# Patient Record
Sex: FEMALE | Race: WHITE | NOT HISPANIC OR LATINO | Employment: OTHER | URBAN - METROPOLITAN AREA
[De-identification: names, ages, dates, MRNs, and addresses within clinical notes are randomized per-mention and may not be internally consistent; named-entity substitution may affect disease eponyms.]

---

## 2017-01-11 ENCOUNTER — TRANSCRIBE ORDERS (OUTPATIENT)
Dept: LAB | Facility: CLINIC | Age: 82
End: 2017-01-11

## 2017-01-11 ENCOUNTER — APPOINTMENT (OUTPATIENT)
Dept: LAB | Facility: CLINIC | Age: 82
End: 2017-01-11
Payer: MEDICARE

## 2017-01-11 DIAGNOSIS — M06.09 RHEUMATOID ARTHRITIS OF MULTIPLE SITES WITHOUT RHEUMATOID FACTOR (HCC): ICD-10-CM

## 2017-01-11 DIAGNOSIS — Z79.899 ENCOUNTER FOR LONG-TERM (CURRENT) USE OF OTHER MEDICATIONS: Primary | ICD-10-CM

## 2017-01-11 DIAGNOSIS — M25.511 CHRONIC RIGHT SHOULDER PAIN: ICD-10-CM

## 2017-01-11 DIAGNOSIS — M75.101 ROTATOR CUFF SYNDROME OF RIGHT SHOULDER: ICD-10-CM

## 2017-01-11 DIAGNOSIS — R76.0 RAISED ANTIBODY TITER: ICD-10-CM

## 2017-01-11 DIAGNOSIS — M81.0 OSTEOPOROSIS, UNSPECIFIED: ICD-10-CM

## 2017-01-11 DIAGNOSIS — M51.36 DEGENERATION OF LUMBAR INTERVERTEBRAL DISC: ICD-10-CM

## 2017-01-11 DIAGNOSIS — M15.0 PRIMARY GENERALIZED HYPERTROPHIC OSTEOARTHROSIS: ICD-10-CM

## 2017-01-11 DIAGNOSIS — G89.29 CHRONIC RIGHT SHOULDER PAIN: ICD-10-CM

## 2017-01-11 DIAGNOSIS — G56.02 CARPAL TUNNEL SYNDROME ON LEFT: ICD-10-CM

## 2017-01-11 DIAGNOSIS — M19.232 SECONDARY OSTEOARTHRITIS OF LEFT WRIST: ICD-10-CM

## 2017-01-11 LAB
ALBUMIN SERPL BCP-MCNC: 3.5 G/DL (ref 3.5–5)
ALT SERPL W P-5'-P-CCNC: 16 U/L (ref 12–78)
ANION GAP SERPL CALCULATED.3IONS-SCNC: 6 MMOL/L (ref 4–13)
AST SERPL W P-5'-P-CCNC: 12 U/L (ref 5–45)
BASOPHILS # BLD AUTO: 0.05 THOUSANDS/ΜL (ref 0–0.1)
BASOPHILS NFR BLD AUTO: 1 % (ref 0–1)
BUN SERPL-MCNC: 16 MG/DL (ref 5–25)
CALCIUM SERPL-MCNC: 9.7 MG/DL (ref 8.3–10.1)
CHLORIDE SERPL-SCNC: 105 MMOL/L (ref 100–108)
CO2 SERPL-SCNC: 29 MMOL/L (ref 21–32)
CREAT SERPL-MCNC: 0.72 MG/DL (ref 0.6–1.3)
EOSINOPHIL # BLD AUTO: 0.12 THOUSAND/ΜL (ref 0–0.61)
EOSINOPHIL NFR BLD AUTO: 2 % (ref 0–6)
ERYTHROCYTE [DISTWIDTH] IN BLOOD BY AUTOMATED COUNT: 14.9 % (ref 11.6–15.1)
ERYTHROCYTE [SEDIMENTATION RATE] IN BLOOD: 16 MM/HOUR (ref 0–20)
GFR SERPL CREATININE-BSD FRML MDRD: >60 ML/MIN/1.73SQ M
GLUCOSE SERPL-MCNC: 101 MG/DL (ref 65–140)
HCT VFR BLD AUTO: 40.4 % (ref 34.8–46.1)
HGB BLD-MCNC: 13.3 G/DL (ref 11.5–15.4)
LYMPHOCYTES # BLD AUTO: 2.05 THOUSANDS/ΜL (ref 0.6–4.47)
LYMPHOCYTES NFR BLD AUTO: 33 % (ref 14–44)
MCH RBC QN AUTO: 32.8 PG (ref 26.8–34.3)
MCHC RBC AUTO-ENTMCNC: 32.9 G/DL (ref 31.4–37.4)
MCV RBC AUTO: 100 FL (ref 82–98)
MONOCYTES # BLD AUTO: 1.76 THOUSAND/ΜL (ref 0.17–1.22)
MONOCYTES NFR BLD AUTO: 28 % (ref 4–12)
NEUTROPHILS # BLD AUTO: 2.25 THOUSANDS/ΜL (ref 1.85–7.62)
NEUTS SEG NFR BLD AUTO: 36 % (ref 43–75)
NRBC BLD AUTO-RTO: 0 /100 WBCS
PLATELET # BLD AUTO: 124 THOUSANDS/UL (ref 149–390)
PMV BLD AUTO: 12.4 FL (ref 8.9–12.7)
POTASSIUM SERPL-SCNC: 3.9 MMOL/L (ref 3.5–5.3)
RBC # BLD AUTO: 4.05 MILLION/UL (ref 3.81–5.12)
SODIUM SERPL-SCNC: 140 MMOL/L (ref 136–145)
WBC # BLD AUTO: 6.25 THOUSAND/UL (ref 4.31–10.16)

## 2017-01-11 PROCEDURE — 85652 RBC SED RATE AUTOMATED: CPT | Performed by: INTERNAL MEDICINE

## 2017-01-11 PROCEDURE — 84450 TRANSFERASE (AST) (SGOT): CPT | Performed by: INTERNAL MEDICINE

## 2017-01-11 PROCEDURE — 85025 COMPLETE CBC W/AUTO DIFF WBC: CPT | Performed by: INTERNAL MEDICINE

## 2017-01-11 PROCEDURE — 36415 COLL VENOUS BLD VENIPUNCTURE: CPT | Performed by: INTERNAL MEDICINE

## 2017-01-11 PROCEDURE — 82040 ASSAY OF SERUM ALBUMIN: CPT | Performed by: INTERNAL MEDICINE

## 2017-01-11 PROCEDURE — 84460 ALANINE AMINO (ALT) (SGPT): CPT | Performed by: INTERNAL MEDICINE

## 2017-01-11 PROCEDURE — 80048 BASIC METABOLIC PNL TOTAL CA: CPT | Performed by: INTERNAL MEDICINE

## 2017-03-29 ENCOUNTER — APPOINTMENT (OUTPATIENT)
Dept: LAB | Facility: CLINIC | Age: 82
End: 2017-03-29
Payer: MEDICARE

## 2017-03-29 ENCOUNTER — TRANSCRIBE ORDERS (OUTPATIENT)
Dept: LAB | Facility: CLINIC | Age: 82
End: 2017-03-29

## 2017-03-29 DIAGNOSIS — M81.0 OSTEOPOROSIS, UNSPECIFIED: ICD-10-CM

## 2017-03-29 DIAGNOSIS — M15.0 PRIMARY GENERALIZED HYPERTROPHIC OSTEOARTHROSIS: ICD-10-CM

## 2017-03-29 DIAGNOSIS — G56.02 CARPAL TUNNEL SYNDROME ON LEFT: ICD-10-CM

## 2017-03-29 DIAGNOSIS — M75.101 ROTATOR CUFF SYNDROME OF RIGHT SHOULDER: ICD-10-CM

## 2017-03-29 DIAGNOSIS — R76.0 RAISED ANTIBODY TITER: ICD-10-CM

## 2017-03-29 DIAGNOSIS — M19.232 SECONDARY OSTEOARTHRITIS OF LEFT WRIST: ICD-10-CM

## 2017-03-29 DIAGNOSIS — M51.36 DEGENERATION OF LUMBAR INTERVERTEBRAL DISC: ICD-10-CM

## 2017-03-29 DIAGNOSIS — M06.09 RHEUMATOID ARTHRITIS OF MULTIPLE SITES WITHOUT RHEUMATOID FACTOR (HCC): ICD-10-CM

## 2017-03-29 DIAGNOSIS — Z79.899 ENCOUNTER FOR LONG-TERM (CURRENT) USE OF OTHER MEDICATIONS: ICD-10-CM

## 2017-03-29 DIAGNOSIS — M25.511 ACUTE PAIN OF RIGHT SHOULDER: ICD-10-CM

## 2017-03-29 DIAGNOSIS — Z79.899 ENCOUNTER FOR LONG-TERM (CURRENT) USE OF OTHER MEDICATIONS: Primary | ICD-10-CM

## 2017-03-29 LAB
ALBUMIN SERPL BCP-MCNC: 3.5 G/DL (ref 3.5–5)
ALT SERPL W P-5'-P-CCNC: 17 U/L (ref 12–78)
ANION GAP SERPL CALCULATED.3IONS-SCNC: 8 MMOL/L (ref 4–13)
AST SERPL W P-5'-P-CCNC: 15 U/L (ref 5–45)
BASOPHILS # BLD AUTO: 0.01 THOUSANDS/ΜL (ref 0–0.1)
BASOPHILS NFR BLD AUTO: 0 % (ref 0–1)
BUN SERPL-MCNC: 21 MG/DL (ref 5–25)
CALCIUM SERPL-MCNC: 10.1 MG/DL (ref 8.3–10.1)
CHLORIDE SERPL-SCNC: 105 MMOL/L (ref 100–108)
CO2 SERPL-SCNC: 29 MMOL/L (ref 21–32)
CREAT SERPL-MCNC: 0.81 MG/DL (ref 0.6–1.3)
EOSINOPHIL # BLD AUTO: 0.09 THOUSAND/ΜL (ref 0–0.61)
EOSINOPHIL NFR BLD AUTO: 1 % (ref 0–6)
ERYTHROCYTE [DISTWIDTH] IN BLOOD BY AUTOMATED COUNT: 15.2 % (ref 11.6–15.1)
ERYTHROCYTE [SEDIMENTATION RATE] IN BLOOD: 11 MM/HOUR (ref 0–20)
GFR SERPL CREATININE-BSD FRML MDRD: >60 ML/MIN/1.73SQ M
GLUCOSE P FAST SERPL-MCNC: 82 MG/DL (ref 65–99)
HCT VFR BLD AUTO: 38.9 % (ref 34.8–46.1)
HGB BLD-MCNC: 12.7 G/DL (ref 11.5–15.4)
LYMPHOCYTES # BLD AUTO: 2.55 THOUSANDS/ΜL (ref 0.6–4.47)
LYMPHOCYTES NFR BLD AUTO: 36 % (ref 14–44)
MCH RBC QN AUTO: 32.7 PG (ref 26.8–34.3)
MCHC RBC AUTO-ENTMCNC: 32.6 G/DL (ref 31.4–37.4)
MCV RBC AUTO: 100 FL (ref 82–98)
MONOCYTES # BLD AUTO: 1.99 THOUSAND/ΜL (ref 0.17–1.22)
MONOCYTES NFR BLD AUTO: 28 % (ref 4–12)
NEUTROPHILS # BLD AUTO: 2.51 THOUSANDS/ΜL (ref 1.85–7.62)
NEUTS SEG NFR BLD AUTO: 35 % (ref 43–75)
NRBC BLD AUTO-RTO: 0 /100 WBCS
PLATELET # BLD AUTO: 138 THOUSANDS/UL (ref 149–390)
PMV BLD AUTO: 12.8 FL (ref 8.9–12.7)
POTASSIUM SERPL-SCNC: 4.4 MMOL/L (ref 3.5–5.3)
RBC # BLD AUTO: 3.88 MILLION/UL (ref 3.81–5.12)
SODIUM SERPL-SCNC: 142 MMOL/L (ref 136–145)
WBC # BLD AUTO: 7.18 THOUSAND/UL (ref 4.31–10.16)

## 2017-03-29 PROCEDURE — 85025 COMPLETE CBC W/AUTO DIFF WBC: CPT | Performed by: INTERNAL MEDICINE

## 2017-03-29 PROCEDURE — 84450 TRANSFERASE (AST) (SGOT): CPT

## 2017-03-29 PROCEDURE — 84460 ALANINE AMINO (ALT) (SGPT): CPT | Performed by: INTERNAL MEDICINE

## 2017-03-29 PROCEDURE — 80048 BASIC METABOLIC PNL TOTAL CA: CPT | Performed by: INTERNAL MEDICINE

## 2017-03-29 PROCEDURE — 85652 RBC SED RATE AUTOMATED: CPT | Performed by: INTERNAL MEDICINE

## 2017-03-29 PROCEDURE — 36415 COLL VENOUS BLD VENIPUNCTURE: CPT | Performed by: INTERNAL MEDICINE

## 2017-03-29 PROCEDURE — 82040 ASSAY OF SERUM ALBUMIN: CPT

## 2017-06-27 ENCOUNTER — APPOINTMENT (OUTPATIENT)
Dept: LAB | Facility: CLINIC | Age: 82
End: 2017-06-27
Payer: MEDICARE

## 2017-06-27 ENCOUNTER — TRANSCRIBE ORDERS (OUTPATIENT)
Dept: LAB | Facility: CLINIC | Age: 82
End: 2017-06-27

## 2017-06-27 DIAGNOSIS — Z79.899 ENCOUNTER FOR LONG-TERM (CURRENT) USE OF OTHER MEDICATIONS: ICD-10-CM

## 2017-06-27 DIAGNOSIS — M51.36 DEGENERATION OF LUMBAR INTERVERTEBRAL DISC: ICD-10-CM

## 2017-06-27 DIAGNOSIS — M19.232 SECONDARY OSTEOARTHRITIS OF LEFT WRIST: ICD-10-CM

## 2017-06-27 DIAGNOSIS — M15.0 PRIMARY GENERALIZED HYPERTROPHIC OSTEOARTHROSIS: ICD-10-CM

## 2017-06-27 DIAGNOSIS — M81.0 SENILE OSTEOPOROSIS: ICD-10-CM

## 2017-06-27 DIAGNOSIS — M25.511 RIGHT SHOULDER PAIN, UNSPECIFIED CHRONICITY: ICD-10-CM

## 2017-06-27 DIAGNOSIS — M06.09 RHEUMATOID ARTHRITIS OF MULTIPLE SITES WITHOUT RHEUMATOID FACTOR (HCC): Primary | ICD-10-CM

## 2017-06-27 DIAGNOSIS — M06.09 RHEUMATOID ARTHRITIS OF MULTIPLE SITES WITHOUT RHEUMATOID FACTOR (HCC): ICD-10-CM

## 2017-06-27 DIAGNOSIS — M75.101 ROTATOR CUFF SYNDROME OF RIGHT SHOULDER: ICD-10-CM

## 2017-06-27 DIAGNOSIS — R76.0 RAISED ANTIBODY TITER: ICD-10-CM

## 2017-06-27 DIAGNOSIS — G56.02 CARPAL TUNNEL SYNDROME ON LEFT: ICD-10-CM

## 2017-06-27 LAB
ALBUMIN SERPL BCP-MCNC: 3.6 G/DL (ref 3.5–5)
ALBUMIN SERPL BCP-MCNC: 3.7 G/DL (ref 3.5–5)
ALT SERPL W P-5'-P-CCNC: 15 U/L (ref 12–78)
ANION GAP SERPL CALCULATED.3IONS-SCNC: 6 MMOL/L (ref 4–13)
AST SERPL W P-5'-P-CCNC: 21 U/L (ref 5–45)
BASOPHILS # BLD AUTO: 0.02 THOUSANDS/ΜL (ref 0–0.1)
BASOPHILS NFR BLD AUTO: 0 % (ref 0–1)
BUN SERPL-MCNC: 19 MG/DL (ref 5–25)
CALCIUM ALBUM COR SERPL-MCNC: 10.4 MG/DL (ref 8.3–10.1)
CALCIUM SERPL-MCNC: 10.2 MD/DL (ref 8.3–10.1)
CALCIUM SERPL-MCNC: 10.2 MG/DL (ref 8.3–10.1)
CHLORIDE SERPL-SCNC: 105 MMOL/L (ref 100–108)
CO2 SERPL-SCNC: 29 MMOL/L (ref 21–32)
CREAT SERPL-MCNC: 0.72 MG/DL (ref 0.6–1.3)
EOSINOPHIL # BLD AUTO: 0.07 THOUSAND/ΜL (ref 0–0.61)
EOSINOPHIL NFR BLD AUTO: 1 % (ref 0–6)
ERYTHROCYTE [DISTWIDTH] IN BLOOD BY AUTOMATED COUNT: 14.7 % (ref 11.6–15.1)
ERYTHROCYTE [SEDIMENTATION RATE] IN BLOOD: 11 MM/HOUR (ref 0–20)
GFR SERPL CREATININE-BSD FRML MDRD: >60 ML/MIN/1.73SQ M
GLUCOSE P FAST SERPL-MCNC: 101 MG/DL (ref 65–99)
HCT VFR BLD AUTO: 39.7 % (ref 34.8–46.1)
HGB BLD-MCNC: 13.2 G/DL (ref 11.5–15.4)
LYMPHOCYTES # BLD AUTO: 2.68 THOUSANDS/ΜL (ref 0.6–4.47)
LYMPHOCYTES NFR BLD AUTO: 37 % (ref 14–44)
MCH RBC QN AUTO: 32.8 PG (ref 26.8–34.3)
MCHC RBC AUTO-ENTMCNC: 33.2 G/DL (ref 31.4–37.4)
MCV RBC AUTO: 99 FL (ref 82–98)
MONOCYTES # BLD AUTO: 1.5 THOUSAND/ΜL (ref 0.17–1.22)
MONOCYTES NFR BLD AUTO: 21 % (ref 4–12)
NEUTROPHILS # BLD AUTO: 2.92 THOUSANDS/ΜL (ref 1.85–7.62)
NEUTS SEG NFR BLD AUTO: 41 % (ref 43–75)
NRBC BLD AUTO-RTO: 0 /100 WBCS
PLATELET # BLD AUTO: 139 THOUSANDS/UL (ref 149–390)
PMV BLD AUTO: 11.9 FL (ref 8.9–12.7)
POTASSIUM SERPL-SCNC: 4.2 MMOL/L (ref 3.5–5.3)
RBC # BLD AUTO: 4.02 MILLION/UL (ref 3.81–5.12)
SODIUM SERPL-SCNC: 140 MMOL/L (ref 136–145)
WBC # BLD AUTO: 7.21 THOUSAND/UL (ref 4.31–10.16)

## 2017-06-27 PROCEDURE — 80048 BASIC METABOLIC PNL TOTAL CA: CPT | Performed by: INTERNAL MEDICINE

## 2017-06-27 PROCEDURE — 85025 COMPLETE CBC W/AUTO DIFF WBC: CPT | Performed by: INTERNAL MEDICINE

## 2017-06-27 PROCEDURE — 85652 RBC SED RATE AUTOMATED: CPT | Performed by: INTERNAL MEDICINE

## 2017-06-27 PROCEDURE — 82040 ASSAY OF SERUM ALBUMIN: CPT | Performed by: INTERNAL MEDICINE

## 2017-06-27 PROCEDURE — 84460 ALANINE AMINO (ALT) (SGPT): CPT | Performed by: INTERNAL MEDICINE

## 2017-06-27 PROCEDURE — 84450 TRANSFERASE (AST) (SGOT): CPT

## 2017-06-27 PROCEDURE — 82040 ASSAY OF SERUM ALBUMIN: CPT

## 2017-06-27 PROCEDURE — 82310 ASSAY OF CALCIUM: CPT | Performed by: INTERNAL MEDICINE

## 2017-06-27 PROCEDURE — 36415 COLL VENOUS BLD VENIPUNCTURE: CPT | Performed by: INTERNAL MEDICINE

## 2017-07-24 ENCOUNTER — TRANSCRIBE ORDERS (OUTPATIENT)
Dept: LAB | Facility: CLINIC | Age: 82
End: 2017-07-24

## 2017-07-24 ENCOUNTER — APPOINTMENT (OUTPATIENT)
Dept: LAB | Facility: CLINIC | Age: 82
End: 2017-07-24
Payer: MEDICARE

## 2017-07-24 DIAGNOSIS — M15.0 PRIMARY GENERALIZED HYPERTROPHIC OSTEOARTHROSIS: ICD-10-CM

## 2017-07-24 DIAGNOSIS — G56.02 CARPAL TUNNEL SYNDROME ON LEFT: ICD-10-CM

## 2017-07-24 DIAGNOSIS — Z79.899 ENCOUNTER FOR LONG-TERM (CURRENT) USE OF OTHER MEDICATIONS: ICD-10-CM

## 2017-07-24 DIAGNOSIS — M81.0 SENILE OSTEOPOROSIS: ICD-10-CM

## 2017-07-24 DIAGNOSIS — M75.101 ROTATOR CUFF SYNDROME OF RIGHT SHOULDER: ICD-10-CM

## 2017-07-24 DIAGNOSIS — M54.40 ACUTE LOW BACK PAIN WITH SCIATICA, SCIATICA LATERALITY UNSPECIFIED, UNSPECIFIED BACK PAIN LATERALITY: ICD-10-CM

## 2017-07-24 DIAGNOSIS — M19.232 SECONDARY OSTEOARTHRITIS OF LEFT WRIST: ICD-10-CM

## 2017-07-24 DIAGNOSIS — M25.511 RIGHT SHOULDER PAIN, UNSPECIFIED CHRONICITY: ICD-10-CM

## 2017-07-24 DIAGNOSIS — M06.09 RHEUMATOID ARTHRITIS OF MULTIPLE SITES WITHOUT RHEUMATOID FACTOR (HCC): Primary | ICD-10-CM

## 2017-07-24 DIAGNOSIS — M51.36 DEGENERATION OF LUMBAR INTERVERTEBRAL DISC: ICD-10-CM

## 2017-07-24 DIAGNOSIS — M06.09 RHEUMATOID ARTHRITIS OF MULTIPLE SITES WITHOUT RHEUMATOID FACTOR (HCC): ICD-10-CM

## 2017-07-24 LAB
ALBUMIN SERPL BCP-MCNC: 3.6 G/DL (ref 3.5–5)
ALT SERPL W P-5'-P-CCNC: 16 U/L (ref 12–78)
ANION GAP SERPL CALCULATED.3IONS-SCNC: 6 MMOL/L (ref 4–13)
AST SERPL W P-5'-P-CCNC: 14 U/L (ref 5–45)
BASOPHILS # BLD AUTO: 0.03 THOUSANDS/ΜL (ref 0–0.1)
BASOPHILS NFR BLD AUTO: 0 % (ref 0–1)
BUN SERPL-MCNC: 20 MG/DL (ref 5–25)
CALCIUM SERPL-MCNC: 9.8 MG/DL (ref 8.3–10.1)
CHLORIDE SERPL-SCNC: 105 MMOL/L (ref 100–108)
CO2 SERPL-SCNC: 28 MMOL/L (ref 21–32)
CREAT SERPL-MCNC: 0.76 MG/DL (ref 0.6–1.3)
EOSINOPHIL # BLD AUTO: 0.1 THOUSAND/ΜL (ref 0–0.61)
EOSINOPHIL NFR BLD AUTO: 1 % (ref 0–6)
ERYTHROCYTE [DISTWIDTH] IN BLOOD BY AUTOMATED COUNT: 14.3 % (ref 11.6–15.1)
ERYTHROCYTE [SEDIMENTATION RATE] IN BLOOD: 14 MM/HOUR (ref 0–20)
GFR SERPL CREATININE-BSD FRML MDRD: 71 ML/MIN/1.73SQ M
GLUCOSE P FAST SERPL-MCNC: 95 MG/DL (ref 65–99)
HCT VFR BLD AUTO: 39.7 % (ref 34.8–46.1)
HGB BLD-MCNC: 13.2 G/DL (ref 11.5–15.4)
LYMPHOCYTES # BLD AUTO: 2.87 THOUSANDS/ΜL (ref 0.6–4.47)
LYMPHOCYTES NFR BLD AUTO: 34 % (ref 14–44)
MCH RBC QN AUTO: 32.4 PG (ref 26.8–34.3)
MCHC RBC AUTO-ENTMCNC: 33.2 G/DL (ref 31.4–37.4)
MCV RBC AUTO: 98 FL (ref 82–98)
MONOCYTES # BLD AUTO: 1.95 THOUSAND/ΜL (ref 0.17–1.22)
MONOCYTES NFR BLD AUTO: 23 % (ref 4–12)
NEUTROPHILS # BLD AUTO: 3.55 THOUSANDS/ΜL (ref 1.85–7.62)
NEUTS SEG NFR BLD AUTO: 42 % (ref 43–75)
NRBC BLD AUTO-RTO: 0 /100 WBCS
PLATELET # BLD AUTO: 136 THOUSANDS/UL (ref 149–390)
PMV BLD AUTO: 12.3 FL (ref 8.9–12.7)
POTASSIUM SERPL-SCNC: 4.1 MMOL/L (ref 3.5–5.3)
RBC # BLD AUTO: 4.07 MILLION/UL (ref 3.81–5.12)
SODIUM SERPL-SCNC: 139 MMOL/L (ref 136–145)
WBC # BLD AUTO: 8.53 THOUSAND/UL (ref 4.31–10.16)

## 2017-07-24 PROCEDURE — 82040 ASSAY OF SERUM ALBUMIN: CPT

## 2017-07-24 PROCEDURE — 36415 COLL VENOUS BLD VENIPUNCTURE: CPT | Performed by: INTERNAL MEDICINE

## 2017-07-24 PROCEDURE — 85652 RBC SED RATE AUTOMATED: CPT | Performed by: INTERNAL MEDICINE

## 2017-07-24 PROCEDURE — 84450 TRANSFERASE (AST) (SGOT): CPT

## 2017-07-24 PROCEDURE — 85025 COMPLETE CBC W/AUTO DIFF WBC: CPT | Performed by: INTERNAL MEDICINE

## 2017-07-24 PROCEDURE — 80048 BASIC METABOLIC PNL TOTAL CA: CPT | Performed by: INTERNAL MEDICINE

## 2017-07-24 PROCEDURE — 84460 ALANINE AMINO (ALT) (SGPT): CPT | Performed by: INTERNAL MEDICINE

## 2017-07-27 ENCOUNTER — TRANSCRIBE ORDERS (OUTPATIENT)
Dept: RADIOLOGY | Facility: CLINIC | Age: 82
End: 2017-07-27

## 2017-07-27 ENCOUNTER — ALLSCRIPTS OFFICE VISIT (OUTPATIENT)
Dept: OTHER | Facility: OTHER | Age: 82
End: 2017-07-27

## 2017-07-27 ENCOUNTER — APPOINTMENT (OUTPATIENT)
Dept: RADIOLOGY | Facility: CLINIC | Age: 82
End: 2017-07-27
Payer: MEDICARE

## 2017-07-27 DIAGNOSIS — N83.202 CYST OF LEFT OVARY: ICD-10-CM

## 2017-07-27 DIAGNOSIS — Z91.81 HISTORY OF FALL: ICD-10-CM

## 2017-07-27 DIAGNOSIS — R93.89 ABNORMAL FINDINGS ON DIAGNOSTIC IMAGING OF OTHER SPECIFIED BODY STRUCTURES: ICD-10-CM

## 2017-07-27 DIAGNOSIS — E04.1 NONTOXIC SINGLE THYROID NODULE: ICD-10-CM

## 2017-07-27 DIAGNOSIS — Z91.81 HISTORY OF FALL: Primary | ICD-10-CM

## 2017-07-27 PROCEDURE — 72100 X-RAY EXAM L-S SPINE 2/3 VWS: CPT

## 2017-07-27 PROCEDURE — 72170 X-RAY EXAM OF PELVIS: CPT

## 2017-08-01 ENCOUNTER — HOSPITAL ENCOUNTER (OUTPATIENT)
Dept: RADIOLOGY | Facility: CLINIC | Age: 82
Discharge: HOME | End: 2017-08-01
Payer: MEDICARE

## 2017-08-01 DIAGNOSIS — E04.1 NONTOXIC SINGLE THYROID NODULE: ICD-10-CM

## 2017-08-01 DIAGNOSIS — N83.202 CYST OF LEFT OVARY: ICD-10-CM

## 2017-08-01 PROCEDURE — 76830 TRANSVAGINAL US NON-OB: CPT

## 2017-08-01 PROCEDURE — 76856 US EXAM PELVIC COMPLETE: CPT

## 2017-08-01 PROCEDURE — 76536 US EXAM OF HEAD AND NECK: CPT

## 2017-08-02 ENCOUNTER — GENERIC CONVERSION - ENCOUNTER (OUTPATIENT)
Dept: OTHER | Facility: OTHER | Age: 82
End: 2017-08-02

## 2017-08-07 ENCOUNTER — HOSPITAL ENCOUNTER (OUTPATIENT)
Dept: RADIOLOGY | Facility: HOSPITAL | Age: 82
Discharge: HOME | End: 2017-08-07
Payer: MEDICARE

## 2017-08-07 DIAGNOSIS — R93.89 ABNORMAL FINDINGS ON DIAGNOSTIC IMAGING OF OTHER SPECIFIED BODY STRUCTURES: ICD-10-CM

## 2017-08-07 DIAGNOSIS — E04.1 NONTOXIC SINGLE THYROID NODULE: ICD-10-CM

## 2017-08-07 PROCEDURE — 76942 ECHO GUIDE FOR BIOPSY: CPT

## 2017-08-07 PROCEDURE — 10022 HB FNA W/IMAGE: CPT

## 2017-08-07 PROCEDURE — 88173 CYTOPATH EVAL FNA REPORT: CPT | Performed by: FAMILY MEDICINE

## 2017-08-11 ENCOUNTER — GENERIC CONVERSION - ENCOUNTER (OUTPATIENT)
Dept: OTHER | Facility: OTHER | Age: 82
End: 2017-08-11

## 2017-09-01 ENCOUNTER — APPOINTMENT (OUTPATIENT)
Dept: LAB | Facility: CLINIC | Age: 82
End: 2017-09-01
Payer: MEDICARE

## 2017-09-01 ENCOUNTER — TRANSCRIBE ORDERS (OUTPATIENT)
Dept: LAB | Facility: CLINIC | Age: 82
End: 2017-09-01

## 2017-09-01 DIAGNOSIS — M51.36 DEGENERATION OF LUMBAR INTERVERTEBRAL DISC: ICD-10-CM

## 2017-09-01 DIAGNOSIS — G56.02 CARPAL TUNNEL SYNDROME ON LEFT: ICD-10-CM

## 2017-09-01 DIAGNOSIS — M06.09 RHEUMATOID ARTHRITIS OF MULTIPLE SITES WITHOUT RHEUMATOID FACTOR (HCC): ICD-10-CM

## 2017-09-01 DIAGNOSIS — M75.101 ROTATOR CUFF SYNDROME OF RIGHT SHOULDER: ICD-10-CM

## 2017-09-01 DIAGNOSIS — M81.0 SENILE OSTEOPOROSIS: ICD-10-CM

## 2017-09-01 DIAGNOSIS — M06.09 RHEUMATOID ARTHRITIS OF MULTIPLE SITES WITHOUT RHEUMATOID FACTOR (HCC): Primary | ICD-10-CM

## 2017-09-01 DIAGNOSIS — M15.0 PRIMARY GENERALIZED HYPERTROPHIC OSTEOARTHROSIS: ICD-10-CM

## 2017-09-01 LAB
ALBUMIN SERPL BCP-MCNC: 3.3 G/DL (ref 3.5–5)
ALT SERPL W P-5'-P-CCNC: 13 U/L (ref 12–78)
ANION GAP SERPL CALCULATED.3IONS-SCNC: 6 MMOL/L (ref 4–13)
AST SERPL W P-5'-P-CCNC: 17 U/L (ref 5–45)
BASOPHILS # BLD AUTO: 0.03 THOUSANDS/ΜL (ref 0–0.1)
BASOPHILS NFR BLD AUTO: 0 % (ref 0–1)
BUN SERPL-MCNC: 26 MG/DL (ref 5–25)
CALCIUM SERPL-MCNC: 10 MG/DL (ref 8.3–10.1)
CHLORIDE SERPL-SCNC: 105 MMOL/L (ref 100–108)
CO2 SERPL-SCNC: 27 MMOL/L (ref 21–32)
CREAT SERPL-MCNC: 0.9 MG/DL (ref 0.6–1.3)
EOSINOPHIL # BLD AUTO: 0.04 THOUSAND/ΜL (ref 0–0.61)
EOSINOPHIL NFR BLD AUTO: 1 % (ref 0–6)
ERYTHROCYTE [DISTWIDTH] IN BLOOD BY AUTOMATED COUNT: 14.8 % (ref 11.6–15.1)
ERYTHROCYTE [SEDIMENTATION RATE] IN BLOOD: 16 MM/HOUR (ref 0–20)
GFR SERPL CREATININE-BSD FRML MDRD: 58 ML/MIN/1.73SQ M
GLUCOSE P FAST SERPL-MCNC: 104 MG/DL (ref 65–99)
HCT VFR BLD AUTO: 39.2 % (ref 34.8–46.1)
HGB BLD-MCNC: 13.2 G/DL (ref 11.5–15.4)
LYMPHOCYTES # BLD AUTO: 2.91 THOUSANDS/ΜL (ref 0.6–4.47)
LYMPHOCYTES NFR BLD AUTO: 36 % (ref 14–44)
MCH RBC QN AUTO: 32.9 PG (ref 26.8–34.3)
MCHC RBC AUTO-ENTMCNC: 33.7 G/DL (ref 31.4–37.4)
MCV RBC AUTO: 98 FL (ref 82–98)
MONOCYTES # BLD AUTO: 1.98 THOUSAND/ΜL (ref 0.17–1.22)
MONOCYTES NFR BLD AUTO: 25 % (ref 4–12)
NEUTROPHILS # BLD AUTO: 3.07 THOUSANDS/ΜL (ref 1.85–7.62)
NEUTS SEG NFR BLD AUTO: 38 % (ref 43–75)
NRBC BLD AUTO-RTO: 0 /100 WBCS
PLATELET # BLD AUTO: 162 THOUSANDS/UL (ref 149–390)
PMV BLD AUTO: 12.4 FL (ref 8.9–12.7)
POTASSIUM SERPL-SCNC: 4.2 MMOL/L (ref 3.5–5.3)
RBC # BLD AUTO: 4.01 MILLION/UL (ref 3.81–5.12)
SODIUM SERPL-SCNC: 138 MMOL/L (ref 136–145)
WBC # BLD AUTO: 8.07 THOUSAND/UL (ref 4.31–10.16)

## 2017-09-01 PROCEDURE — 85652 RBC SED RATE AUTOMATED: CPT

## 2017-09-01 PROCEDURE — 36415 COLL VENOUS BLD VENIPUNCTURE: CPT

## 2017-09-01 PROCEDURE — 84450 TRANSFERASE (AST) (SGOT): CPT

## 2017-09-01 PROCEDURE — 80048 BASIC METABOLIC PNL TOTAL CA: CPT

## 2017-09-01 PROCEDURE — 82040 ASSAY OF SERUM ALBUMIN: CPT

## 2017-09-01 PROCEDURE — 84460 ALANINE AMINO (ALT) (SGPT): CPT

## 2017-09-01 PROCEDURE — 85025 COMPLETE CBC W/AUTO DIFF WBC: CPT

## 2018-01-12 NOTE — RESULT NOTES
Discussion/Summary   u/s of abdomen shows no mass  we can perform a ct scan to take a closer look  Also recommendation made to biopsy thyroid nodule  I will place an order for needle guided biopsy which can be done at [address] [place] [address] interventional radiology dept  Call cent scheduling to schedule  schedule f/u ov to discuss biopsy results  Verified Results  * US PELVIS COMPLETE (TRANSABDOMINAL AND TRANSVAGINAL) [id number] 08:54AM Rad Tian Order Number: [id number]    - Patient Instructions: To schedule this appointment, please contact Central Scheduling at [phone]  Test Name Result Flag Reference   US PELVIS COMPLETE (TRANSABDOMINAL AND TRANSVAGINAL) (Report)     PELVIC ULTRASOUND, COMPLETE     INDICATION: 51-year-old woman  Outside CT from 6/20/2017 demonstrated 2 9 cm left adnexal mass  COMPARISON: None  TECHNIQUE:  Transabdominal pelvic ultrasound was performed in sagittal and transverse planes with a curvilinear transducer  Additional transvaginal imaging was performed to better evaluate the endometrium and ovaries  Imaging included volumetric    sweeps as well as traditional still imaging technique  FINDINGS:     UTERUS:   Position: Retroflexed  Size: 5 0 x 2 5 x 2 6 cm  Myometrium: Normal contour and echogenicity  No masses  Cervix: Normal in appearance  ENDOMETRIUM:    Thickness: Normal in thickness, measuring 3 mm in maximal thickness  Echotexture: Normal and homogenous in echogenicity with no evidence of endometrial mass or fluid collection  OVARIES/ADNEXA:   Right ovary: Not identified  Left ovary: 2 5 x 1 2 x 1 4 cm  No evidence of left ovarian cyst or solid mass  Doppler flow within normal limits  No suspicious adnexal mass  Free fluid: None  IMPRESSION:      1  Right ovary not identified  2  Normal appearing atrophic uterus  3  Normal-appearing left ovary with no evidence of left adnexal mass            Workstation performed: [id number]     Signed by:   Jhony Bahena MD   8/2/17     US THYROID [id number] 08:54AM Anca Crest Order Number: [id number]    - Patient Instructions: To schedule this appointment, please contact Central Scheduling at [phone]  Test Name Result Flag Reference   US THYROID (Report)     THYROID ULTRASOUND     INDICATION: Thyroid nodule seen on prior CT scan  COMPARISON: None  TECHNIQUE:  Ultrasound of the thyroid was performed with a high frequency linear transducer in transverse and sagittal planes including volumetric imaging sweeps as well as traditional still imaging technique  FINDINGS:   Heterogeneous bilaterally  Right gland: 4 0 x 1 6 x 2 3 cm  In the upper pole a solid nodule measures 2 1 x 1 4 x 2 3 cm  This has a central hypoechoic area  This is vascular with no microcalcifications  In the lower pole a calcified nodule measures 1 1 x 1 0 x 1 2 cm  This has peripheral vascularity and peripheral calcification with no microcalcifications  Left gland: 2 4 x 1 2 x 1 4 cm  No dominant nodules  Left lobe is heterogeneous with scattered calcifications  Isthmus: The isthmus is 0 22 cm in AP dimension  IMPRESSION:      There are 2 nodules on the right  Recommend biopsy of the largest nodule  Heterogeneous left lobe  Recommend close follow-up  ##sigslh##sigslh       Workstation performed: [id number]     Signed by:   Tamia Anderson MD   8/2/17       Signatures   Electronically signed by : DEBRA Abbott;  Aug  2 2017 10:19AM EST                       (Author)

## 2018-01-15 VITALS
RESPIRATION RATE: 16 BRPM | TEMPERATURE: 98.5 F | SYSTOLIC BLOOD PRESSURE: 120 MMHG | HEIGHT: 63 IN | DIASTOLIC BLOOD PRESSURE: 70 MMHG | WEIGHT: 141 LBS | BODY MASS INDEX: 24.98 KG/M2 | HEART RATE: 78 BPM

## 2018-01-18 ENCOUNTER — APPOINTMENT (OUTPATIENT)
Dept: LAB | Facility: CLINIC | Age: 83
End: 2018-01-18
Payer: MEDICARE

## 2018-01-18 ENCOUNTER — TRANSCRIBE ORDERS (OUTPATIENT)
Dept: LAB | Facility: CLINIC | Age: 83
End: 2018-01-18

## 2018-01-18 DIAGNOSIS — M51.36 DEGENERATION OF LUMBAR INTERVERTEBRAL DISC: ICD-10-CM

## 2018-01-18 DIAGNOSIS — M81.0 SENILE OSTEOPOROSIS: ICD-10-CM

## 2018-01-18 DIAGNOSIS — E04.1 THYROID NODULE: ICD-10-CM

## 2018-01-18 DIAGNOSIS — M19.232 SECONDARY OSTEOARTHRITIS OF LEFT WRIST: ICD-10-CM

## 2018-01-18 DIAGNOSIS — R76.0 RAISED ANTIBODY TITER: ICD-10-CM

## 2018-01-18 DIAGNOSIS — M75.101 ROTATOR CUFF SYNDROME OF RIGHT SHOULDER: ICD-10-CM

## 2018-01-18 DIAGNOSIS — M25.511 RIGHT SHOULDER PAIN, UNSPECIFIED CHRONICITY: ICD-10-CM

## 2018-01-18 DIAGNOSIS — M06.09 RHEUMATOID ARTHRITIS OF MULTIPLE SITES WITHOUT RHEUMATOID FACTOR (HCC): ICD-10-CM

## 2018-01-18 DIAGNOSIS — G56.02 CARPAL TUNNEL SYNDROME ON LEFT: ICD-10-CM

## 2018-01-18 DIAGNOSIS — M15.0 PRIMARY GENERALIZED HYPERTROPHIC OSTEOARTHROSIS: ICD-10-CM

## 2018-01-18 DIAGNOSIS — M06.09 RHEUMATOID ARTHRITIS OF MULTIPLE SITES WITHOUT RHEUMATOID FACTOR (HCC): Primary | ICD-10-CM

## 2018-01-18 LAB
ALBUMIN SERPL BCP-MCNC: 3.6 G/DL (ref 3.5–5)
ALT SERPL W P-5'-P-CCNC: 14 U/L (ref 12–78)
ANION GAP SERPL CALCULATED.3IONS-SCNC: 7 MMOL/L (ref 4–13)
AST SERPL W P-5'-P-CCNC: 14 U/L (ref 5–45)
BASOPHILS # BLD AUTO: 0.03 THOUSANDS/ΜL (ref 0–0.1)
BASOPHILS NFR BLD AUTO: 0 % (ref 0–1)
BUN SERPL-MCNC: 20 MG/DL (ref 5–25)
CALCIUM SERPL-MCNC: 9.7 MG/DL (ref 8.3–10.1)
CHLORIDE SERPL-SCNC: 106 MMOL/L (ref 100–108)
CO2 SERPL-SCNC: 28 MMOL/L (ref 21–32)
CREAT SERPL-MCNC: 0.85 MG/DL (ref 0.6–1.3)
EOSINOPHIL # BLD AUTO: 0.07 THOUSAND/ΜL (ref 0–0.61)
EOSINOPHIL NFR BLD AUTO: 1 % (ref 0–6)
ERYTHROCYTE [DISTWIDTH] IN BLOOD BY AUTOMATED COUNT: 14.5 % (ref 11.6–15.1)
ERYTHROCYTE [SEDIMENTATION RATE] IN BLOOD: 12 MM/HOUR (ref 0–20)
GFR SERPL CREATININE-BSD FRML MDRD: 61 ML/MIN/1.73SQ M
GLUCOSE SERPL-MCNC: 115 MG/DL (ref 65–140)
HCT VFR BLD AUTO: 39.9 % (ref 34.8–46.1)
HGB BLD-MCNC: 13.4 G/DL (ref 11.5–15.4)
LYMPHOCYTES # BLD AUTO: 2.3 THOUSANDS/ΜL (ref 0.6–4.47)
LYMPHOCYTES NFR BLD AUTO: 31 % (ref 14–44)
MCH RBC QN AUTO: 33.1 PG (ref 26.8–34.3)
MCHC RBC AUTO-ENTMCNC: 33.6 G/DL (ref 31.4–37.4)
MCV RBC AUTO: 99 FL (ref 82–98)
MONOCYTES # BLD AUTO: 1.9 THOUSAND/ΜL (ref 0.17–1.22)
MONOCYTES NFR BLD AUTO: 26 % (ref 4–12)
NEUTROPHILS # BLD AUTO: 3 THOUSANDS/ΜL (ref 1.85–7.62)
NEUTS SEG NFR BLD AUTO: 42 % (ref 43–75)
NRBC BLD AUTO-RTO: 0 /100 WBCS
PLATELET # BLD AUTO: 118 THOUSANDS/UL (ref 149–390)
PMV BLD AUTO: 12.5 FL (ref 8.9–12.7)
POTASSIUM SERPL-SCNC: 3.7 MMOL/L (ref 3.5–5.3)
RBC # BLD AUTO: 4.05 MILLION/UL (ref 3.81–5.12)
SODIUM SERPL-SCNC: 141 MMOL/L (ref 136–145)
WBC # BLD AUTO: 7.32 THOUSAND/UL (ref 4.31–10.16)

## 2018-01-18 PROCEDURE — 80048 BASIC METABOLIC PNL TOTAL CA: CPT | Performed by: INTERNAL MEDICINE

## 2018-01-18 PROCEDURE — 36415 COLL VENOUS BLD VENIPUNCTURE: CPT | Performed by: INTERNAL MEDICINE

## 2018-01-18 PROCEDURE — 82040 ASSAY OF SERUM ALBUMIN: CPT

## 2018-01-18 PROCEDURE — 84450 TRANSFERASE (AST) (SGOT): CPT

## 2018-01-18 PROCEDURE — 84460 ALANINE AMINO (ALT) (SGPT): CPT | Performed by: INTERNAL MEDICINE

## 2018-01-18 PROCEDURE — 85652 RBC SED RATE AUTOMATED: CPT | Performed by: INTERNAL MEDICINE

## 2018-01-18 PROCEDURE — 85025 COMPLETE CBC W/AUTO DIFF WBC: CPT | Performed by: INTERNAL MEDICINE

## 2018-02-20 ENCOUNTER — TRANSCRIBE ORDERS (OUTPATIENT)
Dept: LAB | Facility: CLINIC | Age: 83
End: 2018-02-20

## 2018-02-20 DIAGNOSIS — M54.40 ACUTE LOW BACK PAIN WITH SCIATICA, SCIATICA LATERALITY UNSPECIFIED, UNSPECIFIED BACK PAIN LATERALITY: ICD-10-CM

## 2018-02-20 DIAGNOSIS — M81.0 SENILE OSTEOPOROSIS: ICD-10-CM

## 2018-02-20 DIAGNOSIS — M75.101 ROTATOR CUFF SYNDROME OF RIGHT SHOULDER: ICD-10-CM

## 2018-02-20 DIAGNOSIS — M15.0 PRIMARY GENERALIZED HYPERTROPHIC OSTEOARTHROSIS: ICD-10-CM

## 2018-02-20 DIAGNOSIS — M25.511 RIGHT SHOULDER PAIN, UNSPECIFIED CHRONICITY: ICD-10-CM

## 2018-02-20 DIAGNOSIS — M51.36 DEGENERATION OF LUMBAR INTERVERTEBRAL DISC: ICD-10-CM

## 2018-02-20 DIAGNOSIS — G56.03 CARPAL TUNNEL SYNDROME, BILATERAL: ICD-10-CM

## 2018-02-20 DIAGNOSIS — M19.232 SECONDARY OSTEOARTHRITIS OF LEFT WRIST: ICD-10-CM

## 2018-02-20 DIAGNOSIS — R76.0 RAISED ANTIBODY TITER: ICD-10-CM

## 2018-02-20 DIAGNOSIS — M06.09 RHEUMATOID ARTHRITIS OF MULTIPLE SITES WITHOUT RHEUMATOID FACTOR (HCC): Primary | ICD-10-CM

## 2018-04-16 ENCOUNTER — TRANSCRIBE ORDERS (OUTPATIENT)
Dept: LAB | Facility: CLINIC | Age: 83
End: 2018-04-16

## 2018-04-16 ENCOUNTER — APPOINTMENT (OUTPATIENT)
Dept: LAB | Facility: CLINIC | Age: 83
End: 2018-04-16
Payer: MEDICARE

## 2018-04-16 DIAGNOSIS — G89.29 CHRONIC RIGHT SHOULDER PAIN: ICD-10-CM

## 2018-04-16 DIAGNOSIS — M75.101 ROTATOR CUFF SYNDROME OF RIGHT SHOULDER: ICD-10-CM

## 2018-04-16 DIAGNOSIS — M51.36 DEGENERATION OF LUMBAR INTERVERTEBRAL DISC: ICD-10-CM

## 2018-04-16 DIAGNOSIS — M81.0 SENILE OSTEOPOROSIS: ICD-10-CM

## 2018-04-16 DIAGNOSIS — M15.0 PRIMARY GENERALIZED HYPERTROPHIC OSTEOARTHROSIS: ICD-10-CM

## 2018-04-16 DIAGNOSIS — G56.02 CARPAL TUNNEL SYNDROME ON LEFT: ICD-10-CM

## 2018-04-16 DIAGNOSIS — M19.232 SECONDARY OSTEOARTHRITIS OF LEFT WRIST: ICD-10-CM

## 2018-04-16 DIAGNOSIS — M54.50 PAIN, LUMBAR REGION: ICD-10-CM

## 2018-04-16 DIAGNOSIS — M06.09 RHEUMATOID ARTHRITIS OF MULTIPLE SITES WITHOUT RHEUMATOID FACTOR (HCC): ICD-10-CM

## 2018-04-16 DIAGNOSIS — M06.09 RHEUMATOID ARTHRITIS OF MULTIPLE SITES WITHOUT RHEUMATOID FACTOR (HCC): Primary | ICD-10-CM

## 2018-04-16 DIAGNOSIS — M25.511 CHRONIC RIGHT SHOULDER PAIN: ICD-10-CM

## 2018-04-16 DIAGNOSIS — R76.0 RAISED ANTIBODY TITER: ICD-10-CM

## 2018-04-16 LAB
ALBUMIN SERPL BCP-MCNC: 3.7 G/DL (ref 3.5–5)
ALBUMIN SERPL BCP-MCNC: 3.7 G/DL (ref 3.5–5)
ALT SERPL W P-5'-P-CCNC: 19 U/L (ref 12–78)
ANION GAP SERPL CALCULATED.3IONS-SCNC: 6 MMOL/L (ref 4–13)
AST SERPL W P-5'-P-CCNC: 21 U/L (ref 5–45)
BASOPHILS # BLD AUTO: 0.02 THOUSANDS/ΜL (ref 0–0.1)
BASOPHILS NFR BLD AUTO: 0 % (ref 0–1)
BUN SERPL-MCNC: 25 MG/DL (ref 5–25)
CALCIUM ALBUM COR SERPL-MCNC: 10.7 MG/DL (ref 8.3–10.1)
CALCIUM SERPL-MCNC: 10.5 MG/DL
CALCIUM SERPL-MCNC: 10.5 MG/DL (ref 8.3–10.1)
CHLORIDE SERPL-SCNC: 105 MMOL/L (ref 100–108)
CO2 SERPL-SCNC: 28 MMOL/L (ref 21–32)
CREAT SERPL-MCNC: 0.85 MG/DL (ref 0.6–1.3)
EOSINOPHIL # BLD AUTO: 0.08 THOUSAND/ΜL (ref 0–0.61)
EOSINOPHIL NFR BLD AUTO: 1 % (ref 0–6)
ERYTHROCYTE [DISTWIDTH] IN BLOOD BY AUTOMATED COUNT: 14.8 % (ref 11.6–15.1)
ERYTHROCYTE [SEDIMENTATION RATE] IN BLOOD: 10 MM/HOUR (ref 0–20)
GFR SERPL CREATININE-BSD FRML MDRD: 61 ML/MIN/1.73SQ M
GLUCOSE P FAST SERPL-MCNC: 124 MG/DL (ref 65–99)
HCT VFR BLD AUTO: 41.7 % (ref 34.8–46.1)
HGB BLD-MCNC: 13.4 G/DL (ref 11.5–15.4)
LYMPHOCYTES # BLD AUTO: 2.97 THOUSANDS/ΜL (ref 0.6–4.47)
LYMPHOCYTES NFR BLD AUTO: 40 % (ref 14–44)
MCH RBC QN AUTO: 33 PG (ref 26.8–34.3)
MCHC RBC AUTO-ENTMCNC: 32.1 G/DL (ref 31.4–37.4)
MCV RBC AUTO: 103 FL (ref 82–98)
MONOCYTES # BLD AUTO: 1.27 THOUSAND/ΜL (ref 0.17–1.22)
MONOCYTES NFR BLD AUTO: 17 % (ref 4–12)
NEUTROPHILS # BLD AUTO: 3.11 THOUSANDS/ΜL (ref 1.85–7.62)
NEUTS SEG NFR BLD AUTO: 42 % (ref 43–75)
NRBC BLD AUTO-RTO: 0 /100 WBCS
PLATELET # BLD AUTO: 159 THOUSANDS/UL (ref 149–390)
PMV BLD AUTO: 12.3 FL (ref 8.9–12.7)
POTASSIUM SERPL-SCNC: 4.1 MMOL/L (ref 3.5–5.3)
RBC # BLD AUTO: 4.06 MILLION/UL (ref 3.81–5.12)
SODIUM SERPL-SCNC: 139 MMOL/L (ref 136–145)
WBC # BLD AUTO: 7.47 THOUSAND/UL (ref 4.31–10.16)

## 2018-04-16 PROCEDURE — 82040 ASSAY OF SERUM ALBUMIN: CPT | Performed by: INTERNAL MEDICINE

## 2018-04-16 PROCEDURE — 84450 TRANSFERASE (AST) (SGOT): CPT

## 2018-04-16 PROCEDURE — 85025 COMPLETE CBC W/AUTO DIFF WBC: CPT | Performed by: INTERNAL MEDICINE

## 2018-04-16 PROCEDURE — 36415 COLL VENOUS BLD VENIPUNCTURE: CPT | Performed by: INTERNAL MEDICINE

## 2018-04-16 PROCEDURE — 85652 RBC SED RATE AUTOMATED: CPT | Performed by: INTERNAL MEDICINE

## 2018-04-16 PROCEDURE — 80048 BASIC METABOLIC PNL TOTAL CA: CPT | Performed by: INTERNAL MEDICINE

## 2018-04-16 PROCEDURE — 84460 ALANINE AMINO (ALT) (SGPT): CPT | Performed by: INTERNAL MEDICINE

## 2018-04-17 ENCOUNTER — TRANSCRIBE ORDERS (OUTPATIENT)
Dept: ADMINISTRATIVE | Facility: HOSPITAL | Age: 83
End: 2018-04-17

## 2018-04-17 DIAGNOSIS — M81.8 OTHER OSTEOPOROSIS, UNSPECIFIED PATHOLOGICAL FRACTURE PRESENCE: Primary | ICD-10-CM

## 2018-04-19 ENCOUNTER — HOSPITAL ENCOUNTER (OUTPATIENT)
Dept: RADIOLOGY | Facility: HOSPITAL | Age: 83
Discharge: HOME | End: 2018-04-19
Attending: INTERNAL MEDICINE
Payer: MEDICARE

## 2018-04-19 DIAGNOSIS — M81.8 OTHER OSTEOPOROSIS, UNSPECIFIED PATHOLOGICAL FRACTURE PRESENCE: ICD-10-CM

## 2018-04-19 PROCEDURE — 77080 DXA BONE DENSITY AXIAL: CPT

## 2018-06-18 ENCOUNTER — TRANSCRIBE ORDERS (OUTPATIENT)
Dept: LAB | Facility: CLINIC | Age: 83
End: 2018-06-18

## 2018-06-18 ENCOUNTER — APPOINTMENT (OUTPATIENT)
Dept: LAB | Facility: CLINIC | Age: 83
End: 2018-06-18
Payer: MEDICARE

## 2018-06-18 DIAGNOSIS — G56.02 CARPAL TUNNEL SYNDROME ON LEFT: ICD-10-CM

## 2018-06-18 DIAGNOSIS — M25.511 RIGHT SHOULDER PAIN, UNSPECIFIED CHRONICITY: ICD-10-CM

## 2018-06-18 DIAGNOSIS — M19.232 SECONDARY OSTEOARTHRITIS OF LEFT WRIST: ICD-10-CM

## 2018-06-18 DIAGNOSIS — M81.0 SENILE OSTEOPOROSIS: ICD-10-CM

## 2018-06-18 DIAGNOSIS — R76.0 RAISED ANTIBODY TITER: ICD-10-CM

## 2018-06-18 DIAGNOSIS — M15.0 PRIMARY GENERALIZED HYPERTROPHIC OSTEOARTHROSIS: ICD-10-CM

## 2018-06-18 DIAGNOSIS — M06.09 RHEUMATOID ARTHRITIS OF MULTIPLE SITES WITHOUT RHEUMATOID FACTOR (HCC): Primary | ICD-10-CM

## 2018-06-18 DIAGNOSIS — M75.101 ROTATOR CUFF SYNDROME OF RIGHT SHOULDER: ICD-10-CM

## 2018-06-18 DIAGNOSIS — M51.36 DEGENERATION OF LUMBAR INTERVERTEBRAL DISC: ICD-10-CM

## 2018-06-18 DIAGNOSIS — M06.09 RHEUMATOID ARTHRITIS OF MULTIPLE SITES WITHOUT RHEUMATOID FACTOR (HCC): ICD-10-CM

## 2018-06-18 LAB
ALBUMIN SERPL BCP-MCNC: 3.6 G/DL (ref 3.5–5)
ALT SERPL W P-5'-P-CCNC: 14 U/L (ref 12–78)
ANION GAP SERPL CALCULATED.3IONS-SCNC: 6 MMOL/L (ref 4–13)
AST SERPL W P-5'-P-CCNC: 14 U/L (ref 5–45)
BASOPHILS # BLD AUTO: 0.04 THOUSANDS/ΜL (ref 0–0.1)
BASOPHILS NFR BLD AUTO: 1 % (ref 0–1)
BUN SERPL-MCNC: 17 MG/DL (ref 5–25)
CALCIUM SERPL-MCNC: 9.6 MG/DL (ref 8.3–10.1)
CHLORIDE SERPL-SCNC: 106 MMOL/L (ref 100–108)
CO2 SERPL-SCNC: 29 MMOL/L (ref 21–32)
CREAT SERPL-MCNC: 0.84 MG/DL (ref 0.6–1.3)
EOSINOPHIL # BLD AUTO: 0.04 THOUSAND/ΜL (ref 0–0.61)
EOSINOPHIL NFR BLD AUTO: 1 % (ref 0–6)
ERYTHROCYTE [DISTWIDTH] IN BLOOD BY AUTOMATED COUNT: 14.3 % (ref 11.6–15.1)
ERYTHROCYTE [SEDIMENTATION RATE] IN BLOOD: 16 MM/HOUR (ref 0–20)
GFR SERPL CREATININE-BSD FRML MDRD: 62 ML/MIN/1.73SQ M
GLUCOSE P FAST SERPL-MCNC: 114 MG/DL (ref 65–99)
HCT VFR BLD AUTO: 40.1 % (ref 34.8–46.1)
HGB BLD-MCNC: 13 G/DL (ref 11.5–15.4)
IMM GRANULOCYTES # BLD AUTO: 0.03 THOUSAND/UL (ref 0–0.2)
IMM GRANULOCYTES NFR BLD AUTO: 0 % (ref 0–2)
LYMPHOCYTES # BLD AUTO: 2.52 THOUSANDS/ΜL (ref 0.6–4.47)
LYMPHOCYTES NFR BLD AUTO: 33 % (ref 14–44)
MCH RBC QN AUTO: 33.2 PG (ref 26.8–34.3)
MCHC RBC AUTO-ENTMCNC: 32.4 G/DL (ref 31.4–37.4)
MCV RBC AUTO: 103 FL (ref 82–98)
MONOCYTES # BLD AUTO: 1.8 THOUSAND/ΜL (ref 0.17–1.22)
MONOCYTES NFR BLD AUTO: 24 % (ref 4–12)
NEUTROPHILS # BLD AUTO: 3.18 THOUSANDS/ΜL (ref 1.85–7.62)
NEUTS SEG NFR BLD AUTO: 41 % (ref 43–75)
NRBC BLD AUTO-RTO: 0 /100 WBCS
PLATELET # BLD AUTO: 113 THOUSANDS/UL (ref 149–390)
PMV BLD AUTO: 12.4 FL (ref 8.9–12.7)
POTASSIUM SERPL-SCNC: 4 MMOL/L (ref 3.5–5.3)
RBC # BLD AUTO: 3.91 MILLION/UL (ref 3.81–5.12)
SODIUM SERPL-SCNC: 141 MMOL/L (ref 136–145)
WBC # BLD AUTO: 7.61 THOUSAND/UL (ref 4.31–10.16)

## 2018-06-18 PROCEDURE — 80048 BASIC METABOLIC PNL TOTAL CA: CPT | Performed by: INTERNAL MEDICINE

## 2018-06-18 PROCEDURE — 85025 COMPLETE CBC W/AUTO DIFF WBC: CPT | Performed by: INTERNAL MEDICINE

## 2018-06-18 PROCEDURE — 85652 RBC SED RATE AUTOMATED: CPT | Performed by: INTERNAL MEDICINE

## 2018-06-18 PROCEDURE — 84450 TRANSFERASE (AST) (SGOT): CPT

## 2018-06-18 PROCEDURE — 36415 COLL VENOUS BLD VENIPUNCTURE: CPT | Performed by: INTERNAL MEDICINE

## 2018-06-18 PROCEDURE — 82040 ASSAY OF SERUM ALBUMIN: CPT

## 2018-06-18 PROCEDURE — 84460 ALANINE AMINO (ALT) (SGPT): CPT | Performed by: INTERNAL MEDICINE

## 2018-08-15 NOTE — RESULT NOTES
Discussion/Summary   biopsy is negative for cancer  it is consistent with benign thyroid nodule  yearly thyroid u/s indicated     Verified Results  (1) FINE NEEDLE ASPIRATION [id number] 09:33AM Janice Hassan     Test Name Result Flag Reference   LAB AP CASE REPORT (Report)     Non-gynecologic Cytology             Case: [id number]                  Authorizing Provider: Tressa Pollard MD     Collected:      08/07/2017 0933        Ordering Location:   [address] [place] [address] Received:      08/07/2017 8507                    Ultrasound                                   Pathologist:      Homer Easley MD                                 Specimens:  A) - Thyroid, Right, upper mid                                     B) - Thyroid, Right, upper mid   LAB AP CYTO FINAL DIAGNOSIS (Report)     A,B  Thyroid, right upper mid (fine-needle aspiration):    - Negative for malignancy ([place] Category II) - See note  - Findings consistent with a benign follicular nodule  Satisfactory for evaluation  Note: As reported in the [address] [place] [address] for Reporting Thyroid   Cytopathology*, the diagnostic category of benign/negative for   malignancy carries 0% to 3% risk of malignancy being found in subsequent   resections (in the few studies of patients with benign FNA results that   were followed long-term, a false negative rate of <1% was reported), with   the usual management being clinical follow-up supplemented by   ultrasonography (US) as indicated  Repeat FNA may be indicated for nodules   showing significant growth or developing US abnormalities  Ultimately,   clinical/imaging correlation for this patient is needed in arriving at the   actual management plan    *The [place] System for Reporting Thyroid Cytopathology, Jay Rodas, 2010 (1st ed )  Electronically signed by Homer Easley MD on 8/9/2017 at 12:25 PM   LAB AP NONGYN NOTE      Interpretation performed at [facility], [address] [place] [address], KAVITHA GOLDMAN [id number]  LAB AP INTRAOPERATIVE CONSULTATION      Thyroid, right upper mid, FNAB on-site evaluation: Low cellularity  Request 3 more passes  [facility]   LAB AP GROSS DESCRIPTION      A  Thyroid, Right, upper mid: 20ml  Bloody, received in CytoLyt    B  Thyroid, Right, upper mid: 12 slides received ( 6 diff quik / 6 alcohol   fixed )   LAB AP NONGYN ADDITIONAL INFORMATION (Report)     [employer]'s FDA approved ,  and ThinPrep Imaging System are   utilized with strict adherence to the 's instruction manual to   prepare gynecologic and non-gynecologic cytology specimens for the   production of ThinPrep slides as well as for gynecologic ThinPrep imaging  These processes have been validated by our laboratory and/or by the     These tests were developed and their performance characteristics   determined by [facility] or [address] [place] [address]  They may not be cleared or approved by the [place]  Food and   Drug Administration  The FDA has determined that such clearance or   approval is not necessary  These tests are used for clinical purposes  They should not be regarded as investigational or for research  This   laboratory has been approved by [place] [id number], designated as a high-complexity   laboratory and is qualified to perform these tests  LAB AP CLINICAL INFORMATION      Size:2 1X1 69X2 3CM  Margins: SMOOTH Echogenicity: SOLID Microcalcs: PRESENT/ABSENT Flow: PERPHERAL Size change: N/A Suspicion level: LOW Hx of Hashimoto's Thyroiditis: NO       Signatures   Electronically signed by : DEBRA Talbot;  Aug 11 2017  8:07AM EST                       (Author) ambulatory

## 2018-12-05 ENCOUNTER — OFFICE VISIT (OUTPATIENT)
Dept: RHEUMATOLOGY | Facility: CLINIC | Age: 83
End: 2018-12-05
Payer: MEDICARE

## 2018-12-05 ENCOUNTER — APPOINTMENT (OUTPATIENT)
Dept: LAB | Facility: CLINIC | Age: 83
End: 2018-12-05
Payer: MEDICARE

## 2018-12-05 VITALS
DIASTOLIC BLOOD PRESSURE: 67 MMHG | BODY MASS INDEX: 25.52 KG/M2 | HEIGHT: 63 IN | SYSTOLIC BLOOD PRESSURE: 128 MMHG | WEIGHT: 144 LBS | HEART RATE: 82 BPM

## 2018-12-05 DIAGNOSIS — M79.7 FIBROMYALGIA: ICD-10-CM

## 2018-12-05 DIAGNOSIS — Z79.899 HIGH RISK MEDICATION USE: ICD-10-CM

## 2018-12-05 DIAGNOSIS — M81.0 AGE-RELATED OSTEOPOROSIS WITHOUT CURRENT PATHOLOGICAL FRACTURE: ICD-10-CM

## 2018-12-05 DIAGNOSIS — F11.90 CHRONIC, CONTINUOUS USE OF OPIOIDS: ICD-10-CM

## 2018-12-05 DIAGNOSIS — M15.0 PRIMARY GENERALIZED (OSTEO)ARTHRITIS: ICD-10-CM

## 2018-12-05 DIAGNOSIS — G56.02 CARPAL TUNNEL SYNDROME OF LEFT WRIST: ICD-10-CM

## 2018-12-05 DIAGNOSIS — Z11.59 ENCOUNTER FOR SCREENING FOR OTHER VIRAL DISEASES: ICD-10-CM

## 2018-12-05 DIAGNOSIS — M05.79 RHEUMATOID ARTHRITIS INVOLVING MULTIPLE SITES WITH POSITIVE RHEUMATOID FACTOR (HCC): Primary | ICD-10-CM

## 2018-12-05 DIAGNOSIS — M05.79 RHEUMATOID ARTHRITIS INVOLVING MULTIPLE SITES WITH POSITIVE RHEUMATOID FACTOR (HCC): ICD-10-CM

## 2018-12-05 DIAGNOSIS — M47.816 OSTEOARTHRITIS OF LUMBAR SPINE, UNSPECIFIED SPINAL OSTEOARTHRITIS COMPLICATION STATUS: ICD-10-CM

## 2018-12-05 LAB
25(OH)D3 SERPL-MCNC: 19.4 NG/ML (ref 30–100)
ALBUMIN SERPL BCP-MCNC: 3.5 G/DL (ref 3.5–5)
ALP SERPL-CCNC: 53 U/L (ref 46–116)
ALT SERPL W P-5'-P-CCNC: 14 U/L (ref 12–78)
ANION GAP SERPL CALCULATED.3IONS-SCNC: 3 MMOL/L (ref 4–13)
AST SERPL W P-5'-P-CCNC: 12 U/L (ref 5–45)
BASOPHILS # BLD AUTO: 0.03 THOUSANDS/ΜL (ref 0–0.1)
BASOPHILS NFR BLD AUTO: 0 % (ref 0–1)
BILIRUB SERPL-MCNC: 0.34 MG/DL (ref 0.2–1)
BUN SERPL-MCNC: 24 MG/DL (ref 5–25)
CALCIUM SERPL-MCNC: 10 MG/DL (ref 8.3–10.1)
CHLORIDE SERPL-SCNC: 104 MMOL/L (ref 100–108)
CO2 SERPL-SCNC: 30 MMOL/L (ref 21–32)
CREAT SERPL-MCNC: 0.81 MG/DL (ref 0.6–1.3)
CRP SERPL QL: <3 MG/L
EOSINOPHIL # BLD AUTO: 0.1 THOUSAND/ΜL (ref 0–0.61)
EOSINOPHIL NFR BLD AUTO: 1 % (ref 0–6)
ERYTHROCYTE [DISTWIDTH] IN BLOOD BY AUTOMATED COUNT: 14.6 % (ref 11.6–15.1)
ERYTHROCYTE [SEDIMENTATION RATE] IN BLOOD: 16 MM/HOUR (ref 0–20)
GFR SERPL CREATININE-BSD FRML MDRD: 65 ML/MIN/1.73SQ M
GLUCOSE SERPL-MCNC: 101 MG/DL (ref 65–140)
HCT VFR BLD AUTO: 40.1 % (ref 34.8–46.1)
HGB BLD-MCNC: 12.9 G/DL (ref 11.5–15.4)
IMM GRANULOCYTES # BLD AUTO: 0.03 THOUSAND/UL (ref 0–0.2)
IMM GRANULOCYTES NFR BLD AUTO: 0 % (ref 0–2)
LYMPHOCYTES # BLD AUTO: 2.31 THOUSANDS/ΜL (ref 0.6–4.47)
LYMPHOCYTES NFR BLD AUTO: 32 % (ref 14–44)
MAGNESIUM SERPL-MCNC: 2.1 MG/DL (ref 1.6–2.6)
MCH RBC QN AUTO: 32.6 PG (ref 26.8–34.3)
MCHC RBC AUTO-ENTMCNC: 32.2 G/DL (ref 31.4–37.4)
MCV RBC AUTO: 101 FL (ref 82–98)
MONOCYTES # BLD AUTO: 2 THOUSAND/ΜL (ref 0.17–1.22)
MONOCYTES NFR BLD AUTO: 27 % (ref 4–12)
NEUTROPHILS # BLD AUTO: 2.84 THOUSANDS/ΜL (ref 1.85–7.62)
NEUTS SEG NFR BLD AUTO: 40 % (ref 43–75)
NRBC BLD AUTO-RTO: 0 /100 WBCS
PHOSPHATE SERPL-MCNC: 3.2 MG/DL (ref 2.3–4.1)
PLATELET # BLD AUTO: 93 THOUSANDS/UL (ref 149–390)
PMV BLD AUTO: 13.3 FL (ref 8.9–12.7)
POTASSIUM SERPL-SCNC: 4.2 MMOL/L (ref 3.5–5.3)
PROT SERPL-MCNC: 7.9 G/DL (ref 6.4–8.2)
RBC # BLD AUTO: 3.96 MILLION/UL (ref 3.81–5.12)
SODIUM SERPL-SCNC: 137 MMOL/L (ref 136–145)
WBC # BLD AUTO: 7.31 THOUSAND/UL (ref 4.31–10.16)

## 2018-12-05 PROCEDURE — 85025 COMPLETE CBC W/AUTO DIFF WBC: CPT

## 2018-12-05 PROCEDURE — 82306 VITAMIN D 25 HYDROXY: CPT

## 2018-12-05 PROCEDURE — 96372 THER/PROPH/DIAG INJ SC/IM: CPT | Performed by: INTERNAL MEDICINE

## 2018-12-05 PROCEDURE — 87340 HEPATITIS B SURFACE AG IA: CPT

## 2018-12-05 PROCEDURE — 99204 OFFICE O/P NEW MOD 45 MIN: CPT | Performed by: INTERNAL MEDICINE

## 2018-12-05 PROCEDURE — 80053 COMPREHEN METABOLIC PANEL: CPT

## 2018-12-05 PROCEDURE — 84100 ASSAY OF PHOSPHORUS: CPT

## 2018-12-05 PROCEDURE — 85652 RBC SED RATE AUTOMATED: CPT

## 2018-12-05 PROCEDURE — 86803 HEPATITIS C AB TEST: CPT

## 2018-12-05 PROCEDURE — 86704 HEP B CORE ANTIBODY TOTAL: CPT

## 2018-12-05 PROCEDURE — 86200 CCP ANTIBODY: CPT

## 2018-12-05 PROCEDURE — 83735 ASSAY OF MAGNESIUM: CPT

## 2018-12-05 PROCEDURE — 86430 RHEUMATOID FACTOR TEST QUAL: CPT

## 2018-12-05 PROCEDURE — 36415 COLL VENOUS BLD VENIPUNCTURE: CPT

## 2018-12-05 PROCEDURE — 86705 HEP B CORE ANTIBODY IGM: CPT

## 2018-12-05 PROCEDURE — 86140 C-REACTIVE PROTEIN: CPT

## 2018-12-05 RX ORDER — TRAMADOL HYDROCHLORIDE 50 MG/1
1 TABLET ORAL 3 TIMES DAILY PRN
COMMUNITY
Start: 2014-01-27 | End: 2018-12-05 | Stop reason: SDUPTHER

## 2018-12-05 RX ORDER — METHOTREXATE 2.5 MG/1
TABLET ORAL
COMMUNITY
Start: 2009-03-10 | End: 2018-12-05 | Stop reason: SDUPTHER

## 2018-12-05 RX ORDER — FOLIC ACID 1 MG/1
TABLET ORAL
COMMUNITY
Start: 2009-12-16 | End: 2018-12-05 | Stop reason: SDUPTHER

## 2018-12-05 RX ORDER — GABAPENTIN 300 MG/1
300 CAPSULE ORAL 2 TIMES DAILY
Qty: 60 CAPSULE | Refills: 2 | Status: SHIPPED | OUTPATIENT
Start: 2018-12-05 | End: 2022-04-21

## 2018-12-05 RX ORDER — TRAMADOL HYDROCHLORIDE 50 MG/1
50 TABLET ORAL EVERY 8 HOURS PRN
Qty: 15 TABLET | Refills: 0 | Status: SHIPPED | OUTPATIENT
Start: 2018-12-05 | End: 2019-05-28

## 2018-12-05 RX ORDER — OXYCODONE AND ACETAMINOPHEN 7.5; 325 MG/1; MG/1
TABLET ORAL
COMMUNITY
Start: 2009-06-24 | End: 2019-05-28

## 2018-12-05 RX ORDER — FOLIC ACID 1 MG/1
1 TABLET ORAL DAILY
Qty: 30 TABLET | Refills: 11 | Status: SHIPPED | OUTPATIENT
Start: 2018-12-05

## 2018-12-05 RX ORDER — METHOTREXATE 2.5 MG/1
12.5 TABLET ORAL WEEKLY
Qty: 20 TABLET | Refills: 2 | Status: SHIPPED | OUTPATIENT
Start: 2018-12-05 | End: 2019-01-02 | Stop reason: HOSPADM

## 2018-12-05 RX ORDER — GABAPENTIN 300 MG/1
1 CAPSULE ORAL 2 TIMES DAILY
COMMUNITY
Start: 2017-07-27 | End: 2018-12-05 | Stop reason: SDUPTHER

## 2018-12-05 NOTE — PROGRESS NOTES
Assessment and Plan:   Ms Elza Mcmanus is an 35-year-old  female with history significant for seropositive rheumatoid arthritis, generalized osteoarthritis, and osteoporosis who presents for further management of rheumatoid arthritis  She is currently on methotrexate 5 tablets weekly with folic acid 1 mg daily for the rheumatoid arthritis, and has been receiving subcutaneous Prolia 60 mg every 6 months for osteoporosis  She is transitioning care from her prior rheumatologist, Dr Nilay Jalloh  # Seropositive rheumatoid arthritis, without active synovitis  - Tres Bender presents today for further management of seropositive rheumatoid arthritis that was diagnosed approximately 7 years ago  She has been on management with methotrexate 5 tablets weekly, which she has been tolerating well without any obvious side effects  She states the methotrexate has diminished her arthritic symptoms, and I do not appreciate any active evidence of synovitis today  It is possible she does have chronic synovial hypertrophy present at her MCPs and left wrist (although the deformity could also be secondary to a prior history of fracture with malunion)  - As she has been tolerating the methotrexate well, I recommend we continue the same dosage, and also advised her to take folic acid 1 mg daily  I will also obtain high-risk medication lab monitoring at today's visit  - She tells me her prior rheumatologist was also prescribing tramadol and oxycodone as needed for breakthrough pain, but I strongly suspect she has an underlying component of fibromyalgia which could be significantly contributing to her overall pain  She also reports nonrestorative sleep habits which could be a  of the fibromyalgia symptoms  I think her symptoms are more related to fibromyalgia than rheumatoid arthritis, as her joint examination is mostly unremarkable today    I advised her if she would like to continue the tramadol and oxycodone I will refer her to Pain Management  I will give her a short supply of tramadol to help her until she is able to set of this appointment  # Osteoporosis  - I reviewed her last DEXA bone density exam done in April 2018, which showed a T-score of -2 4 in the right hip  She tells me she has been receiving subcutaneous Prolia 60 mg every 6 months for approximately 3 years now  She was receiving this injection through her prior rheumatologist   I advised her I can continue administering this to her, and she is agreeable to get an injection in the office today  I will recheck a BMP, magnesium and phosphorus level in 2 weeks  Plan:  Diagnoses and all orders for this visit:    Rheumatoid arthritis involving multiple sites with positive rheumatoid factor (HCC)  -     folic acid (FOLVITE) 1 mg tablet; Take 1 tablet (1 mg total) by mouth daily  -     gabapentin (NEURONTIN) 300 mg capsule; Take 1 capsule (300 mg total) by mouth 2 (two) times a day  -     methotrexate 2 5 MG tablet; Take 5 tablets (12 5 mg total) by mouth once a week  -     Ambulatory referral to Pain Management; Future  -     Cyclic citrul peptide antibody, IgG; Future  -     RF Screen w/ Reflex to Titer; Future  -     Vitamin D 25 hydroxy; Future  -     traMADol (ULTRAM) 50 mg tablet; Take 1 tablet (50 mg total) by mouth every 8 (eight) hours as needed for moderate pain    Age-related osteoporosis without current pathological fracture  -     Vitamin D 25 hydroxy; Future  -     denosumab (PROLIA) subcutaneous injection 60 mg; Inject 1 mL (60 mg total) under the skin once   -     Magnesium; Future  -     Phosphorus; Future  -     Basic metabolic panel; Future  -     Phosphorus; Future  -     Magnesium; Future    Primary generalized (osteo)arthritis    Chronic, continuous use of opioids  -     Ambulatory referral to Pain Management; Future  -     traMADol (ULTRAM) 50 mg tablet;  Take 1 tablet (50 mg total) by mouth every 8 (eight) hours as needed for moderate pain    High risk medication use  -     CBC and differential; Future  -     Chronic Hepatitis Panel; Future  -     Comprehensive metabolic panel; Future  -     C-reactive protein; Future  -     Sedimentation rate, automated; Future    Encounter for screening for other viral diseases   -     Chronic Hepatitis Panel; Future    Carpal tunnel syndrome of left wrist    Osteoarthritis of lumbar spine, unspecified spinal osteoarthritis complication status    Fibromyalgia      Activities as tolerated    Diet: low carb/low fat, more greens/vegetables, adequate hydration  Exercise: try to maintain a low impact exercise regimen as much as possible  Walk for 30 minutes a day for at least 3 days a week    Encouraged to maintain good sleep hygiene  Continue other medications as prescribed by PCP and other specialists        RTC in 3 months  ADDENDUM:  Patient was under the impression her rheumatoid arthritis labs were positive, but on re-order at this time she is seronegative  I do not have records of prior serologies, so at this time will treat her as seronegative RA  HPI  Ms Andrés Madison is an 72-year-old  female with history significant for seropositive rheumatoid arthritis, generalized osteoarthritis, and osteoporosis who presents for further management of rheumatoid arthritis  She is currently on methotrexate 5 tablets weekly with folic acid 1 mg daily for the rheumatoid arthritis, and has been receiving subcutaneous Prolia 60 mg every 6 months for osteoporosis  She is transitioning care from her prior rheumatologist, Dr Fredy Basurto  Patient states she was officially diagnosed with seropositive rheumatoid arthritis approximately 7 years ago when she first started seeing her rheumatologist   She states she had been experiencing joint pains since her 46s, but never sought medical attention    It appears her symptoms that led to the diagnosis first started with pain in her jaw, but it also seems that the timeline of events is slightly confusing to her, which leads to difficulty in obtaining an accurate history  She states when the diagnosis of rheumatoid arthritis was made she was started on methotrexate, and has not been on any other DMARDs  She has been tolerating the methotrexate well without any noticeable side effects, such as GI issues or oral ulcers, and had been following regularly with her rheumatologist for high risk medication lab monitoring  The last labs she had done was in June 2018, and were normal   She states while being on the methotrexate it has definitely helped her overall pain, as when she missed a few doses due to a lack of refills, her joint pains did worsen  Regardless, she continues to experience diffuse body pain, and states the worst joint pain is in her left hand/wrist, right knee and low back  She also reports being very sensitive to touch  She denies any joint swelling and states morning stiffness affects her diffusely and lasts a few minutes  She is also on gabapentin 300 mg twice daily in view of carpal tunnel syndrome affecting her left wrist   Her prior rheumatologist was also prescribing tramadol 50 mg every 8 hours as needed, but patient states she takes it very sparingly, maybe every other day  She is also on oxycodone 7 5 mg which she usually breaks in half, and states she takes it maybe only twice a week for breakthrough pain  She says she cannot function without intermittent dosing with tramadol or oxycodone  She states she was diagnosed with osteoporosis approximately 3 years ago, and has been on subcutaneous Prolia 60 mg every 6 months since then  She cannot recall exactly when her last Prolia injection was, but states it was at least 6 months ago  She has been tolerating the Prolia injections well without any noticeable side effects, and would like me to continue with this medication      She denies fevers, chills, night sweats, unintentional weight loss, mouth/nose ulcers, skin rash, swollen glands, pleuritic chest pain, shortness of breath, abdominal pain, vomiting, diarrhea, blood in stools or family history of rheumatoid arthritis  She does have multiple family members with a history of cancer  The following portions of the patient's history were reviewed and updated as appropriate: allergies, current medications, past family history, past medical history, past social history, past surgical history and problem list       Review of Systems  Constitutional: Negative for weight change, fevers, chills, night sweats, fatigue  ENT/Mouth: Negative for hearing changes, ear pain, nasal congestion, sinus pain, hoarseness, sore throat, rhinorrhea, swallowing difficulty  Eyes: Negative for pain, redness, discharge, vision changes  Cardiovascular: Negative for chest pain, SOB, palpitations  Respiratory: Negative for cough, sputum, wheezing, dyspnea  Gastrointestinal: Negative for nausea, vomiting, diarrhea, constipation, pain, heartburn  Genitourinary: Negative for dysuria, urinary frequency, hematuria  Musculoskeletal: As per HPI  Skin: Negative for skin rash, color changes  Neuro: Negative for weakness, numbness, tingling, loss of consciousness  Psych: Negative for anxiety, depression  Heme/Lymph: Negative for easy bruising, bleeding, lymphadenopathy  Past Medical History:   Diagnosis Date    Carpal tunnel syndrome     Degeneration of lumbar intervertebral disc     Postmenopausal osteoporosis     Primary generalized (osteo)arthritis     Rheumatoid arthritis ([address] )     Right shoulder pain        Past Surgical History:   Procedure Laterality Date    KNEE SURGERY         Social History     Social History    Marital status:      Spouse name: N/A    Number of children: N/A    Years of education: N/A     Occupational History    Not on file       Social History Main Topics    Smoking status: Former Smoker    Smokeless tobacco: Never Used    Alcohol use Yes      Comment: rarely    Drug use: No    Sexual activity: Not on file     Other Topics Concern    Not on file     Social History Narrative    No narrative on file       Family History   Problem Relation Age of Onset    Cancer Sister        No Known Allergies      Current Outpatient Prescriptions:     folic acid (FOLVITE) 1 mg tablet, Take 1 tablet (1 mg total) by mouth daily, Disp: 30 tablet, Rfl: 11    gabapentin (NEURONTIN) 300 mg capsule, Take 1 capsule (300 mg total) by mouth 2 (two) times a day, Disp: 60 capsule, Rfl: 2    methotrexate 2 5 MG tablet, Take 5 tablets (12 5 mg total) by mouth once a week, Disp: 20 tablet, Rfl: 2    oxyCODONE-acetaminophen (PERCOCET) 7 5-325 MG per tablet, Take by mouth, Disp: , Rfl:     traMADol (ULTRAM) 50 mg tablet, Take 1 tablet (50 mg total) by mouth every 8 (eight) hours as needed for moderate pain, Disp: 15 tablet, Rfl: 0  No current facility-administered medications for this visit  Objective:    Vitals:    12/05/18 1027   BP: 128/67   BP Location: Left arm   Patient Position: Sitting   Cuff Size: Adult   Pulse: 82   Weight: 65 3 kg (144 lb)   Height: 5' 3" (1 6 m)       Physical Exam  General: Well appearing, well nourished, in no distress  Oriented x 3, normal mood and affect  Ambulating without difficulty  Skin: Good turgor, no rash, unusual bruising or prominent lesions  Hair: Normal texture and distribution  Nails: Normal color, no deformities  HEENT:  Head: Normocephalic, atraumatic  Eyes: Conjunctiva clear, sclera non-icteric, EOM intact  Nose: No external lesions, mucosa non-inflamed  Mouth: Mucous membranes moist, no mucosal lesions  Neck: Supple, thyroid non-enlarged and non-tender  No lymphadenopathy  Heart: Regular rate and rhythm, no murmur or gallop  Lungs: Clear to auscultation, no crackles or wheezing     Abdomen: Soft, non-tender, non-distended, bowel sounds normal    Extremities: No amputations or deformities, cyanosis, edema  Musculoskeletal:   Hands - she has diffuse tenderness at bilateral PIPs, her joint spaces are mostly easily palpated, but on her left hand there is possible mild synovial hypertrophy noted of PIPs  There are osteoarthritic changes present at her bilateral DIPs and PIPs  She is also tender at her bilateral MCPs, but there is no evidence of synovitis  She does have enlargement of her bilateral 2nd MCPs which appears to be distinct from the joint space - questionable rheumatoid nodule? She is able to make full fists bilaterally  Wrists - she is tender to palpation of bilateral wrists, but I do not appreciate any soft tissue swelling  She has deformity of her left wrist which she relates to a prior fracture and improper healing  She has discomfort on range of motion of bilateral wrists  Elbows and shoulders - she has mild tenderness to palpation bilaterally, but there is no evidence of soft tissue swelling and she has full range of motion bilaterally  Knees - she has bony enlargement of her right knee  I do not appreciate any soft tissue swelling bilaterally and she has good range of motion  Ankles and feet - she has mild tenderness to palpation bilaterally, but there is no evidence of soft tissue swelling  Spine - no spinal tenderness  18/18 positive fibromyalgia tender points  She also has diffuse myofascial tenderness  Neurologic: Alert and oriented  No focal neurological deficits appreciated  Psychiatric: Normal mood and affect  RAE Carroll    Rheumatology

## 2018-12-06 ENCOUNTER — TELEPHONE (OUTPATIENT)
Dept: RHEUMATOLOGY | Facility: CLINIC | Age: 83
End: 2018-12-06

## 2018-12-06 DIAGNOSIS — Z79.899 METHOTREXATE, LONG TERM, CURRENT USE: ICD-10-CM

## 2018-12-06 DIAGNOSIS — D69.6 THROMBOCYTOPENIA (HCC): Primary | ICD-10-CM

## 2018-12-06 LAB
HBV CORE AB SER QL: NORMAL
HBV CORE IGM SER QL: NORMAL
HBV SURFACE AG SER QL: NORMAL
HCV AB SER QL: NORMAL
RHEUMATOID FACT SER QL LA: NEGATIVE

## 2018-12-06 NOTE — TELEPHONE ENCOUNTER
Called patient to discuss fluctuating low platelet counts  Advised her it could be related to Methotrexate use  Would like her to see Hematology to see if this needs to be evaluated further  In the interim will decrease Methotrexate to 4 tablets weekly and requested she have a repeat CBC checked in 2 weeks  Patient's daughter is understanding of plan

## 2018-12-08 LAB — CCP IGA+IGG SERPL IA-ACNC: 9 UNITS (ref 0–19)

## 2018-12-09 ENCOUNTER — TELEPHONE (OUTPATIENT)
Dept: RHEUMATOLOGY | Facility: CLINIC | Age: 83
End: 2018-12-09

## 2018-12-09 DIAGNOSIS — E55.9 VITAMIN D DEFICIENCY: Primary | ICD-10-CM

## 2018-12-09 RX ORDER — ERGOCALCIFEROL 1.25 MG/1
50000 CAPSULE ORAL WEEKLY
Qty: 4 CAPSULE | Refills: 0 | Status: SHIPPED | OUTPATIENT
Start: 2018-12-09 | End: 2019-05-28

## 2018-12-10 NOTE — TELEPHONE ENCOUNTER
Spoke to patient's son about the new prescribed medication for the vitamin D  And he was within understanding and di explain to the Patient the instruction  They are a ware of being able to contact us for any question or concerns accordingly

## 2018-12-12 ENCOUNTER — TELEPHONE (OUTPATIENT)
Dept: OBGYN CLINIC | Facility: HOSPITAL | Age: 83
End: 2018-12-12

## 2018-12-12 NOTE — TELEPHONE ENCOUNTER
Patient' son Monica Sanchez called requesting clarification on medication and blood work for patient  He states patient started taking Vitamin D 50,000 units on 12/10 and is schedule for blood work for 12/19 and 12/27  Monica Sanchez wants to know whether patient needs to have blood work done on both weeks   Please advise, thx    Call back# [phone]    Dr Ledesma patient

## 2018-12-19 ENCOUNTER — TELEPHONE (OUTPATIENT)
Dept: OBGYN CLINIC | Facility: CLINIC | Age: 83
End: 2018-12-19

## 2018-12-19 ENCOUNTER — TRANSCRIBE ORDERS (OUTPATIENT)
Dept: ADMINISTRATIVE | Facility: HOSPITAL | Age: 83
End: 2018-12-19

## 2018-12-19 ENCOUNTER — APPOINTMENT (OUTPATIENT)
Dept: LAB | Facility: CLINIC | Age: 83
End: 2018-12-19
Payer: MEDICARE

## 2018-12-19 DIAGNOSIS — M81.0 AGE-RELATED OSTEOPOROSIS WITHOUT CURRENT PATHOLOGICAL FRACTURE: ICD-10-CM

## 2018-12-19 DIAGNOSIS — D69.6 THROMBOCYTOPENIA (HCC): ICD-10-CM

## 2018-12-19 LAB
ANION GAP SERPL CALCULATED.3IONS-SCNC: 6 MMOL/L (ref 4–13)
BASOPHILS # BLD AUTO: 0.06 THOUSANDS/ΜL (ref 0–0.1)
BASOPHILS NFR BLD AUTO: 1 % (ref 0–1)
BUN SERPL-MCNC: 18 MG/DL (ref 5–25)
CALCIUM SERPL-MCNC: 9.8 MG/DL (ref 8.3–10.1)
CHLORIDE SERPL-SCNC: 106 MMOL/L (ref 100–108)
CO2 SERPL-SCNC: 25 MMOL/L (ref 21–32)
CREAT SERPL-MCNC: 0.82 MG/DL (ref 0.6–1.3)
EOSINOPHIL # BLD AUTO: 0.08 THOUSAND/ΜL (ref 0–0.61)
EOSINOPHIL NFR BLD AUTO: 1 % (ref 0–6)
ERYTHROCYTE [DISTWIDTH] IN BLOOD BY AUTOMATED COUNT: 14.6 % (ref 11.6–15.1)
GFR SERPL CREATININE-BSD FRML MDRD: 64 ML/MIN/1.73SQ M
GLUCOSE P FAST SERPL-MCNC: 114 MG/DL (ref 65–99)
HCT VFR BLD AUTO: 40.4 % (ref 34.8–46.1)
HGB BLD-MCNC: 12.9 G/DL (ref 11.5–15.4)
IMM GRANULOCYTES # BLD AUTO: 0.05 THOUSAND/UL (ref 0–0.2)
IMM GRANULOCYTES NFR BLD AUTO: 1 % (ref 0–2)
LYMPHOCYTES # BLD AUTO: 2.26 THOUSANDS/ΜL (ref 0.6–4.47)
LYMPHOCYTES NFR BLD AUTO: 29 % (ref 14–44)
MAGNESIUM SERPL-MCNC: 2.2 MG/DL (ref 1.6–2.6)
MCH RBC QN AUTO: 31.9 PG (ref 26.8–34.3)
MCHC RBC AUTO-ENTMCNC: 31.9 G/DL (ref 31.4–37.4)
MCV RBC AUTO: 100 FL (ref 82–98)
MONOCYTES # BLD AUTO: 2.15 THOUSAND/ΜL (ref 0.17–1.22)
MONOCYTES NFR BLD AUTO: 27 % (ref 4–12)
NEUTROPHILS # BLD AUTO: 3.34 THOUSANDS/ΜL (ref 1.85–7.62)
NEUTS SEG NFR BLD AUTO: 41 % (ref 43–75)
NRBC BLD AUTO-RTO: 0 /100 WBCS
PHOSPHATE SERPL-MCNC: 3.2 MG/DL (ref 2.3–4.1)
PLATELET # BLD AUTO: 102 THOUSANDS/UL (ref 149–390)
PMV BLD AUTO: 12.6 FL (ref 8.9–12.7)
POTASSIUM SERPL-SCNC: 4.1 MMOL/L (ref 3.5–5.3)
RBC # BLD AUTO: 4.05 MILLION/UL (ref 3.81–5.12)
SODIUM SERPL-SCNC: 137 MMOL/L (ref 136–145)
WBC # BLD AUTO: 7.94 THOUSAND/UL (ref 4.31–10.16)

## 2018-12-19 PROCEDURE — 83735 ASSAY OF MAGNESIUM: CPT

## 2018-12-19 PROCEDURE — 80048 BASIC METABOLIC PNL TOTAL CA: CPT

## 2018-12-19 PROCEDURE — 36415 COLL VENOUS BLD VENIPUNCTURE: CPT

## 2018-12-19 PROCEDURE — 85025 COMPLETE CBC W/AUTO DIFF WBC: CPT

## 2018-12-19 PROCEDURE — 84100 ASSAY OF PHOSPHORUS: CPT

## 2018-12-19 NOTE — TELEPHONE ENCOUNTER
----- Message from Dianne Viveros MD sent at 12/19/2018  3:33 PM EST -----  Please let patient know her platelets are still low, but stable  I see she has an appointment with Hematology in January, we will see what their recommendations are  Other labs are normal  Thanks

## 2018-12-21 NOTE — TELEPHONE ENCOUNTER
Spoke with Patient's son about the provided lab results  And let him know that we'll will follow up after her visit with her hematologist  Patient's son was within understanding

## 2018-12-27 ENCOUNTER — OFFICE VISIT (OUTPATIENT)
Dept: OBGYN CLINIC | Facility: CLINIC | Age: 83
End: 2018-12-27
Payer: MEDICARE

## 2018-12-27 ENCOUNTER — APPOINTMENT (OUTPATIENT)
Dept: RADIOLOGY | Facility: CLINIC | Age: 83
End: 2018-12-27
Payer: MEDICARE

## 2018-12-27 VITALS
BODY MASS INDEX: 24.27 KG/M2 | HEIGHT: 63 IN | DIASTOLIC BLOOD PRESSURE: 70 MMHG | WEIGHT: 137 LBS | HEART RATE: 76 BPM | SYSTOLIC BLOOD PRESSURE: 112 MMHG

## 2018-12-27 DIAGNOSIS — R22.31 MASS OF FINGER OF RIGHT HAND: ICD-10-CM

## 2018-12-27 DIAGNOSIS — R22.31 MASS OF FINGER OF RIGHT HAND: Primary | ICD-10-CM

## 2018-12-27 PROCEDURE — 73140 X-RAY EXAM OF FINGER(S): CPT

## 2018-12-27 PROCEDURE — 99203 OFFICE O/P NEW LOW 30 MIN: CPT | Performed by: ORTHOPAEDIC SURGERY

## 2018-12-27 NOTE — PROGRESS NOTES
Assessment/Plan:  1  Mass of finger of right hand  XR finger right second digit-index       Scribe Attestation    I,:   Moni Villalobos MA am acting as a scribe while in the presence of the attending physician :        I,:   Shasta Eisenmenger, DO personally performed the services described in this documentation    as scribed in my presence :              I discussed with Shobha Leslie today that her signs and symptoms are consistent with a 1 5 cm soft mobile mass to the dorsal aspect of her right index MCP  I discussed with her today that his is likely begin  Treatment options were discussed with her in the form of excision of mass as she she states she wants this removed  Risk of the surgery are inclusive of but not limited to bleeding, infection, nerve injury, blood clot, worsening of symptoms, not achieving the anticipated results, persistent stiffness, weakness and the need for additional surgery  The patient verbally stated they understood those risks and would like to proceed with the surgery  Surgical consent was signed in the office today  I will see Shobha Leslie the day of surgery  Subjective:   Isa Hernandez is a 80 y o  female who presents to the office today for evaluation of a mass on the dorsal aspect of her right index finger  She states this has been ongoing for the past 6 months  She does have history of RA and is taking methotrexate  She denies any numbness or tingling  Review of Systems   Constitutional: Negative for chills and fever  HENT: Negative for drooling and sneezing  Eyes: Negative for redness  Respiratory: Negative for cough and wheezing  Gastrointestinal: Negative for nausea and vomiting  Musculoskeletal: Positive for arthralgias  Negative for joint swelling and myalgias  Neurological: Negative for weakness and numbness  Psychiatric/Behavioral: Negative for behavioral problems  The patient is not nervous/anxious            Past Medical History:   Diagnosis Date    Carpal tunnel syndrome     Degeneration of lumbar intervertebral disc     Postmenopausal osteoporosis     Primary generalized (osteo)arthritis     Rheumatoid arthritis ([address] )     Right shoulder pain        Past Surgical History:   Procedure Laterality Date    KNEE SURGERY         Family History   Problem Relation Age of Onset    Cancer Sister        Social History     Occupational History    Not on file  Social History Main Topics    Smoking status: Former Smoker    Smokeless tobacco: Never Used    Alcohol use Yes      Comment: rarely    Drug use: No    Sexual activity: Not on file         Current Outpatient Prescriptions:     ergocalciferol (VITAMIN D2) 50,000 units, Take 1 capsule (50,000 Units total) by mouth once a week, Disp: 4 capsule, Rfl: 0    folic acid (FOLVITE) 1 mg tablet, Take 1 tablet (1 mg total) by mouth daily, Disp: 30 tablet, Rfl: 11    gabapentin (NEURONTIN) 300 mg capsule, Take 1 capsule (300 mg total) by mouth 2 (two) times a day, Disp: 60 capsule, Rfl: 2    methotrexate 2 5 MG tablet, Take 5 tablets (12 5 mg total) by mouth once a week, Disp: 20 tablet, Rfl: 2    oxyCODONE-acetaminophen (PERCOCET) 7 5-325 MG per tablet, Take by mouth, Disp: , Rfl:     traMADol (ULTRAM) 50 mg tablet, Take 1 tablet (50 mg total) by mouth every 8 (eight) hours as needed for moderate pain, Disp: 15 tablet, Rfl: 0    No Known Allergies    Objective:  Vitals:    12/27/18 0906   BP: 112/70   Pulse: 76       Ortho Exam     Right Index finger    1 5 cm soft mobile mass  Negative tinel's over mass  Full ROM index MCP, mass does not move  Compartments soft  Brisk capillary refill  Sensation intact median, radial, and ulnar nevre    Physical Exam   Constitutional: She is oriented to person, place, and time  She appears well-developed and well-nourished  HENT:   Head: Normocephalic and atraumatic  Eyes: Conjunctivae are normal  Right eye exhibits no discharge  Left eye exhibits no discharge  Neck: Normal range of motion  Neck supple  Cardiovascular: Normal rate and intact distal pulses  Pulmonary/Chest: Effort normal  No respiratory distress  Neurological: She is alert and oriented to person, place, and time  Skin: Skin is warm and dry  Psychiatric: She has a normal mood and affect  Her behavior is normal  Judgment and thought content normal        I have personally reviewed pertinent films in PACS and my interpretation is as follows:X-ray right index finger demonstrates diffuse arthritis

## 2018-12-28 ENCOUNTER — APPOINTMENT (OUTPATIENT)
Dept: LAB | Facility: CLINIC | Age: 83
End: 2018-12-28
Payer: MEDICARE

## 2018-12-28 ENCOUNTER — TRANSCRIBE ORDERS (OUTPATIENT)
Dept: LAB | Facility: CLINIC | Age: 83
End: 2018-12-28

## 2018-12-28 ENCOUNTER — TELEPHONE (OUTPATIENT)
Dept: OBGYN CLINIC | Facility: CLINIC | Age: 83
End: 2018-12-28

## 2018-12-28 DIAGNOSIS — R22.31 MASS OF FINGER OF RIGHT HAND: ICD-10-CM

## 2018-12-28 LAB
ANION GAP SERPL CALCULATED.3IONS-SCNC: 7 MMOL/L (ref 4–13)
APTT PPP: 34 SECONDS (ref 26–38)
BASOPHILS # BLD AUTO: 0.04 THOUSANDS/ΜL (ref 0–0.1)
BASOPHILS NFR BLD AUTO: 1 % (ref 0–1)
BUN SERPL-MCNC: 16 MG/DL (ref 5–25)
CALCIUM SERPL-MCNC: 9.9 MG/DL (ref 8.3–10.1)
CHLORIDE SERPL-SCNC: 105 MMOL/L (ref 100–108)
CO2 SERPL-SCNC: 27 MMOL/L (ref 21–32)
CREAT SERPL-MCNC: 0.77 MG/DL (ref 0.6–1.3)
EOSINOPHIL # BLD AUTO: 0.08 THOUSAND/ΜL (ref 0–0.61)
EOSINOPHIL NFR BLD AUTO: 1 % (ref 0–6)
ERYTHROCYTE [DISTWIDTH] IN BLOOD BY AUTOMATED COUNT: 14.5 % (ref 11.6–15.1)
GFR SERPL CREATININE-BSD FRML MDRD: 69 ML/MIN/1.73SQ M
GLUCOSE P FAST SERPL-MCNC: 113 MG/DL (ref 65–99)
HCT VFR BLD AUTO: 40.5 % (ref 34.8–46.1)
HGB BLD-MCNC: 13.1 G/DL (ref 11.5–15.4)
IMM GRANULOCYTES # BLD AUTO: 0.03 THOUSAND/UL (ref 0–0.2)
IMM GRANULOCYTES NFR BLD AUTO: 1 % (ref 0–2)
INR PPP: 0.96 (ref 0.86–1.17)
LYMPHOCYTES # BLD AUTO: 2.02 THOUSANDS/ΜL (ref 0.6–4.47)
LYMPHOCYTES NFR BLD AUTO: 31 % (ref 14–44)
MCH RBC QN AUTO: 32.5 PG (ref 26.8–34.3)
MCHC RBC AUTO-ENTMCNC: 32.3 G/DL (ref 31.4–37.4)
MCV RBC AUTO: 101 FL (ref 82–98)
MONOCYTES # BLD AUTO: 1.23 THOUSAND/ΜL (ref 0.17–1.22)
MONOCYTES NFR BLD AUTO: 19 % (ref 4–12)
NEUTROPHILS # BLD AUTO: 3.09 THOUSANDS/ΜL (ref 1.85–7.62)
NEUTS SEG NFR BLD AUTO: 47 % (ref 43–75)
NRBC BLD AUTO-RTO: 0 /100 WBCS
PLATELET # BLD AUTO: 79 THOUSANDS/UL (ref 149–390)
PMV BLD AUTO: 13.6 FL (ref 8.9–12.7)
POTASSIUM SERPL-SCNC: 4.2 MMOL/L (ref 3.5–5.3)
PROTHROMBIN TIME: 12.9 SECONDS (ref 11.8–14.2)
RBC # BLD AUTO: 4.03 MILLION/UL (ref 3.81–5.12)
SODIUM SERPL-SCNC: 139 MMOL/L (ref 136–145)
WBC # BLD AUTO: 6.49 THOUSAND/UL (ref 4.31–10.16)

## 2018-12-28 PROCEDURE — 85025 COMPLETE CBC W/AUTO DIFF WBC: CPT

## 2018-12-28 PROCEDURE — 80048 BASIC METABOLIC PNL TOTAL CA: CPT

## 2018-12-28 PROCEDURE — 85730 THROMBOPLASTIN TIME PARTIAL: CPT

## 2018-12-28 PROCEDURE — 85610 PROTHROMBIN TIME: CPT

## 2018-12-28 PROCEDURE — 36415 COLL VENOUS BLD VENIPUNCTURE: CPT

## 2018-12-31 ENCOUNTER — OFFICE VISIT (OUTPATIENT)
Dept: FAMILY MEDICINE CLINIC | Facility: CLINIC | Age: 83
End: 2018-12-31
Payer: MEDICARE

## 2018-12-31 ENCOUNTER — CONSULT (OUTPATIENT)
Dept: HEMATOLOGY ONCOLOGY | Facility: MEDICAL CENTER | Age: 83
End: 2018-12-31
Payer: MEDICARE

## 2018-12-31 VITALS
DIASTOLIC BLOOD PRESSURE: 72 MMHG | SYSTOLIC BLOOD PRESSURE: 112 MMHG | WEIGHT: 141 LBS | BODY MASS INDEX: 24.98 KG/M2 | TEMPERATURE: 97.8 F | HEART RATE: 78 BPM | HEIGHT: 63 IN | OXYGEN SATURATION: 91 % | RESPIRATION RATE: 18 BRPM

## 2018-12-31 VITALS
DIASTOLIC BLOOD PRESSURE: 70 MMHG | SYSTOLIC BLOOD PRESSURE: 110 MMHG | RESPIRATION RATE: 16 BRPM | BODY MASS INDEX: 25.16 KG/M2 | HEIGHT: 63 IN | WEIGHT: 142 LBS | HEART RATE: 72 BPM | TEMPERATURE: 97.8 F

## 2018-12-31 DIAGNOSIS — M15.9 GENERALIZED OSTEOARTHRITIS: ICD-10-CM

## 2018-12-31 DIAGNOSIS — R22.31 MASS OF FINGER OF RIGHT HAND: Primary | ICD-10-CM

## 2018-12-31 DIAGNOSIS — D69.6 THROMBOCYTOPENIA (HCC): Primary | ICD-10-CM

## 2018-12-31 DIAGNOSIS — M06.09 RHEUMATOID ARTHRITIS OF MULTIPLE SITES WITH NEGATIVE RHEUMATOID FACTOR (HCC): ICD-10-CM

## 2018-12-31 PROCEDURE — 99214 OFFICE O/P EST MOD 30 MIN: CPT | Performed by: FAMILY MEDICINE

## 2018-12-31 PROCEDURE — 99204 OFFICE O/P NEW MOD 45 MIN: CPT | Performed by: INTERNAL MEDICINE

## 2018-12-31 NOTE — PROGRESS NOTES
Chief Complaint   Patient presents with    Follow-up   OA     Patient ID: Gildardo Lu is a 80 y o  female  HPI  Pt is seeing for a painful lump on R hand -  Will have excisional biopsy  -  F/u with rheumatologist     The following portions of the patient's history were reviewed and updated as appropriate: allergies, current medications, past family history, past medical history, past social history, past surgical history and problem list     Review of Systems   Constitutional: Negative  Respiratory: Negative  Cardiovascular: Negative  Gastrointestinal: Negative  Genitourinary: Negative  Musculoskeletal: Negative  Skin: Negative  Neurological: Negative  Current Outpatient Prescriptions   Medication Sig Dispense Refill    folic acid (FOLVITE) 1 mg tablet Take 1 tablet (1 mg total) by mouth daily 30 tablet 11    gabapentin (NEURONTIN) 300 mg capsule Take 1 capsule (300 mg total) by mouth 2 (two) times a day 60 capsule 2    methotrexate 2 5 MG tablet Take 5 tablets (12 5 mg total) by mouth once a week 20 tablet 2    oxyCODONE-acetaminophen (PERCOCET) 7 5-325 MG per tablet Take by mouth      traMADol (ULTRAM) 50 mg tablet Take 1 tablet (50 mg total) by mouth every 8 (eight) hours as needed for moderate pain 15 tablet 0    ergocalciferol (VITAMIN D2) 50,000 units Take 1 capsule (50,000 Units total) by mouth once a week (Patient not taking: Reported on 12/31/2018 ) 4 capsule 0     No current facility-administered medications for this visit  Objective:    /70 (BP Location: Right arm, Patient Position: Sitting, Cuff Size: Large)   Pulse 72   Temp 97 8 °F (36 6 °C) (Tympanic)   Resp 16   Ht 5' 3" (1 6 m)   Wt 64 4 kg (142 lb)   BMI 25 15 kg/m²        Physical Exam   Constitutional: No distress  Cardiovascular: Normal rate, regular rhythm and normal heart sounds  No murmur heard  Pulmonary/Chest: Effort normal and breath sounds normal  No respiratory distress  She has no wheezes  Musculoskeletal: She exhibits no edema  Right wrist: She exhibits deformity  Left wrist: She exhibits deformity                 Assessment/Plan:         Diagnoses and all orders for this visit:    Mass of finger of right hand  -     Ambulatory referral to Family Practice  -     POCT ECG  Pt is clear for procedure   Rheumatoid arthritis of multiple sites with negative rheumatoid factor ([facility] [address] )    Generalized osteoarthritis      rto prn     rto prn                   Hallie Daniel MD

## 2018-12-31 NOTE — PRE-PROCEDURE INSTRUCTIONS
My Surgical Experience    The following information was developed to assist you to prepare for your operation  What do I need to do before coming to the hospital?   Arrange for a responsible person to drive you to and from the hospital    Arrange care for your children at home  Children are not allowed in the recovery areas of the hospital   Plan to wear clothing that is easy to put on and take off  If you are having shoulder surgery, wear a shirt that buttons or zippers in the front  Bathing  o Shower the evening before and the morning of your surgery with an antibacterial soap  Please refer to the Pre Op Showering Instructions for Surgery Patients Sheet   o Remove nail polish and all body piercing jewelry  o Do not shave any body part for at least 24 hours before surgery-this includes face, arms, legs and upper body  Food  o Nothing to eat or drink after midnight the night before your surgery  This includes candy and chewing gum  o Exception: If your surgery is after 12:00pm (noon), you may have clear liquids such as 7-Up®, ginger ale, apple or cranberry juice, Jell-O®, water, or clear broth until 8:00 am  o Do not drink milk or juice with pulp on the morning before surgery  o Do not drink alcohol 24 hours before surgery  Medicine  o Follow instructions you received from your surgeon about which medicines you may take on the day of surgery  o If instructed to take medicine on the morning of surgery, take pills with just a small sip of water  Call your prescribing doctor for specific infroamtion on what to do if you take insulin    What should I bring to the hospital?    Bring:  Georges James or a walker, if you have them, for foot or knee surgery   A list of the daily medicines, vitamins, minerals, herbals and nutritional supplements you take   Include the dosages of medicines and the time you take them each day   Glasses, dentures or hearing aids   Minimal clothing; you will be wearing hospital sleepwear   Photo ID; required to verify your identity   If you have a Living Will or Power of , bring a copy of the documents   If you have an ostomy, bring an extra pouch and any supplies you use    Do not bring   Medicines or inhalers   Money, valuables or jewelry    What other information should I know about the day of surgery?  Notify your surgeons if you develop a cold, sore throat, cough, fever, rash or any other illness   Report to the Ambulatory Surgical/Same Day Surgery Unit   You will be instructed to stop at Registration only if you have not been pre-registered   Inform your  fi they do not stay that they will be asked by the staff to leave a phone number where they can be reached   Be available to be reached before surgery  In the event the operating room schedule changes, you may be asked to come in earlier or later than expected    *It is important to tell your doctor and others involved in your health care if you are taking or have been taking any non-prescription drugs, vitamins, minerals, herbals or other nutritional supplements  Any of these may interact with some food or medicines and cause a reaction      Pre-Surgery Instructions:   Medication Instructions    folic acid (FOLVITE) 1 mg tablet Instructed patient per Anesthesia Guidelines   gabapentin (NEURONTIN) 300 mg capsule Instructed patient per Anesthesia Guidelines   methotrexate 2 5 MG tablet Instructed patient per Anesthesia Guidelines   oxyCODONE-acetaminophen (PERCOCET) 7 5-325 MG per tablet Instructed patient per Anesthesia Guidelines   traMADol (ULTRAM) 50 mg tablet Instructed patient per Anesthesia Guidelines  To  Take gabapentin a m  Of surgery

## 2019-01-01 ENCOUNTER — ANESTHESIA EVENT (OUTPATIENT)
Dept: PERIOP | Facility: HOSPITAL | Age: 84
End: 2019-01-01
Payer: MEDICARE

## 2019-01-02 ENCOUNTER — ANESTHESIA (OUTPATIENT)
Dept: PERIOP | Facility: HOSPITAL | Age: 84
End: 2019-01-02
Payer: MEDICARE

## 2019-01-02 ENCOUNTER — DOCUMENTATION (OUTPATIENT)
Dept: HEMATOLOGY ONCOLOGY | Facility: MEDICAL CENTER | Age: 84
End: 2019-01-02

## 2019-01-02 ENCOUNTER — HOSPITAL ENCOUNTER (OUTPATIENT)
Facility: HOSPITAL | Age: 84
Setting detail: OUTPATIENT SURGERY
Discharge: HOME | End: 2019-01-02
Attending: ORTHOPAEDIC SURGERY | Admitting: ORTHOPAEDIC SURGERY
Payer: MEDICARE

## 2019-01-02 VITALS
OXYGEN SATURATION: 99 % | SYSTOLIC BLOOD PRESSURE: 145 MMHG | TEMPERATURE: 97 F | DIASTOLIC BLOOD PRESSURE: 62 MMHG | WEIGHT: 140 LBS | HEIGHT: 63 IN | BODY MASS INDEX: 24.8 KG/M2 | RESPIRATION RATE: 18 BRPM | HEART RATE: 64 BPM

## 2019-01-02 DIAGNOSIS — M05.79 RHEUMATOID ARTHRITIS INVOLVING MULTIPLE SITES WITH POSITIVE RHEUMATOID FACTOR (HCC): ICD-10-CM

## 2019-01-02 DIAGNOSIS — R22.31 MASS OF FINGER OF RIGHT HAND: ICD-10-CM

## 2019-01-02 PROCEDURE — 88304 TISSUE EXAM BY PATHOLOGIST: CPT | Performed by: PATHOLOGY

## 2019-01-02 PROCEDURE — 26160 REMOVE TENDON SHEATH LESION: CPT | Performed by: ORTHOPAEDIC SURGERY

## 2019-01-02 RX ORDER — LIDOCAINE HYDROCHLORIDE 10 MG/ML
INJECTION, SOLUTION INFILTRATION; PERINEURAL AS NEEDED
Status: DISCONTINUED | OUTPATIENT
Start: 2019-01-02 | End: 2019-01-02 | Stop reason: SURG

## 2019-01-02 RX ORDER — CEFAZOLIN SODIUM 1 G/50ML
1000 SOLUTION INTRAVENOUS ONCE
Status: DISCONTINUED | OUTPATIENT
Start: 2019-01-02 | End: 2019-01-02 | Stop reason: HOSPADM

## 2019-01-02 RX ORDER — FENTANYL CITRATE/PF 50 MCG/ML
25 SYRINGE (ML) INJECTION
Status: DISCONTINUED | OUTPATIENT
Start: 2019-01-02 | End: 2019-01-02 | Stop reason: HOSPADM

## 2019-01-02 RX ORDER — PROPOFOL 10 MG/ML
INJECTION, EMULSION INTRAVENOUS AS NEEDED
Status: DISCONTINUED | OUTPATIENT
Start: 2019-01-02 | End: 2019-01-02 | Stop reason: SURG

## 2019-01-02 RX ORDER — DIPHENHYDRAMINE HYDROCHLORIDE 50 MG/ML
12.5 INJECTION INTRAMUSCULAR; INTRAVENOUS ONCE AS NEEDED
Status: DISCONTINUED | OUTPATIENT
Start: 2019-01-02 | End: 2019-01-02 | Stop reason: HOSPADM

## 2019-01-02 RX ORDER — PROPOFOL 10 MG/ML
INJECTION, EMULSION INTRAVENOUS CONTINUOUS PRN
Status: DISCONTINUED | OUTPATIENT
Start: 2019-01-02 | End: 2019-01-02 | Stop reason: SURG

## 2019-01-02 RX ORDER — MAGNESIUM HYDROXIDE 1200 MG/15ML
LIQUID ORAL AS NEEDED
Status: DISCONTINUED | OUTPATIENT
Start: 2019-01-02 | End: 2019-01-02 | Stop reason: HOSPADM

## 2019-01-02 RX ORDER — SODIUM CHLORIDE 9 MG/ML
125 INJECTION, SOLUTION INTRAVENOUS CONTINUOUS
Status: DISCONTINUED | OUTPATIENT
Start: 2019-01-02 | End: 2019-01-02 | Stop reason: HOSPADM

## 2019-01-02 RX ORDER — FENTANYL CITRATE 50 UG/ML
INJECTION, SOLUTION INTRAMUSCULAR; INTRAVENOUS AS NEEDED
Status: DISCONTINUED | OUTPATIENT
Start: 2019-01-02 | End: 2019-01-02 | Stop reason: SURG

## 2019-01-02 RX ORDER — CEFAZOLIN SODIUM 1 G/3ML
INJECTION, POWDER, FOR SOLUTION INTRAMUSCULAR; INTRAVENOUS AS NEEDED
Status: DISCONTINUED | OUTPATIENT
Start: 2019-01-02 | End: 2019-01-02 | Stop reason: SURG

## 2019-01-02 RX ADMIN — FENTANYL CITRATE 50 MCG: 50 INJECTION, SOLUTION INTRAMUSCULAR; INTRAVENOUS at 08:48

## 2019-01-02 RX ADMIN — PROPOFOL 30 MG: 10 INJECTION, EMULSION INTRAVENOUS at 08:48

## 2019-01-02 RX ADMIN — FENTANYL CITRATE 25 MCG: 50 INJECTION, SOLUTION INTRAMUSCULAR; INTRAVENOUS at 08:50

## 2019-01-02 RX ADMIN — FENTANYL CITRATE 25 MCG: 50 INJECTION, SOLUTION INTRAMUSCULAR; INTRAVENOUS at 08:52

## 2019-01-02 RX ADMIN — CEFAZOLIN 1000 MG: 1 INJECTION, POWDER, FOR SOLUTION INTRAVENOUS at 08:45

## 2019-01-02 RX ADMIN — PROPOFOL 50 MCG/KG/MIN: 10 INJECTION, EMULSION INTRAVENOUS at 08:48

## 2019-01-02 RX ADMIN — SODIUM CHLORIDE: 0.9 INJECTION, SOLUTION INTRAVENOUS at 08:40

## 2019-01-02 RX ADMIN — LIDOCAINE HYDROCHLORIDE 50 MG: 10 INJECTION, SOLUTION INFILTRATION; PERINEURAL at 08:48

## 2019-01-02 NOTE — INTERVAL H&P NOTE
H&P reviewed  After personally examining the patient I find no changes in the patients condition since the H&P had been written  Patient denied any increased bruising, shortness of breath or chest pain on exertion or at rest  Of note, patient's platelet count is 64H, discussed with attending whom is confident with proceeding with minor surgery

## 2019-01-02 NOTE — PROGRESS NOTES
Patient was seen in the PACU  Dressing was dry  There is no active bleeding  Patient brisk capillary refill to the digits  The area where the Esmarch tourniquet was there is no bruising  Compartments were soft  We will follow-up with the patient today and tomorrow regarding wound healing    This was discussed with the hematologist

## 2019-01-02 NOTE — H&P (VIEW-ONLY)
Jessica Fuchs  11/30/1929  [facility] [phone]  HEMATOLOGY/ONCOLOGY CONSULTATION REPORT    DISCUSSION/SUMMARY:    30-year-old female with thrombocytopenia  Mrs Grzegorz Johnston feels relatively well and clinically there are no troubling signs and no signs of excessive bruising or bleeding  We discussed options  Patient refuses any type of extensive workup at this time  Mrs Grzegorz Johnston will allow a CBC in 4 months  If abnormal, patient will return to the office for re-evaluation  The MCV is elevated  B12 and folate can be checked  The differential has demonstrated an elevated monocyte count on a number of prior CBCs  There may be a brewing primary bone marrow disorder such as MDS ([facility]; which would explain the decreased platelet count)  If this was to be the case, the plan would be continued observation anyway (platelet count is not terribly low, patient not having symptoms)  The platelet count was relatively normal less than 1 year ago; platelet count has dropped significantly over a relatively short amount of time  I discussed this with Mrs Grzegorz Johnston  Patient understands that she has a a decreased platelet count and that her risk for excessive bruising/bleeding is increased  We discussed what to monitor for as far as excessive bruising or abnormal bleeding  Thrombocytopenia is defined as a decreased level of platelets in the blood  A normal platelet count can range from 150-450,000 platelets/microliter of blood  The limits are determined by the 2 5th lower and upper percentile so values outside this range not necessarily indicate a disease process  There are many causes for thrombocytopenia  The etiologies can be divided between decreased production and increased instruction      Thrombocytopenia due to decreased production is seen in vitamin M-90 or folic acid deficiency, leukemias, MDS, aplastic anemia, sepsis, other bacterial and viral infections, liver failure (decreased thrombopoietin by the liver), fanconi anemia, Juan-Soulier syndrome, May-Hegglin anomaly, gray platelet syndrome, Alport syndrome and in [place]arrin syndrome  Thrombocytopenia due to increased instruction is seen in ITP, TTP, HUS, DIC, PNH, antiphospholipid syndrome, lupus, posttransfusion purpura, hypersplenism, certain viruses, Gaucher's disease, Dengue fever and Zika virus  Many medications can affect platelet production (valproic acid, methotrexate, chemotherapeutic agents, interferon, H2 blockers, protein pump inhibitors)  Other causes for thrombocytopenia include snake bite, niacin toxicity and Lyme disease  Patient knows to call the hematology/oncology office if there are any other questions or concerns  Carefully review your medication list and verify that the list is accurate and up-to-date  Please call the hematology/oncology office if there are medications missing from the list, medications on the list that you are not currently taking or if there is a dosage or instruction that is different from how you're taking that medication  Patient goals and areas of care:   Monitor platelet count  Barriers to care:   Patient's reluctance to follow-up  Patient is able to self-care   ______________________________________________________________________________________    Chief Complaint   Patient presents with    Consult     Thrombocytopenia     History of Present Illness:    80-year-old female referred for evaluation of thrombocytopenia  Mrs Jesse Bello states that she routinely does not see physicians other than her rheumatologist, Dr Faustina Bhardwaj  Patient has RA; generalized body aches are the same as before  Recent blood work demonstrated a decreased platelet count  Patient denies any issues with excessive bruising or bleeding  Appetite has been good, weight is been stable  Activities are limited but baseline    No headaches, blurred vision, dizziness or falls  No shortness of breath or dyspnea on exertion  No other GI,  or GYN issues  Review of Systems   Constitutional: Negative  HENT: Negative  Eyes: Negative  Respiratory: Negative  Cardiovascular: Negative  Gastrointestinal: Negative  Endocrine: Negative  Genitourinary: Negative  Musculoskeletal: Positive for arthralgias  Skin: Negative  Allergic/Immunologic: Negative  Neurological: Negative  Hematological: Negative  Psychiatric/Behavioral: Negative  All other systems reviewed and are negative  Patient Active Problem List   Diagnosis    Mass of finger of right hand     Past Medical History:   Diagnosis Date    Carpal tunnel syndrome     Degeneration of lumbar intervertebral disc     Postmenopausal osteoporosis     Primary generalized (osteo)arthritis     Rheumat[facility] [address] [address]     Right shoulder pain      Past Surgical History:   Procedure Laterality Date    KNEE SURGERY       Family History   Problem Relation Age of Onset   [place] Cancer Sister     Breast cancer Mother     Breast cancer Maternal Grandmother      Social History     Social History    Marital status:      Spouse name: N/A    Number of children: N/A    Years of education: N/A     Occupational History    Not on file       Social History Main Topics    Smoking status: Former Smoker    Smokeless tobacco: Never Used    Alcohol use Yes      Comment: rarely    Drug use: No    Sexual activity: Not on file     Other Topics Concern    Not on file     Social History Narrative    No narrative on file       Current Outpatient Prescriptions:     ergocalciferol (VITAMIN D2) 50,000 units, Take 1 capsule (50,000 Units total) by mouth once a week, Disp: 4 capsule, Rfl: 0    folic acid (FOLVITE) 1 mg tablet, Take 1 tablet (1 mg total) by mouth daily, Disp: 30 tablet, Rfl: 11    gabapentin (NEURONTIN) 300 mg capsule, Take 1 capsule (300 mg total) by mouth 2 (two) times a day, Disp: 60 capsule, Rfl: 2    methotrexate 2 5 MG tablet, Take 5 tablets (12 5 mg total) by mouth once a week, Disp: 20 tablet, Rfl: 2    oxyCODONE-acetaminophen (PERCOCET) 7 5-325 MG per tablet, Take by mouth, Disp: , Rfl:     traMADol (ULTRAM) 50 mg tablet, Take 1 tablet (50 mg total) by mouth every 8 (eight) hours as needed for moderate pain, Disp: 15 tablet, Rfl: 0    No Known Allergies    Vitals:    12/31/18 0857   BP: 112/72   Pulse: 78   Resp: 18   Temp: 97 8 °F (36 6 °C)   SpO2: 91%     Physical Exam   Constitutional: She is oriented to person, place, and time  She appears well-developed and well-nourished  Thin but well nourished elderly female   HENT:   Head: Normocephalic and atraumatic  Right Ear: External ear normal    Left Ear: External ear normal    Mouth/Throat: Oropharynx is clear and moist    Eyes: Pupils are equal, round, and reactive to light  Conjunctivae and EOM are normal    Neck: Normal range of motion  Neck supple  Supple, no JVD   Cardiovascular: Normal rate, regular rhythm, normal heart sounds and intact distal pulses  Pulmonary/Chest: Effort normal and breath sounds normal    Fair to good air entry bilaterally, clear   Abdominal: Soft  Bowel sounds are normal    Soft, nontender, limited exam, no obvious hepatosplenomegaly, +bowel sounds   Musculoskeletal: Normal range of motion  Good range of motion all extremities, +arthritic changes with ulnar deviation both hands, right 2nd digit with mass at base   Neurological: She is alert and oriented to person, place, and time  She has normal reflexes  Skin: Skin is warm  Warm, moist, relatively good color, no petechiae or ecchymoses   Psychiatric: She has a normal mood and affect   Her behavior is normal  Judgment and thought content normal    Extremities:   0 -1 + bilateral lower extremity edema, no cords, pulses are 1+  Lymphatics:   No adenopathy in the neck, supraclavicular region, axilla and groin bilaterally    Labs    12/28/2018 WBC = 6 4 hemoglobin = 13 1 hematocrit = 40 5 MCV = 101 platelet = 79 RDW = 11 8 neutrophil = 47% lymphocytes = 31% monocyte = 19% PT = 12 9 INR = 0 96 PTT = 34 BUN = 16 creatinine = 0 77  12/05/2018 ESR = 16 C reactive protein < 3 0 AST = 12 ALT = 14 alkaline phosphatase = 53 total protein = 7 9 total bilirubin = 0 3  12/05/2018 hepatitis-B and C profile were negative/nonreactive  06/18/2018 WBC = 7 6 hemoglobin = 13 hematocrit = 40 1 MCV = 103 RDW = 14 3 platelet = 675 neutrophil = 41% lymphocytes = 33% monocyte = 24% (absolute monocyte = 1 80 = elevated)  04/16/2018 WBC = 7 4 hemoglobin = 13 4 hematocrit = 41 7 MCV = 103 platelet = 063 neutrophil = 42% lymphocytes = 40% monocyte = 17%

## 2019-01-02 NOTE — DISCHARGE INSTRUCTIONS
Dr Gia Thomson Operative Instructions  Excision Biopsy of cystic lesion right hand    You have had surgery on your arm today, please read and follow the information below:  · Elevate your hand above your elbow during the next 24-48 hours to help with swelling  · Place your hand and arm over your head with motion at your shoulder three times a day  · Do not apply any cream/ointment/oil to your incisions including antibiotics  · Do not soak your hands in standing water (dishwater, tubs, Jacuzzi's, pools, etc ) until given permission (typically 2-3 weeks after injury)    Call the office if you notice any:  · Increased numbness or tingling of your hand or fingers that is not relieved with elevation  · Increasing pain that is not controlled with medication  · Difficulty chewing, breathing, swallowing  · Chest pains or shortness of breath  · Fever over 101 4 degrees  Bandage: Remove bandage after 3 days  May resume activities of daily life thereafter  Avoid heavy lifting until seen at first follow up  Motion: May move fingers and hand after bandage is removed  Keep wound clean  Weight bearing status: Avoid heavy lifting (>5 pounds) with the extremity that was operated on until follow up appointment  Normal activities of daily living are OK  Sling: Sling for comfort for 2-3 days  Maintain sling while block is working    Pain medication:   Tylenol Extended Release 650 mg every 8 hours     Follow-up Appointment: 7 days  Please call the office below to verify appointment  Please call the office at [phone] if you have any questions or concerns regarding your post-operative care

## 2019-01-02 NOTE — ANESTHESIA PROCEDURE NOTES
Peripheral Block    Patient location during procedure: holding area  Start time: 1/2/2019 8:00 AM  Reason for block: at surgeon's request and post-op pain management  Staffing  Anesthesiologist: Maki Andrews  Performed: anesthesiologist   Preanesthetic Checklist  Completed: patient identified, site marked, surgical consent, pre-op evaluation, timeout performed, IV checked, risks and benefits discussed and monitors and equipment checked  Peripheral Block  Patient position: supine  Prep: ChloraPrep  Patient monitoring: continuous pulse ox, heart rate and frequent blood pressure checks  Block type: axillary  Laterality: right  Injection technique: single-shot  Procedures: ultrasound guided  Ultrasound permanent image saved  Local infiltration: bupivacaine  Infiltration strength: 0 5 %  Dose: 15 mL  Needle  Needle type: Stimuplex   Needle length: 5 cm  Needle localization: ultrasound guidance  Assessment  Injection assessment: incremental injection, local visualized surrounding nerve on ultrasound, negative aspiration for heme and no paresthesia on injection  Paresthesia pain: none  Heart rate change: no  Slow fractionated injection: no  Post-procedure:  site cleaned  patient tolerated the procedure well with no immediate complications

## 2019-01-02 NOTE — ANESTHESIA POSTPROCEDURE EVALUATION
Post-Op Assessment Note      CV Status:  Stable    Mental Status:  Alert    Hydration Status:  Euvolemic    PONV Controlled:  Controlled    Airway Patency:  Patent    Post Op Vitals Reviewed: Yes          Staff: CRNA           BP     Temp      Pulse     Resp      SpO2

## 2019-01-02 NOTE — OP NOTE
OPERATIVE REPORT  PATIENT NAME: Isa Hernandez    :  1929  MRN: [id number]  Pt Location: [facility] OR ROOM 01    SURGERY DATE: 2019    Surgeon(s) and Role:     * Dylan Baugh DO - Primary    Preop Diagnosis:  Mass of finger of right hand [R22 31]    Post-Op Diagnosis Codes:     * Mass of finger of right hand [R22 31]    Procedure(s) (LRB):  EXCISION BIOPSY TISSUE LESION/MASS HAND (Right)    Specimen(s):  ID Type Source Tests Collected by Time Destination   1 : CYST RIGHT HAND Tissue Cyst TISSUE EXAM Krissy CastrejonDO will 2019 [id number]        Estimated Blood Loss:   Minimal    Drains:       Anesthesia Type:   Regional with Sedation    Operative Indications: Mass of finger of right hand [R22 31]      Operative Findings:  1 cm diameter cystic mass over the radial aspect of the index MCP    Complications:   None    Procedure and Technique:  Shobha Leslie is 41-year-old female past medical history of thrombocytopenia and rheumatoid arthritis presents to the office with a mass over the radial aspect of her MCP of her index finger  It did transilluminate  She claims this has been there for sometime and fluctuated in size  She denies any numbness or pain related to the mass  Options were discussed including conservative and surgical options patient was proceed with surgical excision of the mass  All the proper consent forms were obtained  Patient seen by [facility] H[facility] in the work able to use his exam the patient anchor with pace for surgery  Of note the patient's platelets preoperatively were 79  This case was discussed with Hematology in the were okay with clearing the patient for surgery  Is explained to the patient prior to surgery that she is at risk for bleeding secondary to low platelets  Patient understood this risk  Patient understood the risk of infection, bleeding, wound healing issues, need for future surgery if the bleeding was able to unable to be stopped      Patient was identified by 1st and last name prior to the surgery the preop area  The right upper extremity was marked  Patient had medial with anesthesia and they deemed that axillary block with sedation was most appropriate anesthesia  Consent forms were obtained for this  Patient was transferred from the preop area to the OR and again identified by 1st and last name  Patient underwent sedation  A tourniquet on the arm was not used in order to prevent any kind of bruising the tourniquet pressure  Patient then underwent sterile prep and drape  Time-out was performed successfully  All in the room were agreement  The consent form was reviewed by myself  Patient had received her antibiotics  Attention was turned to the radial aspect of the index finger MCP  Using a marking pen a 1 5 cm incision was made over the mass  The limb was exsanguinated with a 4 in Esmarch bandage and the Esmarch was used as a tourniquet  Was uses tourniquet over the wrist to avoid any kind of muscular compression and possible bruising  Attention was then turned to the radial aspect of the index MCP  Using 15 blade a 1 5 cm incision was made over the mass  This incision was carried down through skin and dermis  Thereafter skin hooks were used to retract the tissue and tenotomies scissors used expose the mass  It appeared cystic in nature  Neurovascular structures which were running over the ulnar aspect of the mass were carefully dissected off the mass and retracted ulnarly  This exposed the mass further  Careful dissection around the mass was done with tenotomy scissors bluntly  Care was taken not to injure neurovascular structures  Stalk was noted at the level of the MCP joint and this was cauterized with bipolar cautery  The mass was then removed  It was sent for specimen  Again care was taken to protect any neurovascular structures during the dissection  The wound was thoroughly irrigated and the tourniquet was released  There is no overt bleeding  There is brisk capillary refill to the fingers Bleeding was stopped with bipolar cautery and direct pressure  At the end of the surgery there was no overt bleeding  Sterile compression type dressing was applied to the incision after the 5 0 nylon suture was used for closure  Patient tolerated surgery well  Patient's stressed the PACU stable condition         I was present for the entire procedure and A physician assistant was required during the procedure for retraction tissue handling,dissection and suturing    Patient Disposition:  PACU  and hemodynamically stable    SIGNATURE: Dave Hope DO  DATE: January 2, 2019  TIME: 10:46 AM

## 2019-01-02 NOTE — PERIOPERATIVE NURSING NOTE
Returned from [employer] pain-exposed right handed fingers warm-dsg dry and intact-sling in place

## 2019-01-02 NOTE — PROGRESS NOTES
I received a call from Dr Jessica Grace, orthopedics earlier today  He was concerned about the low platelet count and the fact that the recent hematology note did not specifically state clearance for a digit biopsy  Patient is cleared for the biopsy of the digit lesion  This has been performed this morning  Dr Jessica Grace tells me there were no issues as far as bleeding afterwards  He will have his nurse called the patient tomorrow to make sure there continues to be no bleeding issues  This office, the hematology office will call the patient on Friday January 4, 2019 to recheck with the patient also  Dr Jessica Grace will see the patient in 1 week, earlier if necessary

## 2019-01-02 NOTE — PERIOPERATIVE NURSING NOTE
Patient states she had an "infected tooth" and was taking an antibiotic   Her last dose was "around Liza "

## 2019-01-10 ENCOUNTER — OFFICE VISIT (OUTPATIENT)
Dept: OBGYN CLINIC | Facility: CLINIC | Age: 84
End: 2019-01-10

## 2019-01-10 VITALS — WEIGHT: 137 LBS | BODY MASS INDEX: 24.27 KG/M2 | HEIGHT: 63 IN

## 2019-01-10 DIAGNOSIS — R22.31 MASS OF FINGER OF RIGHT HAND: Primary | ICD-10-CM

## 2019-01-10 PROCEDURE — 99024 POSTOP FOLLOW-UP VISIT: CPT | Performed by: ORTHOPAEDIC SURGERY

## 2019-01-10 NOTE — PROGRESS NOTES
Assessment/Plan:  1  Mass of finger of right hand         Scribe Attestation    I,:   Moni Villalobos MA am acting as a scribe while in the presence of the attending physician :        I,:   Jalyn Morgan DO personally performed the services described in this documentation    as scribed in my presence :              Leslie Dunne is doing well postoperatively  Her incision is healing well without signs of infection  She has been using her hand without any issues or pain  She may restart Methotrexate at this time  She will follow up in 1 week for repeat evaluation and suture removal       Subjective:   Josh Mccord is a 80 y o  female who presents to the office 1 week s/p right index finger excision of mass  She states she is doing well postoperatively  She denies any issues with the wound  She denies any numbness or tingling  Review of Systems   Constitutional: Negative for chills and fever  HENT: Negative for drooling and sneezing  Eyes: Negative for redness  Respiratory: Negative for cough and wheezing  Gastrointestinal: Negative for nausea and vomiting  Musculoskeletal: Positive for arthralgias  Negative for joint swelling and myalgias  Neurological: Negative for weakness and numbness  Psychiatric/Behavioral: Negative for behavioral problems  The patient is not nervous/anxious            Past Medical History:   Diagnosis Date    Carpal tunnel syndrome     Degeneration of lumbar intervertebral disc     Postmenopausal osteoporosis     Primary generalized (osteo)arthritis     Rheumatoid arthritis ([address] )     Right shoulder pain        Past Surgical History:   Procedure Laterality Date    CARPAL TUNNEL RELEASE      CATARACT EXTRACTION      CHOLECYSTECTOMY      KNEE SURGERY      MASS EXCISION Right 1/2/2019    Procedure: EXCISION BIOPSY TISSUE LESION/MASS HAND;  Surgeon: Jalyn Morgan DO;  Location: [facility];  Service: Orthopedics    TONSILLECTOMY         Family History   Problem Relation Age of Onset    Cancer Sister     Breast cancer Mother     Breast cancer Maternal Grandmother     No Known Problems Father     No Known Problems Brother     No Known Problems Maternal Aunt     No Known Problems Maternal Uncle     No Known Problems Paternal Aunt     No Known Problems Paternal Uncle     No Known Problems Maternal Grandfather     No Known Problems Paternal Grandmother     No Known Problems Paternal Grandfather     ADD / ADHD Neg Hx     Anesthesia problems Neg Hx     Clotting disorder Neg Hx     Collagen disease Neg Hx     Diabetes Neg Hx     Dislocations Neg Hx     Learning disabilities Neg Hx     Neurological problems Neg Hx     Osteoporosis Neg Hx     Rheumatologic disease Neg Hx     Scoliosis Neg Hx     Vascular Disease Neg Hx        Social History     Occupational History    Not on file  Social History Main Topics    Smoking status: Former Smoker     Years: 40 00     Types: Cigarettes     Quit date: 12/31/1998    Smokeless tobacco: Never Used    Alcohol use Yes      Comment: rarely    Drug use: No    Sexual activity: Not on file         Current Outpatient Prescriptions:     ergocalciferol (VITAMIN D2) 50,000 units, Take 1 capsule (50,000 Units total) by mouth once a week, Disp: 4 capsule, Rfl: 0    folic acid (FOLVITE) 1 mg tablet, Take 1 tablet (1 mg total) by mouth daily, Disp: 30 tablet, Rfl: 11    gabapentin (NEURONTIN) 300 mg capsule, Take 1 capsule (300 mg total) by mouth 2 (two) times a day, Disp: 60 capsule, Rfl: 2    oxyCODONE-acetaminophen (PERCOCET) 7 5-325 MG per tablet, Take by mouth, Disp: , Rfl:     traMADol (ULTRAM) 50 mg tablet, Take 1 tablet (50 mg total) by mouth every 8 (eight) hours as needed for moderate pain, Disp: 15 tablet, Rfl: 0    No Known Allergies    Objective: There were no vitals filed for this visit      Ortho Exam     Right index finger    Incision healing well   Sutures intact  Sensation intact median, radial, and ulnar nerve  Compartments soft  Brisk capillary refill   Full range of motion of the finger  No palpable hematoma  No seroma  No drainage from the wound        Physical Exam   Constitutional: She is oriented to person, place, and time  She appears well-developed and well-nourished  HENT:   Head: Normocephalic and atraumatic  Eyes: Conjunctivae are normal  Right eye exhibits no discharge  Left eye exhibits no discharge  Neck: Normal range of motion  Neck supple  Cardiovascular: Normal rate and intact distal pulses  Pulmonary/Chest: Effort normal  No respiratory distress  Neurological: She is alert and oriented to person, place, and time  Skin: Skin is warm and dry  Psychiatric: She has a normal mood and affect   Her behavior is normal  Judgment and thought content normal

## 2019-01-17 ENCOUNTER — OFFICE VISIT (OUTPATIENT)
Dept: OBGYN CLINIC | Facility: CLINIC | Age: 84
End: 2019-01-17

## 2019-01-17 VITALS
HEART RATE: 71 BPM | DIASTOLIC BLOOD PRESSURE: 71 MMHG | SYSTOLIC BLOOD PRESSURE: 146 MMHG | BODY MASS INDEX: 24.27 KG/M2 | WEIGHT: 137 LBS | HEIGHT: 63 IN

## 2019-01-17 DIAGNOSIS — R22.31 MASS OF FINGER OF RIGHT HAND: Primary | ICD-10-CM

## 2019-01-17 PROCEDURE — 99024 POSTOP FOLLOW-UP VISIT: CPT | Performed by: ORTHOPAEDIC SURGERY

## 2019-01-17 NOTE — PROGRESS NOTES
Assessment/Plan:  1  Mass of finger of right hand         Scribe Attestation    I,:   Moni Villalobos MA am acting as a scribe while in the presence of the attending physician :        I,:   Anisa Anaya DO personally performed the services described in this documentation    as scribed in my presence :              Drew Marques is doing well postoperatively  Her incision is healing well without drainage or redness  Her sutures were removed in the office today without any complications  She has no restrictions at this time  She may follow up with me as needed  Subjective:   Jasper Swan is a 80 y o  female who presents to the office 2 weeks s/p right index finger excision of mass  She states she is doing well postoperatively  She denies any pain  She denies any numbness or tingling  She states she has restarted her Methotrexate  Review of Systems   Constitutional: Negative for chills and fever  HENT: Negative for drooling and sneezing  Eyes: Negative for redness  Respiratory: Negative for cough and wheezing  Gastrointestinal: Negative for nausea and vomiting  Musculoskeletal: Negative for arthralgias, joint swelling and myalgias  Neurological: Negative for weakness and numbness  Psychiatric/Behavioral: Negative for behavioral problems  The patient is not nervous/anxious            Past Medical History:   Diagnosis Date    Carpal tunnel syndrome     Degeneration of lumbar intervertebral disc     Postmenopausal osteoporosis     Primary generalized (osteo)arthritis     Rheumatoid arthritis ([address] )     Right shoulder pain        Past Surgical History:   Procedure Laterality Date    CARPAL TUNNEL RELEASE      CATARACT EXTRACTION      CHOLECYSTECTOMY      KNEE SURGERY      MASS EXCISION Right 1/2/2019    Procedure: EXCISION BIOPSY TISSUE LESION/MASS HAND;  Surgeon: Anisa Anaya DO;  [facility];  Service: Orthopedics    TONSILLECTOMY         Family History   Problem Relation Age of Onset   [facility] Cancer Sister     Breast cancer Mother     Breast cancer Maternal Grandmother     No Known Problems Father     No Known Problems Brother     No Known Problems Maternal Aunt     No Known Problems Maternal Uncle     No Known Problems Paternal Aunt     No Known Problems Paternal Uncle     No Known Problems Maternal Grandfather     No Known Problems Paternal Grandmother     No Known Problems Paternal Grandfather     ADD / ADHD Neg Hx     Anesthesia problems Neg Hx     Clotting disorder Neg Hx     Collagen disease Neg Hx     Diabetes Neg Hx     Dislocations Neg Hx     Learning disabilities Neg Hx     Neurological problems Neg Hx     Osteoporosis Neg Hx     Rheumatologic disease Neg Hx     Scoliosis Neg Hx     Vascular Disease Neg Hx        Social History     Occupational History    Not on file       Social History Main Topics    Smoking status: Former Smoker     Years: 40 00     Types: Cigarettes     Quit date: 12/31/1998    Smokeless tobacco: Never Used    Alcohol use Yes      Comment: rarely    Drug use: No    Sexual activity: Not on file         Current Outpatient Prescriptions:     ergocalciferol (VITAMIN D2) 50,000 units, Take 1 capsule (50,000 Units total) by mouth once a week, Disp: 4 capsule, Rfl: 0    folic acid (FOLVITE) 1 mg tablet, Take 1 tablet (1 mg total) by mouth daily, Disp: 30 tablet, Rfl: 11    gabapentin (NEURONTIN) 300 mg capsule, Take 1 capsule (300 mg total) by mouth 2 (two) times a day, Disp: 60 capsule, Rfl: 2    oxyCODONE-acetaminophen (PERCOCET) 7 5-325 MG per tablet, Take by mouth, Disp: , Rfl:     traMADol (ULTRAM) 50 mg tablet, Take 1 tablet (50 mg total) by mouth every 8 (eight) hours as needed for moderate pain, Disp: 15 tablet, Rfl: 0    No Known Allergies    Objective:  Vitals:    01/17/19 1001   BP: 146/71   Pulse: 71       Ortho Exam     Right index finger    Incision well healed sutures removed in the office today without any complications  No drainage or redness noted  Sensation intact median, radial, and ulnar nerve  Compartments soft  Brisk capillary refill   Motor intact median radial ulnar nerve  Full range of motion of the fingers and thumb  No instability of the right index finger MCP  Sensation intact radial ulnar aspect index finger      Physical Exam   Constitutional: She is oriented to person, place, and time  She appears well-developed and well-nourished  HENT:   Head: Normocephalic and atraumatic  Eyes: Conjunctivae are normal  Right eye exhibits no discharge  Left eye exhibits no discharge  Neck: Normal range of motion  Neck supple  Cardiovascular: Normal rate and intact distal pulses  Pulmonary/Chest: Effort normal  No respiratory distress  Neurological: She is alert and oriented to person, place, and time  Skin: Skin is warm and dry  Psychiatric: She has a normal mood and affect   Her behavior is normal  Judgment and thought content normal

## 2019-02-05 ENCOUNTER — TRANSCRIBE ORDERS (OUTPATIENT)
Dept: LAB | Facility: CLINIC | Age: 84
End: 2019-02-05

## 2019-02-05 ENCOUNTER — APPOINTMENT (OUTPATIENT)
Dept: LAB | Facility: CLINIC | Age: 84
End: 2019-02-05
Payer: MEDICARE

## 2019-02-05 DIAGNOSIS — M05.9 RHEUMATOID ARTHRITIS WITH POSITIVE RHEUMATOID FACTOR, INVOLVING UNSPECIFIED SITE (HCC): Primary | ICD-10-CM

## 2019-02-05 DIAGNOSIS — M05.9 RHEUMATOID ARTHRITIS WITH POSITIVE RHEUMATOID FACTOR, INVOLVING UNSPECIFIED SITE (HCC): ICD-10-CM

## 2019-02-05 LAB
ALBUMIN SERPL BCP-MCNC: 3.4 G/DL (ref 3.5–5)
ALP SERPL-CCNC: 47 U/L (ref 46–116)
ALT SERPL W P-5'-P-CCNC: 14 U/L (ref 12–78)
ANION GAP SERPL CALCULATED.3IONS-SCNC: 3 MMOL/L (ref 4–13)
AST SERPL W P-5'-P-CCNC: 15 U/L (ref 5–45)
BASOPHILS # BLD AUTO: 0.05 THOUSANDS/ΜL (ref 0–0.1)
BASOPHILS NFR BLD AUTO: 1 % (ref 0–1)
BILIRUB SERPL-MCNC: 0.48 MG/DL (ref 0.2–1)
BUN SERPL-MCNC: 15 MG/DL (ref 5–25)
CALCIUM SERPL-MCNC: 9.4 MG/DL (ref 8.3–10.1)
CHLORIDE SERPL-SCNC: 106 MMOL/L (ref 100–108)
CO2 SERPL-SCNC: 28 MMOL/L (ref 21–32)
CREAT SERPL-MCNC: 0.79 MG/DL (ref 0.6–1.3)
EOSINOPHIL # BLD AUTO: 0.12 THOUSAND/ΜL (ref 0–0.61)
EOSINOPHIL NFR BLD AUTO: 2 % (ref 0–6)
ERYTHROCYTE [DISTWIDTH] IN BLOOD BY AUTOMATED COUNT: 13.9 % (ref 11.6–15.1)
ERYTHROCYTE [SEDIMENTATION RATE] IN BLOOD: 26 MM/HOUR (ref 0–20)
GFR SERPL CREATININE-BSD FRML MDRD: 67 ML/MIN/1.73SQ M
GLUCOSE P FAST SERPL-MCNC: 95 MG/DL (ref 65–99)
HCT VFR BLD AUTO: 40.6 % (ref 34.8–46.1)
HGB BLD-MCNC: 12.9 G/DL (ref 11.5–15.4)
IMM GRANULOCYTES # BLD AUTO: 0.04 THOUSAND/UL (ref 0–0.2)
IMM GRANULOCYTES NFR BLD AUTO: 1 % (ref 0–2)
LYMPHOCYTES # BLD AUTO: 2.44 THOUSANDS/ΜL (ref 0.6–4.47)
LYMPHOCYTES NFR BLD AUTO: 34 % (ref 14–44)
MCH RBC QN AUTO: 32.1 PG (ref 26.8–34.3)
MCHC RBC AUTO-ENTMCNC: 31.8 G/DL (ref 31.4–37.4)
MCV RBC AUTO: 101 FL (ref 82–98)
MONOCYTES # BLD AUTO: 1.44 THOUSAND/ΜL (ref 0.17–1.22)
MONOCYTES NFR BLD AUTO: 20 % (ref 4–12)
NEUTROPHILS # BLD AUTO: 3.14 THOUSANDS/ΜL (ref 1.85–7.62)
NEUTS SEG NFR BLD AUTO: 42 % (ref 43–75)
NRBC BLD AUTO-RTO: 0 /100 WBCS
PMV BLD AUTO: 12.6 FL (ref 8.9–12.7)
POTASSIUM SERPL-SCNC: 4.4 MMOL/L (ref 3.5–5.3)
PROT SERPL-MCNC: 8 G/DL (ref 6.4–8.2)
RBC # BLD AUTO: 4.02 MILLION/UL (ref 3.81–5.12)
SODIUM SERPL-SCNC: 137 MMOL/L (ref 136–145)
WBC # BLD AUTO: 7.23 THOUSAND/UL (ref 4.31–10.16)

## 2019-02-05 PROCEDURE — 85652 RBC SED RATE AUTOMATED: CPT

## 2019-02-05 PROCEDURE — 85025 COMPLETE CBC W/AUTO DIFF WBC: CPT

## 2019-02-05 PROCEDURE — 80053 COMPREHEN METABOLIC PANEL: CPT

## 2019-02-05 PROCEDURE — 36415 COLL VENOUS BLD VENIPUNCTURE: CPT

## 2019-04-08 ENCOUNTER — APPOINTMENT (OUTPATIENT)
Dept: LAB | Facility: CLINIC | Age: 84
End: 2019-04-08
Payer: MEDICARE

## 2019-04-08 ENCOUNTER — TRANSCRIBE ORDERS (OUTPATIENT)
Dept: LAB | Facility: CLINIC | Age: 84
End: 2019-04-08

## 2019-04-08 DIAGNOSIS — M05.79 SEROPOSITIVE RHEUMATOID ARTHRITIS OF MULTIPLE SITES (HCC): Primary | ICD-10-CM

## 2019-04-08 LAB
ALBUMIN SERPL BCP-MCNC: 3.6 G/DL (ref 3.5–5)
ALP SERPL-CCNC: 43 U/L (ref 46–116)
ALT SERPL W P-5'-P-CCNC: 12 U/L (ref 12–78)
ANION GAP SERPL CALCULATED.3IONS-SCNC: 4 MMOL/L (ref 4–13)
AST SERPL W P-5'-P-CCNC: 13 U/L (ref 5–45)
BASOPHILS # BLD AUTO: 0.04 THOUSANDS/ΜL (ref 0–0.1)
BASOPHILS NFR BLD AUTO: 1 % (ref 0–1)
BILIRUB SERPL-MCNC: 0.5 MG/DL (ref 0.2–1)
BUN SERPL-MCNC: 22 MG/DL (ref 5–25)
CALCIUM SERPL-MCNC: 9.2 MG/DL (ref 8.3–10.1)
CHLORIDE SERPL-SCNC: 108 MMOL/L (ref 100–108)
CO2 SERPL-SCNC: 26 MMOL/L (ref 21–32)
CREAT SERPL-MCNC: 0.92 MG/DL (ref 0.6–1.3)
EOSINOPHIL # BLD AUTO: 0.03 THOUSAND/ΜL (ref 0–0.61)
EOSINOPHIL NFR BLD AUTO: 1 % (ref 0–6)
ERYTHROCYTE [DISTWIDTH] IN BLOOD BY AUTOMATED COUNT: 14.4 % (ref 11.6–15.1)
ERYTHROCYTE [SEDIMENTATION RATE] IN BLOOD: 18 MM/HOUR (ref 0–20)
GFR SERPL CREATININE-BSD FRML MDRD: 55 ML/MIN/1.73SQ M
GLUCOSE P FAST SERPL-MCNC: 108 MG/DL (ref 65–99)
HCT VFR BLD AUTO: 39.9 % (ref 34.8–46.1)
HGB BLD-MCNC: 13 G/DL (ref 11.5–15.4)
IMM GRANULOCYTES # BLD AUTO: 0.03 THOUSAND/UL (ref 0–0.2)
IMM GRANULOCYTES NFR BLD AUTO: 1 % (ref 0–2)
LYMPHOCYTES # BLD AUTO: 2.11 THOUSANDS/ΜL (ref 0.6–4.47)
LYMPHOCYTES NFR BLD AUTO: 33 % (ref 14–44)
MCH RBC QN AUTO: 32.3 PG (ref 26.8–34.3)
MCHC RBC AUTO-ENTMCNC: 32.6 G/DL (ref 31.4–37.4)
MCV RBC AUTO: 99 FL (ref 82–98)
MONOCYTES # BLD AUTO: 1.59 THOUSAND/ΜL (ref 0.17–1.22)
MONOCYTES NFR BLD AUTO: 25 % (ref 4–12)
NEUTROPHILS # BLD AUTO: 2.55 THOUSANDS/ΜL (ref 1.85–7.62)
NEUTS SEG NFR BLD AUTO: 39 % (ref 43–75)
NRBC BLD AUTO-RTO: 0 /100 WBCS
PMV BLD AUTO: 13.2 FL (ref 8.9–12.7)
POTASSIUM SERPL-SCNC: 3.6 MMOL/L (ref 3.5–5.3)
PROT SERPL-MCNC: 7.8 G/DL (ref 6.4–8.2)
RBC # BLD AUTO: 4.02 MILLION/UL (ref 3.81–5.12)
SODIUM SERPL-SCNC: 138 MMOL/L (ref 136–145)
WBC # BLD AUTO: 6.35 THOUSAND/UL (ref 4.31–10.16)

## 2019-04-08 PROCEDURE — 80053 COMPREHEN METABOLIC PANEL: CPT | Performed by: INTERNAL MEDICINE

## 2019-04-08 PROCEDURE — 36415 COLL VENOUS BLD VENIPUNCTURE: CPT | Performed by: INTERNAL MEDICINE

## 2019-04-08 PROCEDURE — 85652 RBC SED RATE AUTOMATED: CPT | Performed by: INTERNAL MEDICINE

## 2019-04-08 PROCEDURE — 85025 COMPLETE CBC W/AUTO DIFF WBC: CPT | Performed by: INTERNAL MEDICINE

## 2019-05-08 ENCOUNTER — APPOINTMENT (OUTPATIENT)
Dept: LAB | Facility: CLINIC | Age: 84
End: 2019-05-08
Payer: MEDICARE

## 2019-05-08 ENCOUNTER — TRANSCRIBE ORDERS (OUTPATIENT)
Dept: LAB | Facility: CLINIC | Age: 84
End: 2019-05-08

## 2019-05-08 DIAGNOSIS — D69.6 THROMBOCYTOPENIA (HCC): ICD-10-CM

## 2019-05-08 LAB
BASOPHILS # BLD AUTO: 0.04 THOUSANDS/ΜL (ref 0–0.1)
BASOPHILS NFR BLD AUTO: 1 % (ref 0–1)
EOSINOPHIL # BLD AUTO: 0.04 THOUSAND/ΜL (ref 0–0.61)
EOSINOPHIL NFR BLD AUTO: 1 % (ref 0–6)
ERYTHROCYTE [DISTWIDTH] IN BLOOD BY AUTOMATED COUNT: 14.7 % (ref 11.6–15.1)
FOLATE SERPL-MCNC: >20 NG/ML (ref 3.1–17.5)
HCT VFR BLD AUTO: 41.2 % (ref 34.8–46.1)
HGB BLD-MCNC: 13 G/DL (ref 11.5–15.4)
IMM GRANULOCYTES # BLD AUTO: 0.04 THOUSAND/UL (ref 0–0.2)
IMM GRANULOCYTES NFR BLD AUTO: 1 % (ref 0–2)
LYMPHOCYTES # BLD AUTO: 2.4 THOUSANDS/ΜL (ref 0.6–4.47)
LYMPHOCYTES NFR BLD AUTO: 34 % (ref 14–44)
MCH RBC QN AUTO: 32 PG (ref 26.8–34.3)
MCHC RBC AUTO-ENTMCNC: 31.6 G/DL (ref 31.4–37.4)
MCV RBC AUTO: 102 FL (ref 82–98)
MONOCYTES # BLD AUTO: 1.92 THOUSAND/ΜL (ref 0.17–1.22)
MONOCYTES NFR BLD AUTO: 27 % (ref 4–12)
NEUTROPHILS # BLD AUTO: 2.69 THOUSANDS/ΜL (ref 1.85–7.62)
NEUTS SEG NFR BLD AUTO: 36 % (ref 43–75)
NRBC BLD AUTO-RTO: 0 /100 WBCS
PLATELET # BLD AUTO: 117 THOUSANDS/UL (ref 149–390)
PMV BLD AUTO: 12.6 FL (ref 8.9–12.7)
RBC # BLD AUTO: 4.06 MILLION/UL (ref 3.81–5.12)
VIT B12 SERPL-MCNC: 521 PG/ML (ref 100–900)
WBC # BLD AUTO: 7.13 THOUSAND/UL (ref 4.31–10.16)

## 2019-05-08 PROCEDURE — 82607 VITAMIN B-12: CPT

## 2019-05-08 PROCEDURE — 85025 COMPLETE CBC W/AUTO DIFF WBC: CPT

## 2019-05-08 PROCEDURE — 82746 ASSAY OF FOLIC ACID SERUM: CPT

## 2019-05-08 PROCEDURE — 36415 COLL VENOUS BLD VENIPUNCTURE: CPT

## 2019-05-10 ENCOUNTER — OFFICE VISIT (OUTPATIENT)
Dept: HEMATOLOGY ONCOLOGY | Facility: MEDICAL CENTER | Age: 84
End: 2019-05-10
Payer: MEDICARE

## 2019-05-10 VITALS
HEIGHT: 63 IN | WEIGHT: 140 LBS | BODY MASS INDEX: 24.8 KG/M2 | TEMPERATURE: 97.8 F | SYSTOLIC BLOOD PRESSURE: 138 MMHG | OXYGEN SATURATION: 95 % | RESPIRATION RATE: 18 BRPM | DIASTOLIC BLOOD PRESSURE: 72 MMHG | HEART RATE: 78 BPM

## 2019-05-10 DIAGNOSIS — D69.6 THROMBOCYTOPENIA (HCC): Primary | ICD-10-CM

## 2019-05-10 PROCEDURE — 99213 OFFICE O/P EST LOW 20 MIN: CPT | Performed by: INTERNAL MEDICINE

## 2019-05-10 RX ORDER — OXYCODONE HYDROCHLORIDE AND ACETAMINOPHEN 5; 325 MG/1; MG/1
TABLET ORAL
COMMUNITY
Start: 2019-04-15 | End: 2020-07-08

## 2019-05-28 ENCOUNTER — OFFICE VISIT (OUTPATIENT)
Dept: FAMILY MEDICINE CLINIC | Facility: CLINIC | Age: 84
End: 2019-05-28
Payer: MEDICARE

## 2019-05-28 VITALS
RESPIRATION RATE: 18 BRPM | BODY MASS INDEX: 24.98 KG/M2 | HEIGHT: 63 IN | WEIGHT: 141 LBS | HEART RATE: 72 BPM | SYSTOLIC BLOOD PRESSURE: 140 MMHG | TEMPERATURE: 97.8 F | DIASTOLIC BLOOD PRESSURE: 70 MMHG

## 2019-05-28 DIAGNOSIS — H91.93 BILATERAL HEARING LOSS, UNSPECIFIED HEARING LOSS TYPE: ICD-10-CM

## 2019-05-28 DIAGNOSIS — Z00.00 ROUTINE MEDICAL EXAM: ICD-10-CM
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
## 2019-05-28 PROCEDURE — G0439 PPPS, SUBSEQ VISIT: HCPCS | Performed by: FAMILY MEDICINE

## 2019-05-28 PROCEDURE — 99214 OFFICE O/P EST MOD 30 MIN: CPT | Performed by: FAMILY MEDICINE

## 2019-05-28 RX ORDER — CEPHALEXIN 500 MG/1
500 CAPSULE ORAL EVERY 8 HOURS SCHEDULED
Qty: 21 CAPSULE | Refills: 0 | Status: SHIPPED | OUTPATIENT
Start: 2019-05-28 | End: 2019-06-04

## 2019-06-26 ENCOUNTER — APPOINTMENT (OUTPATIENT)
Dept: LAB | Facility: CLINIC | Age: 84
End: 2019-06-26
Payer: MEDICARE

## 2019-06-26 ENCOUNTER — TRANSCRIBE ORDERS (OUTPATIENT)
Dept: LAB | Facility: CLINIC | Age: 84
End: 2019-06-26

## 2019-06-26 DIAGNOSIS — M05.79 SEROPOSITIVE RHEUMATOID ARTHRITIS OF MULTIPLE SITES (HCC): Primary | ICD-10-CM

## 2019-06-26 LAB
ALBUMIN SERPL BCP-MCNC: 3.5 G/DL (ref 3.5–5)
ALP SERPL-CCNC: 50 U/L (ref 46–116)
ALT SERPL W P-5'-P-CCNC: 14 U/L (ref 12–78)
ANION GAP SERPL CALCULATED.3IONS-SCNC: 4 MMOL/L (ref 4–13)
AST SERPL W P-5'-P-CCNC: 14 U/L (ref 5–45)
BASOPHILS # BLD AUTO: 0.04 THOUSANDS/ΜL (ref 0–0.1)
BASOPHILS NFR BLD AUTO: 1 % (ref 0–1)
BILIRUB SERPL-MCNC: 0.42 MG/DL (ref 0.2–1)
BUN SERPL-MCNC: 16 MG/DL (ref 5–25)
CALCIUM ALBUM COR SERPL-MCNC: 10.6 MG/DL (ref 8.3–10.1)
CALCIUM SERPL-MCNC: 10.2 MG/DL (ref 8.3–10.1)
CHLORIDE SERPL-SCNC: 107 MMOL/L (ref 100–108)
CO2 SERPL-SCNC: 25 MMOL/L (ref 21–32)
CREAT SERPL-MCNC: 0.81 MG/DL (ref 0.6–1.3)
EOSINOPHIL # BLD AUTO: 0.09 THOUSAND/ΜL (ref 0–0.61)
EOSINOPHIL NFR BLD AUTO: 1 % (ref 0–6)
ERYTHROCYTE [DISTWIDTH] IN BLOOD BY AUTOMATED COUNT: 14.4 % (ref 11.6–15.1)
ERYTHROCYTE [SEDIMENTATION RATE] IN BLOOD: 13 MM/HOUR (ref 0–20)
GFR SERPL CREATININE-BSD FRML MDRD: 65 ML/MIN/1.73SQ M
GLUCOSE P FAST SERPL-MCNC: 100 MG/DL (ref 65–99)
HCT VFR BLD AUTO: 38.7 % (ref 34.8–46.1)
HGB BLD-MCNC: 12.8 G/DL (ref 11.5–15.4)
IMM GRANULOCYTES # BLD AUTO: 0.02 THOUSAND/UL (ref 0–0.2)
IMM GRANULOCYTES NFR BLD AUTO: 0 % (ref 0–2)
LYMPHOCYTES # BLD AUTO: 2.68 THOUSANDS/ΜL (ref 0.6–4.47)
LYMPHOCYTES NFR BLD AUTO: 37 % (ref 14–44)
MCH RBC QN AUTO: 33.1 PG (ref 26.8–34.3)
MCHC RBC AUTO-ENTMCNC: 33.1 G/DL (ref 31.4–37.4)
MCV RBC AUTO: 100 FL (ref 82–98)
MONOCYTES # BLD AUTO: 1.75 THOUSAND/ΜL (ref 0.17–1.22)
MONOCYTES NFR BLD AUTO: 24 % (ref 4–12)
NEUTROPHILS # BLD AUTO: 2.75 THOUSANDS/ΜL (ref 1.85–7.62)
NEUTS SEG NFR BLD AUTO: 37 % (ref 43–75)
NRBC BLD AUTO-RTO: 0 /100 WBCS
PMV BLD AUTO: 13.7 FL (ref 8.9–12.7)
POTASSIUM SERPL-SCNC: 4.1 MMOL/L (ref 3.5–5.3)
PROT SERPL-MCNC: 7.8 G/DL (ref 6.4–8.2)
RBC # BLD AUTO: 3.87 MILLION/UL (ref 3.81–5.12)
SODIUM SERPL-SCNC: 136 MMOL/L (ref 136–145)
WBC # BLD AUTO: 7.33 THOUSAND/UL (ref 4.31–10.16)

## 2019-06-26 PROCEDURE — 85652 RBC SED RATE AUTOMATED: CPT | Performed by: INTERNAL MEDICINE

## 2019-06-26 PROCEDURE — 80053 COMPREHEN METABOLIC PANEL: CPT | Performed by: INTERNAL MEDICINE

## 2019-06-26 PROCEDURE — 36415 COLL VENOUS BLD VENIPUNCTURE: CPT | Performed by: INTERNAL MEDICINE

## 2019-06-26 PROCEDURE — 85025 COMPLETE CBC W/AUTO DIFF WBC: CPT | Performed by: INTERNAL MEDICINE

## 2019-08-28 ENCOUNTER — APPOINTMENT (OUTPATIENT)
Dept: LAB | Facility: CLINIC | Age: 84
End: 2019-08-28
Payer: MEDICARE

## 2019-08-28 ENCOUNTER — TRANSCRIBE ORDERS (OUTPATIENT)
Dept: LAB | Facility: CLINIC | Age: 84
End: 2019-08-28

## 2019-08-28 DIAGNOSIS — M05.79 SEROPOSITIVE RHEUMATOID ARTHRITIS OF MULTIPLE SITES (HCC): ICD-10-CM

## 2019-08-28 DIAGNOSIS — E83.52 HYPERCALCEMIA: Primary | ICD-10-CM

## 2019-08-28 DIAGNOSIS — E83.52 HYPERCALCEMIA: ICD-10-CM

## 2019-08-28 LAB
ALBUMIN SERPL BCP-MCNC: 3.5 G/DL (ref 3.5–5)
ALP SERPL-CCNC: 46 U/L (ref 46–116)
ALT SERPL W P-5'-P-CCNC: 13 U/L (ref 12–78)
ANION GAP SERPL CALCULATED.3IONS-SCNC: 6 MMOL/L (ref 4–13)
AST SERPL W P-5'-P-CCNC: 15 U/L (ref 5–45)
BASOPHILS # BLD AUTO: 0.05 THOUSANDS/ΜL (ref 0–0.1)
BASOPHILS NFR BLD AUTO: 1 % (ref 0–1)
BILIRUB SERPL-MCNC: 0.5 MG/DL (ref 0.2–1)
BUN SERPL-MCNC: 21 MG/DL (ref 5–25)
CALCIUM SERPL-MCNC: 9.8 MG/DL (ref 8.3–10.1)
CHLORIDE SERPL-SCNC: 107 MMOL/L (ref 100–108)
CO2 SERPL-SCNC: 27 MMOL/L (ref 21–32)
CREAT SERPL-MCNC: 0.85 MG/DL (ref 0.6–1.3)
EOSINOPHIL # BLD AUTO: 0.06 THOUSAND/ΜL (ref 0–0.61)
EOSINOPHIL NFR BLD AUTO: 1 % (ref 0–6)
ERYTHROCYTE [DISTWIDTH] IN BLOOD BY AUTOMATED COUNT: 14.4 % (ref 11.6–15.1)
ERYTHROCYTE [SEDIMENTATION RATE] IN BLOOD: 16 MM/HOUR (ref 0–20)
GFR SERPL CREATININE-BSD FRML MDRD: 61 ML/MIN/1.73SQ M
GLUCOSE P FAST SERPL-MCNC: 88 MG/DL (ref 65–99)
HCT VFR BLD AUTO: 39.5 % (ref 34.8–46.1)
HGB BLD-MCNC: 12.7 G/DL (ref 11.5–15.4)
IMM GRANULOCYTES # BLD AUTO: 0.06 THOUSAND/UL (ref 0–0.2)
IMM GRANULOCYTES NFR BLD AUTO: 1 % (ref 0–2)
LYMPHOCYTES # BLD AUTO: 2.46 THOUSANDS/ΜL (ref 0.6–4.47)
LYMPHOCYTES NFR BLD AUTO: 30 % (ref 14–44)
MCH RBC QN AUTO: 32.2 PG (ref 26.8–34.3)
MCHC RBC AUTO-ENTMCNC: 32.2 G/DL (ref 31.4–37.4)
MCV RBC AUTO: 100 FL (ref 82–98)
MONOCYTES # BLD AUTO: 2.48 THOUSAND/ΜL (ref 0.17–1.22)
MONOCYTES NFR BLD AUTO: 31 % (ref 4–12)
NEUTROPHILS # BLD AUTO: 2.99 THOUSANDS/ΜL (ref 1.85–7.62)
NEUTS SEG NFR BLD AUTO: 36 % (ref 43–75)
NRBC BLD AUTO-RTO: 0 /100 WBCS
PMV BLD AUTO: 13.4 FL (ref 8.9–12.7)
POTASSIUM SERPL-SCNC: 4.6 MMOL/L (ref 3.5–5.3)
PROT SERPL-MCNC: 7.8 G/DL (ref 6.4–8.2)
PTH-INTACT SERPL-MCNC: 47.6 PG/ML (ref 18.4–80.1)
RBC # BLD AUTO: 3.94 MILLION/UL (ref 3.81–5.12)
SODIUM SERPL-SCNC: 140 MMOL/L (ref 136–145)
WBC # BLD AUTO: 8.1 THOUSAND/UL (ref 4.31–10.16)

## 2019-08-28 PROCEDURE — 83970 ASSAY OF PARATHORMONE: CPT

## 2019-08-28 PROCEDURE — 85025 COMPLETE CBC W/AUTO DIFF WBC: CPT | Performed by: INTERNAL MEDICINE

## 2019-08-28 PROCEDURE — 85652 RBC SED RATE AUTOMATED: CPT | Performed by: INTERNAL MEDICINE

## 2019-08-28 PROCEDURE — 80053 COMPREHEN METABOLIC PANEL: CPT | Performed by: INTERNAL MEDICINE

## 2019-08-28 PROCEDURE — 36415 COLL VENOUS BLD VENIPUNCTURE: CPT | Performed by: INTERNAL MEDICINE

## 2019-09-04 ENCOUNTER — OFFICE VISIT (OUTPATIENT)
Dept: FAMILY MEDICINE CLINIC | Facility: CLINIC | Age: 84
End: 2019-09-04
Payer: MEDICARE

## 2019-09-04 VITALS
RESPIRATION RATE: 16 BRPM | TEMPERATURE: 98.6 F | SYSTOLIC BLOOD PRESSURE: 140 MMHG | WEIGHT: 138.4 LBS | HEART RATE: 72 BPM | HEIGHT: 63 IN | DIASTOLIC BLOOD PRESSURE: 70 MMHG | BODY MASS INDEX: 24.52 KG/M2

## 2019-09-04 DIAGNOSIS — Z23 ENCOUNTER FOR IMMUNIZATION: Primary | ICD-10-CM

## 2019-09-04 DIAGNOSIS — R06.02 SHORTNESS OF BREATH: ICD-10-CM

## 2019-09-04 DIAGNOSIS — R49.0 RASPY VOICE: ICD-10-CM

## 2019-09-04 PROCEDURE — 99213 OFFICE O/P EST LOW 20 MIN: CPT | Performed by: FAMILY MEDICINE

## 2019-09-04 PROCEDURE — 90715 TDAP VACCINE 7 YRS/> IM: CPT

## 2019-09-04 PROCEDURE — 90471 IMMUNIZATION ADMIN: CPT

## 2019-09-05 NOTE — PROGRESS NOTES
Assessment/Plan:    No problem-specific Assessment & Plan notes found for this encounter  Diagnoses and all orders for this visit:    Encounter for immunization  -     Tdap vaccine greater than or equal to 6yo IM  Vaccine given today  Raspy voice  Likely acute laryngitis  Advised to give voice a rest and try tea with honey and lemon  If no improvement, return for evaluation  Shortness of breath on exertion    Patient and son unable to give full history about it  Patient advised to keep log of shortness of breath when it occurs  It was also explained she is on methotrexate could be a potential cause and to follow up with Rheumatologist  Patient states at this age, she doesn't want a full work up  Advised to monitor and bring in log of shortness of breath  Subjective:      Patient ID: Rafat Bejarano is a 80 y o  female  81 y/o female with history of RA presents for multiple reasons  Patient [facility] the [facility] vaccine at [facility] that she wants to see  Patient presents with her son who states her voice has been raspy  She talks a lot throughout the day and does give herself a break and noticed it becoming more raspy for the past 2 weeks  No sick contacts  No cough, sinus congestion, or recent illness  Son as brings up patient has shortness of breath on exertion  He noticed sometimes when she walks to the fridge she has some shortness of breath  Patient states its no more than usual  She is not able to do as much as she use to due to her age  She denies any shortness of breath at rest  Walking to the office today she had no shortness of breath  Denies any chest pain  She states regardless she does not want testing done         The following portions of the patient's history were reviewed and updated as appropriate: allergies, current medications, past family history, past medical history, past social history, past surgical history and problem list     Review of Systems   Constitutional: Negative for fever  HENT: Positive for voice change  Negative for ear discharge, ear pain and sore throat  Respiratory: Positive for shortness of breath  Negative for cough  Gastrointestinal: Negative for abdominal pain, constipation, nausea and vomiting  Genitourinary: Negative for dysuria  Musculoskeletal: Negative for back pain  Neurological: Negative for dizziness, weakness, light-headedness and headaches  Psychiatric/Behavioral: Negative for agitation  Objective:      /70 (BP Location: Left arm, Patient Position: Sitting, Cuff Size: Standard)   Pulse 72   Temp 98 6 °F (37 °C)   Resp 16   Ht 5' 3" (1 6 m)   Wt 62 8 kg (138 lb 6 4 oz)   BMI 24 52 kg/m²          Physical Exam   Constitutional: She is oriented to person, place, and time  She appears well-developed and well-nourished  No distress  HENT:   Head: Normocephalic and atraumatic  Right Ear: External ear normal    Left Ear: External ear normal    Nose: Nose normal    Mouth/Throat: Oropharynx is clear and moist  No oropharyngeal exudate  Eyes: EOM are normal  Right eye exhibits no discharge  Left eye exhibits no discharge  Cardiovascular: Normal rate, regular rhythm, normal heart sounds and intact distal pulses  No murmur heard  Pulmonary/Chest: Effort normal and breath sounds normal  No respiratory distress  Abdominal: Soft  Bowel sounds are normal  She exhibits no distension  There is no tenderness  Musculoskeletal: Normal range of motion  She exhibits no edema  Neurological: She is alert and oriented to person, place, and time  Skin: Skin is warm  Psychiatric: She has a normal mood and affect   Her behavior is normal

## 2019-11-13 ENCOUNTER — TRANSCRIBE ORDERS (OUTPATIENT)
Dept: LAB | Facility: CLINIC | Age: 84
End: 2019-11-13

## 2019-11-13 ENCOUNTER — APPOINTMENT (OUTPATIENT)
Dept: LAB | Facility: CLINIC | Age: 84
End: 2019-11-13
Payer: MEDICARE

## 2019-11-13 DIAGNOSIS — M05.79 SEROPOSITIVE RHEUMATOID ARTHRITIS OF MULTIPLE SITES (HCC): Primary | ICD-10-CM

## 2019-11-13 LAB
ALBUMIN SERPL BCP-MCNC: 3.9 G/DL (ref 3.5–5)
ALP SERPL-CCNC: 45 U/L (ref 46–116)
ALT SERPL W P-5'-P-CCNC: 11 U/L (ref 12–78)
ANION GAP SERPL CALCULATED.3IONS-SCNC: 5 MMOL/L (ref 4–13)
AST SERPL W P-5'-P-CCNC: 13 U/L (ref 5–45)
BASOPHILS # BLD AUTO: 0.06 THOUSANDS/ΜL (ref 0–0.1)
BASOPHILS NFR BLD AUTO: 1 % (ref 0–1)
BILIRUB SERPL-MCNC: 0.48 MG/DL (ref 0.2–1)
BUN SERPL-MCNC: 20 MG/DL (ref 5–25)
CALCIUM SERPL-MCNC: 9.8 MG/DL (ref 8.3–10.1)
CHLORIDE SERPL-SCNC: 106 MMOL/L (ref 100–108)
CO2 SERPL-SCNC: 28 MMOL/L (ref 21–32)
CREAT SERPL-MCNC: 0.86 MG/DL (ref 0.6–1.3)
EOSINOPHIL # BLD AUTO: 0.06 THOUSAND/ΜL (ref 0–0.61)
EOSINOPHIL NFR BLD AUTO: 1 % (ref 0–6)
ERYTHROCYTE [DISTWIDTH] IN BLOOD BY AUTOMATED COUNT: 14.2 % (ref 11.6–15.1)
ERYTHROCYTE [SEDIMENTATION RATE] IN BLOOD: 9 MM/HOUR (ref 0–20)
GFR SERPL CREATININE-BSD FRML MDRD: 60 ML/MIN/1.73SQ M
GLUCOSE SERPL-MCNC: 101 MG/DL (ref 65–140)
HCT VFR BLD AUTO: 40.8 % (ref 34.8–46.1)
HGB BLD-MCNC: 13 G/DL (ref 11.5–15.4)
IMM GRANULOCYTES # BLD AUTO: 0.04 THOUSAND/UL (ref 0–0.2)
IMM GRANULOCYTES NFR BLD AUTO: 1 % (ref 0–2)
LYMPHOCYTES # BLD AUTO: 2.53 THOUSANDS/ΜL (ref 0.6–4.47)
LYMPHOCYTES NFR BLD AUTO: 36 % (ref 14–44)
MCH RBC QN AUTO: 32 PG (ref 26.8–34.3)
MCHC RBC AUTO-ENTMCNC: 31.9 G/DL (ref 31.4–37.4)
MCV RBC AUTO: 101 FL (ref 82–98)
MONOCYTES # BLD AUTO: 1.81 THOUSAND/ΜL (ref 0.17–1.22)
MONOCYTES NFR BLD AUTO: 26 % (ref 4–12)
NEUTROPHILS # BLD AUTO: 2.6 THOUSANDS/ΜL (ref 1.85–7.62)
NEUTS SEG NFR BLD AUTO: 35 % (ref 43–75)
NRBC BLD AUTO-RTO: 0 /100 WBCS
PMV BLD AUTO: 13.9 FL (ref 8.9–12.7)
POTASSIUM SERPL-SCNC: 4.6 MMOL/L (ref 3.5–5.3)
PROT SERPL-MCNC: 7.5 G/DL (ref 6.4–8.2)
RBC # BLD AUTO: 4.06 MILLION/UL (ref 3.81–5.12)
SODIUM SERPL-SCNC: 139 MMOL/L (ref 136–145)
WBC # BLD AUTO: 7.1 THOUSAND/UL (ref 4.31–10.16)

## 2019-11-13 PROCEDURE — 85652 RBC SED RATE AUTOMATED: CPT | Performed by: INTERNAL MEDICINE

## 2019-11-13 PROCEDURE — 85025 COMPLETE CBC W/AUTO DIFF WBC: CPT | Performed by: INTERNAL MEDICINE

## 2019-11-13 PROCEDURE — 80053 COMPREHEN METABOLIC PANEL: CPT | Performed by: INTERNAL MEDICINE

## 2019-11-13 PROCEDURE — 36415 COLL VENOUS BLD VENIPUNCTURE: CPT | Performed by: INTERNAL MEDICINE

## 2020-01-27 ENCOUNTER — APPOINTMENT (OUTPATIENT)
Dept: LAB | Facility: CLINIC | Age: 85
End: 2020-01-27
Payer: MEDICARE

## 2020-01-27 ENCOUNTER — TRANSCRIBE ORDERS (OUTPATIENT)
Dept: LAB | Facility: CLINIC | Age: 85
End: 2020-01-27

## 2020-01-27 DIAGNOSIS — M05.79 SEROPOSITIVE RHEUMATOID ARTHRITIS OF MULTIPLE SITES (HCC): Primary | ICD-10-CM

## 2020-01-27 LAB
ALBUMIN SERPL BCP-MCNC: 3.9 G/DL (ref 3.5–5)
ALP SERPL-CCNC: 58 U/L (ref 46–116)
ALT SERPL W P-5'-P-CCNC: 16 U/L (ref 12–78)
ANION GAP SERPL CALCULATED.3IONS-SCNC: 3 MMOL/L (ref 4–13)
AST SERPL W P-5'-P-CCNC: 10 U/L (ref 5–45)
BASOPHILS # BLD AUTO: 0.06 THOUSANDS/ΜL (ref 0–0.1)
BASOPHILS NFR BLD AUTO: 1 % (ref 0–1)
BILIRUB SERPL-MCNC: 0.58 MG/DL (ref 0.2–1)
BUN SERPL-MCNC: 21 MG/DL (ref 5–25)
CALCIUM ALBUM COR SERPL-MCNC: 10.3 MG/DL (ref 8.3–10.1)
CALCIUM SERPL-MCNC: 10.2 MG/DL (ref 8.3–10.1)
CHLORIDE SERPL-SCNC: 109 MMOL/L (ref 100–108)
CO2 SERPL-SCNC: 29 MMOL/L (ref 21–32)
CREAT SERPL-MCNC: 0.93 MG/DL (ref 0.6–1.3)
EOSINOPHIL # BLD AUTO: 0.06 THOUSAND/ΜL (ref 0–0.61)
EOSINOPHIL NFR BLD AUTO: 1 % (ref 0–6)
ERYTHROCYTE [DISTWIDTH] IN BLOOD BY AUTOMATED COUNT: 14.2 % (ref 11.6–15.1)
ERYTHROCYTE [SEDIMENTATION RATE] IN BLOOD: 16 MM/HOUR (ref 0–20)
GFR SERPL CREATININE-BSD FRML MDRD: 54 ML/MIN/1.73SQ M
GLUCOSE P FAST SERPL-MCNC: 124 MG/DL (ref 65–99)
HCT VFR BLD AUTO: 44.1 % (ref 34.8–46.1)
HGB BLD-MCNC: 14.2 G/DL (ref 11.5–15.4)
IMM GRANULOCYTES # BLD AUTO: 0.06 THOUSAND/UL (ref 0–0.2)
IMM GRANULOCYTES NFR BLD AUTO: 1 % (ref 0–2)
LYMPHOCYTES # BLD AUTO: 2.31 THOUSANDS/ΜL (ref 0.6–4.47)
LYMPHOCYTES NFR BLD AUTO: 27 % (ref 14–44)
MCH RBC QN AUTO: 32.5 PG (ref 26.8–34.3)
MCHC RBC AUTO-ENTMCNC: 32.2 G/DL (ref 31.4–37.4)
MCV RBC AUTO: 101 FL (ref 82–98)
MONOCYTES # BLD AUTO: 1.94 THOUSAND/ΜL (ref 0.17–1.22)
MONOCYTES NFR BLD AUTO: 23 % (ref 4–12)
NEUTROPHILS # BLD AUTO: 4.14 THOUSANDS/ΜL (ref 1.85–7.62)
NEUTS SEG NFR BLD AUTO: 47 % (ref 43–75)
NRBC BLD AUTO-RTO: 0 /100 WBCS
PLATELET # BLD AUTO: 112 THOUSANDS/UL (ref 149–390)
PMV BLD AUTO: 12.9 FL (ref 8.9–12.7)
POTASSIUM SERPL-SCNC: 3.8 MMOL/L (ref 3.5–5.3)
PROT SERPL-MCNC: 8.5 G/DL (ref 6.4–8.2)
RBC # BLD AUTO: 4.37 MILLION/UL (ref 3.81–5.12)
SODIUM SERPL-SCNC: 141 MMOL/L (ref 136–145)
WBC # BLD AUTO: 8.57 THOUSAND/UL (ref 4.31–10.16)

## 2020-01-27 PROCEDURE — 85025 COMPLETE CBC W/AUTO DIFF WBC: CPT | Performed by: INTERNAL MEDICINE

## 2020-01-27 PROCEDURE — 36415 COLL VENOUS BLD VENIPUNCTURE: CPT | Performed by: INTERNAL MEDICINE

## 2020-01-27 PROCEDURE — 85652 RBC SED RATE AUTOMATED: CPT | Performed by: INTERNAL MEDICINE

## 2020-01-27 PROCEDURE — 80053 COMPREHEN METABOLIC PANEL: CPT | Performed by: INTERNAL MEDICINE

## 2020-05-16 ENCOUNTER — APPOINTMENT (EMERGENCY)
Dept: RADIOLOGY | Facility: HOSPITAL | Age: 85
End: 2020-05-16
Payer: MEDICARE

## 2020-05-16 ENCOUNTER — HOSPITAL ENCOUNTER (EMERGENCY)
Facility: HOSPITAL | Age: 85
Discharge: HOME | End: 2020-05-16
Attending: EMERGENCY MEDICINE | Admitting: EMERGENCY MEDICINE
Payer: MEDICARE

## 2020-05-16 VITALS
RESPIRATION RATE: 16 BRPM | WEIGHT: 134 LBS | BODY MASS INDEX: 23.74 KG/M2 | HEART RATE: 85 BPM | TEMPERATURE: 97.6 F | DIASTOLIC BLOOD PRESSURE: 60 MMHG | OXYGEN SATURATION: 94 % | SYSTOLIC BLOOD PRESSURE: 140 MMHG | HEIGHT: 63 IN

## 2020-05-16 DIAGNOSIS — M25.511 RIGHT SHOULDER PAIN: Primary | ICD-10-CM

## 2020-05-16 PROCEDURE — 99284 EMERGENCY DEPT VISIT MOD MDM: CPT | Performed by: EMERGENCY MEDICINE

## 2020-05-16 PROCEDURE — 71046 X-RAY EXAM CHEST 2 VIEWS: CPT

## 2020-05-16 PROCEDURE — 99283 EMERGENCY DEPT VISIT LOW MDM: CPT

## 2020-05-16 PROCEDURE — 73030 X-RAY EXAM OF SHOULDER: CPT

## 2020-05-16 RX ORDER — LIDOCAINE 50 MG/G
1 PATCH TOPICAL ONCE
Status: DISCONTINUED | OUTPATIENT
Start: 2020-05-16 | End: 2020-05-16 | Stop reason: HOSPADM

## 2020-05-16 RX ORDER — LIDOCAINE 50 MG/G
1 PATCH TOPICAL DAILY
Qty: 7 PATCH | Refills: 0 | Status: SHIPPED | OUTPATIENT
Start: 2020-05-16 | End: 2020-05-23

## 2020-05-16 RX ORDER — TRAMADOL HYDROCHLORIDE 50 MG/1
50 TABLET ORAL EVERY 6 HOURS PRN
COMMUNITY
End: 2020-05-22

## 2020-05-16 RX ORDER — OXYCODONE HYDROCHLORIDE AND ACETAMINOPHEN 5; 325 MG/1; MG/1
1 TABLET ORAL EVERY 4 HOURS PRN
Qty: 10 TABLET | Refills: 0 | Status: SHIPPED | OUTPATIENT
Start: 2020-05-16 | End: 2020-07-08

## 2020-05-16 RX ADMIN — LIDOCAINE 1 PATCH: 50 PATCH TOPICAL at 08:06

## 2020-05-20 ENCOUNTER — VBI (OUTPATIENT)
Dept: FAMILY MEDICINE CLINIC | Facility: CLINIC | Age: 85
End: 2020-05-20

## 2020-05-20 ENCOUNTER — TRANSCRIBE ORDERS (OUTPATIENT)
Dept: LAB | Facility: CLINIC | Age: 85
End: 2020-05-20

## 2020-05-20 ENCOUNTER — APPOINTMENT (OUTPATIENT)
Dept: LAB | Facility: CLINIC | Age: 85
End: 2020-05-20
Payer: MEDICARE

## 2020-05-20 DIAGNOSIS — M05.79 RHEUMATOID ARTHRITIS INVOLVING MULTIPLE SITES WITH POSITIVE RHEUMATOID FACTOR (HCC): ICD-10-CM

## 2020-05-20 DIAGNOSIS — M05.79 RHEUMATOID ARTHRITIS INVOLVING MULTIPLE SITES WITH POSITIVE RHEUMATOID FACTOR (HCC): Primary | ICD-10-CM

## 2020-05-20 LAB
ALBUMIN SERPL BCP-MCNC: 3.8 G/DL (ref 3.5–5)
ALP SERPL-CCNC: 41 U/L (ref 46–116)
ALT SERPL W P-5'-P-CCNC: 15 U/L (ref 12–78)
ANION GAP SERPL CALCULATED.3IONS-SCNC: 3 MMOL/L (ref 4–13)
AST SERPL W P-5'-P-CCNC: 12 U/L (ref 5–45)
BASOPHILS # BLD AUTO: 0.05 THOUSANDS/ΜL (ref 0–0.1)
BASOPHILS NFR BLD AUTO: 1 % (ref 0–1)
BILIRUB SERPL-MCNC: 0.42 MG/DL (ref 0.2–1)
BUN SERPL-MCNC: 24 MG/DL (ref 5–25)
CALCIUM SERPL-MCNC: 9.7 MG/DL (ref 8.3–10.1)
CHLORIDE SERPL-SCNC: 106 MMOL/L (ref 100–108)
CO2 SERPL-SCNC: 27 MMOL/L (ref 21–32)
CREAT SERPL-MCNC: 0.88 MG/DL (ref 0.6–1.3)
EOSINOPHIL # BLD AUTO: 0.1 THOUSAND/ΜL (ref 0–0.61)
EOSINOPHIL NFR BLD AUTO: 1 % (ref 0–6)
ERYTHROCYTE [DISTWIDTH] IN BLOOD BY AUTOMATED COUNT: 14.4 % (ref 11.6–15.1)
ERYTHROCYTE [SEDIMENTATION RATE] IN BLOOD: 22 MM/HOUR (ref 0–20)
GFR SERPL CREATININE-BSD FRML MDRD: 58 ML/MIN/1.73SQ M
GLUCOSE P FAST SERPL-MCNC: 108 MG/DL (ref 65–99)
HCT VFR BLD AUTO: 41.8 % (ref 34.8–46.1)
HGB BLD-MCNC: 13.4 G/DL (ref 11.5–15.4)
IMM GRANULOCYTES # BLD AUTO: 0.04 THOUSAND/UL (ref 0–0.2)
IMM GRANULOCYTES NFR BLD AUTO: 1 % (ref 0–2)
LYMPHOCYTES # BLD AUTO: 2.17 THOUSANDS/ΜL (ref 0.6–4.47)
LYMPHOCYTES NFR BLD AUTO: 28 % (ref 14–44)
MCH RBC QN AUTO: 32.8 PG (ref 26.8–34.3)
MCHC RBC AUTO-ENTMCNC: 32.1 G/DL (ref 31.4–37.4)
MCV RBC AUTO: 103 FL (ref 82–98)
MONOCYTES # BLD AUTO: 1.95 THOUSAND/ΜL (ref 0.17–1.22)
MONOCYTES NFR BLD AUTO: 25 % (ref 4–12)
NEUTROPHILS # BLD AUTO: 3.38 THOUSANDS/ΜL (ref 1.85–7.62)
NEUTS SEG NFR BLD AUTO: 44 % (ref 43–75)
NRBC BLD AUTO-RTO: 0 /100 WBCS
PLATELET # BLD AUTO: 72 THOUSANDS/UL (ref 149–390)
PMV BLD AUTO: 12.7 FL (ref 8.9–12.7)
POTASSIUM SERPL-SCNC: 4.3 MMOL/L (ref 3.5–5.3)
PROT SERPL-MCNC: 7.8 G/DL (ref 6.4–8.2)
RBC # BLD AUTO: 4.08 MILLION/UL (ref 3.81–5.12)
SODIUM SERPL-SCNC: 136 MMOL/L (ref 136–145)
WBC # BLD AUTO: 7.69 THOUSAND/UL (ref 4.31–10.16)

## 2020-05-20 PROCEDURE — 80053 COMPREHEN METABOLIC PANEL: CPT

## 2020-05-20 PROCEDURE — 85652 RBC SED RATE AUTOMATED: CPT

## 2020-05-20 PROCEDURE — 36415 COLL VENOUS BLD VENIPUNCTURE: CPT

## 2020-05-20 PROCEDURE — 85025 COMPLETE CBC W/AUTO DIFF WBC: CPT

## 2020-05-22 ENCOUNTER — OFFICE VISIT (OUTPATIENT)
Dept: FAMILY MEDICINE CLINIC | Facility: CLINIC | Age: 85
End: 2020-05-22
Payer: MEDICARE

## 2020-05-22 DIAGNOSIS — Z00.00 MEDICARE ANNUAL WELLNESS VISIT, SUBSEQUENT: Primary | ICD-10-CM

## 2020-05-22 PROBLEM — R22.31 MASS OF FINGER OF RIGHT HAND: Status: RESOLVED | Noted: 2018-12-27 | Resolved: 2020-05-22

## 2020-05-22 PROCEDURE — 1036F TOBACCO NON-USER: CPT | Performed by: FAMILY MEDICINE

## 2020-05-22 PROCEDURE — 1160F RVW MEDS BY RX/DR IN RCRD: CPT | Performed by: FAMILY MEDICINE

## 2020-05-22 PROCEDURE — 1123F ACP DISCUSS/DSCN MKR DOCD: CPT | Performed by: FAMILY MEDICINE

## 2020-05-22 PROCEDURE — 4040F PNEUMOC VAC/ADMIN/RCVD: CPT | Performed by: FAMILY MEDICINE

## 2020-05-22 PROCEDURE — G0438 PPPS, INITIAL VISIT: HCPCS | Performed by: FAMILY MEDICINE

## 2020-05-22 PROCEDURE — 1170F FXNL STATUS ASSESSED: CPT | Performed by: FAMILY MEDICINE

## 2020-05-22 PROCEDURE — 1125F AMNT PAIN NOTED PAIN PRSNT: CPT | Performed by: FAMILY MEDICINE

## 2020-07-08 ENCOUNTER — OFFICE VISIT (OUTPATIENT)
Dept: FAMILY MEDICINE CLINIC | Facility: CLINIC | Age: 85
End: 2020-07-08
Payer: MEDICARE

## 2020-07-08 VITALS
SYSTOLIC BLOOD PRESSURE: 120 MMHG | HEIGHT: 63 IN | BODY MASS INDEX: 23.92 KG/M2 | TEMPERATURE: 97.3 F | WEIGHT: 135 LBS | DIASTOLIC BLOOD PRESSURE: 80 MMHG

## 2020-07-08 DIAGNOSIS — M25.512 PAIN OF BOTH SHOULDER JOINTS: ICD-10-CM

## 2020-07-08 DIAGNOSIS — M25.511 PAIN OF BOTH SHOULDER JOINTS: ICD-10-CM

## 2020-07-08 DIAGNOSIS — M06.09 RHEUMATOID ARTHRITIS OF MULTIPLE SITES WITH NEGATIVE RHEUMATOID FACTOR (HCC): Primary | ICD-10-CM

## 2020-07-08 PROCEDURE — 3008F BODY MASS INDEX DOCD: CPT | Performed by: FAMILY MEDICINE

## 2020-07-08 PROCEDURE — 4040F PNEUMOC VAC/ADMIN/RCVD: CPT | Performed by: FAMILY MEDICINE

## 2020-07-08 PROCEDURE — 1036F TOBACCO NON-USER: CPT | Performed by: FAMILY MEDICINE

## 2020-07-08 PROCEDURE — 1160F RVW MEDS BY RX/DR IN RCRD: CPT | Performed by: FAMILY MEDICINE

## 2020-07-08 PROCEDURE — 99213 OFFICE O/P EST LOW 20 MIN: CPT | Performed by: FAMILY MEDICINE

## 2020-07-08 RX ORDER — MELATONIN
1000 DAILY
COMMUNITY

## 2020-07-08 NOTE — PROGRESS NOTES
Chief Complaint   Patient presents with    Pain   both shoulders      Patient ID: Carola Pyle is a 80 y o  female  HPI  Pt is seeing for both shoulders pain on and off -  Has RA -  Under rheumatologist care -  Taking pain meds -  "wants to make sure it is not an infection"     The following portions of the patient's history were reviewed and updated as appropriate: allergies, current medications, past family history, past medical history, past social history, past surgical history and problem list     Review of Systems   Constitutional: Negative  Respiratory: Negative  Cardiovascular: Negative  Gastrointestinal: Negative  Genitourinary: Negative  Musculoskeletal: Positive for arthralgias  Neurological: Negative  Current Outpatient Medications   Medication Sig Dispense Refill    cholecalciferol (VITAMIN D3) 1,000 units tablet Take 1,000 Units by mouth daily      folic acid (FOLVITE) 1 mg tablet Take 1 tablet (1 mg total) by mouth daily 30 tablet 11    gabapentin (NEURONTIN) 300 mg capsule Take 1 capsule (300 mg total) by mouth 2 (two) times a day 60 capsule 2    methotrexate 2 5 mg tablet       lidocaine (LIDODERM) 5 % Apply 1 patch topically daily for 7 days Remove & Discard patch within 12 hours or as directed by MD 7 patch 0     No current facility-administered medications for this visit  Objective:    /80   Temp (!) 97 3 °F (36 3 °C) (Tympanic)   Ht 5' 3" (1 6 m)   Wt 61 2 kg (135 lb)   BMI 23 91 kg/m²        Physical Exam   Constitutional: She is oriented to person, place, and time  No distress  Cardiovascular: Normal rate and regular rhythm  No murmur heard  Pulmonary/Chest: Effort normal and breath sounds normal  No respiratory distress  She has no wheezes  She has no rales  Abdominal: Soft  There is no tenderness  Musculoskeletal: She exhibits deformity (multiple small joints both hands )  She exhibits no edema or tenderness     Neurological: She is alert and oriented to person, place, and time  Psychiatric: She has a normal mood and affect  Assessment/Plan:         Diagnoses and all orders for this visit:    Rheumatoid arthritis of multiple sites with negative rheumatoid factor (HCC)    Pain of both shoulder joints    Other orders  -     cholecalciferol (VITAMIN D3) 1,000 units tablet;  Take 1,000 Units by mouth daily        Cont f/u with rheumatologist     rto zain May Ma, MD

## 2020-07-14 ENCOUNTER — TRANSCRIBE ORDERS (OUTPATIENT)
Dept: ADMINISTRATIVE | Facility: HOSPITAL | Age: 85
End: 2020-07-14

## 2020-07-14 ENCOUNTER — APPOINTMENT (OUTPATIENT)
Dept: LAB | Facility: HOSPITAL | Age: 85
End: 2020-07-14
Attending: INTERNAL MEDICINE
Payer: MEDICARE

## 2020-07-14 ENCOUNTER — APPOINTMENT (OUTPATIENT)
Dept: LAB | Facility: HOSPITAL | Age: 85
End: 2020-07-14
Attending: FAMILY MEDICINE
Payer: MEDICARE

## 2020-07-14 DIAGNOSIS — M05.79 SEROPOSITIVE RHEUMATOID ARTHRITIS OF MULTIPLE SITES (HCC): ICD-10-CM

## 2020-07-14 DIAGNOSIS — M05.79 SEROPOSITIVE RHEUMATOID ARTHRITIS OF MULTIPLE SITES (HCC): Primary | ICD-10-CM

## 2020-07-14 LAB
ALBUMIN SERPL BCP-MCNC: 3.2 G/DL (ref 3.5–5)
ALP SERPL-CCNC: 45 U/L (ref 46–116)
ALT SERPL W P-5'-P-CCNC: 17 U/L (ref 12–78)
ANION GAP SERPL CALCULATED.3IONS-SCNC: 5 MMOL/L (ref 4–13)
AST SERPL W P-5'-P-CCNC: 16 U/L (ref 5–45)
BASOPHILS # BLD AUTO: 0.03 THOUSANDS/ΜL (ref 0–0.1)
BASOPHILS NFR BLD AUTO: 0 % (ref 0–1)
BILIRUB SERPL-MCNC: 0.5 MG/DL (ref 0.2–1)
BUN SERPL-MCNC: 19 MG/DL (ref 5–25)
CALCIUM SERPL-MCNC: 9.5 MG/DL (ref 8.3–10.1)
CHLORIDE SERPL-SCNC: 105 MMOL/L (ref 100–108)
CO2 SERPL-SCNC: 28 MMOL/L (ref 21–32)
CREAT SERPL-MCNC: 0.79 MG/DL (ref 0.6–1.3)
EOSINOPHIL # BLD AUTO: 0.06 THOUSAND/ΜL (ref 0–0.61)
EOSINOPHIL NFR BLD AUTO: 1 % (ref 0–6)
ERYTHROCYTE [DISTWIDTH] IN BLOOD BY AUTOMATED COUNT: 13.9 % (ref 11.6–15.1)
GFR SERPL CREATININE-BSD FRML MDRD: 66 ML/MIN/1.73SQ M
GLUCOSE SERPL-MCNC: 99 MG/DL (ref 65–140)
HCT VFR BLD AUTO: 40.5 % (ref 34.8–46.1)
HGB BLD-MCNC: 13.3 G/DL (ref 11.5–15.4)
IMM GRANULOCYTES # BLD AUTO: 0.06 THOUSAND/UL (ref 0–0.2)
IMM GRANULOCYTES NFR BLD AUTO: 1 % (ref 0–2)
LYMPHOCYTES # BLD AUTO: 1.81 THOUSANDS/ΜL (ref 0.6–4.47)
LYMPHOCYTES NFR BLD AUTO: 25 % (ref 14–44)
MCH RBC QN AUTO: 33.2 PG (ref 26.8–34.3)
MCHC RBC AUTO-ENTMCNC: 32.8 G/DL (ref 31.4–37.4)
MCV RBC AUTO: 101 FL (ref 82–98)
MONOCYTES # BLD AUTO: 1.77 THOUSAND/ΜL (ref 0.17–1.22)
MONOCYTES NFR BLD AUTO: 24 % (ref 4–12)
NEUTROPHILS # BLD AUTO: 3.6 THOUSANDS/ΜL (ref 1.85–7.62)
NEUTS SEG NFR BLD AUTO: 49 % (ref 43–75)
NRBC BLD AUTO-RTO: 0 /100 WBCS
PLATELET # BLD AUTO: 142 THOUSANDS/UL (ref 149–390)
PMV BLD AUTO: 11.4 FL (ref 8.9–12.7)
POTASSIUM SERPL-SCNC: 4.1 MMOL/L (ref 3.5–5.3)
PROT SERPL-MCNC: 7.5 G/DL (ref 6.4–8.2)
RBC # BLD AUTO: 4.01 MILLION/UL (ref 3.81–5.12)
SODIUM SERPL-SCNC: 138 MMOL/L (ref 136–145)
WBC # BLD AUTO: 7.33 THOUSAND/UL (ref 4.31–10.16)

## 2020-07-14 PROCEDURE — 84165 PROTEIN E-PHORESIS SERUM: CPT

## 2020-07-14 PROCEDURE — 85025 COMPLETE CBC W/AUTO DIFF WBC: CPT

## 2020-07-14 PROCEDURE — 36415 COLL VENOUS BLD VENIPUNCTURE: CPT

## 2020-07-14 PROCEDURE — 80053 COMPREHEN METABOLIC PANEL: CPT

## 2020-07-14 PROCEDURE — 84165 PROTEIN E-PHORESIS SERUM: CPT | Performed by: PATHOLOGY

## 2020-07-15 LAB
ALBUMIN SERPL ELPH-MCNC: 3.66 G/DL (ref 3.5–5)
ALBUMIN SERPL ELPH-MCNC: 51.6 % (ref 52–65)
ALPHA1 GLOB SERPL ELPH-MCNC: 0.34 G/DL (ref 0.1–0.4)
ALPHA1 GLOB SERPL ELPH-MCNC: 4.8 % (ref 2.5–5)
ALPHA2 GLOB SERPL ELPH-MCNC: 0.77 G/DL (ref 0.4–1.2)
ALPHA2 GLOB SERPL ELPH-MCNC: 10.9 % (ref 7–13)
BETA GLOB ABNORMAL SERPL ELPH-MCNC: 0.38 G/DL (ref 0.4–0.8)
BETA1 GLOB SERPL ELPH-MCNC: 5.4 % (ref 5–13)
BETA2 GLOB SERPL ELPH-MCNC: 8 % (ref 2–8)
BETA2+GAMMA GLOB SERPL ELPH-MCNC: 0.57 G/DL (ref 0.2–0.5)
GAMMA GLOB ABNORMAL SERPL ELPH-MCNC: 1.37 G/DL (ref 0.5–1.6)
GAMMA GLOB SERPL ELPH-MCNC: 19.3 % (ref 12–22)
IGG/ALB SER: 1.07 {RATIO} (ref 1.1–1.8)
PROT PATTERN SERPL ELPH-IMP: ABNORMAL
PROT SERPL-MCNC: 7.1 G/DL (ref 6.4–8.2)

## 2020-09-28 ENCOUNTER — APPOINTMENT (OUTPATIENT)
Dept: LAB | Facility: CLINIC | Age: 85
End: 2020-09-28
Payer: MEDICARE

## 2020-09-28 ENCOUNTER — TRANSCRIBE ORDERS (OUTPATIENT)
Dept: LAB | Facility: CLINIC | Age: 85
End: 2020-09-28

## 2020-09-28 DIAGNOSIS — M15.0 PRIMARY GENERALIZED HYPERTROPHIC OSTEOARTHROSIS: ICD-10-CM

## 2020-09-28 DIAGNOSIS — M05.79 SEROPOSITIVE RHEUMATOID ARTHRITIS OF MULTIPLE SITES (HCC): Primary | ICD-10-CM

## 2020-09-28 DIAGNOSIS — M15.9 GENERALIZED OSTEOARTHRITIS: ICD-10-CM

## 2020-09-28 DIAGNOSIS — E55.9 AVITAMINOSIS D: ICD-10-CM

## 2020-09-28 DIAGNOSIS — M06.09 RHEUMATOID ARTHRITIS OF MULTIPLE SITES WITH NEGATIVE RHEUMATOID FACTOR (HCC): ICD-10-CM

## 2020-09-28 DIAGNOSIS — D69.6 THROMBOCYTOPENIA (HCC): ICD-10-CM

## 2020-09-28 DIAGNOSIS — M15.0 PRIMARY GENERALIZED HYPERTROPHIC OSTEOARTHROSIS: Primary | ICD-10-CM

## 2020-09-28 LAB
25(OH)D3 SERPL-MCNC: 39.2 NG/ML (ref 30–100)
ALBUMIN SERPL BCP-MCNC: 3.9 G/DL (ref 3.5–5)
ALP SERPL-CCNC: 53 U/L (ref 46–116)
ALT SERPL W P-5'-P-CCNC: 14 U/L (ref 12–78)
ANION GAP SERPL CALCULATED.3IONS-SCNC: 3 MMOL/L (ref 4–13)
AST SERPL W P-5'-P-CCNC: 13 U/L (ref 5–45)
BASOPHILS # BLD AUTO: 0.05 THOUSANDS/ΜL (ref 0–0.1)
BASOPHILS NFR BLD AUTO: 1 % (ref 0–1)
BILIRUB SERPL-MCNC: 0.52 MG/DL (ref 0.2–1)
BUN SERPL-MCNC: 23 MG/DL (ref 5–25)
CALCIUM SERPL-MCNC: 10.7 MG/DL (ref 8.3–10.1)
CHLORIDE SERPL-SCNC: 109 MMOL/L (ref 100–108)
CO2 SERPL-SCNC: 27 MMOL/L (ref 21–32)
CREAT SERPL-MCNC: 0.81 MG/DL (ref 0.6–1.3)
EOSINOPHIL # BLD AUTO: 0.07 THOUSAND/ΜL (ref 0–0.61)
EOSINOPHIL NFR BLD AUTO: 1 % (ref 0–6)
ERYTHROCYTE [DISTWIDTH] IN BLOOD BY AUTOMATED COUNT: 14.5 % (ref 11.6–15.1)
ERYTHROCYTE [SEDIMENTATION RATE] IN BLOOD: 24 MM/HOUR (ref 0–29)
GFR SERPL CREATININE-BSD FRML MDRD: 64 ML/MIN/1.73SQ M
GLUCOSE P FAST SERPL-MCNC: 97 MG/DL (ref 65–99)
HCT VFR BLD AUTO: 40.2 % (ref 34.8–46.1)
HGB BLD-MCNC: 13.3 G/DL (ref 11.5–15.4)
IMM GRANULOCYTES # BLD AUTO: 0.05 THOUSAND/UL (ref 0–0.2)
IMM GRANULOCYTES NFR BLD AUTO: 1 % (ref 0–2)
LYMPHOCYTES # BLD AUTO: 2.7 THOUSANDS/ΜL (ref 0.6–4.47)
LYMPHOCYTES NFR BLD AUTO: 37 % (ref 14–44)
MCH RBC QN AUTO: 33.1 PG (ref 26.8–34.3)
MCHC RBC AUTO-ENTMCNC: 33.1 G/DL (ref 31.4–37.4)
MCV RBC AUTO: 100 FL (ref 82–98)
MONOCYTES # BLD AUTO: 1.59 THOUSAND/ΜL (ref 0.17–1.22)
MONOCYTES NFR BLD AUTO: 22 % (ref 4–12)
NEUTROPHILS # BLD AUTO: 2.81 THOUSANDS/ΜL (ref 1.85–7.62)
NEUTS SEG NFR BLD AUTO: 38 % (ref 43–75)
NRBC BLD AUTO-RTO: 0 /100 WBCS
PLATELET # BLD AUTO: 107 THOUSANDS/UL (ref 149–390)
PMV BLD AUTO: 13.2 FL (ref 8.9–12.7)
POTASSIUM SERPL-SCNC: 4.4 MMOL/L (ref 3.5–5.3)
PROT SERPL-MCNC: 7.8 G/DL (ref 6.4–8.2)
RBC # BLD AUTO: 4.02 MILLION/UL (ref 3.81–5.12)
SODIUM SERPL-SCNC: 139 MMOL/L (ref 136–145)
WBC # BLD AUTO: 7.27 THOUSAND/UL (ref 4.31–10.16)

## 2020-09-28 PROCEDURE — 80053 COMPREHEN METABOLIC PANEL: CPT | Performed by: INTERNAL MEDICINE

## 2020-09-28 PROCEDURE — 82306 VITAMIN D 25 HYDROXY: CPT | Performed by: INTERNAL MEDICINE

## 2020-09-28 PROCEDURE — 36415 COLL VENOUS BLD VENIPUNCTURE: CPT | Performed by: INTERNAL MEDICINE

## 2020-09-28 PROCEDURE — 85025 COMPLETE CBC W/AUTO DIFF WBC: CPT | Performed by: INTERNAL MEDICINE

## 2020-09-28 PROCEDURE — 85652 RBC SED RATE AUTOMATED: CPT | Performed by: INTERNAL MEDICINE

## 2020-10-05 ENCOUNTER — APPOINTMENT (OUTPATIENT)
Dept: LAB | Facility: CLINIC | Age: 85
End: 2020-10-05
Payer: MEDICARE

## 2020-10-05 PROCEDURE — 36415 COLL VENOUS BLD VENIPUNCTURE: CPT

## 2020-10-05 PROCEDURE — 80307 DRUG TEST PRSMV CHEM ANLYZR: CPT

## 2020-10-19 LAB — MISCELLANEOUS LAB TEST RESULT: NORMAL

## 2020-11-30 ENCOUNTER — TRANSCRIBE ORDERS (OUTPATIENT)
Dept: LAB | Facility: CLINIC | Age: 85
End: 2020-11-30

## 2020-11-30 ENCOUNTER — APPOINTMENT (OUTPATIENT)
Dept: LAB | Facility: CLINIC | Age: 85
End: 2020-11-30
Payer: MEDICARE

## 2020-11-30 DIAGNOSIS — M05.79 SEROPOSITIVE RHEUMATOID ARTHRITIS OF MULTIPLE SITES (HCC): Primary | ICD-10-CM

## 2020-11-30 DIAGNOSIS — M79.10 MYALGIA: ICD-10-CM

## 2020-11-30 LAB
ALBUMIN SERPL BCP-MCNC: 3.6 G/DL (ref 3.5–5)
ALP SERPL-CCNC: 43 U/L (ref 46–116)
ALT SERPL W P-5'-P-CCNC: 17 U/L (ref 12–78)
ANION GAP SERPL CALCULATED.3IONS-SCNC: 6 MMOL/L (ref 4–13)
AST SERPL W P-5'-P-CCNC: 14 U/L (ref 5–45)
BASOPHILS # BLD AUTO: 0.04 THOUSANDS/ΜL (ref 0–0.1)
BASOPHILS NFR BLD AUTO: 1 % (ref 0–1)
BILIRUB SERPL-MCNC: 0.49 MG/DL (ref 0.2–1)
BUN SERPL-MCNC: 23 MG/DL (ref 5–25)
CALCIUM SERPL-MCNC: 10.4 MG/DL (ref 8.3–10.1)
CHLORIDE SERPL-SCNC: 106 MMOL/L (ref 100–108)
CK SERPL-CCNC: 38 U/L (ref 26–192)
CO2 SERPL-SCNC: 27 MMOL/L (ref 21–32)
CREAT SERPL-MCNC: 0.82 MG/DL (ref 0.6–1.3)
EOSINOPHIL # BLD AUTO: 0.03 THOUSAND/ΜL (ref 0–0.61)
EOSINOPHIL NFR BLD AUTO: 0 % (ref 0–6)
ERYTHROCYTE [DISTWIDTH] IN BLOOD BY AUTOMATED COUNT: 14.4 % (ref 11.6–15.1)
ERYTHROCYTE [SEDIMENTATION RATE] IN BLOOD: 23 MM/HOUR (ref 0–29)
GFR SERPL CREATININE-BSD FRML MDRD: 63 ML/MIN/1.73SQ M
GLUCOSE P FAST SERPL-MCNC: 97 MG/DL (ref 65–99)
HCT VFR BLD AUTO: 40.8 % (ref 34.8–46.1)
HGB BLD-MCNC: 13.1 G/DL (ref 11.5–15.4)
IMM GRANULOCYTES # BLD AUTO: 0.04 THOUSAND/UL (ref 0–0.2)
IMM GRANULOCYTES NFR BLD AUTO: 1 % (ref 0–2)
LYMPHOCYTES # BLD AUTO: 2.63 THOUSANDS/ΜL (ref 0.6–4.47)
LYMPHOCYTES NFR BLD AUTO: 37 % (ref 14–44)
MCH RBC QN AUTO: 32.7 PG (ref 26.8–34.3)
MCHC RBC AUTO-ENTMCNC: 32.1 G/DL (ref 31.4–37.4)
MCV RBC AUTO: 102 FL (ref 82–98)
MONOCYTES # BLD AUTO: 1.47 THOUSAND/ΜL (ref 0.17–1.22)
MONOCYTES NFR BLD AUTO: 21 % (ref 4–12)
NEUTROPHILS # BLD AUTO: 2.93 THOUSANDS/ΜL (ref 1.85–7.62)
NEUTS SEG NFR BLD AUTO: 40 % (ref 43–75)
NRBC BLD AUTO-RTO: 0 /100 WBCS
PLATELET # BLD AUTO: 117 THOUSANDS/UL (ref 149–390)
PMV BLD AUTO: 12.5 FL (ref 8.9–12.7)
POTASSIUM SERPL-SCNC: 4.2 MMOL/L (ref 3.5–5.3)
PROT SERPL-MCNC: 7.6 G/DL (ref 6.4–8.2)
RBC # BLD AUTO: 4.01 MILLION/UL (ref 3.81–5.12)
SODIUM SERPL-SCNC: 139 MMOL/L (ref 136–145)
WBC # BLD AUTO: 7.14 THOUSAND/UL (ref 4.31–10.16)

## 2020-11-30 PROCEDURE — 85652 RBC SED RATE AUTOMATED: CPT | Performed by: INTERNAL MEDICINE

## 2020-11-30 PROCEDURE — 85025 COMPLETE CBC W/AUTO DIFF WBC: CPT | Performed by: INTERNAL MEDICINE

## 2020-11-30 PROCEDURE — 82550 ASSAY OF CK (CPK): CPT | Performed by: INTERNAL MEDICINE

## 2020-11-30 PROCEDURE — 80053 COMPREHEN METABOLIC PANEL: CPT | Performed by: INTERNAL MEDICINE

## 2020-11-30 PROCEDURE — 36415 COLL VENOUS BLD VENIPUNCTURE: CPT | Performed by: INTERNAL MEDICINE

## 2020-12-01 ENCOUNTER — CLINICAL SUPPORT (OUTPATIENT)
Dept: FAMILY MEDICINE CLINIC | Facility: CLINIC | Age: 85
End: 2020-12-01
Payer: MEDICARE

## 2020-12-01 DIAGNOSIS — Z23 NEED FOR VACCINATION: Primary | ICD-10-CM

## 2020-12-01 PROCEDURE — 90662 IIV NO PRSV INCREASED AG IM: CPT

## 2020-12-01 PROCEDURE — G0008 ADMIN INFLUENZA VIRUS VAC: HCPCS

## 2021-01-18 ENCOUNTER — TELEPHONE (OUTPATIENT)
Dept: FAMILY MEDICINE CLINIC | Facility: CLINIC | Age: 86
End: 2021-01-18

## 2021-01-18 ENCOUNTER — TELEMEDICINE (OUTPATIENT)
Dept: FAMILY MEDICINE CLINIC | Facility: CLINIC | Age: 86
End: 2021-01-18
Payer: MEDICARE

## 2021-01-18 ENCOUNTER — APPOINTMENT (OUTPATIENT)
Dept: RADIOLOGY | Facility: CLINIC | Age: 86
End: 2021-01-18
Payer: MEDICARE

## 2021-01-18 DIAGNOSIS — R06.02 SHORTNESS OF BREATH: ICD-10-CM

## 2021-01-18 DIAGNOSIS — R53.83 OTHER FATIGUE: ICD-10-CM

## 2021-01-18 DIAGNOSIS — R06.02 SHORTNESS OF BREATH: Primary | ICD-10-CM

## 2021-01-18 PROCEDURE — U0005 INFEC AGEN DETEC AMPLI PROBE: HCPCS | Performed by: FAMILY MEDICINE

## 2021-01-18 PROCEDURE — U0003 INFECTIOUS AGENT DETECTION BY NUCLEIC ACID (DNA OR RNA); SEVERE ACUTE RESPIRATORY SYNDROME CORONAVIRUS 2 (SARS-COV-2) (CORONAVIRUS DISEASE [COVID-19]), AMPLIFIED PROBE TECHNIQUE, MAKING USE OF HIGH THROUGHPUT TECHNOLOGIES AS DESCRIBED BY CMS-2020-01-R: HCPCS | Performed by: FAMILY MEDICINE

## 2021-01-18 PROCEDURE — 99442 PR PHYS/QHP TELEPHONE EVALUATION 11-20 MIN: CPT | Performed by: FAMILY MEDICINE

## 2021-01-18 PROCEDURE — 71046 X-RAY EXAM CHEST 2 VIEWS: CPT

## 2021-01-18 RX ORDER — ALBUTEROL SULFATE 90 UG/1
2 AEROSOL, METERED RESPIRATORY (INHALATION) EVERY 6 HOURS PRN
Qty: 1 INHALER | Refills: 0 | Status: SHIPPED | OUTPATIENT
Start: 2021-01-18 | End: 2022-04-21

## 2021-01-18 NOTE — PROGRESS NOTES
Virtual Brief Visit    Assessment/Plan:    Problem List Items Addressed This Visit     None      Visit Diagnoses     Shortness of breath    -  Primary    Relevant Medications    albuterol (PROVENTIL HFA,VENTOLIN HFA) 90 mcg/act inhaler    Other Relevant Orders    Novel Coronavirus (Covid-19),PCR [facility] - Collected at [facility]    XR chest pa & lateral (Completed)    Other fatigue        Relevant Orders    Novel Coronavirus (Covid-19),PCR [facility] - Collected at [place] [address] or [facility]      patient wants an xray to rule out anything causing this  Patient advised it will not guarantee show something but would still want it  Patient tested for covid  If worsening SOB, patient needs to go to the ER  Reason for visit is   Chief Complaint   Patient presents with    Virtual Brief Visit        Encounter provider Radhames Gonzales MD    Provider located at [address] [place] [id number]    Recent Visits  No visits were found meeting these conditions  Showing recent visits within past 7 days and meeting all other requirements     Today's Visits  Date Type Provider Dept   01/18/21 Telemedicine Radhames Gonzales MD [address] [place] [address] today's visits and meeting all other requirements     Future Appointments  No visits were found meeting these conditions  Showing future appointments within next 150 days and meeting all other requirements        After connecting through telephone, the patient was identified by name and date of birth  Zechariahveesofya Usman was informed that this is a telemedicine visit and that the visit is being conducted through telephone  My office door was closed  No one else was in the room  She acknowledged consent and understanding of privacy and security of the platform  The patient has agreed to participate and understands she can discontinue the visit at any time  Patient is aware this is a billable service  Subjective    Chase Mcgraw is a 80 y o  female calls with her son with concerns with shortness of breath  She states that the end of her deep inspiration for the past couple weeks  She has no fevers or chills  No diarrhea or vomiting  No loss of taste or smell  As per patient she has not left the house and has been staying home  Her sons to live with her and to leave the house  She states that she needs an x-ray to see what is going on  No cough  She does have fatigue  Unk Dequna HPI     Past Medical History:   Diagnosis Date    Carpal tunnel syndrome     Degeneration of lumbar intervertebral disc     Postmenopausal osteoporosis     Primary generalized (osteo)arthritis     Rheumatoid arthritis ([address] )     Right shoulder pain        Past Surgical History:   Procedure Laterality Date    CARPAL TUNNEL RELEASE      CATARACT EXTRACTION      CHOLECYSTECTOMY      KNEE SURGERY      MASS EXCISION Right 1/2/2019    Procedure: EXCISION BIOPSY TISSUE LESION/MASS HAND;  Surgeon: Gómez Garcia DO;  [facility];  Service: Orthopedics    TONSILLECTOMY         Current Outpatient Medications   Medication Sig Dispense Refill    albuterol (PROVENTIL HFA,VENTOLIN HFA) 90 mcg/act inhaler Inhale 2 puffs every 6 (six) hours as needed for wheezing or shortness of breath 1 Inhaler 0    cholecalciferol (VITAMIN D3) 1,000 units tablet Take 1,000 Units by mouth daily      folic acid (FOLVITE) 1 mg tablet Take 1 tablet (1 mg total) by mouth daily 30 tablet 11    gabapentin (NEURONTIN) 300 mg capsule Take 1 capsule (300 mg total) by mouth 2 (two) times a day 60 capsule 2    lidocaine (LIDODERM) 5 % Apply 1 patch topically daily for 7 days Remove & Discard patch within 12 hours or as directed by MD Levine patch 0    methotrexate 2 5 mg tablet        No current facility-administered medications for this visit  No Known Allergies    Review of Systems   Constitutional: Positive for fatigue   Negative for appetite change and fever  HENT: Negative for ear pain and sore throat  Eyes: Negative for visual disturbance  Respiratory: Positive for shortness of breath  Negative for cough  Cardiovascular: Negative for chest pain and leg swelling  Gastrointestinal: Negative for abdominal pain, diarrhea, nausea and vomiting  Genitourinary: Negative for dysuria  Musculoskeletal: Negative for arthralgias  Skin: Negative for color change  Neurological: Negative for dizziness, tremors, light-headedness and headaches  Psychiatric/Behavioral: Negative for agitation and behavioral problems  There were no vitals filed for this visit  It was my intent to perform this visit via video technology but the patient was not able to do a video connection so the visit was completed via audio telephone only  I spent 15 minutes directly with the patient during this visit    VIRTUAL VISIT [facility] acknowledges that she has consented to an online visit or consultation  She understands that the online visit is based solely on information provided by her, and that, in the absence of a face-to-face physical evaluation by the physician, the diagnosis she receives is both limited and provisional in terms of accuracy and completeness  This is not intended to replace a full medical face-to-face evaluation by the physician  Conor Yanez understands and accepts these terms

## 2021-01-18 NOTE — TELEPHONE ENCOUNTER
Dr Umer Ochoa:    Patient is having a hard time inhaling the albuterol  Patient's son wanted to know if you could send in an Rx for a tube to [facility] in [place]

## 2021-01-19 LAB — SARS-COV-2 N GENE RESP QL NAA+PROBE: NEGATIVE

## 2021-01-20 ENCOUNTER — OFFICE VISIT (OUTPATIENT)
Dept: FAMILY MEDICINE CLINIC | Facility: CLINIC | Age: 86
End: 2021-01-20
Payer: MEDICARE

## 2021-01-20 VITALS
DIASTOLIC BLOOD PRESSURE: 74 MMHG | OXYGEN SATURATION: 96 % | HEART RATE: 76 BPM | HEIGHT: 63 IN | WEIGHT: 142 LBS | TEMPERATURE: 97.5 F | BODY MASS INDEX: 25.16 KG/M2 | SYSTOLIC BLOOD PRESSURE: 140 MMHG | RESPIRATION RATE: 18 BRPM

## 2021-01-20 DIAGNOSIS — R06.02 SOB (SHORTNESS OF BREATH): Primary | ICD-10-CM

## 2021-01-20 PROCEDURE — 99214 OFFICE O/P EST MOD 30 MIN: CPT | Performed by: FAMILY MEDICINE

## 2021-01-20 PROCEDURE — 93000 ELECTROCARDIOGRAM COMPLETE: CPT | Performed by: FAMILY MEDICINE

## 2021-01-20 RX ORDER — CARVEDILOL 3.12 MG/1
3.12 TABLET ORAL 2 TIMES DAILY WITH MEALS
Qty: 60 TABLET | Refills: 0 | Status: SHIPPED | OUTPATIENT
Start: 2021-01-20 | End: 2021-02-15

## 2021-01-20 RX ORDER — OXYCODONE HYDROCHLORIDE AND ACETAMINOPHEN 5; 325 MG/1; MG/1
1 TABLET ORAL EVERY 4 HOURS PRN
COMMUNITY

## 2021-01-20 NOTE — PROGRESS NOTES
Chief Complaint   Patient presents with    Shortness of Breath        Patient ID: Ashly Gonzalez is a 80 y o  female  HPI  Pt is seeing for intermittent Sob at rest  - started 2 wks ago - no other symptoms, able to sleep in supine position, no legs swelling, no cough or wheezing - no fever, was sent for CXR-  Negative -  Negative COVID test     The following portions of the patient's history were reviewed and updated as appropriate: allergies, current medications, past family history, past medical history, past social history, past surgical history and problem list     Review of Systems   Constitutional: Negative  HENT: Negative  Respiratory: Positive for shortness of breath  Negative for cough, chest tightness and wheezing  Cardiovascular: Negative  Negative for chest pain, palpitations and leg swelling  Gastrointestinal: Negative  Musculoskeletal: Negative  Neurological: Negative  Current Outpatient Medications   Medication Sig Dispense Refill    albuterol (PROVENTIL HFA,VENTOLIN HFA) 90 mcg/act inhaler Inhale 2 puffs every 6 (six) hours as needed for wheezing or shortness of breath 1 Inhaler 0    cholecalciferol (VITAMIN D3) 1,000 units tablet Take 1,000 Units by mouth daily      folic acid (FOLVITE) 1 mg tablet Take 1 tablet (1 mg total) by mouth daily 30 tablet 11    gabapentin (NEURONTIN) 300 mg capsule Take 1 capsule (300 mg total) by mouth 2 (two) times a day 60 capsule 2    methotrexate 2 5 mg tablet       lidocaine (LIDODERM) 5 % Apply 1 patch topically daily for 7 days Remove & Discard patch within 12 hours or as directed by MD Levine patch 0     No current facility-administered medications for this visit  Objective:    /74   Pulse 76   Temp 97 5 °F (36 4 °C) (Tympanic)   Resp 18   Ht 5' 3" (1 6 m)   Wt 64 4 kg (142 lb)   SpO2 96%   BMI 25 15 kg/m²        Physical Exam  Constitutional:       Appearance: She is not ill-appearing     Cardiovascular: Rate and Rhythm: Normal rate and regular rhythm  Heart sounds: No murmur  Pulmonary:      Effort: Pulmonary effort is normal  No respiratory distress  Breath sounds: No wheezing, rhonchi or rales  Musculoskeletal:      Right lower leg: No edema  Left lower leg: No edema  Neurological:      General: No focal deficit present  Mental Status: She is alert and oriented to person, place, and time  Labs in chart were reviewed  Assessment/Plan:         Diagnoses and all orders for this visit:    SOB (shortness of breath)  -     CBC; Future  -     Comprehensive metabolic panel; Future  -     POCT ECG  -     NT-BNP PRO; Future  -     carvedilol (Coreg) 3 125 mg tablet; Take 1 tablet (3 125 mg total) by mouth 2 (two) times a day with meals  -     Ambulatory referral to Cardiology; Future    Other orders  -     oxyCODONE-acetaminophen (PERCOCET) 5-325 mg per tablet; Take 1 tablet by mouth every 4 (four) hours as needed            BMI Counseling: Body mass index is 25 15 kg/m²  Discussed the patient's BMI with her  The BMI is above normal  Nutrition recommendations include reducing portion sizes, decreasing overall calorie intake, 3-5 servings of fruits/vegetables daily and reducing fast food intake  Exercise recommendations include exercising 3-5 times per week             rto prn       Johana Resendez MD

## 2021-01-21 ENCOUNTER — CONSULT (OUTPATIENT)
Dept: CARDIOLOGY CLINIC | Facility: CLINIC | Age: 86
End: 2021-01-21
Payer: MEDICARE

## 2021-01-21 VITALS
WEIGHT: 144.6 LBS | SYSTOLIC BLOOD PRESSURE: 120 MMHG | BODY MASS INDEX: 25.62 KG/M2 | HEART RATE: 70 BPM | HEIGHT: 63 IN | TEMPERATURE: 97.6 F | DIASTOLIC BLOOD PRESSURE: 60 MMHG | OXYGEN SATURATION: 94 % | RESPIRATION RATE: 18 BRPM

## 2021-01-21 DIAGNOSIS — R06.02 SOB (SHORTNESS OF BREATH): Primary | ICD-10-CM

## 2021-01-21 PROCEDURE — 99215 OFFICE O/P EST HI 40 MIN: CPT | Performed by: INTERNAL MEDICINE

## 2021-01-21 NOTE — PROGRESS NOTES
Consultation - Cardiology Office  [facility]  Darryl Camarena 80 y o  female MRN: [id number]  : 1929  Unit/Bed#:  Encounter: [id number]      ASSESSMENT AND RECOMMENDATIONS:  1  Shortness of breath  Etiology uncertain  Could be secondary to age and deconditioning  > echocardiogram to evaluate LV function    2  Rheumatoid arthritis  Managed by PCP    3  Thrombocytopenia  Managed by PCP    4  Hypertension:  BP today is 120/60 image Hg with a heart rate of 70/Min  On Coreg 3 125 mg b i d  Thank you for your consultation  If you have any question please call me at [phone]      Primary Care Physician Requesting Consult: Adriana Ingram MD      Reason for Consult / Principal Problem:  Shortness of breath        HPI :     Darryl Camarena is a 80y o  year old female who was referred by primary care doctor for evaluation of shortness of breath which occurs mainly when she is talking  She has longstanding rheumatoid arthritis and limited mobility  For the last couple of weeks she and her family have noticed that she gets somewhat short of breath when she talks  She denies any chest pain or syncope  She had a chest x-ray 3 days ago which was unremarkable  Patient is reluctant to have any kind of workup done including echocardiogram     REVIEW OF SYSTEMS:    Constitutional: Negative for activity change, appetite change, chills, fatigue, fever and unexpected weight change  HENT: Negative for congestion, sore throat and trouble swallowing  Eyes: Negative for discharge and redness  Respiratory: Negative for apnea, cough, chest tightness, positive for shortness of breath   Cardiovascular: Negative for chest pain, palpitations and leg swelling  Gastrointestinal: Negative for abdominal distention, abdominal pain, anal bleeding, blood in stool, constipation, diarrhea, nausea and vomiting  Endocrine: Negative for polydipsia, polyphagia and polyuria     Genitourinary: Negative for difficulty urinating, dysuria, flank pain and hematuria  Musculoskeletal: Negative for arthralgias, myalgias and neck stiffness  Skin: Negative for pallor and rash  Allergic/Immunologic: Negative for environmental allergies  Neurological: Negative for dizziness, syncope, light-headedness, numbness and headaches  Hematological: Negative for adenopathy  Does not bruise/bleed easily  Psychiatric/Behavioral: Negative for confusion and hallucinations  The patient is not nervous/anxious        Historical Information   Past Medical History:   Diagnosis Date    Carpal tunnel syndrome     Degeneration of lumbar intervertebral disc     Postmenopausal osteoporosis     Primary generalized (osteo)arthritis     Rheumatoid arthritis ([address] )     Right shoulder pain      Past Surgical History:   Procedure Laterality Date    CARPAL TUNNEL RELEASE      CATARACT EXTRACTION      CHOLECYSTECTOMY      KNEE SURGERY      MASS EXCISION Right 2019    Procedure: EXCISION BIOPSY TISSUE LESION/MASS HAND;  Surgeon: Noe Martinez DO;  Location: [facility];  Service: Orthopedics    TONSILLECTOMY       Social History     Substance and Sexual Activity   Alcohol Use Yes    Comment: rarely     Social History     Substance and Sexual Activity   Drug Use No     Social History     Tobacco Use   Smoking Status Former Smoker    Years: 40 00    Types: Cigarettes    Quit date: 1998    Years since quittin 0   Smokeless Tobacco Never Used     Family History:   Family History   Problem Relation Age of Onset    Cancer Sister     Breast cancer Mother     Breast cancer Maternal Grandmother     No Known Problems Father     No Known Problems Brother     No Known Problems Maternal Aunt     No Known Problems Maternal Uncle     No Known Problems Paternal Aunt     No Known Problems Paternal Uncle     No Known Problems Maternal Grandfather     No Known Problems Paternal Grandmother     No Known Problems Paternal Grandfather     ADD / ADHD Neg Hx     Anesthesia problems Neg Hx     Clotting disorder Neg Hx     Collagen disease Neg Hx     Diabetes Neg Hx     Dislocations Neg Hx     Learning disabilities Neg Hx     Neurological problems Neg Hx     Osteoporosis Neg Hx     Rheumatologic disease Neg Hx     Scoliosis Neg Hx     Vascular Disease Neg Hx        Meds/Allergies     No Known Allergies    Current Outpatient Medications:     albuterol (PROVENTIL HFA,VENTOLIN HFA) 90 mcg/act inhaler, Inhale 2 puffs every 6 (six) hours as needed for wheezing or shortness of breath, Disp: 1 Inhaler, Rfl: 0    carvedilol (Coreg) 3 125 mg tablet, Take 1 tablet (3 125 mg total) by mouth 2 (two) times a day with meals, Disp: 60 tablet, Rfl: 0    cholecalciferol (VITAMIN D3) 1,000 units tablet, Take 1,000 Units by mouth daily, Disp: , Rfl:     folic acid (FOLVITE) 1 mg tablet, Take 1 tablet (1 mg total) by mouth daily, Disp: 30 tablet, Rfl: 11    gabapentin (NEURONTIN) 300 mg capsule, Take 1 capsule (300 mg total) by mouth 2 (two) times a day, Disp: 60 capsule, Rfl: 2    lidocaine (LIDODERM) 5 %, Apply 1 patch topically daily for 7 days Remove & Discard patch within 12 hours or as directed by MD, Disp: 7 patch, Rfl: 0    methotrexate 2 5 mg tablet, , Disp: , Rfl:     oxyCODONE-acetaminophen (PERCOCET) 5-325 mg per tablet, Take 1 tablet by mouth every 4 (four) hours as needed, Disp: , Rfl:     Vitals:   /60 mmHg  Heart rate 70/Min  BP Readings from Last 3 Encounters:   01/20/21 140/74   07/08/20 120/80   05/16/20 140/60         PHYSICAL EXAMINATION:  Neurologic:  Alert & oriented x 3, no new focal deficits, Not in any acute distress,  Constitutional:  Well developed, well nourished, non-toxic appearance   Eyes:  Pupil equal and reacting to light, conjunctiva normal, No JVP, No LNP   HENT:  Atraumatic, oropharynx moist, Neck- normal range of motion, no tenderness,  Neck supple   Respiratory:  Bilateral air entry, mostly clear to auscultation  Cardiovascular: S1-S2 regular with a I/VI systolic murmur   GI:  Soft, nondistended, normal bowel sounds, nontender, no hepatosplenomegaly appreciated  Musculoskeletal:  No edema, no tenderness, no deformities  Skin:  Well hydrated, no rash   Lymphatic:  No lymphadenopathy noted   Extremities:  No edema and distal pulses are present    Diagnostic Studies Review Cardio:      EKG:(patient declined EKG to be done today)  01/20/2021:  Normal sinus rhythm, isolated PVC, heart rate 74 per minute, LAFB    Cardiac testing:   No results found for this or any previous visit  Imaging:  Chest X-Ray:   Xr Chest Pa & Lateral    Result Date: 1/18/2021  Impression No acute cardiopulmonary disease  Workstation performed: [id number]     Xr Chest 2 Views    Result Date: 5/16/2020  Impression No radiographic evidence of acute cardiopulmonary disease Workstation performed: [id number]       CT-scan of the chest:     No CTA results available for this patient    Lab Review   Lab Results   Component Value Date    WBC 7 14 11/30/2020    HGB 13 1 11/30/2020    HCT 40 8 11/30/2020     (H) 11/30/2020    RDW 14 4 11/30/2020     (L) 11/30/2020     BMP:  Lab Results   Component Value Date    SODIUM 139 11/30/2020    K 4 2 11/30/2020     11/30/2020    CO2 27 11/30/2020    ANIONGAP 10 5 12/08/2015    BUN 23 11/30/2020    CREATININE 0 82 11/30/2020    GLUC 99 07/14/2020    GLUF 97 11/30/2020    CALCIUM 10 4 (H) 11/30/2020    CORRECTEDCA 10 3 (H) 01/27/2020    EGFR 63 11/30/2020    MG 2 2 12/19/2018     LFT:  Lab Results   Component Value Date    AST 14 11/30/2020    ALT 17 11/30/2020    ALKPHOS 43 (L) 11/30/2020    TP 7 6 11/30/2020    ALB 3 6 11/30/2020      No results found for: [id number]  No components found for: TSH3  No results found for: HGBA1C  Lipid Profile:   No results found for: CHOLESTEROL, HDL, LDLCALC, TRIG  No results found for: CHOLESTEROL  Lab Results   Component Value Date    CKTOTAL 38 11/30/2020     No results found for: NTBNP   Recent Results (from the past 672 hour(s))   Novel Coronavirus (Covid-19),PCR [facility] - Collected at [facility]    Collection Time: 01/18/21 11:03 AM    Specimen: Nose; Nares   Result Value Ref Range    SARS-CoV-2 Negative Negative   POCT ECG    Collection Time: 01/20/21  1:28 PM   Result Value Ref Range    ECG Interp during Ex             Dr Erica Oliveira MD, [facility]      "This note has been constructed using a voice recognition system  Therefore there may be syntax, spelling, and/or grammatical errors   Please call if you have any questions  "

## 2021-01-22 ENCOUNTER — APPOINTMENT (OUTPATIENT)
Dept: LAB | Facility: CLINIC | Age: 86
End: 2021-01-22
Payer: MEDICARE

## 2021-01-22 ENCOUNTER — TRANSCRIBE ORDERS (OUTPATIENT)
Dept: LAB | Facility: CLINIC | Age: 86
End: 2021-01-22

## 2021-01-22 DIAGNOSIS — Z79.899 ENCOUNTER FOR LONG-TERM (CURRENT) USE OF OTHER MEDICATIONS: ICD-10-CM

## 2021-01-22 DIAGNOSIS — M05.79 SEROPOSITIVE RHEUMATOID ARTHRITIS OF MULTIPLE SITES (HCC): Primary | ICD-10-CM

## 2021-01-22 DIAGNOSIS — R06.02 SOB (SHORTNESS OF BREATH): ICD-10-CM

## 2021-01-22 LAB
ALBUMIN SERPL BCP-MCNC: 3.6 G/DL (ref 3.5–5)
ALP SERPL-CCNC: 42 U/L (ref 46–116)
ALT SERPL W P-5'-P-CCNC: 22 U/L (ref 12–78)
ANION GAP SERPL CALCULATED.3IONS-SCNC: 0 MMOL/L (ref 4–13)
AST SERPL W P-5'-P-CCNC: 19 U/L (ref 5–45)
BILIRUB SERPL-MCNC: 0.64 MG/DL (ref 0.2–1)
BUN SERPL-MCNC: 24 MG/DL (ref 5–25)
CALCIUM SERPL-MCNC: 10.6 MG/DL (ref 8.3–10.1)
CHLORIDE SERPL-SCNC: 110 MMOL/L (ref 100–108)
CO2 SERPL-SCNC: 31 MMOL/L (ref 21–32)
CREAT SERPL-MCNC: 0.84 MG/DL (ref 0.6–1.3)
ERYTHROCYTE [DISTWIDTH] IN BLOOD BY AUTOMATED COUNT: 14.6 % (ref 11.6–15.1)
ERYTHROCYTE [SEDIMENTATION RATE] IN BLOOD: 5 MM/HOUR (ref 0–29)
GFR SERPL CREATININE-BSD FRML MDRD: 61 ML/MIN/1.73SQ M
GLUCOSE P FAST SERPL-MCNC: 101 MG/DL (ref 65–99)
HCT VFR BLD AUTO: 42.2 % (ref 34.8–46.1)
HGB BLD-MCNC: 13.3 G/DL (ref 11.5–15.4)
MCH RBC QN AUTO: 32.3 PG (ref 26.8–34.3)
MCHC RBC AUTO-ENTMCNC: 31.5 G/DL (ref 31.4–37.4)
MCV RBC AUTO: 102 FL (ref 82–98)
NT-PROBNP SERPL-MCNC: 95 PG/ML
PLATELET # BLD AUTO: 95 THOUSANDS/UL (ref 149–390)
POTASSIUM SERPL-SCNC: 5.2 MMOL/L (ref 3.5–5.3)
PROT SERPL-MCNC: 7.7 G/DL (ref 6.4–8.2)
RBC # BLD AUTO: 4.12 MILLION/UL (ref 3.81–5.12)
SODIUM SERPL-SCNC: 141 MMOL/L (ref 136–145)
WBC # BLD AUTO: 7.92 THOUSAND/UL (ref 4.31–10.16)

## 2021-01-22 PROCEDURE — 85652 RBC SED RATE AUTOMATED: CPT | Performed by: INTERNAL MEDICINE

## 2021-01-22 PROCEDURE — 80053 COMPREHEN METABOLIC PANEL: CPT | Performed by: INTERNAL MEDICINE

## 2021-01-22 PROCEDURE — 83880 ASSAY OF NATRIURETIC PEPTIDE: CPT

## 2021-01-22 PROCEDURE — 36415 COLL VENOUS BLD VENIPUNCTURE: CPT | Performed by: INTERNAL MEDICINE

## 2021-01-22 PROCEDURE — 85025 COMPLETE CBC W/AUTO DIFF WBC: CPT | Performed by: INTERNAL MEDICINE

## 2021-01-27 DIAGNOSIS — Z23 ENCOUNTER FOR IMMUNIZATION: ICD-10-CM

## 2021-02-06 ENCOUNTER — IMMUNIZATIONS (OUTPATIENT)
Dept: FAMILY MEDICINE CLINIC | Facility: HOSPITAL | Age: 86
End: 2021-02-06

## 2021-02-06 DIAGNOSIS — Z23 ENCOUNTER FOR IMMUNIZATION: Primary | ICD-10-CM

## 2021-02-06 PROCEDURE — 0001A SARS-COV-2 / COVID-19 MRNA VACCINE (PFIZER-BIONTECH) 30 MCG: CPT

## 2021-02-06 PROCEDURE — 91300 SARS-COV-2 / COVID-19 MRNA VACCINE (PFIZER-BIONTECH) 30 MCG: CPT

## 2021-02-12 ENCOUNTER — HOSPITAL ENCOUNTER (OUTPATIENT)
Dept: NON INVASIVE DIAGNOSTICS | Facility: HOSPITAL | Age: 86
Discharge: HOME | End: 2021-02-12
Attending: INTERNAL MEDICINE
Payer: MEDICARE

## 2021-02-12 DIAGNOSIS — R06.02 SOB (SHORTNESS OF BREATH): ICD-10-CM

## 2021-02-12 PROCEDURE — 93306 TTE W/DOPPLER COMPLETE: CPT | Performed by: INTERNAL MEDICINE

## 2021-02-12 PROCEDURE — 93306 TTE W/DOPPLER COMPLETE: CPT

## 2021-02-15 ENCOUNTER — TELEPHONE (OUTPATIENT)
Dept: FAMILY MEDICINE CLINIC | Facility: CLINIC | Age: 86
End: 2021-02-15

## 2021-02-15 ENCOUNTER — TELEPHONE (OUTPATIENT)
Dept: CARDIOLOGY CLINIC | Facility: CLINIC | Age: 86
End: 2021-02-15

## 2021-02-15 DIAGNOSIS — R06.02 SOB (SHORTNESS OF BREATH): ICD-10-CM

## 2021-02-15 RX ORDER — CARVEDILOL 3.12 MG/1
3.12 TABLET ORAL 2 TIMES DAILY WITH MEALS
Qty: 180 TABLET | Refills: 2 | Status: SHIPPED | OUTPATIENT
Start: 2021-02-15 | End: 2021-11-05 | Stop reason: SDUPTHER

## 2021-02-15 NOTE — TELEPHONE ENCOUNTER
Called and spoke w/ son he said the cardiologist wants her on the coreg and ordered it he already picked it up

## 2021-02-15 NOTE — TELEPHONE ENCOUNTER
Dr Adalid Brunson    Patient had echo test last week and they received results from cardiologist Dr Cony Pierce  Do you want her to continue taking cardevilol? If so will need refill sent to [facility]

## 2021-02-15 NOTE — TELEPHONE ENCOUNTER
----- Message from Mariza Rueda MD sent at 2/15/2021  7:59 AM EST -----  Please call and inform patient that the Echocardiogram showed normal pumping function of the heart  There is some regurgitation/leakage from the aortic valve which does not require any specific treatment or restrictions in activity  However would recommend follow-up echocardiogram periodically    Will discuss further at next office visit

## 2021-02-27 ENCOUNTER — IMMUNIZATIONS (OUTPATIENT)
Dept: FAMILY MEDICINE CLINIC | Facility: HOSPITAL | Age: 86
End: 2021-02-27

## 2021-02-27 DIAGNOSIS — Z23 ENCOUNTER FOR IMMUNIZATION: Primary | ICD-10-CM

## 2021-02-27 PROCEDURE — 0002A SARS-COV-2 / COVID-19 MRNA VACCINE (PFIZER-BIONTECH) 30 MCG: CPT

## 2021-02-27 PROCEDURE — 91300 SARS-COV-2 / COVID-19 MRNA VACCINE (PFIZER-BIONTECH) 30 MCG: CPT

## 2021-03-31 ENCOUNTER — APPOINTMENT (OUTPATIENT)
Dept: LAB | Facility: CLINIC | Age: 86
End: 2021-03-31
Payer: MEDICARE

## 2021-03-31 ENCOUNTER — TRANSCRIBE ORDERS (OUTPATIENT)
Dept: LAB | Facility: CLINIC | Age: 86
End: 2021-03-31

## 2021-03-31 DIAGNOSIS — M05.79 SEROPOSITIVE RHEUMATOID ARTHRITIS OF MULTIPLE SITES (HCC): Primary | ICD-10-CM

## 2021-03-31 DIAGNOSIS — M06.09 RHEUMATOID ARTHRITIS OF MULTIPLE SITES WITH NEGATIVE RHEUMATOID FACTOR (HCC): Primary | ICD-10-CM

## 2021-03-31 DIAGNOSIS — Z79.899 ENCOUNTER FOR LONG-TERM (CURRENT) USE OF OTHER MEDICATIONS: ICD-10-CM

## 2021-03-31 LAB
ALBUMIN SERPL BCP-MCNC: 3.4 G/DL (ref 3.5–5)
ALP SERPL-CCNC: 44 U/L (ref 46–116)
ALT SERPL W P-5'-P-CCNC: 14 U/L (ref 12–78)
ANION GAP SERPL CALCULATED.3IONS-SCNC: 6 MMOL/L (ref 4–13)
AST SERPL W P-5'-P-CCNC: 15 U/L (ref 5–45)
BASOPHILS # BLD AUTO: 0.04 THOUSANDS/ΜL (ref 0–0.1)
BASOPHILS NFR BLD AUTO: 1 % (ref 0–1)
BILIRUB SERPL-MCNC: 0.61 MG/DL (ref 0.2–1)
BUN SERPL-MCNC: 20 MG/DL (ref 5–25)
CALCIUM ALBUM COR SERPL-MCNC: 10.7 MG/DL (ref 8.3–10.1)
CALCIUM SERPL-MCNC: 10.2 MG/DL (ref 8.3–10.1)
CHLORIDE SERPL-SCNC: 106 MMOL/L (ref 100–108)
CO2 SERPL-SCNC: 28 MMOL/L (ref 21–32)
CREAT SERPL-MCNC: 0.83 MG/DL (ref 0.6–1.3)
EOSINOPHIL # BLD AUTO: 0.1 THOUSAND/ΜL (ref 0–0.61)
EOSINOPHIL NFR BLD AUTO: 1 % (ref 0–6)
ERYTHROCYTE [DISTWIDTH] IN BLOOD BY AUTOMATED COUNT: 15.2 % (ref 11.6–15.1)
ERYTHROCYTE [SEDIMENTATION RATE] IN BLOOD: 27 MM/HOUR (ref 0–29)
GFR SERPL CREATININE-BSD FRML MDRD: 62 ML/MIN/1.73SQ M
GLUCOSE P FAST SERPL-MCNC: 116 MG/DL (ref 65–99)
HCT VFR BLD AUTO: 42.6 % (ref 34.8–46.1)
HGB BLD-MCNC: 14 G/DL (ref 11.5–15.4)
IMM GRANULOCYTES # BLD AUTO: 0.09 THOUSAND/UL (ref 0–0.2)
IMM GRANULOCYTES NFR BLD AUTO: 1 % (ref 0–2)
LYMPHOCYTES # BLD AUTO: 2.44 THOUSANDS/ΜL (ref 0.6–4.47)
LYMPHOCYTES NFR BLD AUTO: 29 % (ref 14–44)
MCH RBC QN AUTO: 33.4 PG (ref 26.8–34.3)
MCHC RBC AUTO-ENTMCNC: 32.9 G/DL (ref 31.4–37.4)
MCV RBC AUTO: 102 FL (ref 82–98)
MONOCYTES # BLD AUTO: 2.08 THOUSAND/ΜL (ref 0.17–1.22)
MONOCYTES NFR BLD AUTO: 25 % (ref 4–12)
NEUTROPHILS # BLD AUTO: 3.65 THOUSANDS/ΜL (ref 1.85–7.62)
NEUTS SEG NFR BLD AUTO: 43 % (ref 43–75)
NRBC BLD AUTO-RTO: 0 /100 WBCS
PLATELET # BLD AUTO: 81 THOUSANDS/UL (ref 149–390)
PMV BLD AUTO: 12.8 FL (ref 8.9–12.7)
POTASSIUM SERPL-SCNC: 4.1 MMOL/L (ref 3.5–5.3)
PROT SERPL-MCNC: 7.7 G/DL (ref 6.4–8.2)
RBC # BLD AUTO: 4.19 MILLION/UL (ref 3.81–5.12)
SODIUM SERPL-SCNC: 140 MMOL/L (ref 136–145)
WBC # BLD AUTO: 8.4 THOUSAND/UL (ref 4.31–10.16)

## 2021-03-31 PROCEDURE — 85652 RBC SED RATE AUTOMATED: CPT | Performed by: INTERNAL MEDICINE

## 2021-03-31 PROCEDURE — 85025 COMPLETE CBC W/AUTO DIFF WBC: CPT | Performed by: INTERNAL MEDICINE

## 2021-03-31 PROCEDURE — 36415 COLL VENOUS BLD VENIPUNCTURE: CPT | Performed by: INTERNAL MEDICINE

## 2021-03-31 PROCEDURE — 80053 COMPREHEN METABOLIC PANEL: CPT | Performed by: INTERNAL MEDICINE

## 2021-04-03 ENCOUNTER — TELEPHONE (OUTPATIENT)
Dept: FAMILY MEDICINE CLINIC | Facility: CLINIC | Age: 86
End: 2021-04-03

## 2021-04-15 NOTE — TELEPHONE ENCOUNTER
Spoke with son and patient is giving him a hard time about coming into office for her physical   Son said he will talk to patient again about scheduling an appointment

## 2021-04-16 ENCOUNTER — TELEPHONE (OUTPATIENT)
Dept: FAMILY MEDICINE CLINIC | Facility: CLINIC | Age: 86
End: 2021-04-16

## 2021-04-16 ENCOUNTER — OFFICE VISIT (OUTPATIENT)
Dept: CARDIOLOGY CLINIC | Facility: CLINIC | Age: 86
End: 2021-04-16
Payer: MEDICARE

## 2021-04-16 VITALS
WEIGHT: 143 LBS | TEMPERATURE: 97.6 F | HEIGHT: 63 IN | DIASTOLIC BLOOD PRESSURE: 60 MMHG | OXYGEN SATURATION: 95 % | SYSTOLIC BLOOD PRESSURE: 120 MMHG | RESPIRATION RATE: 18 BRPM | HEART RATE: 76 BPM | BODY MASS INDEX: 25.34 KG/M2

## 2021-04-16 DIAGNOSIS — R06.02 SHORTNESS OF BREATH ON EXERTION: Primary | ICD-10-CM

## 2021-04-16 PROCEDURE — 99214 OFFICE O/P EST MOD 30 MIN: CPT | Performed by: INTERNAL MEDICINE

## 2021-04-16 RX ORDER — PREDNISONE 1 MG/1
5 TABLET ORAL
COMMUNITY
Start: 2021-04-01 | End: 2022-04-21

## 2021-04-16 RX ORDER — GABAPENTIN 400 MG/1
400 CAPSULE ORAL
COMMUNITY
Start: 2021-03-17

## 2021-04-16 NOTE — PROGRESS NOTES
Progress Note - Cardiology Office  [facility]    Brendan Cooley 80 y o  female MRN: [id number]  : 1929  Encounter: [id number]      ASSESSMENT:   Shortness of breath  Echo, 2021:    EF 60%, G1DD, mild to moderate aortic regurgitation  Pro NT BNP, 2021    95/normal  > therefore her dyspnea is unlikely secondary to a cardiac etiology and is probably multifactorial including age, deconditioning and possibly effects of rheumatoid arthritis     Rheumatoid arthritis  Managed by PCP  On methotrexate 2 5 mg     Thrombocytopenia  Managed by PCP     Hypertension:    BP today is 120/60 mmHg with a heart rate of 76/Min  On Coreg 3 125 mg b i d      Aortic regurgitation  Mild to moderate      RECOMMENDATIONS:  Continue antihypertensive medications  Follow-up echocardiogram every 1-2 years for seeing progression of aortic regurgitation      Please call [phone] if any questions  HPI :     Brendan Cooley is a 80y o  year old female who came for follow up  Patient was referred previously for evaluation of dyspnea to rule out a cardiac etiology  Her pro NT BNP is normal and her echocardiogram shows normal systolic function/EF with grade 1 diastolic dysfunction  She also has mild to moderate aortic regurgitation  In view of her normal pro NT BNP, the above cardiac findings are unlikely to be the source of her dyspnea which is more likely secondary to her age and deconditioning  REVIEW OF SYSTEMS:  Review of Systems   Respiratory: Positive for shortness of breath (Dyspnea on exertion)  Musculoskeletal: Positive for arthralgias           Historical Information   Past Medical History:   Diagnosis Date    Carpal tunnel syndrome     Degeneration of lumbar intervertebral disc     Postmenopausal osteoporosis     Primary generalized (osteo)arthritis     Rheumatoid arthritis ([address] )     Right shoulder pain      Past Surgical History:   Procedure Laterality Date    CARPAL TUNNEL RELEASE      CATARACT EXTRACTION      CHOLECYSTECTOMY      KNEE SURGERY      MASS EXCISION Right 2019    Procedure: EXCISION BIOPSY TISSUE LESION/MASS HAND;  Surgeon: Isma Soto DO;  Location: [facility];  Service: Orthopedics    TONSILLECTOMY       Social History     Substance and Sexual Activity   Alcohol Use Yes    Comment: rarely     Social History     Substance and Sexual Activity   Drug Use No     Social History     Tobacco Use   Smoking Status Former Smoker    Years: 40 00    Types: Cigarettes    Quit date: 1998    Years since quittin 3   Smokeless Tobacco Never Used     Family History:   Family History   Problem Relation Age of Onset    Cancer Sister     Breast cancer Mother     Breast cancer Maternal Grandmother     No Known Problems Father     No Known Problems Brother     No Known Problems Maternal Aunt     No Known Problems Maternal Uncle     No Known Problems Paternal Aunt     No Known Problems Paternal Uncle     No Known Problems Maternal Grandfather     No Known Problems Paternal Grandmother     No Known Problems Paternal Grandfather     ADD / ADHD Neg Hx     Anesthesia problems Neg Hx     Clotting disorder Neg Hx     Collagen disease Neg Hx     Diabetes Neg Hx     Dislocations Neg Hx     Learning disabilities Neg Hx     Neurological problems Neg Hx     Osteoporosis Neg Hx     Rheumatologic disease Neg Hx     Scoliosis Neg Hx     Vascular Disease Neg Hx        Meds/Allergies     No Known Allergies    Current Outpatient Medications:     albuterol (PROVENTIL HFA,VENTOLIN HFA) 90 mcg/act inhaler, Inhale 2 puffs every 6 (six) hours as needed for wheezing or shortness of breath, Disp: 1 Inhaler, Rfl: 0    carvedilol (Coreg) 3 125 mg tablet, Take 1 tablet (3 125 mg total) by mouth 2 (two) times a day with meals, Disp: 180 tablet, Rfl: 2    cholecalciferol (VITAMIN D3) 1,000 units tablet, Take 1,000 Units by mouth daily, Disp: , Rfl:    folic acid (FOLVITE) 1 mg tablet, Take 1 tablet (1 mg total) by mouth daily, Disp: 30 tablet, Rfl: 11    gabapentin (NEURONTIN) 300 mg capsule, Take 1 capsule (300 mg total) by mouth 2 (two) times a day, Disp: 60 capsule, Rfl: 2    lidocaine (LIDODERM) 5 %, Apply 1 patch topically daily for 7 days Remove & Discard patch within 12 hours or as directed by MD, Disp: 7 patch, Rfl: 0    methotrexate 2 5 mg tablet, , Disp: , Rfl:     oxyCODONE-acetaminophen (PERCOCET) 5-325 mg per tablet, Take 1 tablet by mouth every 4 (four) hours as needed, Disp: , Rfl:     Vitals:   /60 mmHg  HR 76/Min  BP Readings from Last 3 Encounters:   21 120/60   21 140/74   20 120/80       Physical Exam:  Neurologic:  Alert & oriented x 3, no new focal deficits, Not in any acute distress,  Constitutional:  Well developed, well nourished, non-toxic appearance   Eyes:  Pupil equal and reacting to light, conjunctiva normal,   HENT:  Neck is supple, no JVD, no carotid bruits  Respiratory:  Bilateral air entry, mostly clear to auscultation  Cardiovascular: S1-S2 regular with a systolic and diastolic murmur  GI:  Soft, nondistended, normal bowel sounds, nontender, no hepatosplenomegaly appreciated    Skin:  Well hydrated, no rash   Lymphatic:  No lymphadenopathy noted   Extremities:  No pedal edema        Diagnostic Studies Review Cardio:      EKG:  No EKG done today    Cardiac testing:   Results for orders placed during the hospital encounter of 21   Echo complete with contrast if indicated    [address]  [address] [facility]  [phone]    Transthoracic Echocardiogram  2D, M-mode, Doppler, and Color Doppler    Study date:  2021    Patient: Antonietta Lozoya  MR number: [id number]  Account number: [de-identified]  : 1929  Age: 80 years  Gender: Female  Status: Outpatient  Location: Echo lab  Height: 63 in  Weight: 144 lb  BP: 120/ 60 mmHg    Indications: SOB    Diagnoses: R06 02 - Shortness of breath    Sonographer:  RENATE Pedro  Primary Physician:  Prasanna Moreno MD  Referring Physician:  Starr Barraza MD  Group:  [facility] [id number]  Interpreting Physician:  Starr Barraza MD    SUMMARY    LEFT VENTRICLE:  Normal left ventricular chamber size and wall thickness  Visually estimated left ventricular ejection fraction is 60%  No definite regional wall motion abnormality seen  Grade 1 diastolic dysfunction    RIGHT VENTRICLE:  Normal right ventricular size and systolic function  TAPSE is 2 9 cm    LEFT ATRIUM:  Normal left atrial size    RIGHT ATRIUM:  Normal right atrial size    MITRAL VALVE:  Mildly thickened mitral valve leaflets with mild annular calcification  There is trace mitral regurgitation  No mitral stenosis seen    AORTIC VALVE:  Aortic valve is trileaflet  There is mild to moderate aortic regurgitation  There is no aortic stenosis    TRICUSPID VALVE:  There is trace tricuspid regurgitation  Normal PA pressure    PULMONIC VALVE:  There is mild pulmonary regurgitation    AORTA:  Normal aortic root size    IVC, HEPATIC VEINS:  IVC is of normal size and shows normal respirophasic variations    PERICARDIUM:  No pericardial effusion seen    HISTORY: PRIOR HISTORY: Thrombocytopenia,Former smoker    PROCEDURE: The procedure was performed in the echo lab  This was a routine study  The transthoracic approach was used  The study included complete 2D imaging, M-mode, complete spectral Doppler, and color Doppler  The heart rate was 43 bpm,  at the start of the study  Echocardiographic views were limited due to lung interference  Image quality was adequate  LEFT VENTRICLE: Normal left ventricular chamber size and wall thickness  Visually estimated left ventricular ejection fraction is 60%  No definite regional wall motion abnormality seen    Grade 1 diastolic dysfunction    RIGHT VENTRICLE: Normal right ventricular size and systolic function  TAPSE is 2 9 cm    LEFT ATRIUM: Normal left atrial size    RIGHT ATRIUM: Normal right atrial size    MITRAL VALVE: Mildly thickened mitral valve leaflets with mild annular calcification  There is trace mitral regurgitation  No mitral stenosis seen    AORTIC VALVE: Aortic valve is trileaflet  There is mild to moderate aortic regurgitation  There is no aortic stenosis    TRICUSPID VALVE: There is trace tricuspid regurgitation  Normal PA pressure    PULMONIC VALVE: There is mild pulmonary regurgitation    PERICARDIUM: No pericardial effusion seen    AORTA: Normal aortic root size    SYSTEMIC VEINS: IVC: IVC is of normal size and shows normal respirophasic variations The inferior vena cava was normal in size and course  SYSTEM MEASUREMENT TABLES    2D  EF (Teich): 59 92 %  %FS: 31 9 %  Ao Diam: 3 16 cm  EDV(Teich): 105 16 ml  ESV(Teich): 42 15 ml  IVSd: 0 82 cm  LA Area: 12 51 cm2  LA Diam: 3 54 cm  LVEDV MOD A4C: 77 41 ml  LVEF MOD A4C: 55 13 %  LVESV MOD A4C: 34 73 ml  LVIDd: 4 75 cm  LVIDs: 3 24 cm  LVLd A4C: 6 99 cm  LVLs A4C: 5 78 cm  LVPWd: 0 97 cm  SV (Teich): 63 02 ml  SV MOD A4C: 42 68 ml    PW  MV E/A Ratio: 0 63    [employer] Accredited Echocardiography Laboratory    Prepared and electronically signed by    Wili Durant MD  Signed 12-Feb-2021 16:29:31       No results found for this or any previous visit  Imaging:  Chest X-Ray:   Xr Chest Pa & Lateral    Result Date: 1/18/2021  Impression No acute cardiopulmonary disease  Workstation performed: [id number]     Xr Chest 2 Views    Result Date: 5/16/2020  Impression No radiographic evidence of acute cardiopulmonary disease Workstation performed: [id number]       CT-scan of the chest:     No CTA results available for this patient    Lab Review   Lab Results   Component Value Date    WBC 8 40 03/31/2021    HGB 14 0 03/31/2021    HCT 42 6 03/31/2021     (H) 03/31/2021    RDW 15 2 (H) 03/31/2021    PLT 81 (L) 03/31/2021     BMP:  Lab Results   Component Value Date    SODIUM 140 03/31/2021    K 4 1 03/31/2021     03/31/2021    CO2 28 03/31/2021    ANIONGAP 10 5 12/08/2015    BUN 20 03/31/2021    CREATININE 0 83 03/31/2021    GLUC 99 07/14/2020    GLUF 116 (H) 03/31/2021    CALCIUM 10 2 (H) 03/31/2021    CORRECTEDCA 10 7 (H) 03/31/2021    EGFR 62 03/31/2021    MG 2 2 12/19/2018     LFT:  Lab Results   Component Value Date    AST 15 03/31/2021    ALT 14 03/31/2021    ALKPHOS 44 (L) 03/31/2021    TP 7 7 03/31/2021    ALB 3 4 (L) 03/31/2021      No components found for: TSH3  No results found for: [id number]  No results found for: HGBA1C  Lipid Profile:   No results found for: CHOLESTEROL, HDL, LDLCALC, TRIG  No results found for: CHOLESTEROL  Lab Results   Component Value Date    CKTOTAL 38 11/30/2020     Lab Results   Component Value Date    NTBNP 95 01/22/2021      Recent Results (from the past 672 hour(s))   Comprehensive metabolic panel    Collection Time: 03/31/21  9:26 AM   Result Value Ref Range    Sodium 140 136 - 145 mmol/L    Potassium 4 1 3 5 - 5 3 mmol/L    Chloride 106 100 - 108 mmol/L    CO2 28 21 - 32 mmol/L    ANION GAP 6 4 - 13 mmol/L    BUN 20 5 - 25 mg/dL    Creatinine 0 83 0 60 - 1 30 mg/dL    Glucose, Fasting 116 (H) 65 - 99 mg/dL    Calcium 10 2 (H) 8 3 - 10 1 mg/dL    Corrected Calcium 10 7 (H) 8 3 - 10 1 mg/dL    AST 15 5 - 45 U/L    ALT 14 12 - 78 U/L    Alkaline Phosphatase 44 (L) 46 - 116 U/L    Total Protein 7 7 6 4 - 8 2 g/dL    Albumin 3 4 (L) 3 5 - 5 0 g/dL    Total Bilirubin 0 61 0 20 - 1 00 mg/dL    eGFR 62 ml/min/1 73sq m   Sedimentation rate, automated    Collection Time: 03/31/21  9:26 AM   Result Value Ref Range    Sed Rate 27 0 - 29 mm/hour   CBC and differential    Collection Time: 03/31/21  9:26 AM   Result Value Ref Range    WBC 8 40 4 31 - 10 16 Thousand/uL    RBC 4 19 3 81 - 5 12 Million/uL    Hemoglobin 14 0 11 5 - 15 4 g/dL    Hematocrit 42 6 34 8 - 46 1 %     (H) 82 - 98 fL    MCH 33 4 26 8 - 34 3 pg    MCHC 32 9 31 4 - 37 4 g/dL    RDW 15 2 (H) 11 6 - 15 1 %    MPV 12 8 (H) 8 9 - 12 7 fL    Platelets 81 (L) 891 - 390 Thousands/uL    nRBC 0 /100 WBCs    Neutrophils Relative 43 43 - 75 %    Immat GRANS % 1 0 - 2 %    Lymphocytes Relative 29 14 - 44 %    Monocytes Relative 25 (H) 4 - 12 %    Eosinophils Relative 1 0 - 6 %    Basophils Relative 1 0 - 1 %    Neutrophils Absolute 3 65 1 85 - 7 62 Thousands/µL    Immature Grans Absolute 0 09 0 00 - 0 20 Thousand/uL    Lymphocytes Absolute 2 44 0 60 - 4 47 Thousands/µL    Monocytes Absolute 2 08 (H) 0 17 - 1 22 Thousand/µL    Eosinophils Absolute 0 10 0 00 - 0 61 Thousand/µL    Basophils Absolute 0 04 0 00 - 0 10 Thousands/µL             Dr Lorna Kulkarni MD, [facility]      "This note has been constructed using a voice recognition system  Therefore there may be syntax, spelling, and/or grammatical errors   Please call if you have any questions  "

## 2021-04-22 ENCOUNTER — OFFICE VISIT (OUTPATIENT)
Dept: FAMILY MEDICINE CLINIC | Facility: CLINIC | Age: 86
End: 2021-04-22
Payer: MEDICARE

## 2021-04-22 VITALS
RESPIRATION RATE: 18 BRPM | HEART RATE: 87 BPM | DIASTOLIC BLOOD PRESSURE: 70 MMHG | HEIGHT: 63 IN | TEMPERATURE: 98.7 F | BODY MASS INDEX: 25.2 KG/M2 | WEIGHT: 142.2 LBS | OXYGEN SATURATION: 92 % | SYSTOLIC BLOOD PRESSURE: 140 MMHG

## 2021-04-22 DIAGNOSIS — K21.9 GASTROESOPHAGEAL REFLUX DISEASE WITHOUT ESOPHAGITIS: Primary | ICD-10-CM

## 2021-04-22 PROCEDURE — 99213 OFFICE O/P EST LOW 20 MIN: CPT | Performed by: FAMILY MEDICINE

## 2021-04-22 RX ORDER — PANTOPRAZOLE SODIUM 20 MG/1
20 TABLET, DELAYED RELEASE ORAL
Qty: 30 TABLET | Refills: 0 | Status: SHIPPED | OUTPATIENT
Start: 2021-04-22

## 2021-04-23 NOTE — PROGRESS NOTES
Assessment/Plan:    1  Gastroesophageal reflux disease without esophagitis  -     pantoprazole (PROTONIX) 20 mg tablet; Take 1 tablet (20 mg total) by mouth daily before breakfast    Will have patient try protonix  Discussion on diet modification including cutting back on spicy foods such as the chili she has been eating along with tomatoes  Precautions reviewed  If no improvement in the next few days, patient should be evaluated right away  Return in about 1 week (around 4/29/2021)  Subjective:      Patient ID: Tristan Rivas is a 80 y o  female  Chief Complaint   Patient presents with   Alejandro Sophia     has had on and off last couple months unable to burp       HPI  79 y/o female presents with ingestion for the past couple days  Patient feels she just needs to burp  Comes and goes  Patient in the last month has been eating spicy chili few times a week and has been eating lots of tomatoes  Patient tried to use her inhaler with no relief  No chest pain  Ingestion located epigastric region  No abdominal or chest pain  No back pain  Patient had an echo those in February and followed up with cardiology 1 week prior  The following portions of the patient's history were reviewed and updated as appropriate: allergies, current medications, past family history, past medical history, past social history, past surgical history and problem list     Review of Systems   Constitutional: Negative for appetite change and fever  HENT: Negative for ear pain and sore throat  Eyes: Negative for visual disturbance  Respiratory: Negative for shortness of breath  Cardiovascular: Negative for chest pain and leg swelling  Gastrointestinal: Negative for abdominal pain, diarrhea, nausea and vomiting  Genitourinary: Negative for dysuria  Musculoskeletal: Negative for arthralgias  Skin: Negative for color change  Neurological: Negative for dizziness, light-headedness and headaches  Psychiatric/Behavioral: Negative for agitation and behavioral problems  Current Outpatient Medications   Medication Sig Dispense Refill    albuterol (PROVENTIL HFA,VENTOLIN HFA) 90 mcg/act inhaler Inhale 2 puffs every 6 (six) hours as needed for wheezing or shortness of breath 1 Inhaler 0    carvedilol (Coreg) 3 125 mg tablet Take 1 tablet (3 125 mg total) by mouth 2 (two) times a day with meals 180 tablet 2    cholecalciferol (VITAMIN D3) 1,000 units tablet Take 1,000 Units by mouth daily      folic acid (FOLVITE) 1 mg tablet Take 1 tablet (1 mg total) by mouth daily 30 tablet 11    gabapentin (NEURONTIN) 300 mg capsule Take 1 capsule (300 mg total) by mouth 2 (two) times a day (Patient taking differently: Take 300 mg by mouth daily ) 60 capsule 2    methotrexate 2 5 mg tablet       oxyCODONE-acetaminophen (PERCOCET) 5-325 mg per tablet Take 1 tablet by mouth every 4 (four) hours as needed      predniSONE 2 5 mg tablet       gabapentin (NEURONTIN) 400 mg capsule       lidocaine (LIDODERM) 5 % Apply 1 patch topically daily for 7 days Remove & Discard patch within 12 hours or as directed by MD 7 patch 0    pantoprazole (PROTONIX) 20 mg tablet Take 1 tablet (20 mg total) by mouth daily before breakfast 30 tablet 0     No current facility-administered medications for this visit  Objective:    /70 (BP Location: Left arm, Patient Position: Sitting, Cuff Size: Standard)   Pulse 87   Temp 98 7 °F (37 1 °C) (Temporal)   Resp 18   Ht 5' 3" (1 6 m)   Wt 64 5 kg (142 lb 3 2 oz)   SpO2 92%   BMI 25 19 kg/m²        Physical Exam  Constitutional:       General: She is not in acute distress  Appearance: She is well-developed  HENT:      Head: Normocephalic and atraumatic  Right Ear: External ear normal       Left Ear: External ear normal    Eyes:      General:         Right eye: No discharge  Left eye: No discharge     Cardiovascular:      Rate and Rhythm: Normal rate and regular rhythm  Heart sounds: Normal heart sounds  No murmur  Pulmonary:      Effort: Pulmonary effort is normal  No respiratory distress  Breath sounds: Normal breath sounds  Abdominal:      General: Bowel sounds are normal  There is no distension  Palpations: Abdomen is soft  Tenderness: There is no abdominal tenderness  Skin:     General: Skin is warm  Neurological:      Mental Status: She is alert  Mental status is at baseline     Psychiatric:         Behavior: Behavior normal                 Maddie Galaviz MD

## 2021-05-17 ENCOUNTER — TELEPHONE (OUTPATIENT)
Dept: FAMILY MEDICINE CLINIC | Facility: CLINIC | Age: 86
End: 2021-05-17

## 2021-05-17 DIAGNOSIS — R06.02 SOB (SHORTNESS OF BREATH): Primary | ICD-10-CM

## 2021-05-17 RX ORDER — UMECLIDINIUM BROMIDE AND VILANTEROL TRIFENATATE 62.5; 25 UG/1; UG/1
1 POWDER RESPIRATORY (INHALATION) DAILY
Qty: 1 EACH | Refills: 1 | Status: SHIPPED | OUTPATIENT
Start: 2021-05-17 | End: 2021-07-14

## 2021-05-17 NOTE — TELEPHONE ENCOUNTER
Dr Neli Rees    Patient's son says she has been using her rescue inhaler 5-6 times per week and he would like rx for maintenance inhaler for daily use  Please send rx to [facility]

## 2021-05-17 NOTE — TELEPHONE ENCOUNTER
Dr Barrios Prim:    Patient's daughter called and would like to know why you sent in the Anoro Ellipta to the pharmacy and wanted to know if you could send in another inhaler because it was too expensive  Please c/b to discuss further

## 2021-05-18 NOTE — TELEPHONE ENCOUNTER
Pts son is aware I also informed him that a new communication needs to be filled listing both him and is sister in order for the office to communicate with them regarding pts health information

## 2021-05-18 NOTE — TELEPHONE ENCOUNTER
Pl, advise pt's daughter -  Pt's son called yesterday to report that pt is overusing rescue inhaler and requested daily inhaler  -  All daily inhaler are expensive

## 2021-05-24 ENCOUNTER — TELEPHONE (OUTPATIENT)
Dept: CARDIOLOGY CLINIC | Facility: CLINIC | Age: 86
End: 2021-05-24

## 2021-05-24 NOTE — TELEPHONE ENCOUNTER
Son called to clarify that his mom was to continue coreg   Note does not indicate a discontinuation   Relayed that information

## 2021-06-08 ENCOUNTER — TRANSCRIBE ORDERS (OUTPATIENT)
Dept: LAB | Facility: CLINIC | Age: 86
End: 2021-06-08

## 2021-06-08 ENCOUNTER — APPOINTMENT (OUTPATIENT)
Dept: LAB | Facility: CLINIC | Age: 86
End: 2021-06-08
Payer: MEDICARE

## 2021-06-08 DIAGNOSIS — Z79.890 NEED FOR PROPHYLACTIC HORMONE REPLACEMENT THERAPY (POSTMENOPAUSAL): ICD-10-CM

## 2021-06-08 DIAGNOSIS — M05.79 SEROPOSITIVE RHEUMATOID ARTHRITIS OF MULTIPLE SITES (HCC): Primary | ICD-10-CM

## 2021-06-08 LAB
ALBUMIN SERPL BCP-MCNC: 3.4 G/DL (ref 3.5–5)
ALP SERPL-CCNC: 43 U/L (ref 46–116)
ALT SERPL W P-5'-P-CCNC: 13 U/L (ref 12–78)
ANION GAP SERPL CALCULATED.3IONS-SCNC: 1 MMOL/L (ref 4–13)
AST SERPL W P-5'-P-CCNC: 18 U/L (ref 5–45)
BASOPHILS # BLD AUTO: 0.05 THOUSANDS/ΜL (ref 0–0.1)
BASOPHILS NFR BLD AUTO: 1 % (ref 0–1)
BILIRUB SERPL-MCNC: 0.47 MG/DL (ref 0.2–1)
BUN SERPL-MCNC: 18 MG/DL (ref 5–25)
CALCIUM ALBUM COR SERPL-MCNC: 10.7 MG/DL (ref 8.3–10.1)
CALCIUM SERPL-MCNC: 10.2 MG/DL (ref 8.3–10.1)
CHLORIDE SERPL-SCNC: 110 MMOL/L (ref 100–108)
CO2 SERPL-SCNC: 29 MMOL/L (ref 21–32)
CREAT SERPL-MCNC: 1.03 MG/DL (ref 0.6–1.3)
EOSINOPHIL # BLD AUTO: 0.07 THOUSAND/ΜL (ref 0–0.61)
EOSINOPHIL NFR BLD AUTO: 1 % (ref 0–6)
ERYTHROCYTE [DISTWIDTH] IN BLOOD BY AUTOMATED COUNT: 13.7 % (ref 11.6–15.1)
ERYTHROCYTE [SEDIMENTATION RATE] IN BLOOD: 10 MM/HOUR (ref 0–29)
GFR SERPL CREATININE-BSD FRML MDRD: 48 ML/MIN/1.73SQ M
GLUCOSE P FAST SERPL-MCNC: 90 MG/DL (ref 65–99)
HCT VFR BLD AUTO: 41.5 % (ref 34.8–46.1)
HGB BLD-MCNC: 13.3 G/DL (ref 11.5–15.4)
IMM GRANULOCYTES # BLD AUTO: 0.1 THOUSAND/UL (ref 0–0.2)
IMM GRANULOCYTES NFR BLD AUTO: 1 % (ref 0–2)
LYMPHOCYTES # BLD AUTO: 3.47 THOUSANDS/ΜL (ref 0.6–4.47)
LYMPHOCYTES NFR BLD AUTO: 33 % (ref 14–44)
MCH RBC QN AUTO: 32.6 PG (ref 26.8–34.3)
MCHC RBC AUTO-ENTMCNC: 32 G/DL (ref 31.4–37.4)
MCV RBC AUTO: 102 FL (ref 82–98)
MONOCYTES # BLD AUTO: 2.53 THOUSAND/ΜL (ref 0.17–1.22)
MONOCYTES NFR BLD AUTO: 24 % (ref 4–12)
NEUTROPHILS # BLD AUTO: 4.41 THOUSANDS/ΜL (ref 1.85–7.62)
NEUTS SEG NFR BLD AUTO: 40 % (ref 43–75)
NRBC BLD AUTO-RTO: 0 /100 WBCS
PLATELET # BLD AUTO: 118 THOUSANDS/UL (ref 149–390)
PMV BLD AUTO: 13 FL (ref 8.9–12.7)
POTASSIUM SERPL-SCNC: 4.8 MMOL/L (ref 3.5–5.3)
PROT SERPL-MCNC: 7.9 G/DL (ref 6.4–8.2)
RBC # BLD AUTO: 4.08 MILLION/UL (ref 3.81–5.12)
SODIUM SERPL-SCNC: 140 MMOL/L (ref 136–145)
WBC # BLD AUTO: 10.63 THOUSAND/UL (ref 4.31–10.16)

## 2021-06-08 PROCEDURE — 36415 COLL VENOUS BLD VENIPUNCTURE: CPT | Performed by: INTERNAL MEDICINE

## 2021-06-08 PROCEDURE — 85025 COMPLETE CBC W/AUTO DIFF WBC: CPT | Performed by: INTERNAL MEDICINE

## 2021-06-08 PROCEDURE — 85652 RBC SED RATE AUTOMATED: CPT | Performed by: INTERNAL MEDICINE

## 2021-06-08 PROCEDURE — 80053 COMPREHEN METABOLIC PANEL: CPT | Performed by: INTERNAL MEDICINE

## 2021-06-21 ENCOUNTER — TELEPHONE (OUTPATIENT)
Dept: FAMILY MEDICINE CLINIC | Facility: CLINIC | Age: 86
End: 2021-06-21

## 2021-06-28 ENCOUNTER — TELEPHONE (OUTPATIENT)
Dept: FAMILY MEDICINE CLINIC | Facility: CLINIC | Age: 86
End: 2021-06-28

## 2021-06-28 NOTE — TELEPHONE ENCOUNTER
Dr Baxter Sees:    Patient is scheduled for AWV 7/12 and son is requesting an order for labwork w/lipid panel prior to this appointment

## 2021-07-14 DIAGNOSIS — R06.02 SOB (SHORTNESS OF BREATH): ICD-10-CM

## 2021-07-14 RX ORDER — UMECLIDINIUM BROMIDE AND VILANTEROL TRIFENATATE 62.5; 25 UG/1; UG/1
POWDER RESPIRATORY (INHALATION)
Qty: 60 BLISTER | Refills: 1 | Status: SHIPPED | OUTPATIENT
Start: 2021-07-14 | End: 2021-09-28

## 2021-07-15 ENCOUNTER — APPOINTMENT (OUTPATIENT)
Dept: LAB | Facility: CLINIC | Age: 86
End: 2021-07-15
Payer: MEDICARE

## 2021-07-15 DIAGNOSIS — D69.6 THROMBOCYTOPENIA (HCC): ICD-10-CM

## 2021-07-15 DIAGNOSIS — E78.5 HYPERLIPIDEMIA, UNSPECIFIED HYPERLIPIDEMIA TYPE: ICD-10-CM

## 2021-07-15 DIAGNOSIS — R06.02 SOB (SHORTNESS OF BREATH): ICD-10-CM

## 2021-07-15 LAB
ALBUMIN SERPL BCP-MCNC: 3.3 G/DL (ref 3.5–5)
ALP SERPL-CCNC: 36 U/L (ref 46–116)
ALT SERPL W P-5'-P-CCNC: 18 U/L (ref 12–78)
ANION GAP SERPL CALCULATED.3IONS-SCNC: 5 MMOL/L (ref 4–13)
AST SERPL W P-5'-P-CCNC: 15 U/L (ref 5–45)
BILIRUB SERPL-MCNC: 0.53 MG/DL (ref 0.2–1)
BUN SERPL-MCNC: 25 MG/DL (ref 5–25)
CALCIUM ALBUM COR SERPL-MCNC: 10.4 MG/DL (ref 8.3–10.1)
CALCIUM SERPL-MCNC: 9.8 MG/DL (ref 8.3–10.1)
CHLORIDE SERPL-SCNC: 108 MMOL/L (ref 100–108)
CHOLEST SERPL-MCNC: 189 MG/DL (ref 50–200)
CO2 SERPL-SCNC: 25 MMOL/L (ref 21–32)
CREAT SERPL-MCNC: 1 MG/DL (ref 0.6–1.3)
ERYTHROCYTE [DISTWIDTH] IN BLOOD BY AUTOMATED COUNT: 14.6 % (ref 11.6–15.1)
GFR SERPL CREATININE-BSD FRML MDRD: 49 ML/MIN/1.73SQ M
GLUCOSE P FAST SERPL-MCNC: 114 MG/DL (ref 65–99)
HCT VFR BLD AUTO: 40.2 % (ref 34.8–46.1)
HDLC SERPL-MCNC: 67 MG/DL
HGB BLD-MCNC: 12.9 G/DL (ref 11.5–15.4)
LDLC SERPL CALC-MCNC: 103 MG/DL (ref 0–100)
MCH RBC QN AUTO: 32.8 PG (ref 26.8–34.3)
MCHC RBC AUTO-ENTMCNC: 32.1 G/DL (ref 31.4–37.4)
MCV RBC AUTO: 102 FL (ref 82–98)
NONHDLC SERPL-MCNC: 122 MG/DL
PLATELET # BLD AUTO: 95 THOUSANDS/UL (ref 149–390)
PMV BLD AUTO: 13.2 FL (ref 8.9–12.7)
POTASSIUM SERPL-SCNC: 4.6 MMOL/L (ref 3.5–5.3)
PROT SERPL-MCNC: 7.7 G/DL (ref 6.4–8.2)
RBC # BLD AUTO: 3.93 MILLION/UL (ref 3.81–5.12)
SODIUM SERPL-SCNC: 138 MMOL/L (ref 136–145)
TRIGL SERPL-MCNC: 94 MG/DL
WBC # BLD AUTO: 9.23 THOUSAND/UL (ref 4.31–10.16)

## 2021-07-15 PROCEDURE — 80061 LIPID PANEL: CPT

## 2021-07-15 PROCEDURE — 36415 COLL VENOUS BLD VENIPUNCTURE: CPT

## 2021-07-15 PROCEDURE — 85027 COMPLETE CBC AUTOMATED: CPT

## 2021-07-15 PROCEDURE — 80053 COMPREHEN METABOLIC PANEL: CPT

## 2021-09-28 DIAGNOSIS — R06.02 SOB (SHORTNESS OF BREATH): ICD-10-CM

## 2021-09-28 RX ORDER — UMECLIDINIUM BROMIDE AND VILANTEROL TRIFENATATE 62.5; 25 UG/1; UG/1
POWDER RESPIRATORY (INHALATION)
Qty: 60 BLISTER | Refills: 1 | Status: SHIPPED | OUTPATIENT
Start: 2021-09-28 | End: 2022-01-03

## 2021-09-30 ENCOUNTER — APPOINTMENT (OUTPATIENT)
Dept: LAB | Facility: CLINIC | Age: 86
End: 2021-09-30
Payer: MEDICARE

## 2021-11-05 DIAGNOSIS — R06.02 SOB (SHORTNESS OF BREATH): ICD-10-CM

## 2021-11-05 RX ORDER — CARVEDILOL 3.12 MG/1
3.12 TABLET ORAL 2 TIMES DAILY WITH MEALS
Qty: 180 TABLET | Refills: 1 | Status: SHIPPED | OUTPATIENT
Start: 2021-11-05 | End: 2022-05-31

## 2021-12-08 ENCOUNTER — APPOINTMENT (OUTPATIENT)
Dept: LAB | Facility: CLINIC | Age: 86
End: 2021-12-08
Payer: MEDICARE

## 2022-01-03 DIAGNOSIS — R06.02 SOB (SHORTNESS OF BREATH): ICD-10-CM

## 2022-01-03 RX ORDER — UMECLIDINIUM BROMIDE AND VILANTEROL TRIFENATATE 62.5; 25 UG/1; UG/1
POWDER RESPIRATORY (INHALATION)
Qty: 60 BLISTER | Refills: 1 | Status: SHIPPED | OUTPATIENT
Start: 2022-01-03 | End: 2022-04-11

## 2022-01-05 ENCOUNTER — TELEMEDICINE (OUTPATIENT)
Dept: FAMILY MEDICINE CLINIC | Facility: CLINIC | Age: 87
End: 2022-01-05
Payer: MEDICARE

## 2022-01-05 DIAGNOSIS — U07.1 COVID-19: Primary | ICD-10-CM

## 2022-01-05 PROCEDURE — 99441 PR PHYS/QHP TELEPHONE EVALUATION 5-10 MIN: CPT | Performed by: FAMILY MEDICINE

## 2022-01-05 NOTE — PROGRESS NOTES
COVID-19 Outpatient Progress Note    Assessment/Plan:    Problem List Items Addressed This Visit     None      Visit Diagnoses     COVID-19    -  Primary -  Presumed Dx   Was advised to call if worsening symptoms       Disposition:     Patient is fully vaccinated and I recommended self quarantine for 5 days followed by strict mask use for an additional 5 days  If patient were to develop symptoms, they should immediately self isolate and call our office for further guidance  I have spent 10 minutes directly with the patient  Encounter provider Diamond Olivarez MD    Provider located at [address] [place] [id number]    Recent Visits  No visits were found meeting these conditions  Showing recent visits within past 7 days and meeting all other requirements  Future Appointments  No visits were found meeting these conditions  Showing future appointments within next 150 days and meeting all other requirements     This virtual check-in was done via telephone and she agrees to proceed  Patient agrees to participate in a virtual check in via telephone or video visit instead of presenting to the office to address urgent/immediate medical needs  Patient is aware this is a billable service  After connecting through Telephone, the patient was identified by name and date of birth  Almeda Frankel was informed that this was a telemedicine visit and that the exam was being conducted confidentially over secure lines  My office door was closed  No one else was in the room  Almeda Frankel acknowledged consent and understanding of privacy and security of the telemedicine visit  I informed the patient that I have reviewed her record in Epic and presented the opportunity for her to ask any questions regarding the visit today  The patient agreed to participate      Verification of patient location:  Patient is located in the following state in which I hold an active license: [place]    Subjective:   Gena Holland is a 80 y o  female who is concerned about COVID-19  Patient's symptoms include sore throat  Patient denies fever, chills, fatigue, malaise, congestion, rhinorrhea, anosmia, loss of taste, cough, shortness of breath, chest tightness, abdominal pain, nausea, vomiting, diarrhea, myalgias and headaches       Date of symptom onset: 1/2/2022  COVID-19 vaccination status: Fully vaccinated (primary series)    Exposure:   Contact with a person who is under investigation (PUI) for or who is positive for COVID-19 within the last 14 days?: Yes    Hospitalized recently for fever and/or lower respiratory symptoms?: No      Currently a healthcare worker that is involved in direct patient care?: No      Works in a special setting where the risk of COVID-19 transmission may be high? (this may include long-term care, correctional and correction facilities; homeless shelters; assisted-living facilities and group homes ): No      Resident in a special setting where the risk of COVID-19 transmission may be high? (this may include long-term care, correctional and correction facilities; homeless shelters; assisted-living facilities and group homes ): No      Lab Results   Component Value Date    [facility] 98 Negative 01/18/2021     Past Medical History:   Diagnosis Date    Carpal tunnel syndrome     Degeneration of lumbar intervertebral disc     Postmenopausal osteoporosis     Primary generalized (osteo)arthritis     Rheumatoid arthritis ([address] )     Right shoulder pain      Past Surgical History:   Procedure Laterality Date    CARPAL TUNNEL RELEASE      CATARACT EXTRACTION      CHOLECYSTECTOMY      KNEE SURGERY      MASS EXCISION Right 1/2/2019    Procedure: EXCISION BIOPSY TISSUE LESION/MASS HAND;  Surgeon: Salomón Juarez DO;  Location: [facility];  Service: Orthopedics    TONSILLECTOMY       Current Outpatient Medications   Medication Sig Dispense Refill    albuterol (PROVENTIL HFA,VENTOLIN HFA) 90 mcg/act inhaler Inhale 2 puffs every 6 (six) hours as needed for wheezing or shortness of breath 1 Inhaler 0    Anoro Ellipta 62 5-25 MCG/INH inhaler INHALE ONE PUFF BY MOUTH EVERY DAY 60 blister 1    carvedilol (Coreg) 3 125 mg tablet Take 1 tablet (3 125 mg total) by mouth 2 (two) times a day with meals 180 tablet 1    cholecalciferol (VITAMIN D3) 1,000 units tablet Take 1,000 Units by mouth daily      folic acid (FOLVITE) 1 mg tablet Take 1 tablet (1 mg total) by mouth daily 30 tablet 11    gabapentin (NEURONTIN) 300 mg capsule Take 1 capsule (300 mg total) by mouth 2 (two) times a day (Patient taking differently: Take 300 mg by mouth daily ) 60 capsule 2    gabapentin (NEURONTIN) 400 mg capsule       lidocaine (LIDODERM) 5 % Apply 1 patch topically daily for 7 days Remove & Discard patch within 12 hours or as directed by MD 7 patch 0    methotrexate 2 5 mg tablet       oxyCODONE-acetaminophen (PERCOCET) 5-325 mg per tablet Take 1 tablet by mouth every 4 (four) hours as needed      pantoprazole (PROTONIX) 20 mg tablet Take 1 tablet (20 mg total) by mouth daily before breakfast 30 tablet 0    predniSONE 2 5 mg tablet        No current facility-administered medications for this visit  No Known Allergies    Review of Systems   Constitutional: Negative for chills, fatigue and fever  HENT: Positive for sore throat  Negative for congestion and rhinorrhea  Respiratory: Negative for cough, chest tightness and shortness of breath  Gastrointestinal: Negative for abdominal pain, diarrhea, nausea and vomiting  Musculoskeletal: Negative for myalgias  Neurological: Negative for headaches  Objective: There were no vitals filed for this visit  Physical Exam    VIRTUAL VISIT [address] [place] [address] verbally agrees to participate in [employer]   Pt is aware that [employer] could be limited without vital signs or the ability to perform a full hands-on physical exam  Dennise Rapp understands she or the provider may request at any time to terminate the video visit and request the patient to seek care or treatment in person

## 2022-01-06 ENCOUNTER — TELEPHONE (OUTPATIENT)
Dept: FAMILY MEDICINE CLINIC | Facility: CLINIC | Age: 87
End: 2022-01-06

## 2022-01-06 NOTE — TELEPHONE ENCOUNTER
Dr Olivia Bender:    Patient's son called asking about the infusion for his mother  He states that patient has a very bad sore throat  Please c/b to discuss further

## 2022-01-10 ENCOUNTER — APPOINTMENT (EMERGENCY)
Dept: RADIOLOGY | Facility: HOSPITAL | Age: 87
End: 2022-01-10
Payer: MEDICARE

## 2022-01-10 ENCOUNTER — HOSPITAL ENCOUNTER (EMERGENCY)
Facility: HOSPITAL | Age: 87
Discharge: HOME | End: 2022-01-10
Attending: GENERAL PRACTICE | Admitting: GENERAL PRACTICE
Payer: MEDICARE

## 2022-01-10 ENCOUNTER — TELEPHONE (OUTPATIENT)
Dept: FAMILY MEDICINE CLINIC | Facility: CLINIC | Age: 87
End: 2022-01-10

## 2022-01-10 VITALS
HEIGHT: 64 IN | SYSTOLIC BLOOD PRESSURE: 166 MMHG | OXYGEN SATURATION: 93 % | TEMPERATURE: 98.5 F | BODY MASS INDEX: 24.75 KG/M2 | DIASTOLIC BLOOD PRESSURE: 82 MMHG | WEIGHT: 145 LBS | RESPIRATION RATE: 18 BRPM | HEART RATE: 92 BPM

## 2022-01-10 DIAGNOSIS — U07.1 COVID-19: Primary | ICD-10-CM

## 2022-01-10 DIAGNOSIS — K52.9 GASTROENTERITIS: ICD-10-CM

## 2022-01-10 LAB
ALBUMIN SERPL BCP-MCNC: 3.5 G/DL (ref 3.5–5)
ALP SERPL-CCNC: 48 U/L (ref 46–116)
ALT SERPL W P-5'-P-CCNC: 18 U/L (ref 12–78)
ANION GAP SERPL CALCULATED.3IONS-SCNC: 10 MMOL/L (ref 4–13)
AST SERPL W P-5'-P-CCNC: 18 U/L (ref 5–45)
BASOPHILS # BLD AUTO: 0.02 THOUSANDS/ΜL (ref 0–0.1)
BASOPHILS NFR BLD AUTO: 0 % (ref 0–1)
BILIRUB SERPL-MCNC: 0.59 MG/DL (ref 0.2–1)
BUN SERPL-MCNC: 17 MG/DL (ref 5–25)
CALCIUM SERPL-MCNC: 9.7 MG/DL (ref 8.3–10.1)
CHLORIDE SERPL-SCNC: 100 MMOL/L (ref 100–108)
CO2 SERPL-SCNC: 28 MMOL/L (ref 21–32)
CREAT SERPL-MCNC: 0.96 MG/DL (ref 0.6–1.3)
EOSINOPHIL # BLD AUTO: 0.02 THOUSAND/ΜL (ref 0–0.61)
EOSINOPHIL NFR BLD AUTO: 0 % (ref 0–6)
ERYTHROCYTE [DISTWIDTH] IN BLOOD BY AUTOMATED COUNT: 14.5 % (ref 11.6–15.1)
FLUAV RNA RESP QL NAA+PROBE: NEGATIVE
FLUBV RNA RESP QL NAA+PROBE: NEGATIVE
GFR SERPL CREATININE-BSD FRML MDRD: 51 ML/MIN/1.73SQ M
GLUCOSE SERPL-MCNC: 144 MG/DL (ref 65–140)
HCT VFR BLD AUTO: 41.8 % (ref 34.8–46.1)
HGB BLD-MCNC: 13.7 G/DL (ref 11.5–15.4)
IMM GRANULOCYTES # BLD AUTO: 0.09 THOUSAND/UL (ref 0–0.2)
IMM GRANULOCYTES NFR BLD AUTO: 2 % (ref 0–2)
LACTATE SERPL-SCNC: 1.3 MMOL/L (ref 0.5–2)
LIPASE SERPL-CCNC: 37 U/L (ref 73–393)
LYMPHOCYTES # BLD AUTO: 0.81 THOUSANDS/ΜL (ref 0.6–4.47)
LYMPHOCYTES NFR BLD AUTO: 15 % (ref 14–44)
MCH RBC QN AUTO: 32.5 PG (ref 26.8–34.3)
MCHC RBC AUTO-ENTMCNC: 32.8 G/DL (ref 31.4–37.4)
MCV RBC AUTO: 99 FL (ref 82–98)
MONOCYTES # BLD AUTO: 2 THOUSAND/ΜL (ref 0.17–1.22)
MONOCYTES NFR BLD AUTO: 38 % (ref 4–12)
NEUTROPHILS # BLD AUTO: 2.4 THOUSANDS/ΜL (ref 1.85–7.62)
NEUTS SEG NFR BLD AUTO: 45 % (ref 43–75)
NRBC BLD AUTO-RTO: 0 /100 WBCS
PLATELET # BLD AUTO: 82 THOUSANDS/UL (ref 149–390)
PMV BLD AUTO: 11.3 FL (ref 8.9–12.7)
POTASSIUM SERPL-SCNC: 3.8 MMOL/L (ref 3.5–5.3)
PROT SERPL-MCNC: 8.2 G/DL (ref 6.4–8.2)
RBC # BLD AUTO: 4.22 MILLION/UL (ref 3.81–5.12)
RSV RNA RESP QL NAA+PROBE: NEGATIVE
SARS-COV-2 RNA RESP QL NAA+PROBE: POSITIVE
SODIUM SERPL-SCNC: 138 MMOL/L (ref 136–145)
WBC # BLD AUTO: 5.34 THOUSAND/UL (ref 4.31–10.16)

## 2022-01-10 PROCEDURE — 71045 X-RAY EXAM CHEST 1 VIEW: CPT

## 2022-01-10 PROCEDURE — 0241U HB NFCT DS VIR RESP RNA 4 TRGT: CPT | Performed by: GENERAL PRACTICE

## 2022-01-10 PROCEDURE — 96374 THER/PROPH/DIAG INJ IV PUSH: CPT

## 2022-01-10 PROCEDURE — 83690 ASSAY OF LIPASE: CPT | Performed by: GENERAL PRACTICE

## 2022-01-10 PROCEDURE — 74177 CT ABD & PELVIS W/CONTRAST: CPT

## 2022-01-10 PROCEDURE — 83605 ASSAY OF LACTIC ACID: CPT | Performed by: GENERAL PRACTICE

## 2022-01-10 PROCEDURE — G1004 CDSM NDSC: HCPCS

## 2022-01-10 PROCEDURE — 96361 HYDRATE IV INFUSION ADD-ON: CPT

## 2022-01-10 PROCEDURE — 36415 COLL VENOUS BLD VENIPUNCTURE: CPT | Performed by: GENERAL PRACTICE

## 2022-01-10 PROCEDURE — 85025 COMPLETE CBC W/AUTO DIFF WBC: CPT | Performed by: GENERAL PRACTICE

## 2022-01-10 PROCEDURE — 99284 EMERGENCY DEPT VISIT MOD MDM: CPT

## 2022-01-10 PROCEDURE — 80053 COMPREHEN METABOLIC PANEL: CPT | Performed by: GENERAL PRACTICE

## 2022-01-10 PROCEDURE — 99285 EMERGENCY DEPT VISIT HI MDM: CPT | Performed by: GENERAL PRACTICE

## 2022-01-10 RX ORDER — ONDANSETRON 4 MG/1
4 TABLET, ORALLY DISINTEGRATING ORAL EVERY 6 HOURS PRN
Qty: 20 TABLET | Refills: 0 | Status: SHIPPED | OUTPATIENT
Start: 2022-01-10 | End: 2022-04-21

## 2022-01-10 RX ORDER — ONDANSETRON 2 MG/ML
4 INJECTION INTRAMUSCULAR; INTRAVENOUS ONCE
Status: COMPLETED | OUTPATIENT
Start: 2022-01-10 | End: 2022-01-10

## 2022-01-10 RX ADMIN — IOHEXOL 50 ML: 240 INJECTION, SOLUTION INTRATHECAL; INTRAVASCULAR; INTRAVENOUS; ORAL at 12:10

## 2022-01-10 RX ADMIN — IOHEXOL 100 ML: 350 INJECTION, SOLUTION INTRAVENOUS at 13:38

## 2022-01-10 RX ADMIN — SODIUM CHLORIDE 1000 ML: 0.9 INJECTION, SOLUTION INTRAVENOUS at 12:11

## 2022-01-10 RX ADMIN — ONDANSETRON 4 MG: 2 INJECTION INTRAMUSCULAR; INTRAVENOUS at 12:10

## 2022-01-10 NOTE — ED NOTES
Patients son updated with COVID status  No further questions at this time        Mary Crandall RN  01/10/22 [id number]

## 2022-01-10 NOTE — ED NOTES
AVS reviewed with patients son  No further questions at this time        Aby Anand, RN  01/10/22 1816

## 2022-01-10 NOTE — TELEPHONE ENCOUNTER
Spoke with pt son Stephanie Dennis states Mom has nausea vomiting diarrhea x2 1/2 days severe stomach pain extremely thirsty trying to eat and drink but can't advised ER for evaluation

## 2022-01-10 NOTE — ED PROVIDER NOTES
History  Chief Complaint   Patient presents with    Abdominal Pain     n/v/d started yesterday with a headache, exposed to covid 12/28 "I have an infection in my intestine"     Patient is a 42-year-old female with a past medical history of hypertension, COPD, chronic back pain, and rheumatoid arthritis on methotrexate who presents to the emergency room with 2-3 day history of diffuse abdominal pain, nausea, nonbloody emesis, and watery diarrhea  She denies fevers or chills  She also reports a diffuse frontal headache that has been unrelenting for the last 2-3 days  She reports a dry mouth and states she is thirsty  She has never had symptoms like this before  Notably 2 weeks ago the patient was exposed to COVID-19 and proceeded to have a mild illness that was presumed to be COVID-19, however she never was tested  Her symptoms at that time included a sore throat and a cough  These symptoms have since resolved  Abdominal Pain  Associated symptoms: cough, diarrhea, nausea, sore throat and vomiting    Associated symptoms: no chest pain, no chills, no constipation, no dysuria, no fever, no hematuria and no shortness of breath        Prior to Admission Medications   Prescriptions Last Dose Informant Patient Reported? Taking?    Anoro Ellipta 62 5-25 MCG/INH inhaler   No No   Sig: INHALE ONE PUFF BY MOUTH EVERY DAY   albuterol (PROVENTIL HFA,VENTOLIN HFA) 90 mcg/act inhaler   No No   Sig: Inhale 2 puffs every 6 (six) hours as needed for wheezing or shortness of breath   carvedilol (Coreg) 3 125 mg tablet   No No   Sig: Take 1 tablet (3 125 mg total) by mouth 2 (two) times a day with meals   cholecalciferol (VITAMIN D3) 1,000 units tablet   Yes No   Sig: Take 1,000 Units by mouth daily   folic acid (FOLVITE) 1 mg tablet  Self No No   Sig: Take 1 tablet (1 mg total) by mouth daily   gabapentin (NEURONTIN) 300 mg capsule  Self No No   Sig: Take 1 capsule (300 mg total) by mouth 2 (two) times a day   Patient taking differently: Take 300 mg by mouth daily    gabapentin (NEURONTIN) 400 mg capsule   Yes No   lidocaine (LIDODERM) 5 %  Self No No   Sig: Apply 1 patch topically daily for 7 days Remove & Discard patch within 12 hours or as directed by MD   methotrexate 2 5 mg tablet  Self Yes No   oxyCODONE-acetaminophen (PERCOCET) 5-325 mg per tablet   Yes No   Sig: Take 1 tablet by mouth every 4 (four) hours as needed   pantoprazole (PROTONIX) 20 mg tablet   No No   Sig: Take 1 tablet (20 mg total) by mouth daily before breakfast   predniSONE 2 5 mg tablet   Yes No      Facility-Administered Medications: None       Past Medical History:   Diagnosis Date    Carpal tunnel syndrome     Degeneration of lumbar intervertebral disc     Postmenopausal osteoporosis     Primary generalized (osteo)arthritis     Rheumatoid arthritis ([address] )     Right shoulder pain        Past Surgical History:   Procedure Laterality Date    CARPAL TUNNEL RELEASE      CATARACT EXTRACTION      CHOLECYSTECTOMY      KNEE SURGERY      MASS EXCISION Right 1/2/2019    Procedure: EXCISION BIOPSY TISSUE LESION/MASS HAND;  Surgeon: Hayden Akbar DO;  Location: [facility];  Service: Orthopedics    TONSILLECTOMY         Family History   Problem Relation Age of Onset    Cancer Sister     Breast cancer Mother     Breast cancer Maternal Grandmother     No Known Problems Father     No Known Problems Brother     No Known Problems Maternal Aunt     No Known Problems Maternal Uncle     No Known Problems Paternal Aunt     No Known Problems Paternal Uncle     No Known Problems Maternal Grandfather     No Known Problems Paternal Grandmother     No Known Problems Paternal Grandfather     ADD / ADHD Neg Hx     Anesthesia problems Neg Hx     Clotting disorder Neg Hx     Collagen disease Neg Hx     Diabetes Neg Hx     Dislocations Neg Hx     Learning disabilities Neg Hx     Neurological problems Neg Hx     Osteoporosis Neg Hx     Rheumatologic disease Neg Hx     Scoliosis Neg Hx     Vascular Disease Neg Hx      I have reviewed and agree with the history as documented  E-Cigarette/Vaping     E-Cigarette/Vaping Substances     Social History     Tobacco Use    Smoking status: Former Smoker     Years: 40 00     Types: Cigarettes     Quit date: 1998     Years since quittin 0    Smokeless tobacco: Never Used   Substance Use Topics    Alcohol use: Yes     Comment: rarely    Drug use: No       Review of Systems   Constitutional: Negative for chills, diaphoresis and fever  HENT: Positive for sore throat  Negative for congestion and rhinorrhea  Eyes: Negative for redness and visual disturbance  Respiratory: Positive for cough  Negative for shortness of breath and wheezing  Cardiovascular: Negative for chest pain and palpitations  Gastrointestinal: Positive for abdominal pain, diarrhea, nausea and vomiting  Negative for constipation  Endocrine: Negative for cold intolerance and heat intolerance  Genitourinary: Negative for dysuria and hematuria  Musculoskeletal: Negative for arthralgias and myalgias  Neurological: Positive for headaches  Negative for light-headedness  Hematological: Negative for adenopathy  Does not bruise/bleed easily  Psychiatric/Behavioral: Negative for dysphoric mood  The patient is not nervous/anxious  Physical Exam  Physical Exam  Vitals and nursing note reviewed  Constitutional:       General: She is not in acute distress  Appearance: Normal appearance  She is not ill-appearing  HENT:      Head: Normocephalic and atraumatic  Mouth/Throat:      Mouth: Mucous membranes are moist       Pharynx: Oropharynx is clear  Eyes:      General: No scleral icterus  Conjunctiva/sclera: Conjunctivae normal    Cardiovascular:      Rate and Rhythm: Normal rate and regular rhythm  Pulses: Normal pulses  Heart sounds: Normal heart sounds  No murmur heard  No friction rub  No gallop  Pulmonary:      Effort: Pulmonary effort is normal  No respiratory distress  Breath sounds: Normal breath sounds  No wheezing, rhonchi or rales  Abdominal:      Palpations: Abdomen is soft  Tenderness: There is generalized abdominal tenderness (Mild)  There is guarding (Involuntary)  Musculoskeletal:         General: No swelling or tenderness  Cervical back: Normal range of motion and neck supple  Skin:     General: Skin is warm and dry  Capillary Refill: Capillary refill takes less than 2 seconds  Neurological:      General: No focal deficit present  Mental Status: She is alert  Mental status is at baseline     Psychiatric:         Mood and Affect: Mood normal          Behavior: Behavior normal          Vital Signs  ED Triage Vitals   Temperature Pulse Respirations Blood Pressure SpO2   01/10/22 1023 01/10/22 1023 01/10/22 1023 01/10/22 1023 01/10/22 1023   (!) 97 2 °F (36 2 °C) 98 18 132/81 93 %      Temp src Heart Rate Source Patient Position - Orthostatic VS BP Location FiO2 (%)   -- 01/10/22 1215 01/10/22 1215 01/10/22 1215 --    Monitor Lying Left arm       Pain Score       01/10/22 1023       5           Vitals:    01/10/22 1023 01/10/22 1215   BP: 132/81 (!) 171/77   Pulse: 98 (!) 116   Patient Position - Orthostatic VS:  Lying         Visual Acuity      ED Medications  Medications   ondansetron (ZOFRAN) injection 4 mg (4 mg Intravenous Given 1/10/22 1210)   sodium chloride 0 9 % bolus 1,000 mL (1,000 mL Intravenous New Bag 1/10/22 1211)   iohexol (OMNIPAQUE) 240 MG/ML solution 50 mL (50 mL Oral Given 1/10/22 1210)   iohexol (OMNIPAQUE) 350 MG/ML injection (SINGLE-DOSE) 100 mL (100 mL Intravenous Given 1/10/22 1338)       Diagnostic Studies  Results Reviewed     Procedure Component Value Units Date/Time    COVID/FLU/RSV - 2 hour TAT [[id number]]  (Abnormal) Collected: 01/10/22 1223    Lab Status: Final result Specimen: Nares from Nose Updated: 01/10/22 1312     SARS-CoV-2 Positive     INFLUENZA A PCR Negative     INFLUENZA B PCR Negative     RSV PCR Negative    Narrative:      FOR PEDIATRIC PATIENTS - copy/paste COVID Guidelines URL to browser: [email or web address]  Posibax    SARS-CoV-2 assay is a Nucleic Acid Amplification assay intended for the  qualitative detection of nucleic acid from SARS-CoV-2 in nasopharyngeal  swabs  Results are for the presumptive identification of SARS-CoV-2 RNA  Positive results are indicative of infection with SARS-CoV-2, the virus  causing COVID-19, but do not rule out bacterial infection or co-infection  with other viruses  Laboratories within the [place] and its  territories are required to report all positive results to the appropriate  public health authorities  Negative results do not preclude SARS-CoV-2  infection and should not be used as the sole basis for treatment or other  patient management decisions  Negative results must be combined with  clinical observations, patient history, and epidemiological information  This test has not been FDA cleared or approved  This test has been authorized by FDA under an Emergency Use Authorization  (EUA)  This test is only authorized for the duration of time the  declaration that circumstances exist justifying the authorization of the  emergency use of an in vitro diagnostic tests for detection of SARS-CoV-2  virus and/or diagnosis of COVID-19 infection under section 564(b)(1) of  the Act, 21 U  S C  454VYB-3(X)(1), unless the authorization is terminated  or revoked sooner  The test has been validated but independent review by FDA  and CLIA is pending  Test performed using [employer] GeneXpert: This RT-PCR assay targets N2,  a region unique to SARS-CoV-2  A conserved region in the E-gene was chosen  for pan-Sarbecovirus detection which includes SARS-CoV-2      Lactic acid, plasma [[id number]]  (Normal) Collected: 01/10/22 1223    Lab Status: Final result Specimen: Blood from Arm, Right Updated: 01/10/22 1259     LACTIC ACID 1 3 mmol/L     Narrative:      Result may be elevated if tourniquet was used during collection      CBC and differential [[id number]]  (Abnormal) Collected: 01/10/22 1223    Lab Status: Final result Specimen: Blood from Arm, Right Updated: 01/10/22 1255     WBC 5 34 Thousand/uL      RBC 4 22 Million/uL      Hemoglobin 13 7 g/dL      Hematocrit 41 8 %      MCV 99 fL      MCH 32 5 pg      MCHC 32 8 g/dL      RDW 14 5 %      MPV 11 3 fL      Platelets 82 Thousands/uL      nRBC 0 /100 WBCs      Neutrophils Relative 45 %      Immat GRANS % 2 %      Lymphocytes Relative 15 %      Monocytes Relative 38 %      Eosinophils Relative 0 %      Basophils Relative 0 %      Neutrophils Absolute 2 40 Thousands/µL      Immature Grans Absolute 0 09 Thousand/uL      Lymphocytes Absolute 0 81 Thousands/µL      Monocytes Absolute 2 00 Thousand/µL      Eosinophils Absolute 0 02 Thousand/µL      Basophils Absolute 0 02 Thousands/µL     CMP [[id number]]  (Abnormal) Collected: 01/10/22 1223    Lab Status: Final result Specimen: Blood from Arm, Right Updated: 01/10/22 1254     Sodium 138 mmol/L      Potassium 3 8 mmol/L      Chloride 100 mmol/L      CO2 28 mmol/L      ANION GAP 10 mmol/L      BUN 17 mg/dL      Creatinine 0 96 mg/dL      Glucose 144 mg/dL      Calcium 9 7 mg/dL      AST 18 U/L      ALT 18 U/L      Alkaline Phosphatase 48 U/L      Total Protein 8 2 g/dL      Albumin 3 5 g/dL      Total Bilirubin 0 59 mg/dL      eGFR 51 ml/min/1 73sq m     Narrative:      [place] guidelines for Chronic Kidney Disease (CKD):     Stage 1 with normal or high GFR (GFR > 90 mL/min/1 73 square meters)    Stage 2 Mild CKD (GFR = 60-89 mL/min/1 73 square meters)    Stage 3A Moderate CKD (GFR = 45-59 mL/min/1 73 square meters)    Stage 3B Moderate CKD (GFR = 30-44 mL/min/1 73 square meters)    Stage 4 Severe CKD (GFR = 15-29 mL/min/1 73 square meters)    Stage 5 End Stage CKD (GFR <15 mL/min/1 73 square meters)  Note: GFR calculation is accurate only with a steady state creatinine    Lipase [[id number]]  (Abnormal) Collected: 01/10/22 1223    Lab Status: Final result Specimen: Blood from Arm, Right Updated: 01/10/22 1254     Lipase 37 u/L     Urinalysis with microscopic [[id number]]     Lab Status: No result Specimen: Urine                  CT Abdomen pelvis with contrast   Final Result by Ronni Jha MD (01/10 1998)      1  No acute intra-abdominal abnormality   2   3 1 cm infrarenal abdominal aortic aneurysm   3   4 1 cm simple left adnexal cyst, recommend nonemergent pelvic ultrasound   4  Groundglass opacities in the lung bases, partially visualized, compatible with COVID-19               Workstation performed: [id number]         XR chest 1 view portable   ED Interpretation by Dorene Mane MD (01/10 1335)   NAD      Final Result by Jaye Chakraborty DO (01/10 1502)      Patchy airspace opacity left lung base may represent atelectasis or developing pneumonia  Workstation performed: [id number]                    Procedures  Procedures         ED Course  ED Course as of 01/10/22 1546   Mon Dragan 10, 2022   1510 Patient re-evaluated  Nausea and vomiting resolved  Feeling better  Results discussed  No acute findings except COVID+  Symptoms may be manifestations of persistent COVID infection  Will discharge home with return precautions and instructions to continue to isolate until symptoms have significantly improved or resolved                                               MDM    Disposition  Final diagnoses:   COVID-19   Gastroenteritis     Time reflects when diagnosis was documented in both MDM as applicable and the Disposition within this note     Time User Action Codes Description Comment    1/10/2022  3:13 PM Stevenson Sanes Add [U07 1] COVID-19     1/10/2022  3:13 PM Stevenson Sanes Add [K52 9] Gastroenteritis       ED Disposition     ED Disposition Condition Date/Time Comment    Discharge Stable Mon Dragan 10, 2022  3:13 PM Isaiah Phalen discharge to home/self care  Follow-up Information     Follow up With Specialties Details Why Contact Info    Brooks Galloway MD Family Medicine   [address] [address] [phone]            Patient's Medications   Discharge Prescriptions    ONDANSETRON (ZOFRAN ODT) 4 MG DISINTEGRATING TABLET    Take 1 tablet (4 mg total) by mouth every 6 (six) hours as needed for nausea or vomiting       Start Date: 1/10/2022 End Date: --       Order Dose: 4 mg       Quantity: 20 tablet    Refills: 0       No discharge procedures on file      PDMP Review     None          ED Provider  Electronically Signed by           Grier Bernheim, MD  01/10/22 [address] Radha Rodríguez MD  01/10/22 7474

## 2022-02-01 ENCOUNTER — APPOINTMENT (OUTPATIENT)
Dept: LAB | Facility: CLINIC | Age: 87
End: 2022-02-01
Payer: MEDICARE

## 2022-03-14 ENCOUNTER — APPOINTMENT (OUTPATIENT)
Dept: LAB | Facility: CLINIC | Age: 87
End: 2022-03-14
Payer: MEDICARE

## 2022-03-22 ENCOUNTER — TELEPHONE (OUTPATIENT)
Dept: FAMILY MEDICINE CLINIC | Facility: CLINIC | Age: 87
End: 2022-03-22

## 2022-04-09 ENCOUNTER — OFFICE VISIT (OUTPATIENT)
Dept: FAMILY MEDICINE CLINIC | Facility: CLINIC | Age: 87
End: 2022-04-09
Payer: MEDICARE

## 2022-04-09 VITALS
HEART RATE: 84 BPM | SYSTOLIC BLOOD PRESSURE: 140 MMHG | DIASTOLIC BLOOD PRESSURE: 80 MMHG | TEMPERATURE: 97.7 F | RESPIRATION RATE: 16 BRPM

## 2022-04-09 DIAGNOSIS — J02.9 SORE THROAT: ICD-10-CM

## 2022-04-09 DIAGNOSIS — M06.09 RHEUMATOID ARTHRITIS OF MULTIPLE SITES WITH NEGATIVE RHEUMATOID FACTOR (HCC): Primary | ICD-10-CM

## 2022-04-09 DIAGNOSIS — R06.02 SOB (SHORTNESS OF BREATH): ICD-10-CM

## 2022-04-09 PROCEDURE — U0003 INFECTIOUS AGENT DETECTION BY NUCLEIC ACID (DNA OR RNA); SEVERE ACUTE RESPIRATORY SYNDROME CORONAVIRUS 2 (SARS-COV-2) (CORONAVIRUS DISEASE [COVID-19]), AMPLIFIED PROBE TECHNIQUE, MAKING USE OF HIGH THROUGHPUT TECHNOLOGIES AS DESCRIBED BY CMS-2020-01-R: HCPCS | Performed by: FAMILY MEDICINE

## 2022-04-09 PROCEDURE — U0005 INFEC AGEN DETEC AMPLI PROBE: HCPCS | Performed by: FAMILY MEDICINE

## 2022-04-09 PROCEDURE — 99214 OFFICE O/P EST MOD 30 MIN: CPT | Performed by: FAMILY MEDICINE

## 2022-04-09 RX ORDER — PREDNISONE 1 MG/1
5 TABLET ORAL DAILY
Qty: 5 TABLET | Refills: 0 | Status: SHIPPED | OUTPATIENT
Start: 2022-04-09 | End: 2022-04-14

## 2022-04-09 NOTE — PROGRESS NOTES
Terra Prater 11/30/1929 female MRN: [id number]    FAMILY PRACTICE OFFICE VISIT  [facility] - [address]      ASSESSMENT/PLAN  Terra Prater is a 80 y o  female presents to the office for    Diagnoses and all orders for this visit:    Rheumatoid arthritis of multiple sites with negative rheumatoid factor (HCC)  -     predniSONE 5 mg tablet; Take 1 tablet (5 mg total) by mouth daily for 5 days    Sore throat  -     predniSONE 5 mg tablet; Take 1 tablet (5 mg total) by mouth daily for 5 days  -     COVID Only - Office Collect       Highly recommend that the patient contact her rheumatologist   I do believe that this is likely rheumatoid arthritis flare  Treatment shown above  Will send for COVID testing just to be sure given her sore throat  Recommend if COVID test is negative that she follow-up with her primary care for follow-up on chronic sore throat  No future appointments  SUBJECTIVE  CC: Back Pain (pain across back), Headache (achey all over severe), and Sore Throat      HPI:  Terra Prater is a 80 y o  female who presents for an acute appointment  Patient states she has had significant profound back pain all over her body specifically on her shoulders in her lower back with any movement  States she has a headache as well as a sore throat  Unfortunately she states that the sore throat has been present for at least 3 months  She states that she is drinking enough fluids  Review of Systems   Constitutional: Negative for activity change, appetite change, chills, fatigue and fever  HENT: Positive for sore throat  Negative for congestion  Respiratory: Negative for cough, chest tightness and shortness of breath  Cardiovascular: Negative for chest pain and leg swelling  Gastrointestinal: Negative for abdominal distention, abdominal pain, constipation, diarrhea, nausea and vomiting     Musculoskeletal: Positive for arthralgias, back pain and gait problem  All other systems reviewed and are negative        Historical Information   The patient history was reviewed as follows:  Past Medical History:   Diagnosis Date    Carpal tunnel syndrome     Degeneration of lumbar intervertebral disc     Postmenopausal osteoporosis     Primary generalized (osteo)arthritis     Rheumatoid arthritis ([address] )     Right shoulder pain          Medications:     Current Outpatient Medications:     albuterol (PROVENTIL HFA,VENTOLIN HFA) 90 mcg/act inhaler, Inhale 2 puffs every 6 (six) hours as needed for wheezing or shortness of breath, Disp: 1 Inhaler, Rfl: 0    Anoro Ellipta 62 5-25 MCG/INH inhaler, INHALE ONE PUFF BY MOUTH EVERY DAY, Disp: 60 blister, Rfl: 1    carvedilol (Coreg) 3 125 mg tablet, Take 1 tablet (3 125 mg total) by mouth 2 (two) times a day with meals, Disp: 180 tablet, Rfl: 1    cholecalciferol (VITAMIN D3) 1,000 units tablet, Take 1,000 Units by mouth daily, Disp: , Rfl:     folic acid (FOLVITE) 1 mg tablet, Take 1 tablet (1 mg total) by mouth daily, Disp: 30 tablet, Rfl: 11    gabapentin (NEURONTIN) 400 mg capsule, 400 mg daily at bedtime  , Disp: , Rfl:     methotrexate 2 5 mg tablet, , Disp: , Rfl:     ondansetron (Zofran ODT) 4 mg disintegrating tablet, Take 1 tablet (4 mg total) by mouth every 6 (six) hours as needed for nausea or vomiting, Disp: 20 tablet, Rfl: 0    oxyCODONE-acetaminophen (PERCOCET) 5-325 mg per tablet, Take 1 tablet by mouth every 4 (four) hours as needed, Disp: , Rfl:     pantoprazole (PROTONIX) 20 mg tablet, Take 1 tablet (20 mg total) by mouth daily before breakfast, Disp: 30 tablet, Rfl: 0    predniSONE 2 5 mg tablet, , Disp: , Rfl:     gabapentin (NEURONTIN) 300 mg capsule, Take 1 capsule (300 mg total) by mouth 2 (two) times a day (Patient not taking: Reported on 4/9/2022 ), Disp: 60 capsule, Rfl: 2    lidocaine (LIDODERM) 5 %, Apply 1 patch topically daily for 7 days Remove & Discard patch within 12 hours or as directed by MD, Disp: 7 patch, Rfl: 0    No Known Allergies    OBJECTIVE  Vitals:   Vitals:    04/09/22 1114   BP: 140/80   Patient Position: Sitting   Cuff Size: Standard   Pulse: 84   Resp: 16   Temp: 97 7 °F (36 5 °C)   TempSrc: Temporal         Physical Exam  Vitals reviewed  Constitutional:       Appearance: She is well-developed  HENT:      Head: Normocephalic and atraumatic  Mouth/Throat:      Pharynx: Posterior oropharyngeal erythema present  Eyes:      Conjunctiva/sclera: Conjunctivae normal       Pupils: Pupils are equal, round, and reactive to light  Cardiovascular:      Rate and Rhythm: Normal rate and regular rhythm  Heart sounds: Normal heart sounds  Pulmonary:      Effort: Pulmonary effort is normal  No respiratory distress  Breath sounds: Normal breath sounds  Musculoskeletal:         General: Normal range of motion  Arms:       Cervical back: Normal range of motion and neck supple  Skin:     General: Skin is warm  Capillary Refill: Capillary refill takes less than 2 seconds  Neurological:      Mental Status: She is alert and oriented to person, place, and time                      Phill Walton MD,   [facility]  4/9/2022

## 2022-04-10 LAB — SARS-COV-2 RNA RESP QL NAA+PROBE: NEGATIVE

## 2022-04-11 RX ORDER — UMECLIDINIUM BROMIDE AND VILANTEROL TRIFENATATE 62.5; 25 UG/1; UG/1
POWDER RESPIRATORY (INHALATION)
Qty: 60 BLISTER | Refills: 3 | Status: SHIPPED | OUTPATIENT
Start: 2022-04-11 | End: 2022-04-21

## 2022-04-21 ENCOUNTER — OFFICE VISIT (OUTPATIENT)
Dept: FAMILY MEDICINE CLINIC | Facility: CLINIC | Age: 87
End: 2022-04-21
Payer: MEDICARE

## 2022-04-21 VITALS
DIASTOLIC BLOOD PRESSURE: 70 MMHG | SYSTOLIC BLOOD PRESSURE: 126 MMHG | TEMPERATURE: 96.8 F | RESPIRATION RATE: 16 BRPM | HEART RATE: 66 BPM

## 2022-04-21 DIAGNOSIS — Z00.00 MEDICARE ANNUAL WELLNESS VISIT, SUBSEQUENT: Primary | ICD-10-CM

## 2022-04-21 PROCEDURE — 1123F ACP DISCUSS/DSCN MKR DOCD: CPT | Performed by: FAMILY MEDICINE

## 2022-04-21 PROCEDURE — G0439 PPPS, SUBSEQ VISIT: HCPCS | Performed by: FAMILY MEDICINE

## 2022-04-21 RX ORDER — PREDNISONE 2.5 MG
TABLET ORAL
COMMUNITY
Start: 2022-03-14

## 2022-04-21 NOTE — PATIENT INSTRUCTIONS
Medicare Preventive Visit Patient Instructions  Thank you for completing your Welcome to Medicare Visit or Medicare Annual Wellness Visit today  Your next wellness visit will be due in one year (4/22/2023)  The screening/preventive services that you may require over the next 5-10 years are detailed below  Some tests may not apply to you based off risk factors and/or age  Screening tests ordered at today's visit but not completed yet may show as past due  Also, please note that scanned in results may not display below  Preventive Screenings:  Service Recommendations Previous Testing/Comments   Colorectal Cancer Screening  * Colonoscopy    * Fecal Occult Blood Test (FOBT)/Fecal Immunochemical Test (FIT)  * Fecal DNA/Cologuard Test  * Flexible Sigmoidoscopy Age: 54-65 years old   Colonoscopy: every 10 years (may be performed more frequently if at higher risk)  OR  FOBT/FIT: every 1 year  OR  Cologuard: every 3 years  OR  Sigmoidoscopy: every 5 years  Screening may be recommended earlier than age 48 if at higher risk for colorectal cancer  Also, an individualized decision between you and your healthcare provider will decide whether screening between the ages of 74-80 would be appropriate  Colonoscopy: Not on file  FOBT/FIT: Not on file  Cologuard: Not on file  Sigmoidoscopy: Not on file          Breast Cancer Screening Age: 36 years old  Frequency: every 1-2 years  Not required if history of left and right mastectomy Mammogram: Not on file        Cervical Cancer Screening Between the ages of 21-29, pap smear recommended once every 3 years  Between the ages of 33-67, can perform pap smear with HPV co-testing every 5 years     Recommendations may differ for women with a history of total hysterectomy, cervical cancer, or abnormal pap smears in past  Pap Smear: Not on file        Hepatitis C Screening Once for adults born between [facility]  More frequently in patients at high risk for Hepatitis C Hep C Antibody: Not on file        Diabetes Screening 1-2 times per year if you're at risk for diabetes or have pre-diabetes Fasting glucose: 113 mg/dL   A1C: No results in last 5 years        Cholesterol Screening Once every 5 years if you don't have a lipid disorder  May order more often based on risk factors  Lipid panel: 07/15/2021          Other Preventive Screenings Covered by Medicare:  1  Abdominal Aortic Aneurysm (AAA) Screening: covered once if your at risk  You're considered to be at risk if you have a family history of AAA  2  Lung Cancer Screening: covers low dose CT scan once per year if you meet all of the following conditions: (1) Age 50-69; (2) No signs or symptoms of lung cancer; (3) Current smoker or have quit smoking within the last 15 years; (4) You have a tobacco smoking history of at least 30 pack years (packs per day multiplied by number of years you smoked); (5) You get a written order from a healthcare provider  3  Glaucoma Screening: covered annually if you're considered high risk: (1) You have diabetes OR (2) Family history of glaucoma OR (3)  aged 48 and older OR (3)  American aged 72 and older  3  Osteoporosis Screening: covered every 2 years if you meet one of the following conditions: (1) You're estrogen deficient and at risk for osteoporosis based off medical history and other findings; (2) Have a vertebral abnormality; (3) On glucocorticoid therapy for more than 3 months; (4) Have primary hyperparathyroidism; (5) On osteoporosis medications and need to assess response to drug therapy  · Last bone density test (DXA Scan): 04/19/2018  5  HIV Screening: covered annually if you're between the age of 12-76  Also covered annually if you are younger than 13 and older than 72 with risk factors for HIV infection  For pregnant patients, it is covered up to 3 times per pregnancy      Immunizations:  Immunization Recommendations   Influenza Vaccine Annual influenza vaccination during flu season is recommended for all persons aged >= 6 months who do not have contraindications   Pneumococcal Vaccine (Prevnar and Pneumovax)  * Prevnar = PCV13  * Pneumovax = PPSV23   Adults 25-60 years old: 1-3 doses may be recommended based on certain risk factors  Adults 72 years old: Prevnar (PCV13) vaccine recommended followed by Pneumovax (PPSV23) vaccine  If already received PPSV23 since turning 65, then PCV13 recommended at least one year after PPSV23 dose  Hepatitis B Vaccine 3 dose series if at intermediate or high risk (ex: diabetes, end stage renal disease, liver disease)   Tetanus (Td) Vaccine - COST NOT COVERED BY MEDICARE PART B Following completion of primary series, a booster dose should be given every 10 years to maintain immunity against tetanus  Td may also be given as tetanus wound prophylaxis  Tdap Vaccine - COST NOT COVERED BY MEDICARE PART B Recommended at least once for all adults  For pregnant patients, recommended with each pregnancy  Shingles Vaccine (Shingrix) - COST NOT COVERED BY MEDICARE PART B  2 shot series recommended in those aged 48 and above     Health Maintenance Due:  There are no preventive care reminders to display for this patient  Immunizations Due:      Topic Date Due    Pneumococcal Vaccine: 65+ Years (2 of 4 - PPSV23) 01/08/2018    COVID-19 Vaccine (3 - Pfizer risk 4-dose series) 03/27/2021    Influenza Vaccine (1) 09/01/2021     Advance Directives   What are advance directives? Advance directives are legal documents that state your wishes and plans for medical care  These plans are made ahead of time in case you lose your ability to make decisions for yourself  Advance directives can apply to any medical decision, such as the treatments you want, and if you want to donate organs  What are the types of advance directives? There are many types of advance directives, and each state has rules about how to use them   You may choose a combination of any of the following:  · Living will: This is a written record of the treatment you want  You can also choose which treatments you do not want, which to limit, and which to stop at a certain time  This includes surgery, medicine, IV fluid, and tube feedings  · Durable power of  for healthcare [facility]): This is a written record that states who you want to make healthcare choices for you when you are unable to make them for yourself  This person, called a proxy, is usually a family member or a friend  You may choose more than 1 proxy  · Do not resuscitate (DNR) order:  A DNR order is used in case your heart stops beating or you stop breathing  It is a request not to have certain forms of treatment, such as CPR  A DNR order may be included in other types of advance directives  · Medical directive: This covers the care that you want if you are in a coma, near death, or unable to make decisions for yourself  You can list the treatments you want for each condition  Treatment may include pain medicine, surgery, blood transfusions, dialysis, IV or tube feedings, and a ventilator (breathing machine)  · Values history: This document has questions about your views, beliefs, and how you feel and think about life  This information can help others choose the care that you would choose  Why are advance directives important? An advance directive helps you control your care  Although spoken wishes may be used, it is better to have your wishes written down  Spoken wishes can be misunderstood, or not followed  Treatments may be given even if you do not want them  An advance directive may make it easier for your family to make difficult choices about your care  Urinary Incontinence   Urinary incontinence (UI)  is when you lose control of your bladder  UI develops because your bladder cannot store or empty urine properly  The 3 most common types of UI are stress incontinence, urge incontinence, or both    Medicines: · May be given to help strengthen your bladder control  Report any side effects of medication to your healthcare provider  Do pelvic muscle exercises often:  Your pelvic muscles help you stop urinating  Squeeze these muscles tight for 5 seconds, then relax for 5 seconds  Gradually work up to squeezing for 10 seconds  Do 3 sets of 15 repetitions a day, or as directed  This will help strengthen your pelvic muscles and improve bladder control  Train your bladder:  Go to the bathroom at set times, such as every 2 hours, even if you do not feel the urge to go  You can also try to hold your urine when you feel the urge to go  For example, hold your urine for 5 minutes when you feel the urge to go  As that becomes easier, hold your urine for 10 minutes  Self-care:   · Keep a UI record  Write down how often you leak urine and how much you leak  Make a note of what you were doing when you leaked urine  · Drink liquids as directed  You may need to limit the amount of liquid you drink to help control your urine leakage  Do not drink any liquid right before you go to bed  Limit or do not have drinks that contain caffeine or alcohol  · Prevent constipation  Eat a variety of high-fiber foods  Good examples are high-fiber cereals, beans, vegetables, and whole-grain breads  Walking is the best way to trigger your intestines to have a bowel movement  · Exercise regularly and maintain a healthy weight  Weight loss and exercise will decrease pressure on your bladder and help you control your leakage  · Use a catheter as directed  to help empty your bladder  A catheter is a tiny, plastic tube that is put into your bladder to drain your urine  · Go to behavior therapy as directed  Behavior therapy may be used to help you learn to control your urge to urinate  © Copyright [employer] 2018 Information is for End User's use only and may not be sold, redistributed or otherwise used for commercial purposes   All illustrations and images included in CareNotes® are the copyrighted property of A D A M , Inc  or Armin Mayberry

## 2022-04-21 NOTE — PROGRESS NOTES
Assessment and Plan:     Problem List Items Addressed This Visit     None      Visit Diagnoses     Medicare annual wellness visit, subsequent    -  Primary      was advised to stop Anoro -  Wants to see if she can be OK w/o inhaler      Preventive health issues were discussed with patient, and age appropriate screening tests were ordered as noted in patient's After Visit Summary  Personalized health advice and appropriate referrals for health education or preventive services given if needed, as noted in patient's After Visit Summary       History of Present Illness:     Patient presents for Medicare Annual Wellness visit    Patient Care Team:  Yobani Gant MD as PCP - General     Problem List:     Patient Active Problem List   Diagnosis    Thrombocytopenia ([facility] )    Generalized osteoarthritis    Rheumatoid arthritis of multiple sites with negative rheumatoid factor ([facility] )      Past Medical and Surgical History:     Past Medical History:   Diagnosis Date    Carpal tunnel syndrome     Degeneration of lumbar intervertebral disc     Postmenopausal osteoporosis     Primary generalized (osteo)arthritis     Rheumatoid arthritis ([facility] )     Right shoulder pain      Past Surgical History:   Procedure Laterality Date    CARPAL TUNNEL RELEASE      CATARACT EXTRACTION      CHOLECYSTECTOMY      KNEE SURGERY      MASS EXCISION Right 1/2/2019    Procedure: EXCISION BIOPSY TISSUE LESION/MASS HAND;  Surgeon: Nicole Noel DO;  [facility];  Service: Orthopedics    TONSILLECTOMY        Family History:     Family History   Problem Relation Age of Onset    Cancer Sister     Breast cancer Mother     Breast cancer Maternal Grandmother     No Known Problems Father     No Known Problems Brother     No Known Problems Maternal Aunt     No Known Problems Maternal Uncle     No Known Problems Paternal Aunt     No Known Problems Paternal Uncle     No Known Problems Maternal Grandfather     No Known Problems Paternal Grandmother     No Known Problems Paternal Grandfather     ADD / ADHD Neg Hx     Anesthesia problems Neg Hx     Clotting disorder Neg Hx     Collagen disease Neg Hx     Diabetes Neg Hx     Dislocations Neg Hx     Learning disabilities Neg Hx     Neurological problems Neg Hx     Osteoporosis Neg Hx     Rheumatologic disease Neg Hx     Scoliosis Neg Hx     Vascular Disease Neg Hx       Social History:     Social History     Socioeconomic History    Marital status:       Spouse name: None    Number of children: None    Years of education: None    Highest education level: None   Occupational History    None   Tobacco Use    Smoking status: Former Smoker     Years: 40 00     Types: Cigarettes     Quit date: 1998     Years since quittin 3    Smokeless tobacco: Never Used   Substance and Sexual Activity    Alcohol use: Yes     Comment: rarely    Drug use: No    Sexual activity: None   Other Topics Concern    None   Social History Narrative    None     Social Determinants of Health     Financial Resource Strain: Not on file   Food Insecurity: Not on file   Transportation Needs: Not on file   Physical Activity: Not on file   Stress: Not on file   Social Connections: Not on file   Intimate Partner Violence: Not on file   Housing Stability: Not on file      Medications and Allergies:     Current Outpatient Medications   Medication Sig Dispense Refill    Anoro Ellipta 62 5-25 MCG/INH inhaler IP1 BY MOUTH DAILY 60 blister 3    carvedilol (Coreg) 3 125 mg tablet Take 1 tablet (3 125 mg total) by mouth 2 (two) times a day with meals 180 tablet 1    cholecalciferol (VITAMIN D3) 1,000 units tablet Take 1,000 Units by mouth daily      folic acid (FOLVITE) 1 mg tablet Take 1 tablet (1 mg total) by mouth daily 30 tablet 11    gabapentin (NEURONTIN) 400 mg capsule 400 mg daily at bedtime        methotrexate 2 5 mg tablet       oxyCODONE-acetaminophen (PERCOCET) 5-325 mg per tablet Take 1 tablet by mouth every 4 (four) hours as needed      pantoprazole (PROTONIX) 20 mg tablet Take 1 tablet (20 mg total) by mouth daily before breakfast 30 tablet 0    predniSONE 2 5 mg tablet       lidocaine (LIDODERM) 5 % Apply 1 patch topically daily for 7 days Remove & Discard patch within 12 hours or as directed by MD Levine patch 0     No current facility-administered medications for this visit  No Known Allergies   Immunizations:     Immunization History   Administered Date(s) Administered    COVID-19 PFIZER VACCINE 0 3 ML IM 02/06/2021, 02/27/2021    DTaP 5 11/06/2000    Influenza Split High Dose Preservative Free IM 11/18/2014    Influenza, high dose seasonal 0 7 mL 12/01/2020    Influenza, seasonal, injectable 10/22/2007, 09/15/2008, 11/25/2009    Pneumococcal Conjugate 13-Valent 11/13/2017    Tdap 09/04/2019    Tuberculin Skin Test-PPD Intradermal 06/16/2008      Health Maintenance: There are no preventive care reminders to display for this patient  Topic Date Due    Pneumococcal Vaccine: 65+ Years (2 of 4 - PPSV23) 01/08/2018    COVID-19 Vaccine (3 - Pfizer risk 4-dose series) 03/27/2021    Influenza Vaccine (1) 09/01/2021      Medicare Health Risk Assessment:     /70   Pulse 66   Temp (!) 96 8 °F (36 °C)   Resp 16      [place] Sink is here for her Subsequent Wellness visit  Health Risk Assessment:   Patient rates overall health as fair  Patient feels that their physical health rating is slightly better  Patient is satisfied with their life  Eyesight was rated as same  Hearing was rated as same  Patient feels that their emotional and mental health rating is same  Patients states they are never, rarely angry  Patient states they are always unusually tired/fatigued  Pain experienced in the last 7 days has been some  Patient's pain rating has been 4/10  Patient states that she has experienced no weight loss or gain in last 6 months       Depression Screening: PHQ-2 Score: 0      Fall Risk Screening: In the past year, patient has experienced: no history of falling in past year      Urinary Incontinence Screening:   Patient has leaked urine accidently in the last six months  Home Safety:  Patient has trouble with stairs inside or outside of their home  Patient has working smoke alarms and has working carbon monoxide detector  Home safety hazards include: none  Nutrition:   Current diet is No Added Salt  Medications:   Patient is currently taking over-the-counter supplements  OTC medications include: see medication list  Patient is able to manage medications  Activities of Daily Living (ADLs)/Instrumental Activities of Daily Living (IADLs):   Walk and transfer into and out of bed and chair?: Yes  Dress and groom yourself?: Yes    Bathe or shower yourself?: Yes    Feed yourself?  Yes  Do your laundry/housekeeping?: No  Manage your money, pay your bills and track your expenses?: No  Make your own meals?: No    Do your own shopping?: No    ADL comments: Son is helping     Previous Hospitalizations:   Any hospitalizations or ED visits within the last 12 months?: No      Advance Care Planning:   Living will: No    Durable POA for healthcare: No    Advanced directive: No    Advanced directive counseling given: Yes      Cognitive Screening:   Provider or family/friend/caregiver concerned regarding cognition?: No    PREVENTIVE SCREENINGS      Cardiovascular Screening:    General: Screening Not Indicated and History Lipid Disorder      Diabetes Screening:     General: Screening Current      Colorectal Cancer Screening:     General: Screening Not Indicated      Breast Cancer Screening:     General: Screening Not Indicated      Cervical Cancer Screening:    General: Screening Not Indicated      Osteoporosis Screening:    General: Screening Not Indicated and History Osteoporosis      Abdominal Aortic Aneurysm (AAA) Screening:        General: Screening Not Indicated      Lung Cancer Screening:     General: Screening Not Indicated      Hepatitis C Screening:    General: Screening Not Indicated    Screening, Brief Intervention, and Referral to Treatment (SBIRT)    Screening  Typical number of drinks in a day: 0  Typical number of drinks in a week: 0  Interpretation: Low risk drinking behavior      Single Item Drug Screening:  How often have you used an illegal drug (including marijuana) or a prescription medication for non-medical reasons in the past year? never    Single Item Drug Screen Score: 0  Interpretation: Negative screen for possible drug use disorder    Review of Current Opioid Use  Opioid Risk Tool (ORT) Score: 0  Opioid Risk Tool (ORT) Interpretation: Score 0-3: Low risk for opioid misuse      Renee Torrez MD

## 2022-05-31 DIAGNOSIS — R06.02 SOB (SHORTNESS OF BREATH): ICD-10-CM

## 2022-05-31 RX ORDER — CARVEDILOL 3.12 MG/1
TABLET ORAL
Qty: 180 TABLET | Refills: 1 | Status: SHIPPED | OUTPATIENT
Start: 2022-05-31

## 2022-07-19 ENCOUNTER — APPOINTMENT (OUTPATIENT)
Dept: LAB | Facility: CLINIC | Age: 87
End: 2022-07-19
Payer: MEDICARE

## 2022-07-19 DIAGNOSIS — E83.52 HYPERCALCEMIA: ICD-10-CM

## 2022-07-19 DIAGNOSIS — M05.79 SEROPOSITIVE RHEUMATOID ARTHRITIS OF MULTIPLE SITES (HCC): ICD-10-CM

## 2022-07-19 DIAGNOSIS — M79.10 MYALGIA: ICD-10-CM

## 2022-07-19 DIAGNOSIS — Z79.899 ENCOUNTER FOR LONG-TERM (CURRENT) USE OF OTHER MEDICATIONS: ICD-10-CM

## 2022-07-19 LAB
ALBUMIN SERPL BCP-MCNC: 3.4 G/DL (ref 3.5–5)
ALP SERPL-CCNC: 39 U/L (ref 46–116)
ALT SERPL W P-5'-P-CCNC: 17 U/L (ref 12–78)
ANION GAP SERPL CALCULATED.3IONS-SCNC: 4 MMOL/L (ref 4–13)
AST SERPL W P-5'-P-CCNC: 17 U/L (ref 5–45)
BILIRUB SERPL-MCNC: 0.4 MG/DL (ref 0.2–1)
BUN SERPL-MCNC: 17 MG/DL (ref 5–25)
CALCIUM ALBUM COR SERPL-MCNC: 10.7 MG/DL (ref 8.3–10.1)
CALCIUM SERPL-MCNC: 10.2 MG/DL (ref 8.3–10.1)
CHLORIDE SERPL-SCNC: 107 MMOL/L (ref 96–108)
CO2 SERPL-SCNC: 29 MMOL/L (ref 21–32)
CREAT SERPL-MCNC: 1.02 MG/DL (ref 0.6–1.3)
GFR SERPL CREATININE-BSD FRML MDRD: 47 ML/MIN/1.73SQ M
GLUCOSE P FAST SERPL-MCNC: 102 MG/DL (ref 65–99)
POTASSIUM SERPL-SCNC: 4.6 MMOL/L (ref 3.5–5.3)
PROT SERPL-MCNC: 7.7 G/DL (ref 6.4–8.4)
PTH-INTACT SERPL-MCNC: 49.6 PG/ML (ref 18.4–80.1)
SODIUM SERPL-SCNC: 140 MMOL/L (ref 135–147)

## 2022-07-19 PROCEDURE — 84165 PROTEIN E-PHORESIS SERUM: CPT

## 2022-07-19 PROCEDURE — 84165 PROTEIN E-PHORESIS SERUM: CPT | Performed by: PATHOLOGY

## 2022-07-19 PROCEDURE — 83970 ASSAY OF PARATHORMONE: CPT

## 2022-07-19 PROCEDURE — 86334 IMMUNOFIX E-PHORESIS SERUM: CPT

## 2022-07-19 PROCEDURE — 80053 COMPREHEN METABOLIC PANEL: CPT

## 2022-07-19 PROCEDURE — 86334 IMMUNOFIX E-PHORESIS SERUM: CPT | Performed by: PATHOLOGY

## 2022-07-20 LAB
ALBUMIN SERPL ELPH-MCNC: 3.95 G/DL (ref 3.5–5)
ALBUMIN SERPL ELPH-MCNC: 53.4 % (ref 52–65)
ALPHA1 GLOB SERPL ELPH-MCNC: 0.35 G/DL (ref 0.1–0.4)
ALPHA1 GLOB SERPL ELPH-MCNC: 4.7 % (ref 2.5–5)
ALPHA2 GLOB SERPL ELPH-MCNC: 0.71 G/DL (ref 0.4–1.2)
ALPHA2 GLOB SERPL ELPH-MCNC: 9.6 % (ref 7–13)
BETA GLOB ABNORMAL SERPL ELPH-MCNC: 0.36 G/DL (ref 0.4–0.8)
BETA1 GLOB SERPL ELPH-MCNC: 4.9 % (ref 5–13)
BETA2 GLOB SERPL ELPH-MCNC: 8.3 % (ref 2–8)
BETA2+GAMMA GLOB SERPL ELPH-MCNC: 0.61 G/DL (ref 0.2–0.5)
GAMMA GLOB ABNORMAL SERPL ELPH-MCNC: 1.41 G/DL (ref 0.5–1.6)
GAMMA GLOB SERPL ELPH-MCNC: 19.1 % (ref 12–22)
IGG/ALB SER: 1.15 {RATIO} (ref 1.1–1.8)
INTERPRETATION UR IFE-IMP: NORMAL
M PROTEIN 1 MFR SERPL ELPH: 2.8 %
M PROTEIN 1 SERPL ELPH-MCNC: 0.21 G/DL
PROT PATTERN SERPL ELPH-IMP: ABNORMAL
PROT SERPL-MCNC: 7.4 G/DL (ref 6.4–8.2)

## 2022-10-04 ENCOUNTER — APPOINTMENT (OUTPATIENT)
Dept: LAB | Facility: CLINIC | Age: 87
End: 2022-10-04
Payer: MEDICARE

## 2022-10-04 DIAGNOSIS — R89.9 ABNORMAL PLEURAL FLUID: ICD-10-CM

## 2022-10-04 LAB
IGA SERPL-MCNC: 372 MG/DL (ref 70–400)
IGG SERPL-MCNC: 1400 MG/DL (ref 700–1600)
IGM SERPL-MCNC: 222 MG/DL (ref 40–230)

## 2022-10-04 PROCEDURE — 83521 IG LIGHT CHAINS FREE EACH: CPT

## 2022-10-04 PROCEDURE — 82784 ASSAY IGA/IGD/IGG/IGM EACH: CPT

## 2022-10-05 LAB
KAPPA LC FREE SER-MCNC: 48.8 MG/L (ref 3.3–19.4)
KAPPA LC FREE/LAMBDA FREE SER: 1.18 {RATIO} (ref 0.26–1.65)
LAMBDA LC FREE SERPL-MCNC: 41.2 MG/L (ref 5.7–26.3)

## 2022-11-03 DIAGNOSIS — R06.02 SOB (SHORTNESS OF BREATH): ICD-10-CM

## 2022-11-03 RX ORDER — CARVEDILOL 3.12 MG/1
TABLET ORAL
Qty: 180 TABLET | Refills: 0 | Status: SHIPPED | OUTPATIENT
Start: 2022-11-03

## 2023-01-09 ENCOUNTER — APPOINTMENT (OUTPATIENT)
Dept: LAB | Facility: CLINIC | Age: 88
End: 2023-01-09

## 2023-02-10 ENCOUNTER — TELEPHONE (OUTPATIENT)
Dept: FAMILY MEDICINE CLINIC | Facility: CLINIC | Age: 88
End: 2023-02-10

## 2023-02-10 NOTE — TELEPHONE ENCOUNTER
Called number in chart  It is sons number  He is currently on vacation  He will have her call us back to schedule AWV  If she calls back, please get her scheduled  Thank you

## 2023-02-11 DIAGNOSIS — R06.02 SOB (SHORTNESS OF BREATH): ICD-10-CM

## 2023-02-13 RX ORDER — CARVEDILOL 3.12 MG/1
TABLET ORAL
Qty: 180 TABLET | Refills: 0 | Status: SHIPPED | OUTPATIENT
Start: 2023-02-13

## 2023-02-27 ENCOUNTER — HOSPITAL ENCOUNTER (OUTPATIENT)
Dept: RADIOLOGY | Facility: HOSPITAL | Age: 88
Discharge: HOME | End: 2023-02-27
Attending: INTERNAL MEDICINE

## 2023-02-27 DIAGNOSIS — M81.0 AGE-RELATED OSTEOPOROSIS WITHOUT CURRENT PATHOLOGICAL FRACTURE: ICD-10-CM

## 2023-03-23 ENCOUNTER — TELEPHONE (OUTPATIENT)
Dept: FAMILY MEDICINE CLINIC | Facility: CLINIC | Age: 88
End: 2023-03-23

## 2023-03-23 ENCOUNTER — OFFICE VISIT (OUTPATIENT)
Dept: CARDIOLOGY CLINIC | Facility: CLINIC | Age: 88
End: 2023-03-23

## 2023-03-23 VITALS
BODY MASS INDEX: 25.87 KG/M2 | HEIGHT: 63 IN | WEIGHT: 146 LBS | SYSTOLIC BLOOD PRESSURE: 124 MMHG | HEART RATE: 62 BPM | DIASTOLIC BLOOD PRESSURE: 72 MMHG | OXYGEN SATURATION: 96 %

## 2023-03-23 DIAGNOSIS — R06.02 SOB (SHORTNESS OF BREATH): Primary | ICD-10-CM

## 2023-03-23 RX ORDER — DENOSUMAB 60 MG/ML
INJECTION SUBCUTANEOUS
COMMUNITY
Start: 2023-01-10

## 2023-03-23 RX ORDER — UMECLIDINIUM BROMIDE AND VILANTEROL TRIFENATATE 62.5; 25 UG/1; UG/1
POWDER RESPIRATORY (INHALATION)
COMMUNITY
Start: 2023-03-09 | End: 2023-03-24 | Stop reason: SDUPTHER

## 2023-03-23 NOTE — PROGRESS NOTES
Progress Note - Cardiology Office  [facility]    Caridad Cagle 80 y o  female MRN: [id number]  : 1929  Encounter: [id number]      ASSESSMENT:   Dyspnea on exertion  Probably secondary to deconditioning  According to patient's son she hardly moves or ambulates at all    Echo, 2021:    EF 60%, G1DD, mild to moderate aortic regurgitation  Pro NT BNP, 2021    95/normal  > therefore her dyspnea is unlikely secondary to a cardiac etiology and is probably multifactorial including age, deconditioning and possibly effects of rheumatoid arthritis     Rheumatoid arthritis  Managed by PCP  On methotrexate 2 5 mg     Thrombocytopenia  Managed by PCP     Hypertension:    BP today is  124/72 with heart rate of 62/min  On Coreg 3 125 mg b i d      Aortic regurgitation  Mild to moderate        RECOMMENDATIONS:  Continue Coreg  Repeat echocardiogram after next office visit in 6 months       Please call [phone] if any questions  HPI :     Caridad Cagle is a 80y o  year old very pleasant lady who comes for a follow-up  She denies any new symptoms  She still has dyspnea on exertion but according to her son she hardly moves or exercises  She has significant rheumatoid arthritis with LV systolic function, mild diastolic dysfunction and mild to moderate aortic regurgitation    Clinically she appears euvolemic    REVIEW OF SYSTEMS:  Complains of dyspnea on exertion and arthralgias  Denies chest pain or syncope    Historical Information   Past Medical History:   Diagnosis Date   • Carpal tunnel syndrome    • Degeneration of lumbar intervertebral disc    • Postmenopausal osteoporosis    • Primary generalized (osteo)arthritis    • Rheumatoid arthritis ([address] )    • Right shoulder pain      Past Surgical History:   Procedure Laterality Date   • CARPAL TUNNEL RELEASE     • CATARACT EXTRACTION     • CHOLECYSTECTOMY     • KNEE SURGERY     • MASS EXCISION Right 2019    Procedure: EXCISION BIOPSY TISSUE LESION/MASS HAND;  Surgeon: Shelli Nixon DO;  Location: [address] [place] [address];  Service: Orthopedics   • TONSILLECTOMY       Social History     Substance and Sexual Activity   Alcohol Use Yes    Comment: rarely     Social History     Substance and Sexual Activity   Drug Use No     Social History     Tobacco Use   Smoking Status Former   • Years: 40 00   • Types: Cigarettes   • Quit date: 1998   • Years since quittin 2   Smokeless Tobacco Never     Family History:   Family History   Problem Relation Age of Onset   • Cancer Sister    • Breast cancer Mother    • Breast cancer Maternal Grandmother    • No Known Problems Father    • No Known Problems Brother    • No Known Problems Maternal Aunt    • No Known Problems Maternal Uncle    • No Known Problems Paternal Aunt    • No Known Problems Paternal Uncle    • No Known Problems Maternal Grandfather    • No Known Problems Paternal Grandmother    • No Known Problems Paternal Grandfather    • ADD / ADHD Neg Hx    • Anesthesia problems Neg Hx    • Clotting disorder Neg Hx    • Collagen disease Neg Hx    • Diabetes Neg Hx    • Dislocations Neg Hx    • Learning disabilities Neg Hx    • Neurological problems Neg Hx    • Osteoporosis Neg Hx    • Rheumatologic disease Neg Hx    • Scoliosis Neg Hx    • Vascular Disease Neg Hx        Meds/Allergies     No Known Allergies    Current Outpatient Medications:   •  Anoro Ellipta 62 5-25 MCG/ACT inhaler, , Disp: , Rfl:   •  carvedilol (COREG) 3 125 mg tablet, TAKE ONE TABLET BY MOUTH TWICE A DAY WITH MEALS, Disp: 180 tablet, Rfl: 0  •  cholecalciferol (VITAMIN D3) 1,000 units tablet, Take 1,000 Units by mouth daily, Disp: , Rfl:   •  folic acid (FOLVITE) 1 mg tablet, Take 1 tablet (1 mg total) by mouth daily, Disp: 30 tablet, Rfl: 11  •  gabapentin (NEURONTIN) 400 mg capsule, 400 mg daily at bedtime  , Disp: , Rfl:   •  methotrexate 2 5 mg tablet, , Disp: , Rfl:   •  oxyCODONE-acetaminophen (PERCOCET) 5-325 mg per tablet, Take 1 tablet by mouth every 4 (four) hours as needed, Disp: , Rfl:   •  pantoprazole (PROTONIX) 20 mg tablet, Take 1 tablet (20 mg total) by mouth daily before breakfast, Disp: 30 tablet, Rfl: 0  •  predniSONE 2 5 mg tablet, , Disp: , Rfl:   •  Prolia 60 MG/ML, , Disp: , Rfl:   •  lidocaine (LIDODERM) 5 %, Apply 1 patch topically daily for 7 days Remove & Discard patch within 12 hours or as directed by MD, Disp: 7 patch, Rfl: 0    Vitals: Blood pressure 124/72, pulse 62, height 5' 3" (1 6 m), weight 66 2 kg (146 lb), SpO2 96 %  ?  Body mass index is 25 86 kg/m²  Vitals:    03/23/23 1414   Weight: 66 2 kg (146 lb)     BP Readings from Last 3 Encounters:   03/23/23 124/72   04/21/22 126/70   04/09/22 140/80       Physical Exam:    Neurologic:  Alert & oriented x 3, no new focal deficits, Not in any acute distress,  Constitutional:  Well developed, well nourished, non-toxic appearance   Eyes:  Pupil equal and reacting to light, conjunctiva normal,   HENT:  Atraumatic, oropharynx moist, Neck- normal range of motion, no tenderness,  Neck supple, No JVP, No LNP   Respiratory:  Bilateral air entry, mostly clear to auscultation  Cardiovascular: S1-S2 regular rhythm with 1/6 systolic and diastolic murmurs  GI:  Soft, nondistended, normal bowel sounds, nontender, no hepatosplenomegaly appreciated    Musculoskeletal: No myalgias  Skin:  Well hydrated, no rash   Lymphatic:  No lymphadenopathy noted   Extremities:  No edema         Diagnostic Studies Review Cardio:      EKG: Sinus rhythm with fusion complexes, heart rate 62/min, left anterior fascicular block    Cardiac testing:   Results for orders placed during the hospital encounter of 02/12/21    Echo complete with contrast if indicated    Narrative  [address]  [address] [facility]   [phone]    Transthoracic Echocardiogram  2D, M-mode, Doppler, and Color Doppler    Study date:  12-Feb-2021    Patient: Kristopher Villeda Reinaldo Mas  MR number: [id number]  Account number: [de-identified]  : 1929  Age: 80 years  Gender: Female  Status: Outpatient  Location: Echo lab  Height: 63 in  Weight: 144 lb  BP: 120/ 60 mmHg    Indications: SOB    Diagnoses: R06 02 - Shortness of breath    Sonographer:  RENATE Mejias  Primary Physician:  Mickey Chin MD  Referring Physician:  Amanda Strickland MD  Group:  [facility]  Interpreting Physician:  Amanda Strickland MD    SUMMARY    LEFT VENTRICLE:  Normal left ventricular chamber size and wall thickness  Visually estimated left ventricular ejection fraction is 60%  No definite regional wall motion abnormality seen  Grade 1 diastolic dysfunction    RIGHT VENTRICLE:  Normal right ventricular size and systolic function  TAPSE is 2 9 cm    LEFT ATRIUM:  Normal left atrial size    RIGHT ATRIUM:  Normal right atrial size    MITRAL VALVE:  Mildly thickened mitral valve leaflets with mild annular calcification  There is trace mitral regurgitation  No mitral stenosis seen    AORTIC VALVE:  Aortic valve is trileaflet  There is mild to moderate aortic regurgitation  There is no aortic stenosis    TRICUSPID VALVE:  There is trace tricuspid regurgitation  Normal PA pressure    PULMONIC VALVE:  There is mild pulmonary regurgitation    AORTA:  Normal aortic root size    IVC, HEPATIC VEINS:  IVC is of normal size and shows normal respirophasic variations    PERICARDIUM:  No pericardial effusion seen    HISTORY: PRIOR HISTORY: Thrombocytopenia,Former smoker    PROCEDURE: The procedure was performed in the echo lab  This was a routine study  The transthoracic approach was used  The study included complete 2D imaging, M-mode, complete spectral Doppler, and color Doppler  The heart rate was 43 bpm,  at the start of the study  Echocardiographic views were limited due to lung interference  Image quality was adequate      LEFT VENTRICLE: Normal left ventricular chamber size and wall thickness  Visually estimated left ventricular ejection fraction is 60%  No definite regional wall motion abnormality seen  Grade 1 diastolic dysfunction    RIGHT VENTRICLE: Normal right ventricular size and systolic function  TAPSE is 2 9 cm    LEFT ATRIUM: Normal left atrial size    RIGHT ATRIUM: Normal right atrial size    MITRAL VALVE: Mildly thickened mitral valve leaflets with mild annular calcification  There is trace mitral regurgitation  No mitral stenosis seen    AORTIC VALVE: Aortic valve is trileaflet  There is mild to moderate aortic regurgitation  There is no aortic stenosis    TRICUSPID VALVE: There is trace tricuspid regurgitation  Normal PA pressure    PULMONIC VALVE: There is mild pulmonary regurgitation    PERICARDIUM: No pericardial effusion seen    AORTA: Normal aortic root size    SYSTEMIC VEINS: IVC: IVC is of normal size and shows normal respirophasic variations The inferior vena cava was normal in size and course  SYSTEM MEASUREMENT TABLES    2D  EF (Teich): 59 92 %  %FS: 31 9 %  Ao Diam: 3 16 cm  EDV(Teich): 105 16 ml  ESV(Teich): 42 15 ml  IVSd: 0 82 cm  LA Area: 12 51 cm2  LA Diam: 3 54 cm  LVEDV MOD A4C: 77 41 ml  LVEF MOD A4C: 55 13 %  LVESV MOD A4C: 34 73 ml  LVIDd: 4 75 cm  LVIDs: 3 24 cm  LVLd A4C: 6 99 cm  LVLs A4C: 5 78 cm  LVPWd: 0 97 cm  SV (Teich): 63 02 ml  SV MOD A4C: 42 68 ml    PW  MV E/A Ratio: 0 63    [employer] Accredited Echocardiography Laboratory    Prepared and electronically signed by    Tarik Bills MD  Signed 12-Feb-2021 16:29:31      Imaging:  Chest X-Ray:   No Chest XR results available for this patient  CT-scan of the chest:     No CTA results available for this patient    Lab Review   Lab Results   Component Value Date    WBC 7 62 01/09/2023    HGB 13 1 01/09/2023    HCT 40 0 01/09/2023     (H) 01/09/2023    RDW 14 6 01/09/2023     (L) 01/09/2023     BMP:  Lab Results   Component Value Date    SODIUM 139 01/09/2023    K 4 6 01/09/2023     01/09/2023    CO2 30 01/09/2023    ANIONGAP 10 5 12/08/2015    BUN 16 01/09/2023    CREATININE 0 94 01/09/2023    GLUC 144 (H) 01/10/2022    GLUF 118 (H) 01/09/2023    CALCIUM 10 2 (H) 01/09/2023    CORRECTEDCA 10 8 (H) 01/09/2023    EGFR 52 01/09/2023    MG 2 2 12/19/2018     LFT:  Lab Results   Component Value Date    AST 14 01/09/2023    ALT 14 01/09/2023    ALKPHOS 42 (L) 01/09/2023    TP 7 9 01/09/2023    ALB 3 3 (L) 01/09/2023      No components found for: TSH3  No results found for: EIH0LCSADYUM  No results found for: HGBA1C  Lipid Profile:   Lab Results   Component Value Date    CHOLESTEROL 189 07/15/2021    HDL 67 07/15/2021    LDLCALC 103 (H) 07/15/2021    TRIG 94 07/15/2021     Lab Results   Component Value Date    CHOLESTEROL 189 07/15/2021     Lab Results   Component Value Date    CKTOTAL 40 07/19/2022     Lab Results   Component Value Date    NTBNP 95 01/22/2021      No results found for this or any previous visit (from the past 672 hour(s))  Dr Shelli Muller MD, [facility]      "This note has been constructed using a voice recognition system  Therefore there may be syntax, spelling, and/or grammatical errors   Please call if you have any questions  "

## 2023-03-24 DIAGNOSIS — R06.02 SOB (SHORTNESS OF BREATH): Primary | ICD-10-CM

## 2023-03-24 RX ORDER — UMECLIDINIUM BROMIDE AND VILANTEROL TRIFENATATE 62.5; 25 UG/1; UG/1
1 POWDER RESPIRATORY (INHALATION) DAILY
Qty: 60 BLISTER | Refills: 1 | Status: SHIPPED | OUTPATIENT
Start: 2023-03-24 | End: 2023-04-17

## 2023-04-03 ENCOUNTER — APPOINTMENT (OUTPATIENT)
Dept: LAB | Facility: CLINIC | Age: 88
End: 2023-04-03

## 2023-04-03 ENCOUNTER — OFFICE VISIT (OUTPATIENT)
Dept: FAMILY MEDICINE CLINIC | Facility: CLINIC | Age: 88
End: 2023-04-03

## 2023-04-03 VITALS
BODY MASS INDEX: 26.19 KG/M2 | DIASTOLIC BLOOD PRESSURE: 82 MMHG | RESPIRATION RATE: 18 BRPM | TEMPERATURE: 96.4 F | WEIGHT: 147.8 LBS | HEART RATE: 70 BPM | SYSTOLIC BLOOD PRESSURE: 140 MMHG | HEIGHT: 63 IN

## 2023-04-03 DIAGNOSIS — R14.2 BURPING: Primary | ICD-10-CM

## 2023-04-03 DIAGNOSIS — K59.00 CONSTIPATION, UNSPECIFIED CONSTIPATION TYPE: ICD-10-CM

## 2023-04-03 RX ORDER — DOCUSATE SODIUM 100 MG/1
200 CAPSULE, LIQUID FILLED ORAL DAILY
Qty: 90 CAPSULE | Refills: 0
Start: 2023-04-03

## 2023-04-03 NOTE — PROGRESS NOTES
Chief Complaint   Patient presents with   • Heartburn     Pt c/o indigestion all the time even when she hasn't eaten        Patient ID: Renetta Hewitt is a 80 y o  female  HPI  Pt is seeing for frequent burping and passing gas -  admits constipation -  Using prune juice  -  Does not drink enough water -  No abd pain, no weight loss, no N/V     The following portions of the patient's history were reviewed and updated as appropriate: allergies, current medications, past family history, past medical history, past social history, past surgical history and problem list     Review of Systems   Constitutional: Negative  Negative for fatigue  Respiratory: Negative  Cardiovascular: Negative  Gastrointestinal: Positive for abdominal distention and constipation  Negative for abdominal pain, blood in stool, nausea and vomiting  Genitourinary: Negative  Musculoskeletal: Negative  Skin: Negative  Neurological: Negative          Current Outpatient Medications   Medication Sig Dispense Refill   • Anoro Ellipta 62 5-25 MCG/ACT inhaler Inhale 1 puff daily 60 blister 1   • carvedilol (COREG) 3 125 mg tablet TAKE ONE TABLET BY MOUTH TWICE A DAY WITH MEALS 916 tablet 0   • folic acid (FOLVITE) 1 mg tablet Take 1 tablet (1 mg total) by mouth daily 30 tablet 11   • gabapentin (NEURONTIN) 400 mg capsule 400 mg daily at bedtime       • methotrexate 2 5 mg tablet      • oxyCODONE-acetaminophen (PERCOCET) 5-325 mg per tablet Take 1 tablet by mouth every 4 (four) hours as needed     • predniSONE 2 5 mg tablet      • Prolia 60 MG/ML      • cholecalciferol (VITAMIN D3) 1,000 units tablet Take 1,000 Units by mouth daily (Patient not taking: Reported on 4/3/2023)     • lidocaine (LIDODERM) 5 % Apply 1 patch topically daily for 7 days Remove & Discard patch within 12 hours or as directed by MD Levine patch 0   • pantoprazole (PROTONIX) 20 mg tablet Take 1 tablet (20 mg total) by mouth daily before breakfast 30 tablet 0     No "current facility-administered medications for this visit  Objective:    /82 (BP Location: Left arm, Patient Position: Sitting, Cuff Size: Standard)   Pulse 70   Temp (!) 96 4 °F (35 8 °C)   Resp 18   Ht 5' 3\" (1 6 m)   Wt 67 kg (147 lb 12 8 oz)   BMI 26 18 kg/m²        Physical Exam  Constitutional:       Appearance: She is not ill-appearing  Cardiovascular:      Rate and Rhythm: Normal rate and regular rhythm  Pulmonary:      Effort: Pulmonary effort is normal  No respiratory distress  Abdominal:      Palpations: Abdomen is soft  Tenderness: There is no abdominal tenderness  Skin:     Coloration: Skin is not pale  Neurological:      Mental Status: She is alert  Assessment/Plan:         Diagnoses and all orders for this visit:    Burping    Constipation, unspecified constipation type  -     docusate sodium (COLACE) 100 mg capsule;  Take 2 capsules (200 mg total) by mouth daily    pt is getting BW done today by rheumatologist     rto in 1 m  For Lolita Cedillo MD      "

## 2023-04-24 ENCOUNTER — OFFICE VISIT (OUTPATIENT)
Dept: FAMILY MEDICINE CLINIC | Facility: CLINIC | Age: 88
End: 2023-04-24

## 2023-04-24 VITALS
RESPIRATION RATE: 16 BRPM | HEART RATE: 70 BPM | WEIGHT: 147 LBS | DIASTOLIC BLOOD PRESSURE: 90 MMHG | BODY MASS INDEX: 26.05 KG/M2 | SYSTOLIC BLOOD PRESSURE: 152 MMHG | HEIGHT: 63 IN | TEMPERATURE: 95.9 F

## 2023-04-24 DIAGNOSIS — Z00.00 MEDICARE ANNUAL WELLNESS VISIT, SUBSEQUENT: Primary | ICD-10-CM

## 2023-04-24 DIAGNOSIS — R06.02 SOB (SHORTNESS OF BREATH): ICD-10-CM

## 2023-04-24 RX ORDER — UMECLIDINIUM BROMIDE AND VILANTEROL TRIFENATATE 62.5; 25 UG/1; UG/1
1 POWDER RESPIRATORY (INHALATION) DAILY
Qty: 60 BLISTER | Refills: 11 | Status: SHIPPED | OUTPATIENT
Start: 2023-04-24

## 2023-04-24 NOTE — PROGRESS NOTES
Assessment and Plan:     Problem List Items Addressed This Visit    None  Visit Diagnoses     Medicare annual wellness visit, subsequent    -  Primary    SOB (shortness of breath)        Relevant Medications    Anoro Ellipta 62 5-25 MCG/ACT inhaler        BMI Counseling: Body mass index is 26 04 kg/m²  The BMI is above normal  Nutrition recommendations include encouraging healthy choices of fruits and vegetables, moderation in carbohydrate intake and increasing intake of lean protein  Rationale for BMI follow-up plan is due to patient being overweight or obese  Preventive health issues were discussed with patient, and age appropriate screening tests were ordered as noted in patient's After Visit Summary  Personalized health advice and appropriate referrals for health education or preventive services given if needed, as noted in patient's After Visit Summary       History of Present Illness:     Patient presents for a Medicare Wellness Visit    HPI   Patient Care Team:  Khoi Mills MD as PCP - Willette Boxer, MD as PCP - [address] [place] [address] (RTE)     Review of Systems:     Review of Systems     Problem List:     Patient Active Problem List   Diagnosis   • Thrombocytopenia (HCC)   • Generalized osteoarthritis   • Rheumatoid arthritis of multiple sites with negative rheumatoid factor ([facility] [address] )      Past Medical and Surgical History:     Past Medical History:   Diagnosis Date   • Carpal tunnel syndrome    • Degeneration of lumbar intervertebral disc    • Postmenopausal osteoporosis    • Primary generalized (osteo)arthritis    • Rheumatoid arthritis ([address] )    • Right shoulder pain      Past Surgical History:   Procedure Laterality Date   • CARPAL TUNNEL RELEASE     • CATARACT EXTRACTION     • CHOLECYSTECTOMY     • KNEE SURGERY     • MASS EXCISION Right 1/2/2019    Procedure: EXCISION BIOPSY TISSUE LESION/MASS HAND;  Surgeon: Citlali Amador DO;  Location: [address] [place] [address];  Service: Orthopedics   • TONSILLECTOMY        Family History:     Family History   Problem Relation Age of Onset   • Cancer Sister    • Breast cancer Mother    • Breast cancer Maternal Grandmother    • No Known Problems Father    • No Known Problems Brother    • No Known Problems Maternal Aunt    • No Known Problems Maternal Uncle    • No Known Problems Paternal Aunt    • No Known Problems Paternal Uncle    • No Known Problems Maternal Grandfather    • No Known Problems Paternal Grandmother    • No Known Problems Paternal Grandfather    • ADD / ADHD Neg Hx    • Anesthesia problems Neg Hx    • Clotting disorder Neg Hx    • Collagen disease Neg Hx    • Diabetes Neg Hx    • Dislocations Neg Hx    • Learning disabilities Neg Hx    • Neurological problems Neg Hx    • Osteoporosis Neg Hx    • Rheumatologic disease Neg Hx    • Scoliosis Neg Hx    • Vascular Disease Neg Hx       Social History:     Social History     Socioeconomic History   • Marital status:      Spouse name: None   • Number of children: None   • Years of education: None   • Highest education level: None   Occupational History   • None   Tobacco Use   • Smoking status: Former     Years: 40 00     Types: Cigarettes     Quit date: 1998     Years since quittin 3   • Smokeless tobacco: Never   Vaping Use   • Vaping Use: Never used   Substance and Sexual Activity   • Alcohol use: Yes     Comment: rarely   • Drug use: No   • Sexual activity: None   Other Topics Concern   • None   Social History Narrative   • None     Social Determinants of Health     Financial Resource Strain: Low Risk    • Difficulty of Paying Living Expenses: Not hard at all   Food Insecurity: Not on file   Transportation Needs: No Transportation Needs   • Lack of Transportation (Medical): No   • Lack of Transportation (Non-Medical):  No   Physical Activity: Not on file   Stress: Not on file   Social Connections: Not on file   Intimate Partner Violence: Not on file   Housing Stability: Not on file Medications and Allergies:     Current Outpatient Medications   Medication Sig Dispense Refill   • Anoro Ellipta 62 5-25 MCG/ACT inhaler Inhale 1 puff daily 60 blister 1   • carvedilol (COREG) 3 125 mg tablet TAKE ONE TABLET BY MOUTH TWICE A DAY WITH MEALS 180 tablet 0   • docusate sodium (COLACE) 100 mg capsule Take 2 capsules (200 mg total) by mouth daily 90 capsule 0   • folic acid (FOLVITE) 1 mg tablet Take 1 tablet (1 mg total) by mouth daily 30 tablet 11   • gabapentin (NEURONTIN) 400 mg capsule 400 mg daily at bedtime       • methotrexate 2 5 mg tablet      • oxyCODONE-acetaminophen (PERCOCET) 5-325 mg per tablet Take 1 tablet by mouth every 4 (four) hours as needed     • pantoprazole (PROTONIX) 20 mg tablet Take 1 tablet (20 mg total) by mouth daily before breakfast 30 tablet 0   • predniSONE 2 5 mg tablet      • Prolia 60 MG/ML      • cholecalciferol (VITAMIN D3) 1,000 units tablet Take 1,000 Units by mouth daily (Patient not taking: Reported on 4/3/2023)     • lidocaine (LIDODERM) 5 % Apply 1 patch topically daily for 7 days Remove & Discard patch within 12 hours or as directed by MD 7 patch 0     No current facility-administered medications for this visit  No Known Allergies   Immunizations:     Immunization History   Administered Date(s) Administered   • COVID-19 PFIZER VACCINE 0 3 ML IM 02/06/2021, 02/27/2021   • DTaP 5 11/06/2000   • Influenza Split High Dose Preservative Free IM 11/18/2014   • Influenza, high dose seasonal 0 7 mL 12/01/2020   • Influenza, seasonal, injectable 10/22/2007, 09/15/2008, 11/25/2009   • Pneumococcal Conjugate 13-Valent 11/13/2017   • Tdap 09/04/2019   • Tuberculin Skin Test-PPD Intradermal 06/16/2008      Health Maintenance: There are no preventive care reminders to display for this patient        Topic Date Due   • Pneumococcal Vaccine: 65+ Years (2 - PPSV23 if available, else PCV20) 11/13/2018   • COVID-19 Vaccine (3 - Pfizer risk series) 03/27/2021   • Influenza Vaccine (1) 09/01/2022      Medicare Screening Tests and Risk Assessments:     Gaye Nieves is here for her Subsequent Wellness visit  Health Risk Assessment:   Patient rates overall health as fair  Patient feels that their physical health rating is slightly worse  Patient is very satisfied with their life  Eyesight was rated as slightly worse  Hearing was rated as slightly worse  Patient feels that their emotional and mental health rating is same  Patients states they are never, rarely angry  Patient states they are often unusually tired/fatigued  Pain experienced in the last 7 days has been a lot  Patient's pain rating has been 7/10  Patient states that she has experienced no weight loss or gain in last 6 months  Fall Risk Screening: In the past year, patient has experienced: no history of falling in past year      Urinary Incontinence Screening:   Patient has not leaked urine accidently in the last six months  Home Safety:  Patient does not have trouble with stairs inside or outside of their home  Patient has working smoke alarms and has working carbon monoxide detector  Home safety hazards include: none  Nutrition:   Current diet is Regular  Medications:   Patient is currently taking over-the-counter supplements  OTC medications include: see medication list  Patient is able to manage medications  Activities of Daily Living (ADLs)/Instrumental Activities of Daily Living (IADLs):   Walk and transfer into and out of bed and chair?: Yes  Dress and groom yourself?: Yes    Bathe or shower yourself?: Yes    Feed yourself? Yes  Do your laundry/housekeeping?: No  Manage your money, pay your bills and track your expenses?: No  Make your own meals?: No    Do your own shopping?: No    ADL comments: Family members are helping     Previous Hospitalizations:   Any hospitalizations or ED visits within the last 12 months?: No      Advance Care Planning:   Living will: Yes    Durable POA for healthcare:  Yes "  Advanced directive: Yes      Cognitive Screening:   Provider or family/friend/caregiver concerned regarding cognition?: No    PREVENTIVE SCREENINGS      Cardiovascular Screening:    General: Screening Current      Diabetes Screening:     General: Screening Current      Colorectal Cancer Screening:     General: Screening Not Indicated      Breast Cancer Screening:     General: Screening Not Indicated      Cervical Cancer Screening:    General: Screening Not Indicated      Osteoporosis Screening:    General: Screening Not Indicated and History Osteoporosis      Abdominal Aortic Aneurysm (AAA) Screening:        General: Screening Not Indicated      Lung Cancer Screening:     General: Screening Not Indicated      Hepatitis C Screening:    General: Screening Not Indicated    Screening, Brief Intervention, and Referral to Treatment (SBIRT)    Screening  Typical number of drinks in a day: 0  Typical number of drinks in a week: 0  Interpretation: Low risk drinking behavior  Single Item Drug Screening:  How often have you used an illegal drug (including marijuana) or a prescription medication for non-medical reasons in the past year? never    Single Item Drug Screen Score: 0  Interpretation: Negative screen for possible drug use disorder    Review of Current Opioid Use    Opioid Risk Tool (ORT) Interpretation: Score 0-3: Low risk for opioid misuse    No results found       Physical Exam:     /90 (BP Location: Left arm, Patient Position: Sitting, Cuff Size: Standard)   Pulse 70   Temp (!) 95 9 °F (35 5 °C)   Resp 16   Ht 5' 3\" (1 6 m)   Wt 66 7 kg (147 lb)   BMI 26 04 kg/m²     Physical Exam     Dejah Hairston MD  "

## 2023-04-24 NOTE — PATIENT INSTRUCTIONS
Medicare Preventive Visit Patient Instructions  Thank you for completing your Welcome to Medicare Visit or Medicare Annual Wellness Visit today  Your next wellness visit will be due in one year (4/24/2024)  The screening/preventive services that you may require over the next 5-10 years are detailed below  Some tests may not apply to you based off risk factors and/or age  Screening tests ordered at today's visit but not completed yet may show as past due  Also, please note that scanned in results may not display below  Preventive Screenings:  Service Recommendations Previous Testing/Comments   Colorectal Cancer Screening  * Colonoscopy    * Fecal Occult Blood Test (FOBT)/Fecal Immunochemical Test (FIT)  * Fecal DNA/Cologuard Test  * Flexible Sigmoidoscopy Age: 39-70 years old   Colonoscopy: every 10 years (may be performed more frequently if at higher risk)  OR  FOBT/FIT: every 1 year  OR  Cologuard: every 3 years  OR  Sigmoidoscopy: every 5 years  Screening may be recommended earlier than age 39 if at higher risk for colorectal cancer  Also, an individualized decision between you and your healthcare provider will decide whether screening between the ages of 74-80 would be appropriate  Colonoscopy: Not on file  FOBT/FIT: Not on file  Cologuard: Not on file  Sigmoidoscopy: Not on file          Breast Cancer Screening Age: 36 years old  Frequency: every 1-2 years  Not required if history of left and right mastectomy Mammogram: Not on file        Cervical Cancer Screening Between the ages of 21-29, pap smear recommended once every 3 years  Between the ages of 33-67, can perform pap smear with HPV co-testing every 5 years     Recommendations may differ for women with a history of total hysterectomy, cervical cancer, or abnormal pap smears in past  Pap Smear: Not on file        Hepatitis C Screening Once for adults born between [facility]  More frequently in patients at high risk for Hepatitis C Hep C Antibody: Not on file        Diabetes Screening 1-2 times per year if you're at risk for diabetes or have pre-diabetes Fasting glucose: 112 mg/dL (4/3/2023)  A1C: No results in last 5 years (No results in last 5 years)      Cholesterol Screening Once every 5 years if you don't have a lipid disorder  May order more often based on risk factors  Lipid panel: 07/15/2021          Other Preventive Screenings Covered by Medicare:  1  Abdominal Aortic Aneurysm (AAA) Screening: covered once if your at risk  You're considered to be at risk if you have a family history of AAA  2  Lung Cancer Screening: covers low dose CT scan once per year if you meet all of the following conditions: (1) Age 50-69; (2) No signs or symptoms of lung cancer; (3) Current smoker or have quit smoking within the last 15 years; (4) You have a tobacco smoking history of at least 20 pack years (packs per day multiplied by number of years you smoked); (5) You get a written order from a healthcare provider  3  Glaucoma Screening: covered annually if you're considered high risk: (1) You have diabetes OR (2) Family history of glaucoma OR (3)  aged 48 and older OR (3)  American aged 72 and older  3  Osteoporosis Screening: covered every 2 years if you meet one of the following conditions: (1) You're estrogen deficient and at risk for osteoporosis based off medical history and other findings; (2) Have a vertebral abnormality; (3) On glucocorticoid therapy for more than 3 months; (4) Have primary hyperparathyroidism; (5) On osteoporosis medications and need to assess response to drug therapy  · Last bone density test (DXA Scan): 02/27/2023   5  HIV Screening: covered annually if you're between the age of 15-65  Also covered annually if you are younger than 13 and older than 72 with risk factors for HIV infection  For pregnant patients, it is covered up to 3 times per pregnancy      Immunizations:  Immunization Recommendations   Influenza Vaccine Annual influenza vaccination during flu season is recommended for all persons aged >= 6 months who do not have contraindications   Pneumococcal Vaccine   * Pneumococcal conjugate vaccine = PCV13 (Prevnar 13), PCV15 (Vaxneuvance), PCV20 (Prevnar 20)  * Pneumococcal polysaccharide vaccine = PPSV23 (Pneumovax) Adults 25-60 years old: 1-3 doses may be recommended based on certain risk factors  Adults 72 years old: 1-2 doses may be recommended based off what pneumonia vaccine you previously received   Hepatitis B Vaccine 3 dose series if at intermediate or high risk (ex: diabetes, end stage renal disease, liver disease)   Tetanus (Td) Vaccine - COST NOT COVERED BY MEDICARE PART B Following completion of primary series, a booster dose should be given every 10 years to maintain immunity against tetanus  Td may also be given as tetanus wound prophylaxis  Tdap Vaccine - COST NOT COVERED BY MEDICARE PART B Recommended at least once for all adults  For pregnant patients, recommended with each pregnancy  Shingles Vaccine (Shingrix) - COST NOT COVERED BY MEDICARE PART B  2 shot series recommended in those aged 48 and above     Health Maintenance Due:  There are no preventive care reminders to display for this patient  Immunizations Due:      Topic Date Due   • Pneumococcal Vaccine: 65+ Years (2 - PPSV23 if available, else PCV20) 11/13/2018   • COVID-19 Vaccine (3 - Pfizer risk series) 03/27/2021   • Influenza Vaccine (1) 09/01/2022     Advance Directives   What are advance directives? Advance directives are legal documents that state your wishes and plans for medical care  These plans are made ahead of time in case you lose your ability to make decisions for yourself  Advance directives can apply to any medical decision, such as the treatments you want, and if you want to donate organs  What are the types of advance directives?   There are many types of advance directives, and each state has rules about how to use them  You may choose a combination of any of the following:  · Living will: This is a written record of the treatment you want  You can also choose which treatments you do not want, which to limit, and which to stop at a certain time  This includes surgery, medicine, IV fluid, and tube feedings  · Durable power of  for healthcare [facility]): This is a written record that states who you want to make healthcare choices for you when you are unable to make them for yourself  This person, called a proxy, is usually a family member or a friend  You may choose more than 1 proxy  · Do not resuscitate (DNR) order:  A DNR order is used in case your heart stops beating or you stop breathing  It is a request not to have certain forms of treatment, such as CPR  A DNR order may be included in other types of advance directives  · Medical directive: This covers the care that you want if you are in a coma, near death, or unable to make decisions for yourself  You can list the treatments you want for each condition  Treatment may include pain medicine, surgery, blood transfusions, dialysis, IV or tube feedings, and a ventilator (breathing machine)  · Values history: This document has questions about your views, beliefs, and how you feel and think about life  This information can help others choose the care that you would choose  Why are advance directives important? An advance directive helps you control your care  Although spoken wishes may be used, it is better to have your wishes written down  Spoken wishes can be misunderstood, or not followed  Treatments may be given even if you do not want them  An advance directive may make it easier for your family to make difficult choices about your care  Urinary Incontinence   Urinary incontinence (UI)  is when you lose control of your bladder  UI develops because your bladder cannot store or empty urine properly   The 3 most common types of UI are stress incontinence, urge incontinence, or both  Medicines:   · May be given to help strengthen your bladder control  Report any side effects of medication to your healthcare provider  Do pelvic muscle exercises often:  Your pelvic muscles help you stop urinating  Squeeze these muscles tight for 5 seconds, then relax for 5 seconds  Gradually work up to squeezing for 10 seconds  Do 3 sets of 15 repetitions a day, or as directed  This will help strengthen your pelvic muscles and improve bladder control  Train your bladder:  Go to the bathroom at set times, such as every 2 hours, even if you do not feel the urge to go  You can also try to hold your urine when you feel the urge to go  For example, hold your urine for 5 minutes when you feel the urge to go  As that becomes easier, hold your urine for 10 minutes  Self-care:   · Keep a UI record  Write down how often you leak urine and how much you leak  Make a note of what you were doing when you leaked urine  · Drink liquids as directed  You may need to limit the amount of liquid you drink to help control your urine leakage  Do not drink any liquid right before you go to bed  Limit or do not have drinks that contain caffeine or alcohol  · Prevent constipation  Eat a variety of high-fiber foods  Good examples are high-fiber cereals, beans, vegetables, and whole-grain breads  Walking is the best way to trigger your intestines to have a bowel movement  · Exercise regularly and maintain a healthy weight  Weight loss and exercise will decrease pressure on your bladder and help you control your leakage  · Use a catheter as directed  to help empty your bladder  A catheter is a tiny, plastic tube that is put into your bladder to drain your urine  · Go to behavior therapy as directed  Behavior therapy may be used to help you learn to control your urge to urinate      Weight Management   Why it is important to manage your weight:  Being overweight increases your risk of health conditions such as heart disease, high blood pressure, type 2 diabetes, and certain types of cancer  It can also increase your risk for osteoarthritis, sleep apnea, and other respiratory problems  Aim for a slow, steady weight loss  Even a small amount of weight loss can lower your risk of health problems  How to lose weight safely:  A safe and healthy way to lose weight is to eat fewer calories and get regular exercise  You can lose up about 1 pound a week by decreasing the number of calories you eat by 500 calories each day  Healthy meal plan for weight management:  A healthy meal plan includes a variety of foods, contains fewer calories, and helps you stay healthy  A healthy meal plan includes the following:  · Eat whole-grain foods more often  A healthy meal plan should contain fiber  Fiber is the part of grains, fruits, and vegetables that is not broken down by your body  Whole-grain foods are healthy and provide extra fiber in your diet  Some examples of whole-grain foods are whole-wheat breads and pastas, oatmeal, brown rice, and bulgur  · Eat a variety of vegetables every day  Include dark, leafy greens such as spinach, kale, gordy greens, and mustard greens  Eat yellow and orange vegetables such as carrots, sweet potatoes, and winter squash  · Eat a variety of fruits every day  Choose fresh or canned fruit (canned in its own juice or light syrup) instead of juice  Fruit juice has very little or no fiber  · Eat low-fat dairy foods  Drink fat-free (skim) milk or 1% milk  Eat fat-free yogurt and low-fat cottage cheese  Try low-fat cheeses such as mozzarella and other reduced-fat cheeses  · Choose meat and other protein foods that are low in fat  Choose beans or other legumes such as split peas or lentils  Choose fish, skinless poultry (chicken or turkey), or lean cuts of red meat (beef or pork)  Before you cook meat or poultry, cut off any visible fat  · Use less fat and oil    Try baking foods instead of frying them  Add less fat, such as margarine, sour cream, regular salad dressing and mayonnaise to foods  Eat fewer high-fat foods  Some examples of high-fat foods include french fries, doughnuts, ice cream, and cakes  · Eat fewer sweets  Limit foods and drinks that are high in sugar  This includes candy, cookies, regular soda, and sweetened drinks  Exercise:  Exercise at least 30 minutes per day on most days of the week  Some examples of exercise include walking, biking, dancing, and swimming  You can also fit in more physical activity by taking the stairs instead of the elevator or parking farther away from stores  Ask your healthcare provider about the best exercise plan for you  © Copyright [employer] 2018 Information is for End User's use only and may not be sold, redistributed or otherwise used for commercial purposes   All illustrations and images included in CareNotes® are the copyrighted property of A D A M , [employer]  or [address] [place] [address]

## 2023-05-10 DIAGNOSIS — R06.02 SOB (SHORTNESS OF BREATH): ICD-10-CM

## 2023-05-10 RX ORDER — CARVEDILOL 3.12 MG/1
TABLET ORAL
Qty: 180 TABLET | Refills: 0 | Status: SHIPPED | OUTPATIENT
Start: 2023-05-10

## 2023-08-02 DIAGNOSIS — R06.02 SOB (SHORTNESS OF BREATH): ICD-10-CM

## 2023-08-02 RX ORDER — CARVEDILOL 3.12 MG/1
TABLET ORAL
Qty: 180 TABLET | Refills: 0 | Status: SHIPPED | OUTPATIENT
Start: 2023-08-02

## 2023-08-16 ENCOUNTER — APPOINTMENT (OUTPATIENT)
Dept: LAB | Facility: CLINIC | Age: 88
End: 2023-08-16
Payer: COMMERCIAL

## 2023-08-16 DIAGNOSIS — Z79.899 ENCOUNTER FOR LONG-TERM (CURRENT) USE OF OTHER MEDICATIONS: ICD-10-CM

## 2023-08-16 DIAGNOSIS — M05.79 SEROPOSITIVE RHEUMATOID ARTHRITIS OF MULTIPLE SITES (HCC): ICD-10-CM

## 2023-08-16 DIAGNOSIS — M51.36 DDD (DEGENERATIVE DISC DISEASE), LUMBAR: ICD-10-CM

## 2023-08-16 LAB
ALBUMIN SERPL BCP-MCNC: 3.6 G/DL (ref 3.5–5)
ALP SERPL-CCNC: 40 U/L (ref 46–116)
ALT SERPL W P-5'-P-CCNC: 13 U/L (ref 12–78)
ANION GAP SERPL CALCULATED.3IONS-SCNC: 1 MMOL/L
AST SERPL W P-5'-P-CCNC: 11 U/L (ref 5–45)
BILIRUB SERPL-MCNC: 0.62 MG/DL (ref 0.2–1)
BUN SERPL-MCNC: 19 MG/DL (ref 5–25)
CALCIUM SERPL-MCNC: 10.3 MG/DL (ref 8.3–10.1)
CHLORIDE SERPL-SCNC: 109 MMOL/L (ref 96–108)
CO2 SERPL-SCNC: 29 MMOL/L (ref 21–32)
CREAT SERPL-MCNC: 1.04 MG/DL (ref 0.6–1.3)
GFR SERPL CREATININE-BSD FRML MDRD: 46 ML/MIN/1.73SQ M
GLUCOSE P FAST SERPL-MCNC: 132 MG/DL (ref 65–99)
POTASSIUM SERPL-SCNC: 5 MMOL/L (ref 3.5–5.3)
PROT SERPL-MCNC: 7.9 G/DL (ref 6.4–8.4)
SODIUM SERPL-SCNC: 139 MMOL/L (ref 135–147)

## 2023-08-16 PROCEDURE — 80053 COMPREHEN METABOLIC PANEL: CPT

## 2023-08-16 PROCEDURE — 36415 COLL VENOUS BLD VENIPUNCTURE: CPT

## 2023-08-17 ENCOUNTER — APPOINTMENT (OUTPATIENT)
Dept: LAB | Facility: CLINIC | Age: 88
End: 2023-08-17
Payer: COMMERCIAL

## 2023-08-17 PROCEDURE — 36415 COLL VENOUS BLD VENIPUNCTURE: CPT

## 2023-08-17 PROCEDURE — 80307 DRUG TEST PRSMV CHEM ANLYZR: CPT

## 2023-08-23 LAB — MISCELLANEOUS LAB TEST RESULT: NORMAL

## 2023-10-19 ENCOUNTER — VBI (OUTPATIENT)
Dept: ADMINISTRATIVE | Facility: OTHER | Age: 88
End: 2023-10-19

## 2023-10-19 NOTE — TELEPHONE ENCOUNTER
10/19/23 11:54 AM    Patient contacted (left message) to bring Advance Directive, POLST, or Living Will document to next scheduled pcp visit. Thank you.   Karen Gomez  [facility]

## 2023-10-24 ENCOUNTER — APPOINTMENT (OUTPATIENT)
Dept: LAB | Facility: CLINIC | Age: 88
End: 2023-10-24
Payer: COMMERCIAL

## 2023-10-27 ENCOUNTER — IMMUNIZATIONS (OUTPATIENT)
Dept: FAMILY MEDICINE CLINIC | Facility: CLINIC | Age: 88
End: 2023-10-27
Payer: COMMERCIAL

## 2023-10-27 DIAGNOSIS — Z23 ENCOUNTER FOR IMMUNIZATION: Primary | ICD-10-CM

## 2023-10-27 PROCEDURE — 90662 IIV NO PRSV INCREASED AG IM: CPT

## 2023-10-27 PROCEDURE — G0008 ADMIN INFLUENZA VIRUS VAC: HCPCS

## 2023-11-07 ENCOUNTER — NURSE TRIAGE (OUTPATIENT)
Age: 88
End: 2023-11-07

## 2023-11-07 ENCOUNTER — TELEMEDICINE (OUTPATIENT)
Dept: FAMILY MEDICINE CLINIC | Facility: CLINIC | Age: 88
End: 2023-11-07
Payer: COMMERCIAL

## 2023-11-07 DIAGNOSIS — M06.09 RHEUMATOID ARTHRITIS OF MULTIPLE SITES WITH NEGATIVE RHEUMATOID FACTOR (HCC): ICD-10-CM

## 2023-11-07 DIAGNOSIS — K21.9 GASTROESOPHAGEAL REFLUX DISEASE WITHOUT ESOPHAGITIS: Primary | ICD-10-CM

## 2023-11-07 DIAGNOSIS — M15.9 GENERALIZED OSTEOARTHRITIS: ICD-10-CM

## 2023-11-07 PROCEDURE — 99442 PR PHYS/QHP TELEPHONE EVALUATION 11-20 MIN: CPT | Performed by: STUDENT IN AN ORGANIZED HEALTH CARE EDUCATION/TRAINING PROGRAM

## 2023-11-07 RX ORDER — PANTOPRAZOLE SODIUM 40 MG/1
40 TABLET, DELAYED RELEASE ORAL DAILY
Qty: 90 TABLET | Refills: 0 | Status: SHIPPED | OUTPATIENT
Start: 2023-11-07

## 2023-11-07 NOTE — PROGRESS NOTES
Virtual Brief Visit    This Visit is being completed by telephone. The Patient is located at Home and in the following state in which I hold an active license [place]    The patient was identified by name and date of birth. Olvin Prasad was informed that this is a telemedicine visit and that the visit is being conducted through Telephone. My office door was closed. No one else was in the room. She acknowledged consent and understanding of privacy and security of the video platform. The patient has agreed to participate and understands they can discontinue the visit at any time. Patient is aware this is a billable service. Assessment/Plan:    Problem List Items Addressed This Visit        Musculoskeletal and Integument    Generalized osteoarthritis    Rheumatoid arthritis of multiple sites with negative rheumatoid factor ([address])   Other Visit Diagnoses     Gastroesophageal reflux disease without esophagitis    -  Primary    Relevant Medications    pantoprazole (PROTONIX) 40 mg tablet        Discussion over the phone today, symptoms appear to be more consistent with gastric reflux, has previously been on Protonix and has not taken in some time. No shortness of breath or chest pain, noncardiac. Patient also notes low appetite due to reflux symptoms, we will monitor this closely, recommend increasing oral hydration at this time, there may be some underlying gastroenteritis symptoms. If symptoms do not respond or worsen, I do recommend follow-up in office for further evaluation. Patient and family in agreement of plan, close follow-up encouraged. Recent Visits  No visits were found meeting these conditions.   Showing recent visits within past 7 days and meeting all other requirements  Today's Visits  Date Type Provider Dept   11/07/23 Telemedicine [id number] [facility] today's visits and meeting all other requirements  Future Appointments  No visits were found meeting these conditions.   Showing future appointments within next 150 days and meeting all other requirements         Visit Time  Total Visit Duration: 12 minutes

## 2023-11-07 NOTE — TELEPHONE ENCOUNTER
Regarding: High BP  ----- Message from Yris Henderson sent at 11/7/2023  4:56 PM EST -----  PT just had a virtual with Dr Neil Herman for stomach issues. Son is requesting a phone call to see if his mom is drinking enough water and they are concerned because her BP is 169/83.

## 2023-11-07 NOTE — TELEPHONE ENCOUNTER
Spoke with son . Repeated blood pressure while on the phone. 165/80. No new symptoms since telemedicine visit today. Will continue to hydrate and will call the office in the morning with an update. Will call back if any concerns . Please follow up with patient in the morning    Reason for Disposition   Systolic BP >= 122 OR Diastolic >= 165    Answer Assessment - Initial Assessment Questions  1. BLOOD PRESSURE: "What is the blood pressure?" "Did you take at least two measurements 5 minutes apart?"      169/83, 165/80  2. ONSET: "When did you take your blood pressure?"      Right now   3. HOW: "How did you obtain the blood pressure?" (e.g., visiting nurse, automatic home BP monitor)      At home  4. HISTORY: "Do you have a history of high blood pressure?"      Denies   5. MEDICATIONS: "Are you taking any medications for blood pressure?" "Have you missed any doses recently?"      Denies   6. OTHER SYMPTOMS: "Do you have any symptoms?" (e.g., headache, chest pain, blurred vision, difficulty breathing, weakness)      Headache which she reported today at the visit.     Protocols used: Blood Pressure - High-ADULT-[place]

## 2023-11-08 NOTE — TELEPHONE ENCOUNTER
Patient son called back and advised she is doing better, bp now 150/70, no indigestion, no need for appointment.

## 2023-11-09 NOTE — PROGRESS NOTES
Progress Note - Cardiology Office  [facility]    Pete Miranda 80 y.o. female MRN: [id number]  : 1929  Encounter: [id number]      ASSESSMENT:   Dyspnea on exertion  Probably secondary to deconditioning. The patient's son again stated that she hardly moves or ambulates at all. Echo, 2021:    EF 60%, G1DD, mild to moderate aortic regurgitation    Pro NT BNP, 2021    95/normal  > therefore her dyspnea is unlikely secondary to a cardiac etiology and is probably multifactorial including age, deconditioning and possibly effects of rheumatoid arthritis     Rheumatoid arthritis  Managed by PCP  On methotrexate 2.5 mg     Thrombocytopenia  Managed by PCP     Hypertension:    BP today is 120/70 mmHg with heart rate of 82/  On Coreg 3.125 mg b.i.d. Aortic regurgitation  Mild to moderate    Abnormal EKG  IVCD, cannot exclude anterolateral infarct of undetermined age        RECOMMENDATIONS:  Continue Coreg  Offered patient to undergo pharmacologic nuclear stress test to rule out significant coronary artery disease and decide appropriate treatment. Patient declined stating that at her age she does not want to have that. Again advised patient to increase level of activity, ambulation, exercise. Also advised patient to do some regular breathing exercises        Please call [phone] if any questions. HPI :     Pete Miranda is a 80y.o. year old female who came for follow up. She again states that she has dyspnea on exertion. She comes in a wheelchair. According to her son she hardly moves and mainly consumes carbohydrates all day. We discussed the result of her previous echocardiogram.  I offered her to undergo a pharmacologic nuclear stress test which the patient declined.   I advised her to increase activity and ambulation and exercise and especially to do breathing exercises    REVIEW OF SYSTEMS:  Review of Systems   Respiratory:  Positive for shortness of breath (Dyspnea on exertion). Musculoskeletal:  Positive for arthralgias and gait problem. All other systems reviewed and are negative.         Historical Information   Past Medical History:   Diagnosis Date    Carpal tunnel syndrome     Degeneration of lumbar intervertebral disc     Postmenopausal osteoporosis     Primary generalized (osteo)arthritis     Rheumatoid arthritis ([address])     Right shoulder pain      Past Surgical History:   Procedure Laterality Date    CARPAL TUNNEL RELEASE      CATARACT EXTRACTION      CHOLECYSTECTOMY      KNEE SURGERY      MASS EXCISION Right 2019    Procedure: EXCISION BIOPSY TISSUE LESION/MASS HAND;  Surgeon: Lita Kapadia DO;  Location: [facility];  Service: Orthopedics    TONSILLECTOMY       Social History     Substance and Sexual Activity   Alcohol Use Yes    Comment: rarely     Social History     Substance and Sexual Activity   Drug Use No     Social History     Tobacco Use   Smoking Status Former    Years: 40.00    Types: Cigarettes    Quit date: 1998    Years since quittin.8   Smokeless Tobacco Never     Family History:   Family History   Problem Relation Age of Onset    Cancer Sister     Breast cancer Mother     Breast cancer Maternal Grandmother     No Known Problems Father     No Known Problems Brother     No Known Problems Maternal Aunt     No Known Problems Maternal Uncle     No Known Problems Paternal Aunt     No Known Problems Paternal Uncle     No Known Problems Maternal Grandfather     No Known Problems Paternal Grandmother     No Known Problems Paternal Grandfather     ADD / ADHD Neg Hx     Anesthesia problems Neg Hx     Clotting disorder Neg Hx     Collagen disease Neg Hx     Diabetes Neg Hx     Dislocations Neg Hx     Learning disabilities Neg Hx     Neurological problems Neg Hx     Osteoporosis Neg Hx     Rheumatologic disease Neg Hx     Scoliosis Neg Hx     Vascular Disease Neg Hx        Meds/Allergies     No Known Allergies    Current Outpatient Medications:     Anoro Ellipta 62.5-25 MCG/ACT inhaler, Inhale 1 puff daily, Disp: 60 blister, Rfl: 11    carvedilol (COREG) 3.125 mg tablet, TAKE ONE TABLET BY MOUTH TWICE A DAY WITH MEALS, Disp: 180 tablet, Rfl: 0    docusate sodium (COLACE) 100 mg capsule, Take 2 capsules (200 mg total) by mouth daily, Disp: 90 capsule, Rfl: 0    folic acid (FOLVITE) 1 mg tablet, Take 1 tablet (1 mg total) by mouth daily, Disp: 30 tablet, Rfl: 11    gabapentin (NEURONTIN) 400 mg capsule, 400 mg daily at bedtime  , Disp: , Rfl:     methotrexate 2.5 mg tablet, , Disp: , Rfl:     oxyCODONE-acetaminophen (PERCOCET) 5-325 mg per tablet, Take 1 tablet by mouth every 4 (four) hours as needed, Disp: , Rfl:     pantoprazole (PROTONIX) 40 mg tablet, Take 1 tablet (40 mg total) by mouth daily, Disp: 90 tablet, Rfl: 0    predniSONE 2.5 mg tablet, , Disp: , Rfl:     Prolia 60 MG/ML, , Disp: , Rfl:     lidocaine (LIDODERM) 5 %, Apply 1 patch topically daily for 7 days Remove & Discard patch within 12 hours or as directed by MD, Disp: 7 patch, Rfl: 0    Vitals: Blood pressure 122/70, pulse 82, height 5' 3" (1.6 m), SpO2 94 %. Body mass index is 26.04 kg/m². Vitals:     BP Readings from Last 3 Encounters:   11/10/23 122/70   04/24/23 152/90   04/03/23 140/82       Physical Exam:  Physical Exam    Neurologic:  Alert & oriented x 3, no new focal deficits, Not in any acute distress,  Constitutional:  Well developed, well nourished, non-toxic appearance   Eyes:  Pupil equal and reacting to light, conjunctiva normal,   HENT:  Atraumatic, oropharynx moist, Neck- normal range of motion, no tenderness,  Neck supple, No JVP, No LNP   Respiratory:  Bilateral air entry, mostly clear to auscultation  Cardiovascular: S1-S2 regular with a I/VI systolic murmur   GI:  Soft, nondistended, normal bowel sounds, nontender, no hepatosplenomegaly appreciated. Musculoskeletal:  No edema, no tenderness, no deformities.    Skin:  Well hydrated, no rash Lymphatic:  No lymphadenopathy noted   Extremities:  No edema and distal pulses are present        Diagnostic Studies Review Cardio:      EKG: Normal sinus rhythm, heart rate 82/min, left axis deviation, cannot exclude anterolateral infarct of undetermined age, IVCD    Cardiac testing:   Results for orders placed during the hospital encounter of 21    Echo complete with contrast if indicated    Narrative  [address] [place] [address] J[facility] Lanza [facility], [facility] [address] [place] [address]  [phone]    Transthoracic Echocardiogram  2D, M-mode, Doppler, and Color Doppler    Study date:  2021    Patient: Mona Giraldo  MR number: [id number]  Account number: [de-identified]  : 1929  Age: 80 years  Gender: Female  Status: Outpatient  Location: Echo lab  Height: 63 in  Weight: 144 lb  BP: 120/ 60 mmHg    Indications: SOB    Diagnoses: R06.02 - Shortness of breath    Sonographer:  RENATE García  Primary Physician:  Tonny Anthony MD  Referring Physician:  Silvano Kirkland MD  Group:  [facility]  Interpreting Physician:  Silvano Kirkland MD    SUMMARY    LEFT VENTRICLE:  Normal left ventricular chamber size and wall thickness. Visually estimated left ventricular ejection fraction is 60%  No definite regional wall motion abnormality seen.   Grade 1 diastolic dysfunction    RIGHT VENTRICLE:  Normal right ventricular size and systolic function  TAPSE is 2.9 cm    LEFT ATRIUM:  Normal left atrial size    RIGHT ATRIUM:  Normal right atrial size    MITRAL VALVE:  Mildly thickened mitral valve leaflets with mild annular calcification  There is trace mitral regurgitation  No mitral stenosis seen    AORTIC VALVE:  Aortic valve is trileaflet  There is mild to moderate aortic regurgitation  There is no aortic stenosis    TRICUSPID VALVE:  There is trace tricuspid regurgitation  Normal PA pressure    PULMONIC VALVE:  There is mild pulmonary regurgitation    AORTA:  Normal aortic root size    IVC, HEPATIC VEINS:  IVC is of normal size and shows normal respirophasic variations    PERICARDIUM:  No pericardial effusion seen    HISTORY: PRIOR HISTORY: Thrombocytopenia,Former smoker    PROCEDURE: The procedure was performed in the echo lab. This was a routine study. The transthoracic approach was used. The study included complete 2D imaging, M-mode, complete spectral Doppler, and color Doppler. The heart rate was 43 bpm,  at the start of the study. Echocardiographic views were limited due to lung interference. Image quality was adequate. LEFT VENTRICLE: Normal left ventricular chamber size and wall thickness. Visually estimated left ventricular ejection fraction is 60%  No definite regional wall motion abnormality seen. Grade 1 diastolic dysfunction    RIGHT VENTRICLE: Normal right ventricular size and systolic function  TAPSE is 2.9 cm    LEFT ATRIUM: Normal left atrial size    RIGHT ATRIUM: Normal right atrial size    MITRAL VALVE: Mildly thickened mitral valve leaflets with mild annular calcification  There is trace mitral regurgitation  No mitral stenosis seen    AORTIC VALVE: Aortic valve is trileaflet  There is mild to moderate aortic regurgitation  There is no aortic stenosis    TRICUSPID VALVE: There is trace tricuspid regurgitation  Normal PA pressure    PULMONIC VALVE: There is mild pulmonary regurgitation    PERICARDIUM: No pericardial effusion seen    AORTA: Normal aortic root size    SYSTEMIC VEINS: IVC: IVC is of normal size and shows normal respirophasic variations The inferior vena cava was normal in size and course.     SYSTEM MEASUREMENT TABLES    2D  EF (Teich): 59.92 %  %FS: 31.9 %  Ao Diam: 3.16 cm  EDV(Teich): 105.16 ml  ESV(Teich): 42.15 ml  IVSd: 0.82 cm  LA Area: 12.51 cm2  LA Diam: 3.54 cm  LVEDV MOD A4C: 77.41 ml  LVEF MOD A4C: 55.13 %  LVESV MOD A4C: 34.73 ml  LVIDd: 4.75 cm  LVIDs: 3.24 cm  LVLd A4C: 6.99 cm  LVLs A4C: 5.78 cm  LVPWd: 0.97 cm  SV (Teich): 63.02 ml  SV MOD A4C: 42.68 ml    PW  MV E/A Ratio: 0.63    [employer] Commission Accredited Echocardiography Laboratory    Prepared and electronically signed by    Sonia Hutchins MD  Signed 12-Feb-2021 16:29:31      Imaging:  Chest X-Ray:   No Chest XR results available for this patient. CT-scan of the chest:     No CTA results available for this patient.   Lab Review   Lab Results   Component Value Date    WBC 7.81 10/24/2023    HGB 13.3 10/24/2023    HCT 39.4 10/24/2023     (H) 10/24/2023    RDW 15.2 (H) 10/24/2023     (L) 10/24/2023     BMP:  Lab Results   Component Value Date    SODIUM 139 10/24/2023    K 5.3 10/24/2023     10/24/2023    CO2 32 10/24/2023    ANIONGAP 10.5 12/08/2015    BUN 22 10/24/2023    CREATININE 0.99 10/24/2023    GLUC 144 (H) 01/10/2022    GLUF 125 (H) 10/24/2023    CALCIUM 10.1 10/24/2023    CORRECTEDCA 10.3 (H) 04/03/2023    EGFR 49 10/24/2023    MG 2.2 12/19/2018     LFT:  Lab Results   Component Value Date    AST 15 10/24/2023    ALT 9 10/24/2023    ALKPHOS 36 10/24/2023    TP 7.2 10/24/2023    ALB 3.6 10/24/2023      No components found for: "TSH3"  No results found for: "ILY8KBELNKAB"  No results found for: "HGBA1C"  Lipid Profile:   Lab Results   Component Value Date    CHOLESTEROL 189 07/15/2021    HDL 67 07/15/2021    LDLCALC 103 (H) 07/15/2021    TRIG 94 07/15/2021     Lab Results   Component Value Date    CHOLESTEROL 189 07/15/2021     Lab Results   Component Value Date    CKTOTAL 40 07/19/2022     Lab Results   Component Value Date    NTBNP 95 01/22/2021      Recent Results (from the past 672 hour(s))   Comprehensive metabolic panel    Collection Time: 10/24/23 11:05 AM   Result Value Ref Range    Sodium 139 135 - 147 mmol/L    Potassium 5.3 3.5 - 5.3 mmol/L    Chloride 101 96 - 108 mmol/L    CO2 32 21 - 32 mmol/L    ANION GAP 6 mmol/L    BUN 22 5 - 25 mg/dL    Creatinine 0.99 0.60 - 1.30 mg/dL    Glucose, Fasting 125 (H) 65 - 99 mg/dL    Calcium 10.1 8.4 - 10.2 mg/dL AST 15 13 - 39 U/L    ALT 9 7 - 52 U/L    Alkaline Phosphatase 36 34 - 104 U/L    Total Protein 7.2 6.4 - 8.4 g/dL    Albumin 3.6 3.5 - 5.0 g/dL    Total Bilirubin 0.42 0.20 - 1.00 mg/dL    eGFR 49 ml/min/1.73sq m   Sedimentation rate, automated    Collection Time: 10/24/23 11:05 AM   Result Value Ref Range    Sed Rate 21 0 - 29 mm/hour   CBC and differential    Collection Time: 10/24/23 11:05 AM   Result Value Ref Range    WBC 7.81 4.31 - 10.16 Thousand/uL    RBC 3.91 3.81 - 5.12 Million/uL    Hemoglobin 13.3 11.5 - 15.4 g/dL    Hematocrit 39.4 34.8 - 46.1 %     (H) 82 - 98 fL    MCH 34.0 26.8 - 34.3 pg    MCHC 33.8 31.4 - 37.4 g/dL    RDW 15.2 (H) 11.6 - 15.1 %    MPV 12.5 8.9 - 12.7 fL    Platelets 897 (L) 882 - 390 Thousands/uL    nRBC 0 /100 WBCs    Neutrophils Relative 53 43 - 75 %    Immat GRANS % 1 0 - 2 %    Lymphocytes Relative 24 14 - 44 %    Monocytes Relative 20 (H) 4 - 12 %    Eosinophils Relative 1 0 - 6 %    Basophils Relative 1 0 - 1 %    Neutrophils Absolute 4.18 1.85 - 7.62 Thousands/µL    Immature Grans Absolute 0.06 0.00 - 0.20 Thousand/uL    Lymphocytes Absolute 1.89 0.60 - 4.47 Thousands/µL    Monocytes Absolute 1.56 (H) 0.17 - 1.22 Thousand/µL    Eosinophils Absolute 0.07 0.00 - 0.61 Thousand/µL    Basophils Absolute 0.05 0.00 - 0.10 Thousands/µL             Dr. Aniket Anthony MD, [facility]      "This note has been constructed using a voice recognition system. Therefore there may be syntax, spelling, and/or grammatical errors.  Please call if you have any questions. "

## 2023-11-10 ENCOUNTER — OFFICE VISIT (OUTPATIENT)
Dept: CARDIOLOGY CLINIC | Facility: CLINIC | Age: 88
End: 2023-11-10
Payer: COMMERCIAL

## 2023-11-10 VITALS
OXYGEN SATURATION: 94 % | SYSTOLIC BLOOD PRESSURE: 122 MMHG | BODY MASS INDEX: 26.04 KG/M2 | DIASTOLIC BLOOD PRESSURE: 70 MMHG | HEART RATE: 82 BPM | HEIGHT: 63 IN

## 2023-11-10 DIAGNOSIS — R06.02 SOB (SHORTNESS OF BREATH): Primary | ICD-10-CM

## 2023-11-10 PROCEDURE — 93000 ELECTROCARDIOGRAM COMPLETE: CPT | Performed by: INTERNAL MEDICINE

## 2023-11-10 PROCEDURE — 99214 OFFICE O/P EST MOD 30 MIN: CPT | Performed by: INTERNAL MEDICINE

## 2023-11-29 ENCOUNTER — TELEPHONE (OUTPATIENT)
Age: 88
End: 2023-11-29

## 2023-11-29 NOTE — TELEPHONE ENCOUNTER
Spoke with patient heri Pyle is requesting to get a recommendation for a Rheumatologist for the patient. Please advise.

## 2023-12-01 NOTE — TELEPHONE ENCOUNTER
Son called today stating he still has not heard back in regards to rheumatologist recommendations. Please send recommendations through [employer] message.

## 2023-12-02 ENCOUNTER — HOSPITAL ENCOUNTER (EMERGENCY)
Facility: HOSPITAL | Age: 88
Discharge: HOME | End: 2023-12-02
Attending: EMERGENCY MEDICINE | Admitting: EMERGENCY MEDICINE
Payer: COMMERCIAL

## 2023-12-02 ENCOUNTER — APPOINTMENT (EMERGENCY)
Dept: RADIOLOGY | Facility: HOSPITAL | Age: 88
End: 2023-12-02
Payer: COMMERCIAL

## 2023-12-02 VITALS
OXYGEN SATURATION: 94 % | DIASTOLIC BLOOD PRESSURE: 60 MMHG | HEART RATE: 72 BPM | SYSTOLIC BLOOD PRESSURE: 139 MMHG | TEMPERATURE: 99.4 F | RESPIRATION RATE: 18 BRPM

## 2023-12-02 DIAGNOSIS — M19.90 OSTEOARTHRITIS: ICD-10-CM

## 2023-12-02 DIAGNOSIS — M25.40 EFFUSION INTO JOINT: ICD-10-CM

## 2023-12-02 DIAGNOSIS — S83.90XA KNEE SPRAIN: Primary | ICD-10-CM

## 2023-12-02 LAB — CRYSTALS SNV QL MICRO: NORMAL

## 2023-12-02 PROCEDURE — 99285 EMERGENCY DEPT VISIT HI MDM: CPT | Performed by: EMERGENCY MEDICINE

## 2023-12-02 PROCEDURE — 99284 EMERGENCY DEPT VISIT MOD MDM: CPT

## 2023-12-02 PROCEDURE — 20610 DRAIN/INJ JOINT/BURSA W/O US: CPT | Performed by: EMERGENCY MEDICINE

## 2023-12-02 PROCEDURE — 73564 X-RAY EXAM KNEE 4 OR MORE: CPT

## 2023-12-02 PROCEDURE — 89060 EXAM SYNOVIAL FLUID CRYSTALS: CPT | Performed by: EMERGENCY MEDICINE

## 2023-12-02 PROCEDURE — 87070 CULTURE OTHR SPECIMN AEROBIC: CPT | Performed by: EMERGENCY MEDICINE

## 2023-12-02 PROCEDURE — 87205 SMEAR GRAM STAIN: CPT | Performed by: EMERGENCY MEDICINE

## 2023-12-02 NOTE — ED PROVIDER NOTES
History  Chief Complaint   Patient presents with    Knee Pain     Patient states she was walking with daughter and "slid on the ground" on Wednesday. 2 days later Right knee pain. Patient states she has had problems with her right knee for years since fracturing her lower leg at the age of 39. She gets episodic pain and swelling in the knee which usually resolves on its own. Patient states on Wednesday of this week 3 days prior to arrival her right knee just gave out while she was walking. This was witnessed and her family helped her to the floor. Patient states she was able to get up and continue to walk that day however each day since the knee has become progressively more swollen and more painful. Since yesterday she has been unable to walk on it. Denies any other injury        Prior to Admission Medications   Prescriptions Last Dose Informant Patient Reported? Taking?    Anoro Ellipta 62.5-25 MCG/ACT inhaler   No No   Sig: Inhale 1 puff daily   Prolia 60 MG/ML   Yes No   carvedilol (COREG) 3.125 mg tablet   No No   Sig: TAKE ONE TABLET BY MOUTH TWICE A DAY WITH MEALS   docusate sodium (COLACE) 100 mg capsule   No No   Sig: Take 2 capsules (200 mg total) by mouth daily   folic acid (FOLVITE) 1 mg tablet  Self No No   Sig: Take 1 tablet (1 mg total) by mouth daily   gabapentin (NEURONTIN) 400 mg capsule  Self Yes No   Si mg daily at bedtime     lidocaine (LIDODERM) 5 %  Self No No   Sig: Apply 1 patch topically daily for 7 days Remove & Discard patch within 12 hours or as directed by MD   methotrexate 2.5 mg tablet  Self Yes No   oxyCODONE-acetaminophen (PERCOCET) 5-325 mg per tablet  Self Yes No   Sig: Take 1 tablet by mouth every 4 (four) hours as needed   pantoprazole (PROTONIX) 40 mg tablet   No No   Sig: Take 1 tablet (40 mg total) by mouth daily   predniSONE 2.5 mg tablet  Self Yes No      Facility-Administered Medications: None       Past Medical History:   Diagnosis Date    Carpal tunnel syndrome     Degeneration of lumbar intervertebral disc     Postmenopausal osteoporosis     Primary generalized (osteo)arthritis     Rheumatoid arthritis ([address])     Right shoulder pain        Past Surgical History:   Procedure Laterality Date    CARPAL TUNNEL RELEASE      CATARACT EXTRACTION      CHOLECYSTECTOMY      KNEE SURGERY      MASS EXCISION Right 2019    Procedure: EXCISION BIOPSY TISSUE LESION/MASS HAND;  Surgeon: Ivet Rojo DO;  Location: [facility];  Service: Orthopedics    TONSILLECTOMY         Family History   Problem Relation Age of Onset    Cancer Sister     Breast cancer Mother     Breast cancer Maternal Grandmother     No Known Problems Father     No Known Problems Brother     No Known Problems Maternal Aunt     No Known Problems Maternal Uncle     No Known Problems Paternal Aunt     No Known Problems Paternal Uncle     No Known Problems Maternal Grandfather     No Known Problems Paternal Grandmother     No Known Problems Paternal Grandfather     ADD / ADHD Neg Hx     Anesthesia problems Neg Hx     Clotting disorder Neg Hx     Collagen disease Neg Hx     Diabetes Neg Hx     Dislocations Neg Hx     Learning disabilities Neg Hx     Neurological problems Neg Hx     Osteoporosis Neg Hx     Rheumatologic disease Neg Hx     Scoliosis Neg Hx     Vascular Disease Neg Hx      I have reviewed and agree with the history as documented. E-Cigarette/Vaping    E-Cigarette Use Never User      E-Cigarette/Vaping Substances     Social History     Tobacco Use    Smoking status: Former     Years: 40.00     Types: Cigarettes     Quit date: 1998     Years since quittin.9    Smokeless tobacco: Never   Vaping Use    Vaping Use: Never used   Substance Use Topics    Alcohol use: Yes     Comment: rarely    Drug use: No       Review of Systems   Constitutional:  Negative for chills and fever. HENT:  Negative for congestion. Eyes:  Negative for visual disturbance.    Respiratory:  Negative for cough and shortness of breath. Cardiovascular:  Negative for chest pain. Gastrointestinal:  Negative for abdominal pain and vomiting. Genitourinary:  Negative for dysuria. Musculoskeletal:  Positive for arthralgias, gait problem and joint swelling. Skin:  Negative for rash. Neurological:  Positive for weakness. Negative for dizziness and headaches. Hematological:  Does not bruise/bleed easily. Psychiatric/Behavioral:  Negative for confusion. All other systems reviewed and are negative. Physical Exam  Physical Exam  Vitals and nursing note reviewed. Constitutional:       Appearance: Normal appearance. HENT:      Head: Normocephalic. Right Ear: External ear normal.      Left Ear: External ear normal.      Nose: Nose normal.      Mouth/Throat:      Pharynx: Oropharynx is clear. Eyes:      Conjunctiva/sclera: Conjunctivae normal.   Cardiovascular:      Rate and Rhythm: Normal rate. Pulses: Normal pulses. Pulmonary:      Effort: Pulmonary effort is normal.   Abdominal:      Palpations: Abdomen is soft. Musculoskeletal:         General: Swelling and tenderness present. Cervical back: Normal range of motion. Comments: Patient has a swollen effusion of the right knee without erythema or cellulitis. Rotation of range of motion with inability to lock in extension. Skin:     General: Skin is warm and dry. Capillary Refill: Capillary refill takes less than 2 seconds. Neurological:      General: No focal deficit present. Mental Status: She is alert and oriented to person, place, and time.    Psychiatric:         Mood and Affect: Mood normal.         Behavior: Behavior normal.         Vital Signs  ED Triage Vitals   Temperature Pulse Respirations Blood Pressure SpO2   12/02/23 1619 12/02/23 1604 12/02/23 1604 12/02/23 1604 12/02/23 1604   99.4 °F (37.4 °C) 75 18 129/63 93 %      Temp Source Heart Rate Source Patient Position - Orthostatic VS BP Location FiO2 (%) 12/02/23 1619 12/02/23 1604 -- -- --   Tympanic Monitor         Pain Score       --                  Vitals:    12/02/23 1604 12/02/23 1745 12/02/23 1845   BP: 129/63  139/60   Pulse: 75 70 72         Visual Acuity      ED Medications  Medications - No data to display    Diagnostic Studies  Results Reviewed       Procedure Component Value Units Date/Time    Body fluid culture and Gram stain [[id number]] Collected: 12/02/23 2020    Lab Status: In process Specimen: Body Fluid from Joint, Right Knee Updated: 12/02/23 2026    Synovial fluid, crystal [[id number]] Collected: 12/02/23 2020    Lab Status: In process Specimen: Synovial Fluid from Joint, Right Knee Updated: 12/02/23 2026                   XR knee 4+ views Right injury   Final Result by Denver Kaufman, DO (12/02 1951)      No acute osseous abnormality. Degenerative changes as described. Workstation performed: [id number]                    Procedures  Orthopedic injury treatment    Date/Time: 12/2/2023 8:06 PM    Performed by: Elayne Dee MD  Authorized by: Elayne Dee MD    Patient Location:  ED  Universal Protocol:  Consent given by: patient  Patient identity confirmed: verbally with patient    Injury location:  Knee  Location details:  Right knee  Injury type:   Soft tissue  Neurovascular status: Neurovascularly intact    Distal perfusion: normal    Neurological function: normal    Range of motion: reduced    Local anesthesia used?: Yes    Anesthesia:  Local infiltration  Local anesthetic:  Lidocaine 1% with epinephrine  Skeletal traction used?: No    Immobilization:  Knee immobilizer  Distal perfusion: normal    Neurological function: normal    Range of motion: improved     20 cc fluid aspiration           ED Course                                             Medical Decision Making  X-rays were obtained which showed multiple changes to the knee including advanced DJD, joint effusion, calcification changes and a possible osteophyte. There is a area of concern for possible tibial plateau fracture. X-ray department was called for reading. Case was discussed with orthopedic Dr. Fletcher Garza who recommended aspiration as above, with follow-up    Amount and/or Complexity of Data Reviewed  Labs: ordered. Radiology: ordered. Disposition  Final diagnoses:   Knee sprain   Effusion into joint   Osteoarthritis     Time reflects when diagnosis was documented in both MDM as applicable and the Disposition within this note       Time User Action Codes Description Comment    12/2/2023  8:18 PM Nory Flores Add [S83.90XA] Knee sprain     12/2/2023  8:19 PM Teresa Speller A Add [M25.40] Effusion into joint     12/2/2023  8:19 PM Nory Flores Add [X52.32] Osteoarthritis           ED Disposition       ED Disposition   Discharge    Condition   Stable    Date/Time   Sat Dec 2, 2023  8:18 PM    Comment   Jessica Underwood discharge to home/self care. Follow-up Information       Follow up With Specialties Details Why [address], DO Orthopedic Surgery Schedule an appointment as soon as possible for a visit in 1 day  [address] [place] [address]  [phone]              Patient's Medications   Discharge Prescriptions    No medications on file       No discharge procedures on file.     PDMP Review       None            ED Provider  Electronically Signed by             Nory Flores MD  12/02/23 3049

## 2023-12-04 ENCOUNTER — TELEPHONE (OUTPATIENT)
Age: 88
End: 2023-12-04

## 2023-12-04 DIAGNOSIS — M15.9 GENERALIZED OSTEOARTHRITIS: ICD-10-CM

## 2023-12-04 DIAGNOSIS — M06.09 RHEUMATOID ARTHRITIS OF MULTIPLE SITES WITH NEGATIVE RHEUMATOID FACTOR (HCC): Primary | ICD-10-CM

## 2023-12-04 NOTE — TELEPHONE ENCOUNTER
Caller: Patients son Genoveva Rodriguez    Doctor:     Reason for call: Patient is scheduled for a NP appointment on 1/31/2024 to establish care. Genoveva Abhishekgasper stated that her previous Rheumatologist retired. He is asking what he can do from now until the appointment for refill on her Oxy.     Call back#: [phone]

## 2023-12-04 NOTE — TELEPHONE ENCOUNTER
Unfortunately I have a policy of not prescribing narcotics. She will need to get a refill from her PCP.  If PCP is unable to do this then PCP can write a pain medicine referral.

## 2023-12-04 NOTE — TELEPHONE ENCOUNTER
Patients son called in today requesting 2 referrals for his mother. 1st referral: Dr. Yoana Lazcano, orthopedic. She is being seen for her right knee. That appointment is on Tuesday 12/12/23 at 2:00pm.    2nd referral: Dr. Georgie Simmons , Rheumatology. That appointment is on 1/31/24 at 11:00am at the [facility].  (He previous Rheumatologist retired)      Please advise, thank you

## 2023-12-04 NOTE — TELEPHONE ENCOUNTER
Continue feedings, no need to wake at 3 hours overnight.    No intervention needed for dry skin  Above eyebrows.    Omar Noonan

## 2023-12-05 ENCOUNTER — TELEPHONE (OUTPATIENT)
Dept: RHEUMATOLOGY | Facility: CLINIC | Age: 88
End: 2023-12-05

## 2023-12-05 LAB
BACTERIA SPEC BFLD CULT: NO GROWTH
GRAM STN SPEC: NORMAL
GRAM STN SPEC: NORMAL

## 2023-12-05 NOTE — TELEPHONE ENCOUNTER
Patient's son was called and it was explained to him that the Doctor has a no Narcotics policy and his mom could call her PCP to fill her script or she could see pain management to be evaluated and given what they feel she needs. Understanding was verbalized.

## 2023-12-12 ENCOUNTER — OFFICE VISIT (OUTPATIENT)
Dept: OBGYN CLINIC | Facility: CLINIC | Age: 88
End: 2023-12-12
Payer: COMMERCIAL

## 2023-12-12 ENCOUNTER — APPOINTMENT (OUTPATIENT)
Dept: RADIOLOGY | Facility: CLINIC | Age: 88
End: 2023-12-12
Payer: COMMERCIAL

## 2023-12-12 VITALS
BODY MASS INDEX: 26.05 KG/M2 | SYSTOLIC BLOOD PRESSURE: 139 MMHG | WEIGHT: 147 LBS | HEIGHT: 63 IN | HEART RATE: 72 BPM | DIASTOLIC BLOOD PRESSURE: 60 MMHG

## 2023-12-12 DIAGNOSIS — M17.11 PRIMARY OSTEOARTHRITIS OF RIGHT KNEE: Primary | ICD-10-CM

## 2023-12-12 DIAGNOSIS — M25.532 LEFT WRIST PAIN: ICD-10-CM

## 2023-12-12 PROCEDURE — 99204 OFFICE O/P NEW MOD 45 MIN: CPT | Performed by: ORTHOPAEDIC SURGERY

## 2023-12-12 PROCEDURE — 20610 DRAIN/INJ JOINT/BURSA W/O US: CPT | Performed by: ORTHOPAEDIC SURGERY

## 2023-12-12 PROCEDURE — 73110 X-RAY EXAM OF WRIST: CPT

## 2023-12-12 RX ADMIN — LIDOCAINE HYDROCHLORIDE 4 ML: 10 INJECTION, SOLUTION INFILTRATION; PERINEURAL at 14:00

## 2023-12-12 RX ADMIN — TRIAMCINOLONE ACETONIDE 40 MG: 40 INJECTION, SUSPENSION INTRA-ARTICULAR; INTRAMUSCULAR at 14:00

## 2023-12-12 NOTE — PROGRESS NOTES
Assessment/Plan:  1. Primary osteoarthritis of right knee  Ambulatory Referral to Orthopedic Surgery    Large joint arthrocentesis: R knee    lidocaine (XYLOCAINE) 1 % injection 4 mL    triamcinolone acetonide (KENALOG-40) 40 mg/mL injection 40 mg      2. Left wrist pain  XR wrist 3+ vw left        Scribe Attestation    I,:  Angeline Contreras am acting as a scribe while in the presence of the attending physician.:       I,:  Shama Jackson MD personally performed the services described in this documentation    as scribed in my presence.:           Terell Patterson is a pleasant 80year-old female who presents for initial evaluation of the right knee and left wrist. After obtaining a through history, orthopedic exam, and reviewing imaging I believe her symptoms are consistent with osteoarthritis of both the right knee. I offered a corticosteroid injection of the right knee today, as she is experiencing a good amount of pain and swelling. After discussion of risks and benefits with her and her son she would like a corticosteroid injection in her right knee which was given with good relief. For the left wrist she has a chronic deformity from a nonoperatively treated wrist fracture with dorsal angulation. 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 We discussed treatment options for the wrist as well. We did discuss that fixing her deformity would mean an osteotomy and internal fixation which would be a rather morbid surgery with a lot of associated risks for somebody who is 80years old. At this time we will continue to treat her conservatively. She may use a wrist brace as needed.  She will follow-up in 3 months or sooner if her symptoms worsen or persist.      Large joint arthrocentesis: R knee  Universal Protocol:  Consent given by: patient  Time out: Immediately prior to procedure a "time out" was called to verify the correct patient, procedure, equipment, support staff and site/side marked as required. Timeout called at: 12/12/2023 2:00 PM.  Patient understanding: patient states understanding of the procedure being performed  Site marked: the operative site was marked  Supporting Documentation  Indications: pain   Procedure Details  Location: knee - R knee  Preparation: Patient was prepped and draped in the usual sterile fashion  Needle size: 22 G  Ultrasound guidance: no  Approach: anterolateral  Medications administered: 4 mL lidocaine 1 %; 40 mg triamcinolone acetonide 40 mg/mL    Patient tolerance: patient tolerated the procedure well with no immediate complications  Dressing:  Sterile dressing applied            Subjective:   Savannah Love is a 80 y.o. female who presents for initial evaluation of the right knee and left wrist. She presented to the ED on 12/2/2023 after the knee gave out on her when she was walking. Since then the knee has become increasingly more painful and swollen. She has a previous injury to the leg from 58 years ago that left the knee effected over the years. She had a left wrist fracture treated nonoperatively a few years ago. She does have pain from time to time in her wrist but is more concerned with the deformity of her wrist.. She takes oxycodone for the wrist pain. Review of Systems   Constitutional:  Positive for activity change. Negative for chills and fever. HENT:  Negative for ear pain and sore throat. Eyes:  Negative for pain and visual disturbance. Respiratory:  Negative for cough and shortness of breath. Cardiovascular:  Negative for chest pain and palpitations. Gastrointestinal:  Negative for abdominal pain and vomiting. Genitourinary:  Negative for dysuria and hematuria. Musculoskeletal:  Positive for arthralgias, joint swelling and myalgias. Negative for back pain. Skin:  Negative for color change and rash. Neurological:  Negative for seizures and syncope. All other systems reviewed and are negative.         Past Medical History: Diagnosis Date   • Carpal tunnel syndrome    • Degeneration of lumbar intervertebral disc    • Postmenopausal osteoporosis    • Primary generalized (osteo)arthritis    • Rheumatoid arthritis ([address])    • Right shoulder pain        Past Surgical History:   Procedure Laterality Date   • CARPAL TUNNEL RELEASE     • CATARACT EXTRACTION     • CHOLECYSTECTOMY     • KNEE SURGERY     • MASS EXCISION Right 2019    Procedure: EXCISION BIOPSY TISSUE LESION/MASS HAND;  Surgeon: Rehan Calvo DO;  Location: [facility];  Service: Orthopedics   • TONSILLECTOMY         Family History   Problem Relation Age of Onset   • Cancer Sister    • Breast cancer Mother    • Breast cancer Maternal Grandmother    • No Known Problems Father    • No Known Problems Brother    • No Known Problems Maternal Aunt    • No Known Problems Maternal Uncle    • No Known Problems Paternal Aunt    • No Known Problems Paternal Uncle    • No Known Problems Maternal Grandfather    • No Known Problems Paternal Grandmother    • No Known Problems Paternal Grandfather    • ADD / ADHD Neg Hx    • Anesthesia problems Neg Hx    • Clotting disorder Neg Hx    • Collagen disease Neg Hx    • Diabetes Neg Hx    • Dislocations Neg Hx    • Learning disabilities Neg Hx    • Neurological problems Neg Hx    • Osteoporosis Neg Hx    • Rheumatologic disease Neg Hx    • Scoliosis Neg Hx    • Vascular Disease Neg Hx        Social History     Occupational History   • Not on file   Tobacco Use   • Smoking status: Former     Current packs/day: 0.00     Types: Cigarettes     Start date: 1958     Quit date: 1998     Years since quittin.9   • Smokeless tobacco: Never   Vaping Use   • Vaping status: Never Used   Substance and Sexual Activity   • Alcohol use: Yes     Comment: rarely   • Drug use: No   • Sexual activity: Not on file         Current Outpatient Medications:   •  Anoro Ellipta 62.5-25 MCG/ACT inhaler, Inhale 1 puff daily, Disp: 60 blister, Rfl: 11  • carvedilol (COREG) 3.125 mg tablet, TAKE ONE TABLET BY MOUTH TWICE A DAY WITH MEALS, Disp: 180 tablet, Rfl: 0  •  docusate sodium (COLACE) 100 mg capsule, Take 2 capsules (200 mg total) by mouth daily, Disp: 90 capsule, Rfl: 0  •  folic acid (FOLVITE) 1 mg tablet, Take 1 tablet (1 mg total) by mouth daily, Disp: 30 tablet, Rfl: 11  •  gabapentin (NEURONTIN) 400 mg capsule, 400 mg daily at bedtime  , Disp: , Rfl:   •  methotrexate 2.5 mg tablet, , Disp: , Rfl:   •  oxyCODONE-acetaminophen (PERCOCET) 5-325 mg per tablet, Take 1 tablet by mouth every 4 (four) hours as needed, Disp: , Rfl:   •  pantoprazole (PROTONIX) 40 mg tablet, Take 1 tablet (40 mg total) by mouth daily, Disp: 90 tablet, Rfl: 0  •  predniSONE 2.5 mg tablet, , Disp: , Rfl:   •  Prolia 60 MG/ML, , Disp: , Rfl:   •  lidocaine (LIDODERM) 5 %, Apply 1 patch topically daily for 7 days Remove & Discard patch within 12 hours or as directed by MD, Disp: 7 patch, Rfl: 0  No current facility-administered medications for this visit. No Known Allergies    Objective:  Vitals:    12/12/23 1359   BP: 139/60   Pulse: 72       Right Knee Exam     Tenderness   The patient is experiencing tenderness in the medial joint line and lateral joint line. Range of Motion   Extension:  0   Flexion:  90     Other   Erythema: absent  Scars: absent  Sensation: normal  Pulse: present  Swelling: moderate  Effusion: effusion present    Comments:  Small post injection bruise from ED          Observations     Right Knee   Positive for effusion. Left wrist exam.  Tender palpation about the dorsal wrist joint. Deformity with dorsal angulation of the wrist and prominent ulnar styloid. Range of motion, 30 degrees of extension 10 degrees of flexion. Physical Exam  Vitals and nursing note reviewed. Constitutional:       Appearance: Normal appearance. HENT:      Head: Normocephalic and atraumatic.       Right Ear: External ear normal.      Left Ear: External ear normal. Nose: Nose normal.   Eyes:      General: No scleral icterus. Extraocular Movements: Extraocular movements intact. Conjunctiva/sclera: Conjunctivae normal.   Cardiovascular:      Rate and Rhythm: Normal rate. Pulmonary:      Effort: Pulmonary effort is normal. No respiratory distress. Musculoskeletal:      Cervical back: Normal range of motion and neck supple. Right knee: Effusion present. Comments: See ortho exam   Skin:     General: Skin is warm and dry. Neurological:      Mental Status: She is alert and oriented to person, place, and time. Psychiatric:         Mood and Affect: Mood normal.         Behavior: Behavior normal.         I have personally reviewed pertinent films in PACS and my interpretation is as follows:  X-rays of the right knee obtained on 12/2/2023 demonstrate sever tricompartmental osteoarthritis of the knee. Left wrist films from 12/12/2023 were reviewed which show healed distal radius fracture with dorsal angulation and deformity. There is significant osteoarthritis of the wrist joint as well. This document was created using speech voice recognition software. Grammatical errors, random word insertions, pronoun errors, and incomplete sentences are an occasional consequence of this system due to software limitations, ambient noise, and hardware issues. Any formal questions or concerns about content, text, or information contained within the body of this dictation should be directly addressed to the provider for clarification.

## 2023-12-13 RX ORDER — TRIAMCINOLONE ACETONIDE 40 MG/ML
40 INJECTION, SUSPENSION INTRA-ARTICULAR; INTRAMUSCULAR
Status: COMPLETED | OUTPATIENT
Start: 2023-12-12 | End: 2023-12-12

## 2023-12-13 RX ORDER — LIDOCAINE HYDROCHLORIDE 10 MG/ML
4 INJECTION, SOLUTION INFILTRATION; PERINEURAL
Status: COMPLETED | OUTPATIENT
Start: 2023-12-12 | End: 2023-12-12

## 2023-12-29 ENCOUNTER — TELEPHONE (OUTPATIENT)
Age: 88
End: 2023-12-29

## 2023-12-29 DIAGNOSIS — K21.9 GASTROESOPHAGEAL REFLUX DISEASE WITHOUT ESOPHAGITIS: ICD-10-CM

## 2023-12-29 DIAGNOSIS — M06.09 RHEUMATOID ARTHRITIS OF MULTIPLE SITES WITH NEGATIVE RHEUMATOID FACTOR (HCC): Primary | ICD-10-CM

## 2023-12-29 RX ORDER — PANTOPRAZOLE SODIUM 40 MG/1
40 TABLET, DELAYED RELEASE ORAL DAILY
Qty: 90 TABLET | Refills: 1 | Status: SHIPPED | OUTPATIENT
Start: 2023-12-29

## 2023-12-29 NOTE — TELEPHONE ENCOUNTER
That's fine- if you can find out how many tabs she is taking per week (med list doesn't reflect her dose).   Thanks

## 2023-12-29 NOTE — TELEPHONE ENCOUNTER
Caller: Diaz     Doctor: Deana     Reason for call: Diaz is calling requesting an refill on his mother medication methotrexate 2.5mg . Patient was seeing a private rheumatologist who has retired . Diaz will like to know if the medication can refill by  Please advise     Pharmacy :[facility]  Call back#: [phone]

## 2024-01-24 ENCOUNTER — TELEPHONE (OUTPATIENT)
Age: 89
End: 2024-01-24

## 2024-01-24 NOTE — TELEPHONE ENCOUNTER
Patient's son called asking if Dr. NESS goes to any other location, or if he can possibly change her appt to another date. He has another appt that day. Stated he is going to try and get his sister to bring her, if not he will call and reschedule.

## 2024-01-26 ENCOUNTER — NURSE TRIAGE (OUTPATIENT)
Age: 89
End: 2024-01-26

## 2024-01-26 ENCOUNTER — HOSPITAL ENCOUNTER (EMERGENCY)
Facility: HOSPITAL | Age: 89
Discharge: HOME | End: 2024-01-26
Attending: EMERGENCY MEDICINE
Payer: COMMERCIAL

## 2024-01-26 ENCOUNTER — APPOINTMENT (EMERGENCY)
Dept: RADIOLOGY | Facility: HOSPITAL | Age: 89
End: 2024-01-26
Payer: COMMERCIAL

## 2024-01-26 VITALS
DIASTOLIC BLOOD PRESSURE: 98 MMHG | TEMPERATURE: 97.1 F | BODY MASS INDEX: 26.39 KG/M2 | RESPIRATION RATE: 24 BRPM | SYSTOLIC BLOOD PRESSURE: 152 MMHG | OXYGEN SATURATION: 93 % | HEART RATE: 88 BPM | WEIGHT: 149 LBS

## 2024-01-26 DIAGNOSIS — R06.02 SOB (SHORTNESS OF BREATH): Primary | ICD-10-CM

## 2024-01-26 DIAGNOSIS — U07.1 COVID: ICD-10-CM

## 2024-01-26 LAB
ALBUMIN SERPL BCP-MCNC: 3.7 G/DL (ref 3.5–5)
ALP SERPL-CCNC: 48 U/L (ref 34–104)
ALT SERPL W P-5'-P-CCNC: 12 U/L (ref 7–52)
ANION GAP SERPL CALCULATED.3IONS-SCNC: 5 MMOL/L
APTT PPP: 34 SECONDS (ref 23–37)
AST SERPL W P-5'-P-CCNC: 19 U/L (ref 13–39)
BASOPHILS # BLD AUTO: 0.04 THOUSANDS/ÂΜL (ref 0–0.1)
BASOPHILS NFR BLD AUTO: 0 % (ref 0–1)
BILIRUB SERPL-MCNC: 0.41 MG/DL (ref 0.2–1)
BNP SERPL-MCNC: 34 PG/ML (ref 0–100)
BUN SERPL-MCNC: 26 MG/DL (ref 5–25)
CALCIUM SERPL-MCNC: 10.4 MG/DL (ref 8.4–10.2)
CARDIAC TROPONIN I PNL SERPL HS: 54 NG/L
CHLORIDE SERPL-SCNC: 102 MMOL/L (ref 96–108)
CO2 SERPL-SCNC: 30 MMOL/L (ref 21–32)
CREAT SERPL-MCNC: 1.03 MG/DL (ref 0.6–1.3)
D DIMER PPP FEU-MCNC: 1.71 UG/ML FEU
EOSINOPHIL # BLD AUTO: 0.04 THOUSAND/ÂΜL (ref 0–0.61)
EOSINOPHIL NFR BLD AUTO: 0 % (ref 0–6)
ERYTHROCYTE [DISTWIDTH] IN BLOOD BY AUTOMATED COUNT: 15.5 % (ref 11.6–15.1)
FLUAV RNA RESP QL NAA+PROBE: NEGATIVE
FLUBV RNA RESP QL NAA+PROBE: NEGATIVE
GFR SERPL CREATININE-BSD FRML MDRD: 46 ML/MIN/1.73SQ M
GLUCOSE SERPL-MCNC: 127 MG/DL (ref 65–140)
HCT VFR BLD AUTO: 40.2 % (ref 34.8–46.1)
HGB BLD-MCNC: 13.7 G/DL (ref 11.5–15.4)
IMM GRANULOCYTES # BLD AUTO: 0.1 THOUSAND/UL (ref 0–0.2)
IMM GRANULOCYTES NFR BLD AUTO: 1 % (ref 0–2)
INR PPP: 1.03 (ref 0.84–1.19)
LYMPHOCYTES # BLD AUTO: 2.19 THOUSANDS/ÂΜL (ref 0.6–4.47)
LYMPHOCYTES NFR BLD AUTO: 23 % (ref 14–44)
MCH RBC QN AUTO: 34 PG (ref 26.8–34.3)
MCHC RBC AUTO-ENTMCNC: 34.1 G/DL (ref 31.4–37.4)
MCV RBC AUTO: 100 FL (ref 82–98)
MONOCYTES # BLD AUTO: 1.56 THOUSAND/ÂΜL (ref 0.17–1.22)
MONOCYTES NFR BLD AUTO: 16 % (ref 4–12)
NEUTROPHILS # BLD AUTO: 5.68 THOUSANDS/ÂΜL (ref 1.85–7.62)
NEUTS SEG NFR BLD AUTO: 60 % (ref 43–75)
NRBC BLD AUTO-RTO: 0 /100 WBCS
PLATELET # BLD AUTO: 127 THOUSANDS/UL (ref 149–390)
PMV BLD AUTO: 10.5 FL (ref 8.9–12.7)
POTASSIUM SERPL-SCNC: 4.6 MMOL/L (ref 3.5–5.3)
PROT SERPL-MCNC: 7.7 G/DL (ref 6.4–8.4)
PROTHROMBIN TIME: 13.8 SECONDS (ref 11.6–14.5)
RBC # BLD AUTO: 4.03 MILLION/UL (ref 3.81–5.12)
RSV RNA RESP QL NAA+PROBE: NEGATIVE
SARS-COV-2 RNA RESP QL NAA+PROBE: POSITIVE
SODIUM SERPL-SCNC: 137 MMOL/L (ref 135–147)
WBC # BLD AUTO: 9.61 THOUSAND/UL (ref 4.31–10.16)

## 2024-01-26 PROCEDURE — 85610 PROTHROMBIN TIME: CPT | Performed by: EMERGENCY MEDICINE

## 2024-01-26 PROCEDURE — 94640 AIRWAY INHALATION TREATMENT: CPT

## 2024-01-26 PROCEDURE — G1004 CDSM NDSC: HCPCS

## 2024-01-26 PROCEDURE — 71275 CT ANGIOGRAPHY CHEST: CPT

## 2024-01-26 PROCEDURE — 96374 THER/PROPH/DIAG INJ IV PUSH: CPT

## 2024-01-26 PROCEDURE — 99285 EMERGENCY DEPT VISIT HI MDM: CPT

## 2024-01-26 PROCEDURE — 71045 X-RAY EXAM CHEST 1 VIEW: CPT

## 2024-01-26 PROCEDURE — 80053 COMPREHEN METABOLIC PANEL: CPT | Performed by: EMERGENCY MEDICINE

## 2024-01-26 PROCEDURE — 85025 COMPLETE CBC W/AUTO DIFF WBC: CPT | Performed by: EMERGENCY MEDICINE

## 2024-01-26 PROCEDURE — 85730 THROMBOPLASTIN TIME PARTIAL: CPT | Performed by: EMERGENCY MEDICINE

## 2024-01-26 PROCEDURE — 93005 ELECTROCARDIOGRAM TRACING: CPT

## 2024-01-26 PROCEDURE — 85379 FIBRIN DEGRADATION QUANT: CPT | Performed by: EMERGENCY MEDICINE

## 2024-01-26 PROCEDURE — 36415 COLL VENOUS BLD VENIPUNCTURE: CPT | Performed by: EMERGENCY MEDICINE

## 2024-01-26 PROCEDURE — 83880 ASSAY OF NATRIURETIC PEPTIDE: CPT | Performed by: EMERGENCY MEDICINE

## 2024-01-26 PROCEDURE — 96361 HYDRATE IV INFUSION ADD-ON: CPT

## 2024-01-26 PROCEDURE — 0241U HB NFCT DS VIR RESP RNA 4 TRGT: CPT | Performed by: EMERGENCY MEDICINE

## 2024-01-26 PROCEDURE — 99285 EMERGENCY DEPT VISIT HI MDM: CPT | Performed by: EMERGENCY MEDICINE

## 2024-01-26 PROCEDURE — 84484 ASSAY OF TROPONIN QUANT: CPT | Performed by: EMERGENCY MEDICINE

## 2024-01-26 RX ORDER — NIRMATRELVIR AND RITONAVIR 150-100 MG
2 KIT ORAL 2 TIMES DAILY
Qty: 20 TABLET | Refills: 0 | Status: SHIPPED | OUTPATIENT
Start: 2024-01-26 | End: 2024-01-31

## 2024-01-26 RX ORDER — ALBUTEROL SULFATE 90 UG/1
2 AEROSOL, METERED RESPIRATORY (INHALATION) EVERY 6 HOURS PRN
Qty: 8.5 G | Refills: 0 | Status: SHIPPED | OUTPATIENT
Start: 2024-01-26 | End: 2024-01-31 | Stop reason: SDUPTHER

## 2024-01-26 RX ORDER — IPRATROPIUM BROMIDE AND ALBUTEROL SULFATE 2.5; .5 MG/3ML; MG/3ML
3 SOLUTION RESPIRATORY (INHALATION) ONCE
Status: COMPLETED | OUTPATIENT
Start: 2024-01-26 | End: 2024-01-26

## 2024-01-26 RX ORDER — METHYLPREDNISOLONE SODIUM SUCCINATE 125 MG/2ML
80 INJECTION, POWDER, LYOPHILIZED, FOR SOLUTION INTRAMUSCULAR; INTRAVENOUS ONCE
Status: COMPLETED | OUTPATIENT
Start: 2024-01-26 | End: 2024-01-26

## 2024-01-26 RX ADMIN — IPRATROPIUM BROMIDE AND ALBUTEROL SULFATE 3 ML: 2.5; .5 SOLUTION RESPIRATORY (INHALATION) at 13:43

## 2024-01-26 RX ADMIN — SODIUM CHLORIDE 500 ML: 0.9 INJECTION, SOLUTION INTRAVENOUS at 14:58

## 2024-01-26 RX ADMIN — IOHEXOL 85 ML: 350 INJECTION, SOLUTION INTRAVENOUS at 14:39

## 2024-01-26 RX ADMIN — METHYLPREDNISOLONE SODIUM SUCCINATE 80 MG: 125 INJECTION, POWDER, FOR SOLUTION INTRAMUSCULAR; INTRAVENOUS at 13:37

## 2024-01-26 NOTE — TELEPHONE ENCOUNTER
"Reason for Disposition  • MODERATE difficulty breathing (e.g., speaks in phrases, SOB even at rest, pulse 100-120) of new-onset or worse than normal    Answer Assessment - Initial Assessment Questions  1. RESPIRATORY STATUS: \"Describe your breathing?\" (e.g., wheezing, shortness of breath, unable to speak, severe coughing)          Patient very SOB on phone states she can't breathe her son is taking her to [place] [facility]      2. ONSET: \"When did this breathing problem begin?\"         today    3. PATTERN \"Does the difficult breathing come and go, or has it been constant since it started?\"         constant    4. SEVERITY: \"How bad is your breathing?\" (e.g., mild, moderate, severe)     - MILD: No SOB at rest, mild SOB with walking, speaks normally in sentences, can lay down, no retractions, pulse < 100.     - MODERATE: SOB at rest, SOB with minimal exertion and prefers to sit, cannot lie down flat, speaks in phrases, mild retractions, audible wheezing, pulse 100-120.     - SEVERE: Very SOB at rest, speaks in single words, struggling to breathe, sitting hunched forward, retractions, pulse > 120           Severe      5. RECURRENT SYMPTOM: \"Have you had difficulty breathing before?\" If Yes, ask: \"When was the last time?\" and \"What happened that time?\"           Unsure      6. CARDIAC HISTORY: \"Do you have any history of heart disease?\" (e.g., heart attack, angina, bypass surgery, angioplasty)         Yes   7. LUNG HISTORY: \"Do you have any history of lung disease?\"  (e.g., pulmonary embolus, asthma, emphysema)          Yes       8. CAUSE: \"What do you think is causing the breathing problem?\"         Unsure       9. OTHER SYMPTOMS: \"Do you have any other symptoms? (e.g., dizziness, runny nose, cough, chest pain, fever)          Unsure    Protocols used: Breathing Difficulty-ADULT-[place]    "

## 2024-01-26 NOTE — TELEPHONE ENCOUNTER
Regarding: Breathing/cant't cant breath  ----- Message from Mere Molina sent at 1/26/2024 12:15 PM EST -----  Patients son called in, stating his mom is having a hard time breathing/ catching her breath. Tried pts inhaler but didn't help. Tried to reach [facility] unable to reach anyone. Advised him one of our nurses will be calling him back ASAP and if patient gets worse or if he believes is necessary they will go to ER

## 2024-01-26 NOTE — TELEPHONE ENCOUNTER
"Patient called in stating she is very SOB \" I cannot breathe\".  Audible SOB via phone.  I recommended ER evaluation asap. Patient agreed and stated she is on her way to [place] [facility].  "

## 2024-01-26 NOTE — ED PROVIDER NOTES
History  Chief Complaint   Patient presents with    Shortness of Breath     Arrives with son reported that she has been worsening SOB since this morning, denies any ill exposure. Speaks in full sentences, tachypnea.      89 yo female from home with history of arthritis c/o waking up this morning feeling sob, she felt her breaths were very shallow and she had to force herself to try to take deep breaths.  Gets worse with moving around, exertion, talking.  No chest pain.  No dizziness or passing out.  + feels generally weak.  Has had left shoulder/arm pain for the past month or so and has body pains.  + cough.  No vomiting, diarrhea, fever.  She quit smoking 30 years ago.  She uses a walker and wheelchair. Her son tried another family member's albuterol inhaler without relief.  Pt. Denies any history of asthma or COPD or heart problems.      History provided by:  Patient and relative   used: No    Shortness of Breath  Associated symptoms: cough    Associated symptoms: no abdominal pain, no chest pain, no fever, no headaches, no rash and no vomiting        Prior to Admission Medications   Prescriptions Last Dose Informant Patient Reported? Taking?   Anoro Ellipta 62.5-25 MCG/ACT inhaler 2024  No Yes   Sig: Inhale 1 puff daily   Prolia 60 MG/ML   Yes No   carvedilol (COREG) 3.125 mg tablet   No No   Sig: TAKE ONE TABLET BY MOUTH TWICE A DAY WITH MEALS   docusate sodium (COLACE) 100 mg capsule   No No   Sig: Take 2 capsules (200 mg total) by mouth daily   folic acid (FOLVITE) 1 mg tablet  Self No No   Sig: Take 1 tablet (1 mg total) by mouth daily   gabapentin (NEURONTIN) 400 mg capsule  Self Yes No   Si mg daily at bedtime     lidocaine (LIDODERM) 5 %  Self No No   Sig: Apply 1 patch topically daily for 7 days Remove & Discard patch within 12 hours or as directed by MD   methotrexate 2.5 mg tablet   No No   Sig: Take 4 tablets (10 mg total) by mouth once a week   oxyCODONE-acetaminophen  (PERCOCET) 5-325 mg per tablet  Self Yes No   Sig: Take 1 tablet by mouth every 4 (four) hours as needed   pantoprazole (PROTONIX) 40 mg tablet   No No   Sig: TAKE 1 TABLET (40 MG TOTAL) BY MOUTH DAILY   predniSONE 2.5 mg tablet  Self Yes No      Facility-Administered Medications: None       Past Medical History:   Diagnosis Date    Carpal tunnel syndrome     Degeneration of lumbar intervertebral disc     Postmenopausal osteoporosis     Primary generalized (osteo)arthritis     Rheumatoid arthritis (HCC)     Right shoulder pain        Past Surgical History:   Procedure Laterality Date    CARPAL TUNNEL RELEASE      CATARACT EXTRACTION      CHOLECYSTECTOMY      KNEE SURGERY      MASS EXCISION Right 1/2/2019    Procedure: EXCISION BIOPSY TISSUE LESION/MASS HAND;  Surgeon: Dylan Baugh DO;  Location: [facility];  Service: Orthopedics    TONSILLECTOMY         Family History   Problem Relation Age of Onset    Cancer Sister     Breast cancer Mother     Breast cancer Maternal Grandmother     No Known Problems Father     No Known Problems Brother     No Known Problems Maternal Aunt     No Known Problems Maternal Uncle     No Known Problems Paternal Aunt     No Known Problems Paternal Uncle     No Known Problems Maternal Grandfather     No Known Problems Paternal Grandmother     No Known Problems Paternal Grandfather     ADD / ADHD Neg Hx     Anesthesia problems Neg Hx     Clotting disorder Neg Hx     Collagen disease Neg Hx     Diabetes Neg Hx     Dislocations Neg Hx     Learning disabilities Neg Hx     Neurological problems Neg Hx     Osteoporosis Neg Hx     Rheumatologic disease Neg Hx     Scoliosis Neg Hx     Vascular Disease Neg Hx      I have reviewed and agree with the history as documented.    E-Cigarette/Vaping    E-Cigarette Use Never User      E-Cigarette/Vaping Substances     Social History     Tobacco Use    Smoking status: Former     Current packs/day: 0.00     Types: Cigarettes     Start date: 12/31/1958      Quit date: 1998     Years since quittin.0    Smokeless tobacco: Never   Vaping Use    Vaping status: Never Used   Substance Use Topics    Alcohol use: Yes     Comment: rarely    Drug use: No       Review of Systems   Constitutional: Negative.  Negative for fever.   HENT: Negative.     Eyes: Negative.    Respiratory:  Positive for cough and shortness of breath.    Cardiovascular: Negative.  Negative for chest pain.   Gastrointestinal: Negative.  Negative for abdominal pain, diarrhea, nausea and vomiting.   Genitourinary: Negative.  Negative for dysuria and flank pain.   Musculoskeletal:  Positive for arthralgias. Negative for back pain and myalgias.   Skin: Negative.  Negative for rash.   Neurological:  Positive for weakness. Negative for dizziness and headaches.   Hematological:  Does not bruise/bleed easily.   Psychiatric/Behavioral: Negative.     All other systems reviewed and are negative.      Physical Exam  Physical Exam  Vitals and nursing note reviewed.   Constitutional:       Appearance: She is well-developed. She is not ill-appearing or diaphoretic.   HENT:      Head: Normocephalic and atraumatic.   Eyes:      General: No scleral icterus.     Conjunctiva/sclera: Conjunctivae normal.   Cardiovascular:      Rate and Rhythm: Normal rate and regular rhythm.      Heart sounds: Normal heart sounds. No murmur heard.  Pulmonary:      Effort: Respiratory distress present.      Breath sounds: Decreased breath sounds and wheezing present.      Comments: Seems mildly distressed; Few scattered exp. wheezes  Abdominal:      General: Bowel sounds are normal. There is no distension.      Palpations: Abdomen is soft.      Tenderness: There is no abdominal tenderness.   Musculoskeletal:         General: No deformity. Normal range of motion.      Cervical back: Normal range of motion and neck supple.      Right lower leg: No tenderness. No edema.      Left lower leg: No edema.   Skin:     General: Skin is warm and  dry.      Coloration: Skin is not pale.      Findings: No rash.   Neurological:      General: No focal deficit present.      Mental Status: She is alert and oriented to person, place, and time.      Cranial Nerves: No cranial nerve deficit.   Psychiatric:         Behavior: Behavior normal.         Vital Signs  ED Triage Vitals   Temperature Pulse Respirations Blood Pressure SpO2   01/26/24 1316 01/26/24 1319 01/26/24 1316 01/26/24 1319 01/26/24 1319   (!) 97.1 °F (36.2 °C) 88 (!) 24 152/98 93 %      Temp Source Heart Rate Source Patient Position - Orthostatic VS BP Location FiO2 (%)   01/26/24 1316 01/26/24 1316 01/26/24 1319 01/26/24 1319 --   Tympanic Monitor Sitting Right arm       Pain Score       01/26/24 1316       7           Vitals:    01/26/24 1319   BP: 152/98   Pulse: 88   Patient Position - Orthostatic VS: Sitting         Visual Acuity      ED Medications  Medications   sodium chloride 0.9 % bolus 500 mL (500 mL Intravenous New Bag 1/26/24 1458)   ipratropium-albuterol (DUO-NEB) 0.5-2.5 mg/3 mL inhalation solution 3 mL (3 mL Nebulization Given 1/26/24 1343)   methylPREDNISolone sodium succinate (Solu-MEDROL) injection 80 mg (80 mg Intravenous Given 1/26/24 1337)   iohexol (OMNIPAQUE) 350 MG/ML injection (MULTI-DOSE) 85 mL (85 mL Intravenous Given 1/26/24 1439)       Diagnostic Studies  Results Reviewed       Procedure Component Value Units Date/Time    CBC and differential [[id number]]  (Abnormal) Collected: 01/26/24 1329    Lab Status: Final result Specimen: Blood from Arm, Right Updated: 01/26/24 1444     WBC 9.61 Thousand/uL      RBC 4.03 Million/uL      Hemoglobin 13.7 g/dL      Hematocrit 40.2 %       fL      MCH 34.0 pg      MCHC 34.1 g/dL      RDW 15.5 %      MPV 10.5 fL      Platelets 127 Thousands/uL      nRBC 0 /100 WBCs      Neutrophils Relative 60 %      Immat GRANS % 1 %      Lymphocytes Relative 23 %      Monocytes Relative 16 %      Eosinophils Relative 0 %      Basophils Relative 0  %      Neutrophils Absolute 5.68 Thousands/µL      Immature Grans Absolute 0.10 Thousand/uL      Lymphocytes Absolute 2.19 Thousands/µL      Monocytes Absolute 1.56 Thousand/µL      Eosinophils Absolute 0.04 Thousand/µL      Basophils Absolute 0.04 Thousands/µL     FLU/RSV/COVID - if FLU/RSV clinically relevant [[id number]]  (Abnormal) Collected: 01/26/24 1329    Lab Status: Final result Specimen: Nares from Nose Updated: 01/26/24 1428     SARS-CoV-2 Positive     INFLUENZA A PCR Negative     INFLUENZA B PCR Negative     RSV PCR Negative    Narrative:      FOR PEDIATRIC PATIENTS - copy/paste COVID Guidelines URL to browser: [email or web address]    SARS-CoV-2 assay is a Nucleic Acid Amplification assay intended for the  qualitative detection of nucleic acid from SARS-CoV-2 in nasopharyngeal  swabs. Results are for the presumptive identification of SARS-CoV-2 RNA.    Positive results are indicative of infection with SARS-CoV-2, the virus  causing COVID-19, but do not rule out bacterial infection or co-infection  with other viruses. Laboratories within the [place] and its  territories are required to report all positive results to the appropriate  public health authorities. Negative results do not preclude SARS-CoV-2  infection and should not be used as the sole basis for treatment or other  patient management decisions. Negative results must be combined with  clinical observations, patient history, and epidemiological information.  This test has not been FDA cleared or approved.    This test has been authorized by FDA under an Emergency Use Authorization  (EUA). This test is only authorized for the duration of time the  declaration that circumstances exist justifying the authorization of the  emergency use of an in vitro diagnostic tests for detection of SARS-CoV-2  virus and/or diagnosis of COVID-19 infection under section 564(b)(1) of  the Act, 21 U.S.C.  360bbb-3(b)(1), unless the authorization is terminated  or revoked sooner. The test has been validated but independent review by FDA  and CLIA is pending.    Test performed using [employer]: This RT-PCR assay targets N2,  a region unique to SARS-CoV-2. A conserved region in the E-gene was chosen  for pan-Sarbecovirus detection which includes SARS-CoV-2.    According to CMS-2020-01-R, this platform meets the definition of high-throughput technology.    Comprehensive metabolic panel [[id number]]  (Abnormal) Collected: 01/26/24 1329    Lab Status: Final result Specimen: Blood from Arm, Right Updated: 01/26/24 1426     Sodium 137 mmol/L      Potassium 4.6 mmol/L      Chloride 102 mmol/L      CO2 30 mmol/L      ANION GAP 5 mmol/L      BUN 26 mg/dL      Creatinine 1.03 mg/dL      Glucose 127 mg/dL      Calcium 10.4 mg/dL      AST 19 U/L      ALT 12 U/L      Alkaline Phosphatase 48 U/L      Total Protein 7.7 g/dL      Albumin 3.7 g/dL      Total Bilirubin 0.41 mg/dL      eGFR 46 ml/min/1.73sq m     Narrative:      National Kidney Disease Foundation guidelines for Chronic Kidney Disease (CKD):     Stage 1 with normal or high GFR (GFR > 90 mL/min/1.73 square meters)    Stage 2 Mild CKD (GFR = 60-89 mL/min/1.73 square meters)    Stage 3A Moderate CKD (GFR = 45-59 mL/min/1.73 square meters)    Stage 3B Moderate CKD (GFR = 30-44 mL/min/1.73 square meters)    Stage 4 Severe CKD (GFR = 15-29 mL/min/1.73 square meters)    Stage 5 End Stage CKD (GFR <15 mL/min/1.73 square meters)  Note: GFR calculation is accurate only with a steady state creatinine    D-Dimer [[id number]]  (Abnormal) Collected: 01/26/24 1329    Lab Status: Final result Specimen: Blood from Arm, Right Updated: 01/26/24 1409     D-Dimer, Quant 1.71 ug/ml FEU     Narrative:      In the evaluation for possible pulmonary embolism, in the appropriate (Well's Score of 4 or less) patient, the age adjusted d-dimer cutoff for this patient can be calculated as:    Age x  0.01 (in ug/mL) for Age-adjusted D-dimer exclusion threshold for a patient over 50 years.    B-Type Natriuretic Peptide(BNP) [[id number]]  (Normal) Collected: 01/26/24 1329    Lab Status: Final result Specimen: Blood from Arm, Right Updated: 01/26/24 1404     BNP 34 pg/mL     HS Troponin 0hr (reflex protocol) [[id number]]  (Abnormal) Collected: 01/26/24 1329    Lab Status: Final result Specimen: Blood from Arm, Right Updated: 01/26/24 1403     hs TnI 0hr 54 ng/L     HS Troponin I 2hr [[id number]]     Lab Status: No result Specimen: Blood     Protime-INR [[id number]]  (Normal) Collected: 01/26/24 1329    Lab Status: Final result Specimen: Blood from Arm, Right Updated: 01/26/24 1353     Protime 13.8 seconds      INR 1.03    APTT [[id number]]  (Normal) Collected: 01/26/24 1329    Lab Status: Final result Specimen: Blood from Arm, Right Updated: 01/26/24 1353     PTT 34 seconds     UA (URINE) with reflex to Scope [[id number]]     Lab Status: No result Specimen: Urine                    CTA ED chest PE study   Final Result by Andria Kiran MD (01/26 1514)      No pulmonary emboli.      No acute pulmonary disease.      Mild emphysema.      Mild fibrosis.         Workstation performed: [id number]         XR chest 1 view portable   ED Interpretation by Rula Kent MD (01/26 1414)   NAD      Final Result by Rodolfo Yadav MD (01/26 1430)      No acute cardiopulmonary disease.                  Workstation performed: [id number]                    Procedures  ECG 12 Lead Documentation Only    Date/Time: 1/26/2024 1:45 PM    Performed by: Rula Kent MD  Authorized by: Rula Kent MD    Indications / Diagnosis:  Sob  ECG reviewed by me, the ED Provider: yes    Patient location:  ED  Previous ECG:     Previous ECG:  Compared to current    Similarity:  Changes noted  Interpretation:     Interpretation: abnormal    Rate:     ECG rate:  77    ECG rate assessment: normal    Rhythm:     Rhythm: sinus rhythm    Ectopy:      Ectopy: none    QRS:     QRS axis:  Left  Conduction:     Conduction: abnormal      Abnormal conduction: complete LBBB and 1st degree    ST segments:     ST segments:  Normal  T waves:     T waves: normal    Comments:      No significant ST/Twave changes or morphology from EKG 11/10/2023            ED Course                                             Medical Decision Making  Prior records reviewed.  Pt. Was seen in cardiology office about 2 and a half months ago with complaint of WASSERMAN.  Per their note, pt. Had echo 2021 with EF 60% and mild AR, normal BNP and they felt it was likely due to age/deconditioning.  At that time she declined nuclear stress test.    Differential diagnosis includes but not limited to ACS, CHF, COPD, PE, anxiety, infection.  Will check screening blood work, xray and EKG.  1450 - WBC ok, Trop 54, Dimer 1.71, BNP normal at 34.  ordered CTA chest and while pt. There her covid came back positive which would account for the elevated inflammatory markers.  Pt. And son advised and then son tells me his wife had it last week.  Awaiting CTA report and then will re-evaluate.  Pt. Already informed she is going home no matter what.    1520 - CTA negative for acute process, no PE or pneumonia and pt. Wants to leave.  Advised rest, fluids, symptomatic tx, return if any severe breathing distress or pulse less than 90%.  Sent in script for Paxlovid.    Amount and/or Complexity of Data Reviewed  Labs: ordered.  Radiology: ordered and independent interpretation performed.    Risk  Prescription drug management.             Disposition  Final diagnoses:   SOB (shortness of breath)   COVID     Time reflects when diagnosis was documented in both MDM as applicable and the Disposition within this note       Time User Action Codes Description Comment    1/26/2024  2:50 PM Rula Kent Add [R06.02] SOB (shortness of breath)     1/26/2024  2:50 PM Rula Kent Add [U07.1] COVID           ED Disposition        ED Disposition   Discharge    Condition   Stable    Date/Time   Fri Jan 26, 2024  3:16 PM    Comment   Dennise Arnett discharge to home/self care.                   Follow-up Information       Follow up With Specialties Details Why Contact Info    Verna Ybarra MD Family Medicine   [facility] [address] [place] [id number]  [phone]              Patient's Medications   Discharge Prescriptions    NIRMATRELVIR & RITONAVIR (PAXLOVID, 150/100,) TABLET THERAPY PACK    Take 2 tablets by mouth 2 (two) times a day for 5 days Take 1 nirmatrelvir tablet + 1 ritonavir tablet together per dose       Start Date: 1/26/2024 End Date: 1/31/2024       Order Dose: 2 tablets       Quantity: 20 tablet    Refills: 0       No discharge procedures on file.    PDMP Review       None            ED Provider  Electronically Signed by             Rula Kent MD  01/26/24 0284

## 2024-01-28 LAB
ATRIAL RATE: 77 BPM
P AXIS: 66 DEGREES
PR INTERVAL: 212 MS
QRS AXIS: -55 DEGREES
QRSD INTERVAL: 122 MS
QT INTERVAL: 380 MS
QTC INTERVAL: 430 MS
T WAVE AXIS: 89 DEGREES
VENTRICULAR RATE: 77 BPM

## 2024-01-29 DIAGNOSIS — K21.9 GASTROESOPHAGEAL REFLUX DISEASE WITHOUT ESOPHAGITIS: ICD-10-CM

## 2024-01-29 DIAGNOSIS — R06.02 SOB (SHORTNESS OF BREATH): ICD-10-CM

## 2024-01-29 RX ORDER — PANTOPRAZOLE SODIUM 40 MG/1
40 TABLET, DELAYED RELEASE ORAL DAILY
Qty: 90 TABLET | Refills: 1 | Status: SHIPPED | OUTPATIENT
Start: 2024-01-29

## 2024-01-29 RX ORDER — CARVEDILOL 3.12 MG/1
TABLET ORAL
Qty: 180 TABLET | Refills: 0 | Status: SHIPPED | OUTPATIENT
Start: 2024-01-29

## 2024-01-31 ENCOUNTER — TELEPHONE (OUTPATIENT)
Age: 89
End: 2024-01-31

## 2024-01-31 ENCOUNTER — TELEMEDICINE (OUTPATIENT)
Dept: RHEUMATOLOGY | Facility: CLINIC | Age: 89
End: 2024-01-31
Payer: COMMERCIAL

## 2024-01-31 DIAGNOSIS — M81.0 OSTEOPOROSIS, UNSPECIFIED OSTEOPOROSIS TYPE, UNSPECIFIED PATHOLOGICAL FRACTURE PRESENCE: ICD-10-CM

## 2024-01-31 DIAGNOSIS — Z79.60 LONG-TERM USE OF IMMUNOSUPPRESSANT MEDICATION: ICD-10-CM

## 2024-01-31 DIAGNOSIS — Z79.899 HIGH RISK MEDICATION USE: ICD-10-CM

## 2024-01-31 DIAGNOSIS — R06.02 SOB (SHORTNESS OF BREATH): ICD-10-CM

## 2024-01-31 DIAGNOSIS — M19.91 PRIMARY OSTEOARTHRITIS, UNSPECIFIED SITE: Primary | ICD-10-CM

## 2024-01-31 PROCEDURE — 99204 OFFICE O/P NEW MOD 45 MIN: CPT | Performed by: STUDENT IN AN ORGANIZED HEALTH CARE EDUCATION/TRAINING PROGRAM

## 2024-01-31 RX ORDER — ALBUTEROL SULFATE 90 UG/1
2 AEROSOL, METERED RESPIRATORY (INHALATION) EVERY 6 HOURS PRN
Qty: 8.5 G | Refills: 0 | Status: SHIPPED | OUTPATIENT
Start: 2024-01-31

## 2024-01-31 NOTE — TELEPHONE ENCOUNTER
Pt's son called, states he had called yesterday to request an alternative med for:    Anoro Ellipta 62.5-25 MCG/ACT inhaler     States this med is not covered by her insurance, Informed him Dr Rula Kent sent to pharmacy:    albuterol (ProAir HFA) 90 mcg/act inhaler     He will call pharmacy to verify and will call back if needed.

## 2024-01-31 NOTE — TELEPHONE ENCOUNTER
I left a message for patient son , stating he needs to call insurance company and ask what alternatives they will cover .

## 2024-01-31 NOTE — TELEPHONE ENCOUNTER
Patient's son called to get an update on this request. She is needing an alternative to the   Anoro Ellipta 62.5-25 MCG/ACT inhaler    Due to insurance not covering.  He did not know any substitutions that her insurance will cover, but she is completely out of this medication and needs it as soon as possible.

## 2024-01-31 NOTE — TELEPHONE ENCOUNTER
Pt's son calling in regarding appt today. Pt diagnosed with COVID on Friday. Symptoms began on Thursday. He is wondering if she can still come in for appt. Advised she can come in with a mask or can switch to virtual. Pt has trouble wearing a mask so virtual would be preferred. Changed to virtual appt in appt desk.

## 2024-01-31 NOTE — TELEPHONE ENCOUNTER
PT son called and stated the pharmacy stated they never got the request for the albuterol inhaler that was sent on the 1/26, can we pull that script and send it again today because she is in need of it with her covid.

## 2024-01-31 NOTE — PROGRESS NOTES
Telemedicine consent    Patient: Dennise Arnett  Provider: Teja Leblanc DO  Provider located at [facility]  [address] [address]    The patient was identified by name and date of birth. Dennise Arnett was informed that this is a telemedicine visit and that the visit is being conducted through the [employer] platform. She agrees to proceed..  My office door was closed. No one else was in the room.  She acknowledged consent and understanding of privacy and security of the video platform. The patient has agreed to participate and understands they can discontinue the visit at any time.    Patient is aware this is a billable service.     I spent 20 minutes with the patient during this visit.    Rheumatology Outpatient Consult Note  1/31/2024       Dhaval Higgins DO  [address] [place] [address]    Reason for referral: RA    Assessment: 89 y.o. female referred for the above.    There is no evidence, from symptoms or labs, for an inflammatory arthritis. I do not know why she was started on methotrexate. To develop new RA at age 89 would be highly unusual. She has known multiple degenerative changes in the joints. She is also on a low enough dose of methotrexate that I don't feel sit would be helping considerably with RA symptoms anyway.    Discussed with patient and son about stopping methotrexate and seeing if she develops significant joint AM stiffness and/or joint swelling. They are both agreeable to this. Patient was happy to have things explained to her.    They do have an appointment with a pain doctor to discuss getting her oxycodone.    From chart review, seems that she is already on Prolia for her OP, which I agree with.    Encounter Diagnosis     ICD-10-CM    1. Primary osteoarthritis, unspecified site  M19.91 Ambulatory Referral to Rheumatology      2. Long-term use of immunosuppressant  medication  Z79.60       3. High risk medication use  Z79.899       4. Osteoporosis, unspecified osteoporosis type, unspecified pathological fracture presence  M81.0           Plan:  -STOP methotrexate  -Recent labs good so no need to recheck for now  -RTC 6 weeks (virtual per patient/son preference) to see if any changes in symptoms from stopping methotrexate    Teja Leblanc DO, FLORINA Leblanc DO, FLORINA MEMBRENO Rheumatology     History: Dennise Arnett is a(n) 89 y.o. female who is referred for the above. Multiple degenerative changes on imaging. Present on the call is also her son.    Was previously seeing Dr. Macias prior to his senior living. He had dxd her with seronegative rheumatoid arthritis at age 89 and put her on low-dose methotrexate. He also gave her oxycodone for her OA which enabled her to be much more active.    Talking to her today, she denies any AM stiffness except in the knee where there are known severe degenerative changes, and this does not improve throughout the day. She does not get joint swelling. Even prior to starting methotrexate, she did not have symptoms like this.      Past Medical History:   Diagnosis Date    Carpal tunnel syndrome     Degeneration of lumbar intervertebral disc     Postmenopausal osteoporosis     Primary generalized (osteo)arthritis     Rheumatoid arthritis (HCC)     Right shoulder pain        Past Surgical History:   Procedure Laterality Date    CARPAL TUNNEL RELEASE      CATARACT EXTRACTION      CHOLECYSTECTOMY      KNEE SURGERY      MASS EXCISION Right 1/2/2019    Procedure: EXCISION BIOPSY TISSUE LESION/MASS HAND;  Surgeon: Dylan Baugh DO;  Location: [facility];  Service: Orthopedics    TONSILLECTOMY         No outpatient medications have been marked as taking for the 1/31/24 encounter (Telemedicine) with Teja Leblanc DO.       Allergies as of 01/31/2024    (No Known Allergies)       Family History   Problem Relation Age of Onset    Cancer  Sister     Breast cancer Mother     Breast cancer Maternal Grandmother     No Known Problems Father     No Known Problems Brother     No Known Problems Maternal Aunt     No Known Problems Maternal Uncle     No Known Problems Paternal Aunt     No Known Problems Paternal Uncle     No Known Problems Maternal Grandfather     No Known Problems Paternal Grandmother     No Known Problems Paternal Grandfather     ADD / ADHD Neg Hx     Anesthesia problems Neg Hx     Clotting disorder Neg Hx     Collagen disease Neg Hx     Diabetes Neg Hx     Dislocations Neg Hx     Learning disabilities Neg Hx     Neurological problems Neg Hx     Osteoporosis Neg Hx     Rheumatologic disease Neg Hx     Scoliosis Neg Hx     Vascular Disease Neg Hx        Social History:  Social History     Tobacco Use    Smoking status: Former     Current packs/day: 0.00     Types: Cigarettes     Start date: 1958     Quit date: 1998     Years since quittin.1    Smokeless tobacco: Never   Vaping Use    Vaping status: Never Used   Substance Use Topics    Alcohol use: Yes     Comment: rarely    Drug use: No       Review of Systems: Pertinent findings documented in HPI    ___________________________________        Lab Result Review: relevant labs reviewed   Latest Reference Range & Units 18 11:39 22 08:17   Sed Rate 0 - 29 mm/hour 16 21   CYCLIC CITRULLINATED PEPTIDE ANTIBODY 0 - 19 units 9    RHEUMATOID FACTOR Negative  Negative    C-REACTIVE PROTEIN <3.0 mg/L <3.0 <3.0   (H): Data is abnormally high    Imaging Result Review: relevant images reviewed    DXA: reviewed

## 2024-02-01 NOTE — TELEPHONE ENCOUNTER
Diaz (son) called and states Anoro ellipta is no longer covered and would like alternative options so he can find out which is covered by insurance. Would like a call back at [phone].

## 2024-02-01 NOTE — TELEPHONE ENCOUNTER
Reason for call:   [] Refill   [] Prior Auth  [x] Other:     Pt son called stated he has been calling for a replacement for his mom Anoro Ellipta inhaler and he has gotten nowhere,he advised insurance is costing $300. And they need an alternative.    I advised a message was left for him yesterday to contact his mom's insurance to get the alternative they will cover, patient state he did not get that message and repeated if he has to call the insurance company, I advised he do, patient is not pleased but acknowledged he will.

## 2024-02-02 DIAGNOSIS — J45.30 MILD PERSISTENT ASTHMA WITHOUT COMPLICATION: Primary | ICD-10-CM

## 2024-02-02 RX ORDER — BUDESONIDE AND FORMOTEROL FUMARATE DIHYDRATE 160; 4.5 UG/1; UG/1
2 AEROSOL RESPIRATORY (INHALATION) 2 TIMES DAILY
Qty: 10.2 G | Refills: 3 | Status: SHIPPED | OUTPATIENT
Start: 2024-02-02

## 2024-02-05 ENCOUNTER — TELEPHONE (OUTPATIENT)
Age: 89
End: 2024-02-05

## 2024-02-05 DIAGNOSIS — M06.09 RHEUMATOID ARTHRITIS OF MULTIPLE SITES WITH NEGATIVE RHEUMATOID FACTOR (HCC): Primary | ICD-10-CM

## 2024-02-05 RX ORDER — PREDNISONE 2.5 MG/1
2.5 TABLET ORAL DAILY
Qty: 90 TABLET | Refills: 2 | Status: SHIPPED | OUTPATIENT
Start: 2024-02-05

## 2024-02-05 NOTE — TELEPHONE ENCOUNTER
Pt's son states she needs a new script for her Predisone 2.5 mg/ He states that she only has enough for 2 days      Diaz would like a c/b to be updated.

## 2024-02-13 ENCOUNTER — TELEPHONE (OUTPATIENT)
Age: 89
End: 2024-02-13

## 2024-02-13 NOTE — TELEPHONE ENCOUNTER
Patient's son was calling because they wanted to have records from a Dr. Martines faxed to pain management but there are no records from that Doctor in their mother's file

## 2024-02-26 ENCOUNTER — TELEPHONE (OUTPATIENT)
Age: 89
End: 2024-02-26

## 2024-02-26 NOTE — TELEPHONE ENCOUNTER
Caller: Diaz (son)     Doctor:      Reason for call: Patient came in about 2 weeks ago and signed release form to be faxed to  office for the records to see  but  office has yet to receive this.     Please advise     The fax for : [phone]    Call back#: [phone]

## 2024-02-26 NOTE — TELEPHONE ENCOUNTER
S/w Pt's son Diaz per [facility] Form. Diaz requesting ROHI forms be faxed to Dr. Martines's office for record transfer. RN unable to locate forms in Pt's chart at this time, Diaz to go to [place] office tomorrow to d/w clerical staff.

## 2024-02-27 ENCOUNTER — TELEPHONE (OUTPATIENT)
Age: 89
End: 2024-02-27

## 2024-02-27 ENCOUNTER — CONSULT (OUTPATIENT)
Age: 89
End: 2024-02-27
Payer: COMMERCIAL

## 2024-02-27 VITALS — BODY MASS INDEX: 26.4 KG/M2 | HEIGHT: 63 IN | WEIGHT: 149 LBS

## 2024-02-27 DIAGNOSIS — G89.4 CHRONIC PAIN SYNDROME: Primary | ICD-10-CM

## 2024-02-27 DIAGNOSIS — M54.12 CERVICAL RADICULOPATHY: ICD-10-CM

## 2024-02-27 DIAGNOSIS — M54.16 LUMBAR RADICULOPATHY: ICD-10-CM

## 2024-02-27 DIAGNOSIS — M06.09 RHEUMATOID ARTHRITIS OF MULTIPLE SITES WITH NEGATIVE RHEUMATOID FACTOR (HCC): ICD-10-CM

## 2024-02-27 DIAGNOSIS — N18.30 STAGE 3 CHRONIC KIDNEY DISEASE, UNSPECIFIED WHETHER STAGE 3A OR 3B CKD (HCC): ICD-10-CM

## 2024-02-27 DIAGNOSIS — M25.532 LEFT WRIST PAIN: ICD-10-CM

## 2024-02-27 PROCEDURE — 99204 OFFICE O/P NEW MOD 45 MIN: CPT | Performed by: STUDENT IN AN ORGANIZED HEALTH CARE EDUCATION/TRAINING PROGRAM

## 2024-02-27 RX ORDER — OXYCODONE HYDROCHLORIDE AND ACETAMINOPHEN 5; 325 MG/1; MG/1
1 TABLET ORAL 2 TIMES DAILY PRN
Qty: 60 TABLET | Refills: 0 | Status: SHIPPED | OUTPATIENT
Start: 2024-02-27 | End: 2024-03-28

## 2024-02-27 RX ORDER — NIRMATRELVIR AND RITONAVIR 300-100 MG
KIT ORAL
COMMUNITY
Start: 2024-01-26

## 2024-02-27 NOTE — TELEPHONE ENCOUNTER
Pt.'s Son called in and requested to please send a referral for pt's appt today with Mario Gonzalez. His fax #  [phone], pt. Has a scheduled appt at the [place] site.

## 2024-02-27 NOTE — PROGRESS NOTES
Assessment  1. Chronic pain syndrome    2. Lumbar radiculopathy    3. Cervical radiculopathy    4. Stage 3 chronic kidney disease, unspecified whether stage 3a or 3b CKD (HCC)    5. Rheumatoid arthritis of multiple sites with negative rheumatoid factor (HCC)    6. Left wrist pain      Patient is a pleasant 89-year-old woman who presents as a new patient visit for acute on chronic right wrist, right knee and left shoulder pain in the setting of multiple falls and surgery.  Over the past month the intensity pains been moderate to severe and she rates pain currently as a 5 out of 10 numeric rating scale.  The pain does interfere with her daily activities.  The pain is occurring constantly, 100% of the time and there is no typical pattern regards to time of day where symptoms are better or worse.  She describes the pain as shooting, pins-and-needles, throbbing, dull and aching, constantly fatiguing with some associated weakness in upper and lower extremities.  She does drop some objects.  Patient does use a wheelchair as well.  Activities that increase her pain include standing, bending, walking, relaxation.  Most recent imaging included x-ray of her left wrist on 12/12/2023 significant for old healed fracture with some residual deformity at the distal radius and a nonunited old ulnar styloid fracture.  In regards to medications patient is currently taking gabapentin 400 mg nightly along with Percocet 5-325 mg twice daily as needed.  Of note patient was manage with Dr. Macias who was prescribing Percocet for years but unfortunately after he retired patient went to another provider who went to charge her $175 per visit so she was looking for alternative options.  Prior pain treatments include nerve blocks, nerve injections, physical therapy and heat with only heat provided moderate relief.  Patient does not smoke or drink and she is not on any benzodiazepine therapy.  Other medications include ibuprofen and Tylenol as  needed.  Patient is also on chronic prednisone therapy.    Given patient's chronic pain syndrome with improvement in pain and function for years on low-dose Percocet think is reasonable to continue patient on these medications especially in the setting of the patient having significant comorbidities, failure of alternative medications, and not on any concurrent benzodiazepines.  Given patient was managed with Percocet 5-3 25 twice daily as needed with great relief we will continue the medication at this dose.  Opiate agreement signed today and oral swab drug screen obtained.  Patient to follow-up in 1 month for continued medication management.    Plan  Start Percocet 5-325 mg BID prn  Opioid agreement today  Oral swab drug screen today   Continue gabapentin 400 mg nightly   Continue HEP as can tolerate  Follow up in one month     An oral drug screen swab was collected at today's office visit. The swab will be sent for confirmatory testing. The drug screen is medically necessary because the patient is either dependent on opioid medication or is being considered for opioid medication therapy and the results could impact ongoing or future treatment. The drug screen is to evaluate for the presences or absence of prescribed, non-prescribed, and/or illicit drugs/substances.     There are risks associated with opioid medications, including dependence, addiction and tolerance. The patient understands and agrees to use these medications only as prescribed. Potential side effects of the medications include, but are not limited to, constipation, drowsiness, addiction, impaired judgment and risk of fatal overdose if not taken as prescribed. The patient was warned against driving while taking sedation medications.  Sharing medications is a felony. At this point in time, the patient is showing no signs of addiction, abuse, diversion or suicidal ideation.    A urine drug screen was collected at today's office visit as part of our  medication management protocol. The point of care testing results were appropriate for what was being prescribed. The specimen will be sent for confirmatory testing. The drug screen is medically necessary because the patient is either dependent on opioid medication or is being considered for opioid medication therapy and the results could impact ongoing or future treatment. The drug screen is to evaluate for the presences or absence of prescribed, non-prescribed, and/or illicit drugs/substances.    [place] Prescription Drug Monitoring Program report was reviewed and was appropriate  and [place] Prescription Drug Monitoring Program report was reviewed and was appropriate     Complete risks and benefits including bleeding, infection, tissue reaction, nerve injury and allergic reaction were discussed. The approach was demonstrated using models and literature was provided. Verbal and written consent was obtained.    My impressions and treatment recommendations were discussed in detail with the patient who verbalized understanding and had no further questions.  Discharge instructions were provided. I personally saw and examined the patient and I agree with the above discussed plan of care.    Orders Placed This Encounter   Procedures   • MM OF_Alprazolam Definitive   • MM OF_Amphetamine Definitive Test   • MM OF_Buprenorphine Definitive Test   • MM OF_Clonazepam Definitive   • MM OF_Cocaine Definitive Test   • MM OF_Codeine Definitive   • MM OF_Diazepam Definitive   • MM OF_Fentanyl Definitive Test   • MM OF_Heroin Definitive Test   • MM OF_Hydrocodone Definitive   • MM OF_Hydromorphone Definitive   • MM OF_Lorazepam Definitive   • MM OF_MDMA Definitive Test   • MM OF_Meperidine Definitive Test   • MM OF_Methadone Definitive Test   • MM OF_Methylphenidate Definitive Test   • MM OF_Morphine Definitive   • MM OF_Oxazepam Definitive   • MM OF_Oxycodone Definitive Test - Oral Fluid   • MM OF_Oxymorphone Definitive  Test   • MM OF_Pregabalin Definitive Test   • MM OF_Tapentadol Definitive Test   • MM OF_Temazepam Definitive   • MM OF_THC Definitive Test   • MM OF_Tramadol Definitive Test   • MM ALL_Prescribed Meds and Special Instructions     Order Specific Question:   [facility] Is ALBUTEROL prescribed?     Answer:   Yes     Order Specific Question:   [facility] Is GABAPENTIN prescribed?     Answer:   Yes     Order Specific Question:   [facility] Is OXYCODONE/APAP prescribed?     Answer:   Yes     New Medications Ordered This Visit   Medications   • Paxlovid, 300/100, tablet therapy pack   • oxyCODONE-acetaminophen (PERCOCET) 5-325 mg per tablet     Sig: Take 1 tablet by mouth 2 (two) times a day as needed for moderate pain or severe pain Max Daily Amount: 2 tablets     Dispense:  60 tablet     Refill:  0       History of Present Illness    Dennise Arnett is a 89 y.o. female   Patient is a pleasant 89-year-old woman who presents as a new patient visit for acute on chronic right wrist, right knee and left shoulder pain in the setting of multiple falls and surgery.  Over the past month the intensity pains been moderate to severe and she rates pain currently as a 5 out of 10 numeric rating scale.  The pain does interfere with her daily activities.  The pain is occurring constantly, 100% of the time and there is no typical pattern regards to time of day where symptoms are better or worse.  She describes the pain as shooting, pins-and-needles, throbbing, dull and aching, constantly fatiguing with some associated weakness in upper and lower extremities.  She does drop some objects.  Patient does use a wheelchair as well.  Activities that increase her pain include standing, bending, walking, relaxation.  Most recent imaging included x-ray of her left wrist on 12/12/2023 significant for old healed fracture with some residual deformity at the distal radius and a nonunited old ulnar styloid fracture.  In regards to medications patient  is currently taking gabapentin 400 mg nightly along with Percocet 5-325 mg twice daily as needed.  Of note patient was manage with Dr. Macias who was prescribing Percocet for years but unfortunately after he retired patient went to another provider who went to charge her $175 per visit so she was looking for alternative options.  Prior pain treatments include nerve blocks, nerve injections, physical therapy and heat with only heat provided moderate relief.  Patient does not smoke or drink and she is not on any benzodiazepine therapy.  Other medications include ibuprofen and Tylenol as needed.  Patient is also on chronic prednisone therapy.    I have personally reviewed and/or updated the patient's past medical history, past surgical history, family history, social history, current medications, allergies, and vital signs today.     Review of Systems   Constitutional:  Negative for chills, fatigue and unexpected weight change.   HENT:  Negative for ear pain, sore throat and trouble swallowing.    Eyes:  Positive for visual disturbance. Negative for redness.   Respiratory:  Positive for cough, shortness of breath and wheezing.    Cardiovascular:  Negative for chest pain and leg swelling.   Gastrointestinal:  Negative for abdominal pain, diarrhea, nausea and vomiting.   Endocrine: Positive for polydipsia and polyphagia. Negative for polyuria.   Genitourinary:  Negative for difficulty urinating.   Musculoskeletal:  Positive for arthralgias, joint swelling and myalgias. Negative for gait problem.   Skin:  Negative for rash.   Allergic/Immunologic: Negative for food allergies.   Neurological:  Positive for weakness. Negative for dizziness, numbness and headaches.   Hematological:  Does not bruise/bleed easily.   Psychiatric/Behavioral:  Positive for sleep disturbance. Negative for dysphoric mood.        Patient Active Problem List   Diagnosis   • Thrombocytopenia (HCC)   • Generalized osteoarthritis   • Rheumatoid  arthritis of multiple sites with negative rheumatoid factor (HCC)   • Stage 3 chronic kidney disease, unspecified whether stage 3a or 3b CKD (HCC)       Past Medical History:   Diagnosis Date   • Carpal tunnel syndrome    • Degeneration of lumbar intervertebral disc    • Postmenopausal osteoporosis    • Primary generalized (osteo)arthritis    • Rheumatoid arthritis (HCC)    • Right shoulder pain        Past Surgical History:   Procedure Laterality Date   • CARPAL TUNNEL RELEASE     • CATARACT EXTRACTION     • CHOLECYSTECTOMY     • KNEE SURGERY     • MASS EXCISION Right 2019    Procedure: EXCISION BIOPSY TISSUE LESION/MASS HAND;  Surgeon: Dylan Baugh DO;  Location: [facility];  Service: Orthopedics   • TONSILLECTOMY         Family History   Problem Relation Age of Onset   • Cancer Sister    • Breast cancer Mother    • Breast cancer Maternal Grandmother    • No Known Problems Father    • No Known Problems Brother    • No Known Problems Maternal Aunt    • No Known Problems Maternal Uncle    • No Known Problems Paternal Aunt    • No Known Problems Paternal Uncle    • No Known Problems Maternal Grandfather    • No Known Problems Paternal Grandmother    • No Known Problems Paternal Grandfather    • ADD / ADHD Neg Hx    • Anesthesia problems Neg Hx    • Clotting disorder Neg Hx    • Collagen disease Neg Hx    • Diabetes Neg Hx    • Dislocations Neg Hx    • Learning disabilities Neg Hx    • Neurological problems Neg Hx    • Osteoporosis Neg Hx    • Rheumatologic disease Neg Hx    • Scoliosis Neg Hx    • Vascular Disease Neg Hx        Social History     Occupational History   • Not on file   Tobacco Use   • Smoking status: Former     Current packs/day: 0.00     Types: Cigarettes     Start date: 1958     Quit date: 1998     Years since quittin.1   • Smokeless tobacco: Never   Vaping Use   • Vaping status: Never Used   Substance and Sexual Activity   • Alcohol use: Yes     Comment: rarely   • Drug use:  "No   • Sexual activity: Not on file       Current Outpatient Medications on File Prior to Visit   Medication Sig   • albuterol (ProAir HFA) 90 mcg/act inhaler Inhale 2 puffs every 6 (six) hours as needed for wheezing   • budesonide-formoterol (Symbicort) 160-4.5 mcg/act inhaler Inhale 2 puffs 2 (two) times a day Rinse mouth after use.   • carvedilol (COREG) 3.125 mg tablet TAKE ONE TABLET BY MOUTH TWICE A DAY WITH MEALS   • docusate sodium (COLACE) 100 mg capsule Take 2 capsules (200 mg total) by mouth daily   • folic acid (FOLVITE) 1 mg tablet Take 1 tablet (1 mg total) by mouth daily   • gabapentin (NEURONTIN) 400 mg capsule 400 mg daily at bedtime     • pantoprazole (PROTONIX) 40 mg tablet TAKE 1 TABLET (40 MG TOTAL) BY MOUTH DAILY   • Paxlovid, 300/100, tablet therapy pack    • predniSONE 2.5 mg tablet Take 1 tablet (2.5 mg total) by mouth daily   • Prolia 60 MG/ML    • [DISCONTINUED] oxyCODONE-acetaminophen (PERCOCET) 5-325 mg per tablet Take 1 tablet by mouth every 4 (four) hours as needed   • lidocaine (LIDODERM) 5 % Apply 1 patch topically daily for 7 days Remove & Discard patch within 12 hours or as directed by MD     No current facility-administered medications on file prior to visit.       No Known Allergies    Physical Exam    Ht 5' 3\" (1.6 m)   Wt 67.6 kg (149 lb)   BMI 26.39 kg/m²     Constitutional: normal, well developed, well nourished, alert, in no distress and non-toxic and no overt pain behavior.  Eyes: anicteric  HEENT: grossly intact  Neck: supple, symmetric, trachea midline and no masses   Pulmonary:even and unlabored  Cardiovascular:No edema or pitting edema present  Skin:Normal without rashes or lesions and well hydrated  Psychiatric:Mood and affect appropriate  Neurologic:Cranial Nerves II-XII grossly intact  Musculoskeletal:in wheelchair    Imaging  FINDINGS:     There is no acute fracture or dislocation. However, there is an old healed fracture of the distal radius with some residual " deformity noted. There is old nonunited avulsion of the ulnar styloid.        There is narrowing of the scaphoid radial space. Partial narrowing of the scaphoid trapezoid/trapezial space. There is degenerative narrowing of the first carpometacarpal joint as well.        No lytic or blastic osseous lesion.     Soft tissues are unremarkable.     IMPRESSION:           Old healed fracture with some residual deformity of the distal radius. Nonunited old ulnar styloid fracture. No acute fracture or dislocation. Radiocarpal degenerative changes as above as well as scaphoid trapezoid/trapezium degenerative changes and   degenerative change at the first carpometacarpal joint.

## 2024-03-05 ENCOUNTER — TELEPHONE (OUTPATIENT)
Age: 89
End: 2024-03-05

## 2024-03-05 LAB
7AMINOCLONAZEPAM SAL QL CFM: NEGATIVE NG/ML
AMPHET SAL QL CFM: NEGATIVE NG/ML
BUPRENORPHINE SAL QL SCN: NEGATIVE NG/ML
CARBOXYTHC SAL QL CFM: NEGATIVE NG/ML
CCP IGG SERPL-ACNC: NEGATIVE
COCAINE SAL QL CFM: NEGATIVE NG/ML
CODEINE SAL QL CFM: NEGATIVE NG/ML
EDDP SAL QL CFM: NEGATIVE NG/ML
HYDROCODONE SAL QL CFM: NEGATIVE NG/ML
HYDROCODONE SAL QL CFM: NEGATIVE NG/ML
HYDROMORPHONE SAL QL CFM: NEGATIVE NG/ML
LEUKEMIA MARKERS BLD-IMP: NEGATIVE NG/ML
M PROTEIN 3 UR ELPH-MCNC: ABNORMAL NG/ML
M TB TUBERC IGNF/MITOGEN IGNF CONTROL: NEGATIVE NG/ML
METHADONE SAL QL CFM: NEGATIVE NG/ML
MORPHINE SAL QL CFM: NEGATIVE NG/ML
MORPHINE SAL QL CFM: NEGATIVE NG/ML
OXYMORPHONE SAL QL CFM: ABNORMAL NG/ML
OXYMORPHONE SAL QL CFM: ABNORMAL NG/ML
PREGABALIN SAL QL CFM: NEGATIVE NG/ML
RESULT ALL_PRESCRIBED MEDS AND SPECIAL INSTRUCTIONS: NORMAL
SL AMB 6-MAM (HEROIN METABOLITE) QUANTIFICATION: NEGATIVE NG/ML
SL AMB ALPRAZOLAM QUANTIFICATION: NEGATIVE NG/ML
SL AMB CLONAZEPAM QUANTIFICATION: NEGATIVE NG/ML
SL AMB DIAZEPAM QUANTIFICATION: NEGATIVE NG/ML
SL AMB FENTANYL QUANTIFICATION: NEGATIVE NG/ML
SL AMB MDMA QUANTIFICATION: NEGATIVE NG/ML
SL AMB N-DESMETHYL-TRAMADOL QUANTIFICATION SALIVA: NEGATIVE NG/ML
SL AMB NORBUPRENORPHINE QUANTIFICATION: NEGATIVE NG/ML
SL AMB NORDIAZEPAM QUANTIFICATION: NEGATIVE NG/ML
SL AMB NORFENTANYL QUANTIFICATION: NEGATIVE NG/ML
SL AMB NORHYDROCODONE QUANTIFICATION: NEGATIVE NG/ML
SL AMB NORHYDROCODONE QUANTIFICATION: NEGATIVE NG/ML
SL AMB NORMEPERIDINE QUANTIFICATION: NEGATIVE NG/ML
SL AMB NOROXYCODONE QUANTIFICATION: ABNORMAL NG/ML
SL AMB OXAZEPAM QUANTIFICATION: NEGATIVE NG/ML
SL AMB RITALINIC ACID QUANTIFICATION: NEGATIVE
SL AMB TEMAZEPAM QUANTIFICATION: NEGATIVE NG/ML
SL AMB TEMAZEPAM QUANTIFICATION: NEGATIVE NG/ML
SL AMB TRAMADOL QUANTIFICATION: NEGATIVE NG/ML
SQUAMOUS #/AREA URNS HPF: NEGATIVE NG/ML
TAPENTADOL SAL QL CFM: NEGATIVE NG/ML

## 2024-03-05 NOTE — TELEPHONE ENCOUNTER
Caller: Jose Luis    Doctor: Carlos    Reason for call:[facility] UDS Specimen on hold needs to be billed.  Jose Luis states missing second identifier needs to be verified  and Specimen id# please advise     Call back#: [phone]

## 2024-03-05 NOTE — TELEPHONE ENCOUNTER
Returned call to Jose Luis from [facility]. Questions answered. Specimen ID number provided below.           [facility] in Epic [id number]  Urine

## 2024-03-08 DIAGNOSIS — M05.79 RHEUMATOID ARTHRITIS INVOLVING MULTIPLE SITES WITH POSITIVE RHEUMATOID FACTOR (HCC): ICD-10-CM

## 2024-03-08 RX ORDER — FOLIC ACID 1 MG/1
1 TABLET ORAL DAILY
Qty: 90 TABLET | Refills: 1 | Status: SHIPPED | OUTPATIENT
Start: 2024-03-08

## 2024-03-08 NOTE — TELEPHONE ENCOUNTER
Reason for call:   [x] Refill   [] Prior Auth  [] Other:     Office:   [x] PCP/Provider - Lisseth Ledesma MD   [] Specialty/Provider -     Medication: folic acid (FOLVITE) 1 mg tablet     Dose/Frequency: Take 1 tablet (1 mg total) by mouth daily     Quantity: 90 + 1 refill    Pharmacy: [place], [place]    Does the patient have enough for 3 days?   [] Yes   [x] No - Send as HP to POD

## 2024-03-12 ENCOUNTER — OFFICE VISIT (OUTPATIENT)
Dept: OBGYN CLINIC | Facility: CLINIC | Age: 89
End: 2024-03-12
Payer: COMMERCIAL

## 2024-03-12 VITALS
BODY MASS INDEX: 26.4 KG/M2 | WEIGHT: 149 LBS | HEIGHT: 63 IN | SYSTOLIC BLOOD PRESSURE: 147 MMHG | DIASTOLIC BLOOD PRESSURE: 73 MMHG | HEART RATE: 68 BPM

## 2024-03-12 DIAGNOSIS — M25.561 CHRONIC PAIN OF RIGHT KNEE: ICD-10-CM

## 2024-03-12 DIAGNOSIS — M17.11 PRIMARY OSTEOARTHRITIS OF RIGHT KNEE: Primary | ICD-10-CM

## 2024-03-12 DIAGNOSIS — M25.532 LEFT WRIST PAIN: ICD-10-CM

## 2024-03-12 DIAGNOSIS — G89.29 CHRONIC PAIN OF RIGHT KNEE: ICD-10-CM

## 2024-03-12 PROCEDURE — 99213 OFFICE O/P EST LOW 20 MIN: CPT | Performed by: ORTHOPAEDIC SURGERY

## 2024-03-12 NOTE — PROGRESS NOTES
Assessment/Plan:  1. Primary osteoarthritis of right knee  Injection Procedure Prior Authorization    CANCELED: Injection Procedure Prior Authorization      2. Chronic pain of right knee  CANCELED: Injection Procedure Prior Authorization      3. Left wrist pain          Scribe Attestation    I,:  Karla Leesofya am acting as a scribe while in the presence of the attending physician.:       I,:  Lino Feng MD personally performed the services described in this documentation    as scribed in my presence.:             Dennise is a pleasant 89 y.o. female who presents for follow-up evaluation of the right knee. Given she has not seen any benefit from the corticosteroid injection, we discussed visco supplementation injections for the knee. Prior authorization was submitted today for Euflexxa. The office will reach out to her to set up her visco supplementation appointments.    Subjective:   Dennise Arnett is a 89 y.o. female who presents for follow-up evaluation of the right knee and left wrist. She was last seen on 12/12/2024 and received a corticosteroid injection of the right knee. She states the injection of the right knee did not provide long lasting relief. Today, she reports her knee pain remains unchanged since her last visit, as well as her wrist pain. She has been taking percocet for her pain, which helps relieve some of her symptoms.      Review of Systems   Constitutional:  Negative for chills and fever.   HENT:  Negative for ear pain and sore throat.    Eyes:  Negative for pain and visual disturbance.   Respiratory:  Negative for cough and shortness of breath.    Cardiovascular:  Negative for chest pain and palpitations.   Gastrointestinal:  Negative for abdominal pain and vomiting.   Genitourinary:  Negative for dysuria and hematuria.   Musculoskeletal:  Positive for arthralgias. Negative for back pain.   Skin:  Negative for color change and rash.   Neurological:  Negative for seizures and syncope.    All other systems reviewed and are negative.        Past Medical History:   Diagnosis Date   • Carpal tunnel syndrome    • Degeneration of lumbar intervertebral disc    • Postmenopausal osteoporosis    • Primary generalized (osteo)arthritis    • Rheumatoid arthritis (HCC)    • Right shoulder pain        Past Surgical History:   Procedure Laterality Date   • CARPAL TUNNEL RELEASE     • CATARACT EXTRACTION     • CHOLECYSTECTOMY     • KNEE SURGERY     • MASS EXCISION Right 2019    Procedure: EXCISION BIOPSY TISSUE LESION/MASS HAND;  Surgeon: Dylan Baugh DO;  Location: [facility];  Service: Orthopedics   • TONSILLECTOMY         Family History   Problem Relation Age of Onset   • Cancer Sister    • Breast cancer Mother    • Breast cancer Maternal Grandmother    • No Known Problems Father    • No Known Problems Brother    • No Known Problems Maternal Aunt    • No Known Problems Maternal Uncle    • No Known Problems Paternal Aunt    • No Known Problems Paternal Uncle    • No Known Problems Maternal Grandfather    • No Known Problems Paternal Grandmother    • No Known Problems Paternal Grandfather    • ADD / ADHD Neg Hx    • Anesthesia problems Neg Hx    • Clotting disorder Neg Hx    • Collagen disease Neg Hx    • Diabetes Neg Hx    • Dislocations Neg Hx    • Learning disabilities Neg Hx    • Neurological problems Neg Hx    • Osteoporosis Neg Hx    • Rheumatologic disease Neg Hx    • Scoliosis Neg Hx    • Vascular Disease Neg Hx        Social History     Occupational History   • Not on file   Tobacco Use   • Smoking status: Former     Current packs/day: 0.00     Types: Cigarettes     Start date: 1958     Quit date: 1998     Years since quittin.2   • Smokeless tobacco: Never   Vaping Use   • Vaping status: Never Used   Substance and Sexual Activity   • Alcohol use: Yes     Comment: rarely   • Drug use: No   • Sexual activity: Not on file         Current Outpatient Medications:   •  albuterol (ProAir  HFA) 90 mcg/act inhaler, Inhale 2 puffs every 6 (six) hours as needed for wheezing, Disp: 8.5 g, Rfl: 0  •  budesonide-formoterol (Symbicort) 160-4.5 mcg/act inhaler, Inhale 2 puffs 2 (two) times a day Rinse mouth after use., Disp: 10.2 g, Rfl: 3  •  carvedilol (COREG) 3.125 mg tablet, TAKE ONE TABLET BY MOUTH TWICE A DAY WITH MEALS, Disp: 180 tablet, Rfl: 0  •  docusate sodium (COLACE) 100 mg capsule, Take 2 capsules (200 mg total) by mouth daily, Disp: 90 capsule, Rfl: 0  •  folic acid (FOLVITE) 1 mg tablet, Take 1 tablet (1 mg total) by mouth daily, Disp: 90 tablet, Rfl: 1  •  gabapentin (NEURONTIN) 400 mg capsule, 400 mg daily at bedtime  , Disp: , Rfl:   •  oxyCODONE-acetaminophen (PERCOCET) 5-325 mg per tablet, Take 1 tablet by mouth 2 (two) times a day as needed for moderate pain or severe pain Max Daily Amount: 2 tablets, Disp: 60 tablet, Rfl: 0  •  pantoprazole (PROTONIX) 40 mg tablet, TAKE 1 TABLET (40 MG TOTAL) BY MOUTH DAILY, Disp: 90 tablet, Rfl: 1  •  Paxlovid, 300/100, tablet therapy pack, , Disp: , Rfl:   •  predniSONE 2.5 mg tablet, Take 1 tablet (2.5 mg total) by mouth daily, Disp: 90 tablet, Rfl: 2  •  Prolia 60 MG/ML, , Disp: , Rfl:   •  lidocaine (LIDODERM) 5 %, Apply 1 patch topically daily for 7 days Remove & Discard patch within 12 hours or as directed by MD, Disp: 7 patch, Rfl: 0    No Known Allergies    Objective:  Vitals:    03/12/24 1411   BP: 147/73   Pulse: 68       Right Knee Exam     Tenderness   The patient is experiencing tenderness in the medial joint line and lateral joint line.    Range of Motion   Extension:  0   Flexion:  90     Other   Erythema: absent  Scars: absent  Sensation: normal  Pulse: present  Swelling: moderate  Effusion: effusion present          Observations     Right Knee   Positive for effusion.       Physical Exam  Vitals and nursing note reviewed.   Constitutional:       Appearance: Normal appearance.   HENT:      Head: Normocephalic and atraumatic.      Right  Ear: External ear normal.      Left Ear: External ear normal.      Nose: Nose normal.   Eyes:      General: No scleral icterus.     Extraocular Movements: Extraocular movements intact.      Conjunctiva/sclera: Conjunctivae normal.   Cardiovascular:      Rate and Rhythm: Normal rate.   Pulmonary:      Effort: Pulmonary effort is normal. No respiratory distress.   Musculoskeletal:      Cervical back: Normal range of motion and neck supple.      Right knee: Effusion present.      Comments: See ortho exam   Skin:     General: Skin is warm and dry.   Neurological:      Mental Status: She is alert and oriented to person, place, and time.   Psychiatric:         Mood and Affect: Mood normal.         Behavior: Behavior normal.         This document was created using speech voice recognition software.   Grammatical errors, random word insertions, pronoun errors, and incomplete sentences are an occasional consequence of this system due to software limitations, ambient noise, and hardware issues.   Any formal questions or concerns about content, text, or information contained within the body of this dictation should be directly addressed to the provider for clarification.

## 2024-04-02 ENCOUNTER — RA CDI HCC (OUTPATIENT)
Dept: OTHER | Facility: HOSPITAL | Age: 89
End: 2024-04-02

## 2024-04-02 ENCOUNTER — OFFICE VISIT (OUTPATIENT)
Age: 89
End: 2024-04-02
Payer: COMMERCIAL

## 2024-04-02 VITALS
DIASTOLIC BLOOD PRESSURE: 74 MMHG | BODY MASS INDEX: 26.39 KG/M2 | HEIGHT: 63 IN | SYSTOLIC BLOOD PRESSURE: 146 MMHG | HEART RATE: 67 BPM

## 2024-04-02 DIAGNOSIS — G89.4 CHRONIC PAIN SYNDROME: ICD-10-CM

## 2024-04-02 DIAGNOSIS — N18.30 STAGE 3 CHRONIC KIDNEY DISEASE, UNSPECIFIED WHETHER STAGE 3A OR 3B CKD (HCC): ICD-10-CM

## 2024-04-02 DIAGNOSIS — R06.02 SOB (SHORTNESS OF BREATH): ICD-10-CM

## 2024-04-02 DIAGNOSIS — M06.09 RHEUMATOID ARTHRITIS OF MULTIPLE SITES WITH NEGATIVE RHEUMATOID FACTOR (HCC): Primary | ICD-10-CM

## 2024-04-02 PROCEDURE — 99214 OFFICE O/P EST MOD 30 MIN: CPT | Performed by: STUDENT IN AN ORGANIZED HEALTH CARE EDUCATION/TRAINING PROGRAM

## 2024-04-02 RX ORDER — OXYCODONE HYDROCHLORIDE AND ACETAMINOPHEN 5; 325 MG/1; MG/1
1 TABLET ORAL 2 TIMES DAILY PRN
Qty: 60 TABLET | Refills: 0 | Status: SHIPPED | OUTPATIENT
Start: 2024-05-02 | End: 2024-04-04 | Stop reason: HOSPADM

## 2024-04-02 RX ORDER — OXYCODONE HYDROCHLORIDE AND ACETAMINOPHEN 5; 325 MG/1; MG/1
1 TABLET ORAL 2 TIMES DAILY PRN
Qty: 60 TABLET | Refills: 0 | Status: SHIPPED | OUTPATIENT
Start: 2024-06-01 | End: 2024-04-04 | Stop reason: HOSPADM

## 2024-04-02 RX ORDER — GABAPENTIN 400 MG/1
400 CAPSULE ORAL
Qty: 90 CAPSULE | Refills: 0 | Status: SHIPPED | OUTPATIENT
Start: 2024-04-02 | End: 2024-07-01

## 2024-04-02 RX ORDER — OXYCODONE HYDROCHLORIDE AND ACETAMINOPHEN 5; 325 MG/1; MG/1
1 TABLET ORAL 2 TIMES DAILY PRN
Qty: 60 TABLET | Refills: 0 | Status: SHIPPED | OUTPATIENT
Start: 2024-04-02 | End: 2024-05-02

## 2024-04-02 RX ORDER — CARVEDILOL 3.12 MG/1
TABLET ORAL
Qty: 180 TABLET | Refills: 0 | Status: SHIPPED | OUTPATIENT
Start: 2024-04-02 | End: 2024-04-04

## 2024-04-02 NOTE — PROGRESS NOTES
Assessment:  1. Rheumatoid arthritis of multiple sites with negative rheumatoid factor (HCC)    2. Chronic pain syndrome    3. Stage 3 chronic kidney disease, unspecified whether stage 3a or 3b CKD (HCC)      Patient presents as a follow-up visit after last being seen on 2/27/2024 for chronic pain syndrome, lumbar radiculopathy, cervical radiculopathy and rheumatoid arthritis.  At that time patient was Started on Percocet 5-325 mg twice daily as needed and opiate agreement and oral swab drug screen was started.  Patient is also continued on gabapentin 400 mg nightly.  Since that time patient symptoms are the same rating her pain as a 7 out of 10 on numeric rating scale.  Pain is worse in the morning and the pain is intermittent, occurring 30-60% of time.  Reported the pain is throbbing, pressure-like, pins-and-needles primarily in the left shoulder, bilateral hands and right knee.  Patient does note significant difference with her current pain relievers and does think that the Percocet is making a significant benefit in her life.  She denies any side effects.  Plan:  Continue Percocet 5-325 mg BID prn  Continue gabapentin 400 mg nightly       There are risks associated with opioid medications, including dependence, addiction and tolerance. The patient understands and agrees to use these medications only as prescribed. Potential side effects of the medications include, but are not limited to, constipation, drowsiness, addiction, impaired judgment and risk of fatal overdose if not taken as prescribed. The patient was warned against driving while taking sedation medications.  Sharing medications is a felony. At this point in time, the patient is showing no signs of addiction, abuse, diversion or suicidal ideation.    [place] Prescription Drug Monitoring Program report was reviewed and was appropriate  and [place] Prescription Drug Monitoring Program report was reviewed and was appropriate       My impressions  and treatment recommendations were discussed in detail with the patient who verbalized understanding and had no further questions.  Discharge instructions were provided. I personally saw and examined the patient and I agree with the above discussed plan of care.    No orders of the defined types were placed in this encounter.    New Medications Ordered This Visit   Medications   • gabapentin (NEURONTIN) 400 mg capsule     Sig: Take 1 capsule (400 mg total) by mouth daily at bedtime     Dispense:  90 capsule     Refill:  0   • oxyCODONE-acetaminophen (Percocet) 5-325 mg per tablet     Sig: Take 1 tablet by mouth 2 (two) times a day as needed for moderate pain or severe pain Max Daily Amount: 2 tablets     Dispense:  60 tablet     Refill:  0   • oxyCODONE-acetaminophen (Percocet) 5-325 mg per tablet     Sig: Take 1 tablet by mouth 2 (two) times a day as needed for moderate pain or severe pain Max Daily Amount: 2 tablets Do not start before May 2, 2024.     Dispense:  60 tablet     Refill:  0   • oxyCODONE-acetaminophen (Percocet) 5-325 mg per tablet     Sig: Take 1 tablet by mouth 2 (two) times a day as needed for moderate pain or severe pain Max Daily Amount: 2 tablets Do not start before June 1, 2024.     Dispense:  60 tablet     Refill:  0       History of Present Illness:  Dennise Arnett is a 89 y.o. female who presents for a follow up office visit in regards to Knee Pain, Hand Pain, and Shoulder Pain.         Patient presents as a follow-up visit after last being seen on 2/27/2024 for chronic pain syndrome, lumbar radiculopathy, cervical radiculopathy and rheumatoid arthritis.  At that time patient was Started on Percocet 5-325 mg twice daily as needed and opiate agreement and oral swab drug screen was started.  Patient is also continued on gabapentin 400 mg nightly.  Since that time patient symptoms are the same rating her pain as a 7 out of 10 on numeric rating scale.  Pain is worse in the morning and the  pain is intermittent, occurring 30-60% of time.  Reported the pain is throbbing, pressure-like, pins-and-needles primarily in the left shoulder, bilateral hands and right knee.  Patient does note significant difference with her current pain relievers and does think that the Percocet is making a significant benefit in her life.  She denies any side effects.    I have personally reviewed and/or updated the patient's past medical history, past surgical history, family history, social history, current medications, allergies, and vital signs today.     Review of Systems   Respiratory:  Positive for shortness of breath.    Musculoskeletal:  Positive for arthralgias, back pain and gait problem.   Neurological:  Positive for dizziness, weakness and numbness.       Patient Active Problem List   Diagnosis   • Thrombocytopenia (HCC)   • Generalized osteoarthritis   • Rheumatoid arthritis of multiple sites with negative rheumatoid factor (HCC)   • Stage 3 chronic kidney disease, unspecified whether stage 3a or 3b CKD (HCC)       Past Medical History:   Diagnosis Date   • Carpal tunnel syndrome    • Degeneration of lumbar intervertebral disc    • Postmenopausal osteoporosis    • Primary generalized (osteo)arthritis    • Rheumatoid arthritis (HCC)    • Right shoulder pain        Past Surgical History:   Procedure Laterality Date   • CARPAL TUNNEL RELEASE     • CATARACT EXTRACTION     • CHOLECYSTECTOMY     • KNEE SURGERY     • MASS EXCISION Right 1/2/2019    Procedure: EXCISION BIOPSY TISSUE LESION/MASS HAND;  Surgeon: Dylan Baugh DO;  Location: [facility];  Service: Orthopedics   • TONSILLECTOMY         Family History   Problem Relation Age of Onset   • Cancer Sister    • Breast cancer Mother    • Breast cancer Maternal Grandmother    • No Known Problems Father    • No Known Problems Brother    • No Known Problems Maternal Aunt    • No Known Problems Maternal Uncle    • No Known Problems Paternal Aunt    • No Known Problems  Paternal Uncle    • No Known Problems Maternal Grandfather    • No Known Problems Paternal Grandmother    • No Known Problems Paternal Grandfather    • ADD / ADHD Neg Hx    • Anesthesia problems Neg Hx    • Clotting disorder Neg Hx    • Collagen disease Neg Hx    • Diabetes Neg Hx    • Dislocations Neg Hx    • Learning disabilities Neg Hx    • Neurological problems Neg Hx    • Osteoporosis Neg Hx    • Rheumatologic disease Neg Hx    • Scoliosis Neg Hx    • Vascular Disease Neg Hx        Social History     Occupational History   • Not on file   Tobacco Use   • Smoking status: Former     Current packs/day: 0.00     Types: Cigarettes     Start date: 1958     Quit date: 1998     Years since quittin.2   • Smokeless tobacco: Never   Vaping Use   • Vaping status: Never Used   Substance and Sexual Activity   • Alcohol use: Yes     Comment: rarely   • Drug use: No   • Sexual activity: Not on file       Current Outpatient Medications on File Prior to Visit   Medication Sig   • albuterol (ProAir HFA) 90 mcg/act inhaler Inhale 2 puffs every 6 (six) hours as needed for wheezing   • budesonide-formoterol (Symbicort) 160-4.5 mcg/act inhaler Inhale 2 puffs 2 (two) times a day Rinse mouth after use.   • docusate sodium (COLACE) 100 mg capsule Take 2 capsules (200 mg total) by mouth daily   • folic acid (FOLVITE) 1 mg tablet Take 1 tablet (1 mg total) by mouth daily   • pantoprazole (PROTONIX) 40 mg tablet TAKE 1 TABLET (40 MG TOTAL) BY MOUTH DAILY   • Paxlovid, 300/100, tablet therapy pack    • Prolia 60 MG/ML    • [DISCONTINUED] gabapentin (NEURONTIN) 400 mg capsule 400 mg daily at bedtime     • lidocaine (LIDODERM) 5 % Apply 1 patch topically daily for 7 days Remove & Discard patch within 12 hours or as directed by MD   • [DISCONTINUED] carvedilol (COREG) 3.125 mg tablet TAKE ONE TABLET BY MOUTH TWICE A DAY WITH MEALS   • [DISCONTINUED] predniSONE 2.5 mg tablet Take 1 tablet (2.5 mg total) by mouth daily     No  "current facility-administered medications on file prior to visit.       No Known Allergies    Physical Exam:    /74   Pulse 67   Ht 5' 3\" (1.6 m)   BMI 26.39 kg/m²     Constitutional:normal, well developed, well nourished, alert, in no distress and non-toxic and no overt pain behavior.  Eyes:anicteric  HEENT:grossly intact  Neck:supple, symmetric, trachea midline and no masses   Pulmonary:even and unlabored  Cardiovascular:No edema or pitting edema present  Skin:Normal without rashes or lesions and well hydrated  Psychiatric:Mood and affect appropriate  Neurologic:Cranial Nerves II-XII grossly intact  Musculoskeletal:in wheelchair    Imaging    "

## 2024-04-04 ENCOUNTER — OFFICE VISIT (OUTPATIENT)
Dept: FAMILY MEDICINE CLINIC | Facility: CLINIC | Age: 89
End: 2024-04-04
Payer: COMMERCIAL

## 2024-04-04 VITALS
HEIGHT: 63 IN | DIASTOLIC BLOOD PRESSURE: 62 MMHG | HEART RATE: 60 BPM | BODY MASS INDEX: 25.34 KG/M2 | RESPIRATION RATE: 18 BRPM | WEIGHT: 143 LBS | SYSTOLIC BLOOD PRESSURE: 135 MMHG | TEMPERATURE: 95.4 F

## 2024-04-04 DIAGNOSIS — Z13.0 SCREENING FOR DEFICIENCY ANEMIA: ICD-10-CM

## 2024-04-04 DIAGNOSIS — Z13.1 SCREENING FOR DIABETES MELLITUS: ICD-10-CM

## 2024-04-04 DIAGNOSIS — R32 URINARY INCONTINENCE, UNSPECIFIED TYPE: ICD-10-CM

## 2024-04-04 DIAGNOSIS — Z00.00 MEDICARE ANNUAL WELLNESS VISIT, SUBSEQUENT: Primary | ICD-10-CM

## 2024-04-04 DIAGNOSIS — M15.9 GENERALIZED OSTEOARTHRITIS: ICD-10-CM

## 2024-04-04 DIAGNOSIS — Z13.220 SCREENING CHOLESTEROL LEVEL: ICD-10-CM

## 2024-04-04 DIAGNOSIS — K21.9 GASTROESOPHAGEAL REFLUX DISEASE WITHOUT ESOPHAGITIS: ICD-10-CM

## 2024-04-04 DIAGNOSIS — M06.09 RHEUMATOID ARTHRITIS OF MULTIPLE SITES WITH NEGATIVE RHEUMATOID FACTOR (HCC): ICD-10-CM

## 2024-04-04 DIAGNOSIS — J45.30 MILD PERSISTENT ASTHMA WITHOUT COMPLICATION: ICD-10-CM

## 2024-04-04 PROBLEM — D69.6 THROMBOCYTOPENIA (HCC): Status: RESOLVED | Noted: 2018-12-31 | Resolved: 2024-04-04

## 2024-04-04 PROCEDURE — 99214 OFFICE O/P EST MOD 30 MIN: CPT | Performed by: FAMILY MEDICINE

## 2024-04-04 PROCEDURE — G0439 PPPS, SUBSEQ VISIT: HCPCS | Performed by: FAMILY MEDICINE

## 2024-04-04 RX ORDER — FAMOTIDINE 40 MG/1
40 TABLET, FILM COATED ORAL
Qty: 90 TABLET | Refills: 3 | Status: SHIPPED | OUTPATIENT
Start: 2024-04-04 | End: 2025-03-30

## 2024-04-04 NOTE — PROGRESS NOTES
Assessment and Plan:     Problem List Items Addressed This Visit        Musculoskeletal and Integument    Generalized osteoarthritis    Rheumatoid arthritis of multiple sites with negative rheumatoid factor (HCC)   Other Visit Diagnoses     Medicare annual wellness visit, subsequent    -  Primary    Mild persistent asthma without complication        Screening cholesterol level        Relevant Orders    Lipid panel    Screening for diabetes mellitus        Relevant Orders    Comprehensive metabolic panel    Screening for deficiency anemia        Relevant Orders    CBC and differential    Urinary incontinence, unspecified type        Relevant Orders    UA w Reflex to Microscopic w Reflex to Culture    Gastroesophageal reflux disease without esophagitis        Relevant Medications    famotidine (PEPCID) 40 MG tablet      Urinary incontinence offered Vesicare but at this time declined.  Will send for urine to be sure there is no urinary tract infection.  All screening labs sent  Rheumatology improving patient's overall pain.  Patient has mixed osteoarthritis with rheumatoid arthritis  Acid reflux advise long-term use of Protonix is not healthy.  Recommend trialing on Pepcid 40 mg at bedtime.  If there is no improvement we will call in 20 mg in the morning and at nighttime     Preventive health issues were discussed with patient, and age appropriate screening tests were ordered as noted in patient's After Visit Summary.  Personalized health advice and appropriate referrals for health education or preventive services given if needed, as noted in patient's After Visit Summary.     History of Present Illness:     Patient presents for a Medicare Wellness Visit  [place] 1998  Colleen was patient's rheumatologist.  She was being treated for rheumatoid arthritis and osteoarthritis.  Patient states that overall her joints are stable.  Still has more bad days than good.  Patient states her asthma and her breathing are  doing very good on Symbicort no changes to be made.  States that she was feels like she has a frog in her throat does not think that acid reflux is the issue.  Patient states that she gets so concerned that time she has her sons sit with her because she is worried.  RA osteo   HPI   Patient Care Team:  Verna Ybarra MD as PCP - General (Family Medicine)  Rosemary Franklin MD as PCP - PCP-[facility] ([facility])     Review of Systems:     Review of Systems   Constitutional:  Negative for activity change, appetite change, chills, fatigue and fever.   HENT:  Negative for congestion.    Respiratory:  Negative for cough, chest tightness and shortness of breath.    Cardiovascular:  Negative for chest pain and leg swelling.   Gastrointestinal:  Negative for abdominal distention, abdominal pain, constipation, diarrhea, nausea and vomiting.   All other systems reviewed and are negative.       Problem List:     Patient Active Problem List   Diagnosis   • Generalized osteoarthritis   • Rheumatoid arthritis of multiple sites with negative rheumatoid factor (HCC)   • Stage 3 chronic kidney disease, unspecified whether stage 3a or 3b CKD (HCC)      Past Medical and Surgical History:     Past Medical History:   Diagnosis Date   • Carpal tunnel syndrome    • Degeneration of lumbar intervertebral disc    • Postmenopausal osteoporosis    • Primary generalized (osteo)arthritis    • Rheumatoid arthritis (HCC)    • Right shoulder pain      Past Surgical History:   Procedure Laterality Date   • CARPAL TUNNEL RELEASE     • CATARACT EXTRACTION     • CHOLECYSTECTOMY     • KNEE SURGERY     • MASS EXCISION Right 1/2/2019    Procedure: EXCISION BIOPSY TISSUE LESION/MASS HAND;  Surgeon: Dylan Baugh DO;  Location: [facility];  Service: Orthopedics   • TONSILLECTOMY        Family History:     Family History   Problem Relation Age of Onset   • Cancer Sister    • Breast cancer Mother    • Breast cancer Maternal Grandmother    •  No Known Problems Father    • No Known Problems Brother    • No Known Problems Maternal Aunt    • No Known Problems Maternal Uncle    • No Known Problems Paternal Aunt    • No Known Problems Paternal Uncle    • No Known Problems Maternal Grandfather    • No Known Problems Paternal Grandmother    • No Known Problems Paternal Grandfather    • ADD / ADHD Neg Hx    • Anesthesia problems Neg Hx    • Clotting disorder Neg Hx    • Collagen disease Neg Hx    • Diabetes Neg Hx    • Dislocations Neg Hx    • Learning disabilities Neg Hx    • Neurological problems Neg Hx    • Osteoporosis Neg Hx    • Rheumatologic disease Neg Hx    • Scoliosis Neg Hx    • Vascular Disease Neg Hx       Social History:     Social History     Socioeconomic History   • Marital status:      Spouse name: None   • Number of children: None   • Years of education: None   • Highest education level: None   Occupational History   • None   Tobacco Use   • Smoking status: Former     Current packs/day: 0.00     Types: Cigarettes     Start date: 1958     Quit date: 1998     Years since quittin.2   • Smokeless tobacco: Never   Vaping Use   • Vaping status: Never Used   Substance and Sexual Activity   • Alcohol use: Yes     Comment: rarely   • Drug use: No   • Sexual activity: None   Other Topics Concern   • None   Social History Narrative   • None     Social Determinants of Health     Financial Resource Strain: Low Risk  (2023)    Overall Financial Resource Strain (CARDIA)    • Difficulty of Paying Living Expenses: Not hard at all   Food Insecurity: Not on file   Transportation Needs: No Transportation Needs (2023)    PRAPARE - Transportation    • Lack of Transportation (Medical): No    • Lack of Transportation (Non-Medical): No   Physical Activity: Not on file   Stress: Not on file   Social Connections: Not on file   Intimate Partner Violence: Not on file   Housing Stability: Not on file      Medications and Allergies:      Current Outpatient Medications   Medication Sig Dispense Refill   • albuterol (ProAir HFA) 90 mcg/act inhaler Inhale 2 puffs every 6 (six) hours as needed for wheezing 8.5 g 0   • budesonide-formoterol (Symbicort) 160-4.5 mcg/act inhaler Inhale 2 puffs 2 (two) times a day Rinse mouth after use. 10.2 g 3   • carvedilol (COREG) 3.125 mg tablet TAKE ONE TABLET BY MOUTH TWICE A DAY WITH MEALS 180 tablet 0   • famotidine (PEPCID) 40 MG tablet Take 1 tablet (40 mg total) by mouth daily at bedtime 90 tablet 3   • folic acid (FOLVITE) 1 mg tablet Take 1 tablet (1 mg total) by mouth daily 90 tablet 1   • gabapentin (NEURONTIN) 400 mg capsule Take 1 capsule (400 mg total) by mouth daily at bedtime 90 capsule 0   • oxyCODONE-acetaminophen (Percocet) 5-325 mg per tablet Take 1 tablet by mouth 2 (two) times a day as needed for moderate pain or severe pain Max Daily Amount: 2 tablets 60 tablet 0   • pantoprazole (PROTONIX) 40 mg tablet TAKE 1 TABLET (40 MG TOTAL) BY MOUTH DAILY 90 tablet 1   • predniSONE 2.5 mg tablet 1 TABLET ORAL ONCE A DAY IN IN THE MORNING WITH FOOD 90 DAYS 90 tablet 0   • docusate sodium (COLACE) 100 mg capsule Take 2 capsules (200 mg total) by mouth daily (Patient not taking: Reported on 4/4/2024) 90 capsule 0   • lidocaine (LIDODERM) 5 % Apply 1 patch topically daily for 7 days Remove & Discard patch within 12 hours or as directed by MD Levine patch 0   • Prolia 60 MG/ML  (Patient not taking: Reported on 4/4/2024)       No current facility-administered medications for this visit.     No Known Allergies   Immunizations:     Immunization History   Administered Date(s) Administered   • COVID-19 PFIZER VACCINE 0.3 ML IM 02/06/2021, 02/27/2021   • DTaP 5 11/06/2000   • Influenza Split High Dose Preservative Free IM 11/18/2014   • Influenza, high dose seasonal 0.7 mL 12/01/2020, 10/27/2023   • Influenza, seasonal, injectable 10/22/2007, 09/15/2008, 11/25/2009   • Pneumococcal Conjugate 13-Valent 11/13/2017   •  Tdap 09/04/2019   • Tuberculin Skin Test-PPD Intradermal 06/16/2008      Health Maintenance:     There are no preventive care reminders to display for this patient.      Topic Date Due   • Pneumococcal Vaccine: 65+ Years (2 of 2 - PPSV23 or PCV20) 01/08/2018   • COVID-19 Vaccine (3 - Pfizer risk series) 03/27/2021      Medicare Screening Tests and Risk Assessments:     Dennise is here for her Subsequent Wellness visit.     Health Risk Assessment:   Patient rates overall health as fair. Patient feels that their physical health rating is slightly worse. Patient is very satisfied with their life. Eyesight was rated as slightly worse. Hearing was rated as slightly worse. Patient feels that their emotional and mental health rating is same. Patients states they are never, rarely angry. Patient states they are often unusually tired/fatigued. Pain experienced in the last 7 days has been some. Patient's pain rating has been 5/10.     Fall Risk Screening:   In the past year, patient has experienced: history of falling in past year    Number of falls: 1  Injured during fall?: No    Feels unsteady when standing or walking?: No    Worried about falling?: Yes      Urinary Incontinence Screening:   Patient has leaked urine accidently in the last six months.     Home Safety:  Patient has trouble with stairs inside or outside of their home. Patient has working smoke alarms and has working carbon monoxide detector. Home safety hazards include: none.     Nutrition:   Current diet is Regular and Unhealthy.     Medications:   Patient is not currently taking any over-the-counter supplements. Patient is able to manage medications.     Activities of Daily Living (ADLs)/Instrumental Activities of Daily Living (IADLs):   Walk and transfer into and out of bed and chair?: Yes  Dress and groom yourself?: Yes    Bathe or shower yourself?: No    Feed yourself? Yes  Do your laundry/housekeeping?: Yes  Manage your money, pay your bills and track  "your expenses?: No  Make your own meals?: No    Do your own shopping?: No    Previous Hospitalizations:   Any hospitalizations or ED visits within the last 12 months?: No      Advance Care Planning:   Living will: No      Cognitive Screening:   Provider or family/friend/caregiver concerned regarding cognition?: No    PREVENTIVE SCREENINGS      Cardiovascular Screening:    General: Screening Current    Due for: Lipid Panel      Diabetes Screening:     General: Screening Current    Due for: Blood Glucose      Colorectal Cancer Screening:     General: Screening Not Indicated      Breast Cancer Screening:       Due for: Mammogram        Cervical Cancer Screening:    General: Screening Not Indicated      Osteoporosis Screening:    General: Screening Not Indicated, History Osteoporosis and Screening Current    Due for: Bone Density Ultrasound      Abdominal Aortic Aneurysm (AAA) Screening:        General: Screening Not Indicated      Lung Cancer Screening:     General: Screening Not Indicated      Hepatitis C Screening:    General: Screening Current    Screening, Brief Intervention, and Referral to Treatment (SBIRT)    Screening  Typical number of drinks in a day: 0  Typical number of drinks in a week: 0  Interpretation: Low risk drinking behavior.    Single Item Drug Screening:  How often have you used an illegal drug (including marijuana) or a prescription medication for non-medical reasons in the past year? never    Single Item Drug Screen Score: 0  Interpretation: Negative screen for possible drug use disorder    Review of Current Opioid Use    Opioid Risk Tool (ORT) Interpretation: Complete Opioid Risk Tool (ORT)    No results found.     Physical Exam:     /62 (BP Location: Right arm, Patient Position: Sitting, Cuff Size: Standard)   Pulse 60   Temp (!) 95.4 °F (35.2 °C) (Temporal)   Resp 18   Ht 5' 3\" (1.6 m)   Wt 64.9 kg (143 lb)   BMI 25.33 kg/m²     Physical Exam  Vitals reviewed.   Constitutional:  "      Appearance: Normal appearance. She is well-developed.   HENT:      Head: Normocephalic and atraumatic.      Right Ear: Tympanic membrane, ear canal and external ear normal. There is no impacted cerumen.      Left Ear: Tympanic membrane, ear canal and external ear normal. There is no impacted cerumen.      Nose: Nose normal.      Mouth/Throat:      Mouth: Mucous membranes are moist.      Pharynx: Oropharynx is clear.   Eyes:      Conjunctiva/sclera: Conjunctivae normal.      Pupils: Pupils are equal, round, and reactive to light.   Cardiovascular:      Rate and Rhythm: Normal rate and regular rhythm.      Heart sounds: Normal heart sounds.   Pulmonary:      Effort: Pulmonary effort is normal.      Breath sounds: Normal breath sounds.   Abdominal:      General: Abdomen is flat. Bowel sounds are normal.      Palpations: Abdomen is soft.   Musculoskeletal:         General: Tenderness (right knee) present.      Cervical back: Normal range of motion and neck supple.   Skin:     General: Skin is warm.      Capillary Refill: Capillary refill takes less than 2 seconds.   Neurological:      General: No focal deficit present.      Mental Status: She is alert and oriented to person, place, and time. Mental status is at baseline.   Psychiatric:         Mood and Affect: Mood normal.         Behavior: Behavior normal.         Thought Content: Thought content normal.         Judgment: Judgment normal.          Verna Cordon MD

## 2024-04-04 NOTE — PATIENT INSTRUCTIONS
Medicare Preventive Visit Patient Instructions  Thank you for completing your Welcome to Medicare Visit or Medicare Annual Wellness Visit today. Your next wellness visit will be due in one year (4/5/2025).  The screening/preventive services that you may require over the next 5-10 years are detailed below. Some tests may not apply to you based off risk factors and/or age. Screening tests ordered at today's visit but not completed yet may show as past due. Also, please note that scanned in results may not display below.  Preventive Screenings:  Service Recommendations Previous Testing/Comments   Colorectal Cancer Screening  * Colonoscopy    * Fecal Occult Blood Test (FOBT)/Fecal Immunochemical Test (FIT)  * Fecal DNA/Cologuard Test  * Flexible Sigmoidoscopy Age: 45-75 years old   Colonoscopy: every 10 years (may be performed more frequently if at higher risk)  OR  FOBT/FIT: every 1 year  OR  Cologuard: every 3 years  OR  Sigmoidoscopy: every 5 years  Screening may be recommended earlier than age 45 if at higher risk for colorectal cancer. Also, an individualized decision between you and your healthcare provider will decide whether screening between the ages of 76-85 would be appropriate. Colonoscopy: Not on file  FOBT/FIT: Not on file  Cologuard: Not on file  Sigmoidoscopy: Not on file          Breast Cancer Screening Age: 40+ years old  Frequency: every 1-2 years  Not required if history of left and right mastectomy Mammogram: Not on file        Cervical Cancer Screening Between the ages of 21-29, pap smear recommended once every 3 years.   Between the ages of 30-65, can perform pap smear with HPV co-testing every 5 years.   Recommendations may differ for women with a history of total hysterectomy, cervical cancer, or abnormal pap smears in past. Pap Smear: Not on file        Hepatitis C Screening Once for adults born between 1945 and 1965  More frequently in patients at high risk for Hepatitis C Hep C Antibody: Not  on file        Diabetes Screening 1-2 times per year if you're at risk for diabetes or have pre-diabetes Fasting glucose: 125 mg/dL (10/24/2023)  A1C: No results in last 5 years (No results in last 5 years)      Cholesterol Screening Once every 5 years if you don't have a lipid disorder. May order more often based on risk factors. Lipid panel: 07/15/2021          Other Preventive Screenings Covered by Medicare:  Abdominal Aortic Aneurysm (AAA) Screening: covered once if your at risk. You're considered to be at risk if you have a family history of AAA.  Lung Cancer Screening: covers low dose CT scan once per year if you meet all of the following conditions: (1) Age 55-77; (2) No signs or symptoms of lung cancer; (3) Current smoker or have quit smoking within the last 15 years; (4) You have a tobacco smoking history of at least 20 pack years (packs per day multiplied by number of years you smoked); (5) You get a written order from a healthcare provider.  Glaucoma Screening: covered annually if you're considered high risk: (1) You have diabetes OR (2) Family history of glaucoma OR (3)  aged 50 and older OR (4)  American aged 65 and older  Osteoporosis Screening: covered every 2 years if you meet one of the following conditions: (1) You're estrogen deficient and at risk for osteoporosis based off medical history and other findings; (2) Have a vertebral abnormality; (3) On glucocorticoid therapy for more than 3 months; (4) Have primary hyperparathyroidism; (5) On osteoporosis medications and need to assess response to drug therapy.   Last bone density test (DXA Scan): 02/27/2023.  HIV Screening: covered annually if you're between the age of 15-65. Also covered annually if you are younger than 15 and older than 65 with risk factors for HIV infection. For pregnant patients, it is covered up to 3 times per pregnancy.    Immunizations:  Immunization Recommendations   Influenza Vaccine Annual influenza  vaccination during flu season is recommended for all persons aged >= 6 months who do not have contraindications   Pneumococcal Vaccine   * Pneumococcal conjugate vaccine = PCV13 (Prevnar 13), PCV15 (Vaxneuvance), PCV20 (Prevnar 20)  * Pneumococcal polysaccharide vaccine = PPSV23 (Pneumovax) Adults 19-65 yo with certain risk factors or if 65+ yo  If never received any pneumonia vaccine: recommend Prevnar 20 (PCV20)  Give PCV20 if previously received 1 dose of PCV13 or PPSV23   Hepatitis B Vaccine 3 dose series if at intermediate or high risk (ex: diabetes, end stage renal disease, liver disease)   Respiratory syncytial virus (RSV) Vaccine - COVERED BY MEDICARE PART D  * RSVPreF3 (Arexvy) CDC recommends that adults 60 years of age and older may receive a single dose of RSV vaccine using shared clinical decision-making (SCDM)   Tetanus (Td) Vaccine - COST NOT COVERED BY MEDICARE PART B Following completion of primary series, a booster dose should be given every 10 years to maintain immunity against tetanus. Td may also be given as tetanus wound prophylaxis.   Tdap Vaccine - COST NOT COVERED BY MEDICARE PART B Recommended at least once for all adults. For pregnant patients, recommended with each pregnancy.   Shingles Vaccine (Shingrix) - COST NOT COVERED BY MEDICARE PART B  2 shot series recommended in those 19 years and older who have or will have weakened immune systems or those 50 years and older     Health Maintenance Due:  There are no preventive care reminders to display for this patient.  Immunizations Due:      Topic Date Due   • Pneumococcal Vaccine: 65+ Years (2 of 2 - PPSV23 or PCV20) 01/08/2018   • COVID-19 Vaccine (3 - Pfizer risk series) 03/27/2021     Advance Directives   What are advance directives?  Advance directives are legal documents that state your wishes and plans for medical care. These plans are made ahead of time in case you lose your ability to make decisions for yourself. Advance directives  can apply to any medical decision, such as the treatments you want, and if you want to donate organs.   What are the types of advance directives?  There are many types of advance directives, and each state has rules about how to use them. You may choose a combination of any of the following:  Living will:  This is a written record of the treatment you want. You can also choose which treatments you do not want, which to limit, and which to stop at a certain time. This includes surgery, medicine, IV fluid, and tube feedings.   Durable power of  for healthcare (DPAHC):  This is a written record that states who you want to make healthcare choices for you when you are unable to make them for yourself. This person, called a proxy, is usually a family member or a friend. You may choose more than 1 proxy.  Do not resuscitate (DNR) order:  A DNR order is used in case your heart stops beating or you stop breathing. It is a request not to have certain forms of treatment, such as CPR. A DNR order may be included in other types of advance directives.  Medical directive:  This covers the care that you want if you are in a coma, near death, or unable to make decisions for yourself. You can list the treatments you want for each condition. Treatment may include pain medicine, surgery, blood transfusions, dialysis, IV or tube feedings, and a ventilator (breathing machine).  Values history:  This document has questions about your views, beliefs, and how you feel and think about life. This information can help others choose the care that you would choose.  Why are advance directives important?  An advance directive helps you control your care. Although spoken wishes may be used, it is better to have your wishes written down. Spoken wishes can be misunderstood, or not followed. Treatments may be given even if you do not want them. An advance directive may make it easier for your family to make difficult choices about your care.    Urinary Incontinence   Urinary incontinence (UI)  is when you lose control of your bladder. UI develops because your bladder cannot store or empty urine properly. The 3 most common types of UI are stress incontinence, urge incontinence, or both.  Medicines:   May be given to help strengthen your bladder control. Report any side effects of medication to your healthcare provider.  Do pelvic muscle exercises often:  Your pelvic muscles help you stop urinating. Squeeze these muscles tight for 5 seconds, then relax for 5 seconds. Gradually work up to squeezing for 10 seconds. Do 3 sets of 15 repetitions a day, or as directed. This will help strengthen your pelvic muscles and improve bladder control.  Train your bladder:  Go to the bathroom at set times, such as every 2 hours, even if you do not feel the urge to go. You can also try to hold your urine when you feel the urge to go. For example, hold your urine for 5 minutes when you feel the urge to go. As that becomes easier, hold your urine for 10 minutes.   Self-care:   Keep a UI record.  Write down how often you leak urine and how much you leak. Make a note of what you were doing when you leaked urine.  Drink liquids as directed. You may need to limit the amount of liquid you drink to help control your urine leakage. Do not drink any liquid right before you go to bed. Limit or do not have drinks that contain caffeine or alcohol.   Prevent constipation.  Eat a variety of high-fiber foods. Good examples are high-fiber cereals, beans, vegetables, and whole-grain breads. Walking is the best way to trigger your intestines to have a bowel movement.  Exercise regularly and maintain a healthy weight.  Weight loss and exercise will decrease pressure on your bladder and help you control your leakage.   Use a catheter as directed  to help empty your bladder. A catheter is a tiny, plastic tube that is put into your bladder to drain your urine.   Go to behavior therapy as  directed.  Behavior therapy may be used to help you learn to control your urge to urinate.    Weight Management   Why it is important to manage your weight:  Being overweight increases your risk of health conditions such as heart disease, high blood pressure, type 2 diabetes, and certain types of cancer. It can also increase your risk for osteoarthritis, sleep apnea, and other respiratory problems. Aim for a slow, steady weight loss. Even a small amount of weight loss can lower your risk of health problems.  How to lose weight safely:  A safe and healthy way to lose weight is to eat fewer calories and get regular exercise. You can lose up about 1 pound a week by decreasing the number of calories you eat by 500 calories each day.   Healthy meal plan for weight management:  A healthy meal plan includes a variety of foods, contains fewer calories, and helps you stay healthy. A healthy meal plan includes the following:  Eat whole-grain foods more often.  A healthy meal plan should contain fiber. Fiber is the part of grains, fruits, and vegetables that is not broken down by your body. Whole-grain foods are healthy and provide extra fiber in your diet. Some examples of whole-grain foods are whole-wheat breads and pastas, oatmeal, brown rice, and bulgur.  Eat a variety of vegetables every day.  Include dark, leafy greens such as spinach, kale, gordy greens, and mustard greens. Eat yellow and orange vegetables such as carrots, sweet potatoes, and winter squash.   Eat a variety of fruits every day.  Choose fresh or canned fruit (canned in its own juice or light syrup) instead of juice. Fruit juice has very little or no fiber.  Eat low-fat dairy foods.  Drink fat-free (skim) milk or 1% milk. Eat fat-free yogurt and low-fat cottage cheese. Try low-fat cheeses such as mozzarella and other reduced-fat cheeses.  Choose meat and other protein foods that are low in fat.  Choose beans or other legumes such as split peas or  lentils. Choose fish, skinless poultry (chicken or turkey), or lean cuts of red meat (beef or pork). Before you cook meat or poultry, cut off any visible fat.   Use less fat and oil.  Try baking foods instead of frying them. Add less fat, such as margarine, sour cream, regular salad dressing and mayonnaise to foods. Eat fewer high-fat foods. Some examples of high-fat foods include french fries, doughnuts, ice cream, and cakes.  Eat fewer sweets.  Limit foods and drinks that are high in sugar. This includes candy, cookies, regular soda, and sweetened drinks.  Exercise:  Exercise at least 30 minutes per day on most days of the week. Some examples of exercise include walking, biking, dancing, and swimming. You can also fit in more physical activity by taking the stairs instead of the elevator or parking farther away from stores. Ask your healthcare provider about the best exercise plan for you.      © Copyright [employer] 2018 Information is for End User's use only and may not be sold, redistributed or otherwise used for commercial purposes. All illustrations and images included in CareNotes® are the copyrighted property of [employer] or [employer]

## 2024-04-10 ENCOUNTER — APPOINTMENT (OUTPATIENT)
Dept: LAB | Facility: CLINIC | Age: 89
End: 2024-04-10
Payer: COMMERCIAL

## 2024-04-10 DIAGNOSIS — R32 URINARY INCONTINENCE, UNSPECIFIED TYPE: ICD-10-CM

## 2024-04-10 LAB
BILIRUB UR QL STRIP: NEGATIVE
CLARITY UR: CLEAR
COLOR UR: NORMAL
GLUCOSE UR STRIP-MCNC: NEGATIVE MG/DL
HGB UR QL STRIP.AUTO: NEGATIVE
KETONES UR STRIP-MCNC: NEGATIVE MG/DL
LEUKOCYTE ESTERASE UR QL STRIP: NEGATIVE
NITRITE UR QL STRIP: NEGATIVE
PH UR STRIP.AUTO: 6 [PH]
PROT UR STRIP-MCNC: NEGATIVE MG/DL
SP GR UR STRIP.AUTO: 1.02 (ref 1–1.03)
UROBILINOGEN UR STRIP-ACNC: <2 MG/DL

## 2024-04-10 PROCEDURE — 81003 URINALYSIS AUTO W/O SCOPE: CPT

## 2024-04-11 ENCOUNTER — TELEPHONE (OUTPATIENT)
Dept: FAMILY MEDICINE CLINIC | Facility: CLINIC | Age: 89
End: 2024-04-11

## 2024-04-11 NOTE — TELEPHONE ENCOUNTER
----- Message from Verna Ybarra MD sent at 4/11/2024 11:03 AM EDT -----  Please advise the patient that the urine testing is normal

## 2024-05-06 ENCOUNTER — OFFICE VISIT (OUTPATIENT)
Dept: FAMILY MEDICINE CLINIC | Facility: CLINIC | Age: 89
End: 2024-05-06
Payer: COMMERCIAL

## 2024-05-06 ENCOUNTER — APPOINTMENT (OUTPATIENT)
Dept: LAB | Facility: CLINIC | Age: 89
End: 2024-05-06
Payer: COMMERCIAL

## 2024-05-06 VITALS — OXYGEN SATURATION: 94 % | HEART RATE: 76 BPM | RESPIRATION RATE: 18 BRPM | TEMPERATURE: 97.2 F

## 2024-05-06 DIAGNOSIS — Z13.220 SCREENING CHOLESTEROL LEVEL: ICD-10-CM

## 2024-05-06 DIAGNOSIS — R53.1 WEAKNESS: ICD-10-CM

## 2024-05-06 DIAGNOSIS — Z13.0 SCREENING FOR DEFICIENCY ANEMIA: ICD-10-CM

## 2024-05-06 DIAGNOSIS — R10.9 ABDOMINAL PAIN, UNSPECIFIED ABDOMINAL LOCATION: ICD-10-CM

## 2024-05-06 DIAGNOSIS — Z13.1 SCREENING FOR DIABETES MELLITUS: ICD-10-CM

## 2024-05-06 DIAGNOSIS — R19.7 DIARRHEA, UNSPECIFIED TYPE: Primary | ICD-10-CM

## 2024-05-06 DIAGNOSIS — R19.7 DIARRHEA, UNSPECIFIED TYPE: ICD-10-CM

## 2024-05-06 LAB
ANISOCYTOSIS BLD QL SMEAR: PRESENT
BASOPHILS # BLD MANUAL: 0 THOUSAND/UL (ref 0–0.1)
BASOPHILS NFR MAR MANUAL: 0 % (ref 0–1)
EOSINOPHIL # BLD MANUAL: 0 THOUSAND/UL (ref 0–0.4)
EOSINOPHIL NFR BLD MANUAL: 0 % (ref 0–6)
ERYTHROCYTE [DISTWIDTH] IN BLOOD BY AUTOMATED COUNT: 13.1 % (ref 11.6–15.1)
HCT VFR BLD AUTO: 45.7 % (ref 34.8–46.1)
HGB BLD-MCNC: 14.8 G/DL (ref 11.5–15.4)
LYMPHOCYTES # BLD AUTO: 1.68 THOUSAND/UL (ref 0.6–4.47)
LYMPHOCYTES # BLD AUTO: 13 % (ref 14–44)
MACROCYTES BLD QL AUTO: PRESENT
MCH RBC QN AUTO: 32.7 PG (ref 26.8–34.3)
MCHC RBC AUTO-ENTMCNC: 32.4 G/DL (ref 31.4–37.4)
MCV RBC AUTO: 101 FL (ref 82–98)
MONOCYTES # BLD AUTO: 1.26 THOUSAND/UL (ref 0–1.22)
MONOCYTES NFR BLD: 15 % (ref 4–12)
NEUTROPHILS # BLD MANUAL: 5.46 THOUSAND/UL (ref 1.85–7.62)
NEUTS BAND NFR BLD MANUAL: 7 % (ref 0–8)
NEUTS SEG NFR BLD AUTO: 58 % (ref 43–75)
PLATELET # BLD AUTO: 100 THOUSANDS/UL (ref 149–390)
PLATELET BLD QL SMEAR: ABNORMAL
PMV BLD AUTO: 12.8 FL (ref 8.9–12.7)
POLYCHROMASIA BLD QL SMEAR: PRESENT
RBC # BLD AUTO: 4.53 MILLION/UL (ref 3.81–5.12)
RBC MORPH BLD: PRESENT
VARIANT LYMPHS # BLD AUTO: 7 %
WBC # BLD AUTO: 8.4 THOUSAND/UL (ref 4.31–10.16)

## 2024-05-06 PROCEDURE — 85007 BL SMEAR W/DIFF WBC COUNT: CPT

## 2024-05-06 PROCEDURE — 36415 COLL VENOUS BLD VENIPUNCTURE: CPT

## 2024-05-06 PROCEDURE — 80053 COMPREHEN METABOLIC PANEL: CPT

## 2024-05-06 PROCEDURE — G2211 COMPLEX E/M VISIT ADD ON: HCPCS | Performed by: FAMILY MEDICINE

## 2024-05-06 PROCEDURE — 99214 OFFICE O/P EST MOD 30 MIN: CPT | Performed by: FAMILY MEDICINE

## 2024-05-06 PROCEDURE — 85027 COMPLETE CBC AUTOMATED: CPT

## 2024-05-06 PROCEDURE — 80061 LIPID PANEL: CPT

## 2024-05-06 RX ORDER — OXYCODONE HYDROCHLORIDE AND ACETAMINOPHEN 5; 325 MG/1; MG/1
TABLET ORAL
COMMUNITY
Start: 2024-05-02

## 2024-05-07 ENCOUNTER — APPOINTMENT (OUTPATIENT)
Dept: LAB | Facility: CLINIC | Age: 89
End: 2024-05-07
Payer: COMMERCIAL

## 2024-05-07 LAB
ALBUMIN SERPL BCP-MCNC: 3.8 G/DL (ref 3.5–5)
ALP SERPL-CCNC: 59 U/L (ref 34–104)
ALT SERPL W P-5'-P-CCNC: 9 U/L (ref 7–52)
ANION GAP SERPL CALCULATED.3IONS-SCNC: 11 MMOL/L (ref 4–13)
AST SERPL W P-5'-P-CCNC: 17 U/L (ref 13–39)
BACTERIA UR QL AUTO: ABNORMAL /HPF
BILIRUB SERPL-MCNC: 0.4 MG/DL (ref 0.2–1)
BILIRUB UR QL STRIP: NEGATIVE
BUN SERPL-MCNC: 24 MG/DL (ref 5–25)
CALCIUM SERPL-MCNC: 10.4 MG/DL (ref 8.4–10.2)
CAOX CRY URNS QL MICRO: ABNORMAL /HPF
CHLORIDE SERPL-SCNC: 100 MMOL/L (ref 96–108)
CHOLEST SERPL-MCNC: 150 MG/DL
CLARITY UR: ABNORMAL
CO2 SERPL-SCNC: 26 MMOL/L (ref 21–32)
COLOR UR: YELLOW
CREAT SERPL-MCNC: 0.98 MG/DL (ref 0.6–1.3)
GFR SERPL CREATININE-BSD FRML MDRD: 49 ML/MIN/1.73SQ M
GLUCOSE P FAST SERPL-MCNC: 143 MG/DL (ref 65–99)
GLUCOSE UR STRIP-MCNC: NEGATIVE MG/DL
HDLC SERPL-MCNC: 48 MG/DL
HGB UR QL STRIP.AUTO: ABNORMAL
HYALINE CASTS #/AREA URNS LPF: ABNORMAL /LPF
KETONES UR STRIP-MCNC: NEGATIVE MG/DL
LDLC SERPL CALC-MCNC: 82 MG/DL (ref 0–100)
LEUKOCYTE ESTERASE UR QL STRIP: ABNORMAL
MUCOUS THREADS UR QL AUTO: ABNORMAL
NITRITE UR QL STRIP: NEGATIVE
NON-SQ EPI CELLS URNS QL MICRO: ABNORMAL /HPF
NONHDLC SERPL-MCNC: 102 MG/DL
PH UR STRIP.AUTO: 6 [PH]
POTASSIUM SERPL-SCNC: 4.1 MMOL/L (ref 3.5–5.3)
PROT SERPL-MCNC: 8 G/DL (ref 6.4–8.4)
PROT UR STRIP-MCNC: ABNORMAL MG/DL
RBC #/AREA URNS AUTO: ABNORMAL /HPF
SODIUM SERPL-SCNC: 137 MMOL/L (ref 135–147)
SP GR UR STRIP.AUTO: 1.02 (ref 1–1.03)
TRIGL SERPL-MCNC: 101 MG/DL
UROBILINOGEN UR STRIP-ACNC: <2 MG/DL
WBC #/AREA URNS AUTO: ABNORMAL /HPF

## 2024-05-07 PROCEDURE — 81001 URINALYSIS AUTO W/SCOPE: CPT

## 2024-05-10 ENCOUNTER — APPOINTMENT (OUTPATIENT)
Dept: LAB | Facility: CLINIC | Age: 89
End: 2024-05-10
Payer: COMMERCIAL

## 2024-05-10 ENCOUNTER — HOSPITAL ENCOUNTER (OUTPATIENT)
Dept: RADIOLOGY | Facility: HOSPITAL | Age: 89
Discharge: HOME | End: 2024-05-10
Attending: FAMILY MEDICINE
Payer: COMMERCIAL

## 2024-05-10 DIAGNOSIS — R19.7 DIARRHEA, UNSPECIFIED TYPE: ICD-10-CM

## 2024-05-10 DIAGNOSIS — R53.1 WEAKNESS: ICD-10-CM

## 2024-05-10 DIAGNOSIS — R10.9 ABDOMINAL PAIN, UNSPECIFIED ABDOMINAL LOCATION: ICD-10-CM

## 2024-05-10 PROCEDURE — 74176 CT ABD & PELVIS W/O CONTRAST: CPT

## 2024-05-10 PROCEDURE — 87505 NFCT AGENT DETECTION GI: CPT

## 2024-05-11 LAB — C DIFF TOX B TCDB STL QL NAA+PROBE: NEGATIVE

## 2024-05-12 LAB
C COLI+JEJUNI TUF STL QL NAA+PROBE: NEGATIVE
EC STX1+STX2 GENES STL QL NAA+PROBE: NEGATIVE
SALMONELLA SP SPAO STL QL NAA+PROBE: NEGATIVE
SHIGELLA SP+EIEC IPAH STL QL NAA+PROBE: NEGATIVE

## 2024-05-13 ENCOUNTER — TELEPHONE (OUTPATIENT)
Dept: FAMILY MEDICINE CLINIC | Facility: CLINIC | Age: 89
End: 2024-05-13

## 2024-05-13 NOTE — TELEPHONE ENCOUNTER
----- Message from Verna Ybarra MD sent at 5/12/2024 10:24 PM EDT -----  Please call for CT to be read?    Please see how patient is doing from a diarrhea stand point?  She needs to  fluids is possible and drink at least 30-64oz of water daily to help Hydrate.Tellher we are waiting for CT results

## 2024-05-13 NOTE — TELEPHONE ENCOUNTER
Called radiology reading room and spoke with Yoni. He will request report be finalized.    Called and spoke with patient's son. He advised her diarrhea is better, just has some indigestion and stomachache. Advised regarding increasing water intake and that we will call when ct results are in.

## 2024-05-14 DIAGNOSIS — N83.209 CYST OF OVARY, UNSPECIFIED LATERALITY: Primary | ICD-10-CM

## 2024-05-14 NOTE — TELEPHONE ENCOUNTER
Please advise patient her CT scan shows no signs of infection. Incidentally they found a cyst on her ovary. They would like to get an Ultrasound in the future to be sure it doesn't grow. Recommend doing this in 2-3 months. Please advise I placed order

## 2024-05-21 ENCOUNTER — HOSPITAL ENCOUNTER (OUTPATIENT)
Dept: RADIOLOGY | Facility: HOSPITAL | Age: 89
Discharge: HOME | End: 2024-05-21
Attending: FAMILY MEDICINE
Payer: COMMERCIAL

## 2024-05-21 DIAGNOSIS — N83.209 CYST OF OVARY, UNSPECIFIED LATERALITY: ICD-10-CM

## 2024-05-21 PROCEDURE — 76856 US EXAM PELVIC COMPLETE: CPT

## 2024-06-04 DIAGNOSIS — R06.02 SOB (SHORTNESS OF BREATH): ICD-10-CM

## 2024-06-05 RX ORDER — UMECLIDINIUM BROMIDE AND VILANTEROL TRIFENATATE 62.5; 25 UG/1; UG/1
1 POWDER RESPIRATORY (INHALATION) DAILY
Qty: 60 EACH | Refills: 10 | Status: SHIPPED | OUTPATIENT
Start: 2024-06-05

## 2024-06-05 NOTE — TELEPHONE ENCOUNTER
Refill must be reviewed and completed by the office or provider. The refill is unable to be approved or denied by the medication management team.      Medication discontinued - Please review to see if the refill is appropriate.     The original prescription was discontinued on 2/2/2024 by Dhaval Higgins DO. Renewing this prescription may not be appropriate.

## 2024-07-01 ENCOUNTER — OFFICE VISIT (OUTPATIENT)
Dept: PAIN MEDICINE | Facility: CLINIC | Age: 89
End: 2024-07-01
Payer: COMMERCIAL

## 2024-07-01 VITALS
HEIGHT: 63 IN | WEIGHT: 143 LBS | SYSTOLIC BLOOD PRESSURE: 101 MMHG | DIASTOLIC BLOOD PRESSURE: 63 MMHG | BODY MASS INDEX: 25.34 KG/M2 | HEART RATE: 80 BPM

## 2024-07-01 DIAGNOSIS — N18.30 STAGE 3 CHRONIC KIDNEY DISEASE, UNSPECIFIED WHETHER STAGE 3A OR 3B CKD (HCC): ICD-10-CM

## 2024-07-01 DIAGNOSIS — F11.20 UNCOMPLICATED OPIOID DEPENDENCE (HCC): ICD-10-CM

## 2024-07-01 DIAGNOSIS — Z79.891 LONG-TERM CURRENT USE OF OPIATE ANALGESIC: ICD-10-CM

## 2024-07-01 DIAGNOSIS — G89.4 CHRONIC PAIN SYNDROME: Primary | ICD-10-CM

## 2024-07-01 DIAGNOSIS — M06.09 RHEUMATOID ARTHRITIS OF MULTIPLE SITES WITH NEGATIVE RHEUMATOID FACTOR (HCC): ICD-10-CM

## 2024-07-01 PROCEDURE — 99214 OFFICE O/P EST MOD 30 MIN: CPT

## 2024-07-01 RX ORDER — OXYCODONE HYDROCHLORIDE AND ACETAMINOPHEN 5; 325 MG/1; MG/1
0.5 TABLET ORAL EVERY 6 HOURS PRN
Qty: 60 TABLET | Refills: 0 | Status: SHIPPED | OUTPATIENT
Start: 2024-07-31

## 2024-07-01 RX ORDER — OXYCODONE HYDROCHLORIDE AND ACETAMINOPHEN 5; 325 MG/1; MG/1
0.5 TABLET ORAL EVERY 6 HOURS PRN
Qty: 60 TABLET | Refills: 0 | Status: SHIPPED | OUTPATIENT
Start: 2024-08-29

## 2024-07-01 RX ORDER — OXYCODONE HYDROCHLORIDE AND ACETAMINOPHEN 5; 325 MG/1; MG/1
0.5 TABLET ORAL EVERY 6 HOURS PRN
Qty: 60 TABLET | Refills: 0 | Status: SHIPPED | OUTPATIENT
Start: 2024-07-02

## 2024-07-01 RX ORDER — GABAPENTIN 400 MG/1
400 CAPSULE ORAL
Qty: 90 CAPSULE | Refills: 0 | Status: SHIPPED | OUTPATIENT
Start: 2024-07-01 | End: 2024-09-29

## 2024-07-01 NOTE — PATIENT INSTRUCTIONS
Opioid Safety   AMBULATORY CARE:   Opioid safety  includes the correct use, storage, and disposal of opioids. Examples of opioid medicines to treat pain include oxycodone, morphine, fentanyl, and codeine.   Call your local emergency number (911 in the [place]), or have someone else call if:   You have a seizure.    You cannot be woken.    You have trouble staying awake and your breathing is slow or shallow.     Your speech is slurred, or you are confused.    You are dizzy or stumble when you walk.    Call your doctor, or have someone close to you call if:   You are extremely drowsy, or you have trouble staying awake or speaking.    You have pale or clammy skin.    You have blue fingernails or lips.    Your heartbeat is slower than normal.    You cannot stop vomiting.    You have questions or concerns about your condition or care.    Use opioids safely:   Take prescribed opioids exactly as directed.  Opioids come with directions based on the kind of opioid and how it is given. Do not take more than the recommended amount, or for longer than needed.    Do not give opioids to others or take opioids that belong to someone else.  Misuse of opioids can lead to an addiction or overdose.    Do not mix opioids with other medicines or alcohol.  The combination can cause an overdose, or lead to a coma.    Do not drive or operate heavy machinery after you take the opioid.  Your provider or pharmacist can tell you how long to wait after a dose before you do these activities.    Talk to your healthcare provider if you have any side effects.  He or she can help you prevent or relieve side effects. Side effects include nausea, sleepiness, itching, and trouble thinking clearly.    Manage constipation:  Constipation is the most common side effect of opioid medicine. Constipation is when you have hard, dry bowel movements, or you go longer than usual between bowel movements. Tell your healthcare provider about all changes in your bowel  movements while you are taking opioids. He or she may recommend laxative medicine to help you have a bowel movement. He or she may also change the kind of opioid you are taking, or change when you take it. The following are more ways you can prevent or relieve constipation:  Drink liquids as directed.  You may need to drink extra liquids to help soften and move your bowels. Ask how much liquid to drink each day and which liquids are best for you.    Eat high-fiber foods.  This may help decrease constipation by adding bulk to your bowel movements. High-fiber foods include fruits, vegetables, whole-grain breads and cereals, and beans. Your healthcare provider or dietitian can help you create a high-fiber meal plan. Your provider may also recommend a fiber supplement if you cannot get enough fiber from food.         Exercise regularly.  Regular physical activity can help stimulate your intestines. Walking is a good exercise to prevent or relieve constipation. Ask which exercises are best for you.         Schedule a time each day to have a bowel movement.  This may help train your body to have regular bowel movements. Bend forward while you are on the toilet to help move the bowel movement out. Sit on the toilet for at least 10 minutes, even if you do not have a bowel movement.    Store opioids safely:   Store opioids where others cannot easily get them.  Keep them in a locked cabinet or secure area. Do not  keep them in a purse or other bag you carry with you. A person may be looking for something else and find the opioids.    Make sure opioids are stored out of the reach of children.  A child can easily overdose on opioids. Opioids may look like candy to a small child.    The best way to dispose of opioids:  The laws vary by country and area. In the [place], the best way is to return the opioids through a take-back program. This program is offered by the [place] Drug Enforcement Agency (DONNELL). The following are  options for using the program:  Take the opioids to a DONNELL collection site.  The site is often a law enforcement center. Call your local law enforcement center for scheduled take-back days in your area. You will be given information on where to go if the collection site is in a different location.    Take the opioids to an approved pharmacy or hospital.  A pharmacy or hospital may be set up as a collection site. You will need to ask if it is a DONNELL collection site if you were not directed there. A pharmacy or doctor's office may not be able to take back opioids unless it is a DONNELL site.    Use a mail-back system.  This means you are given containers to put the opioids into. You will then mail them in the containers.    Use a take-back drop box.  This is a place to leave the opioids at any time. People and animals will not be able to get into the box. Your local law enforcement agency can tell you where to find a drop box in your area.    Other safe ways to dispose of opioids:  The medicine may come with disposal instructions. The instructions may vary depending on the brand of medicine you are using. Instructions may come in a Medication Guide, but not every medicine has one. You may instead get instructions from your pharmacy or doctor. Follow instructions carefully. The following are general guidelines to follow:  Find out if you can flush the opioid.  Some opioids can be flushed down the toilet or poured into the sink. You will need to contact authorities in your area to see if this is an option for you. The FDA also offers a list of medicines that are safe to flush down the toilet. You can check the list if you cannot get the information for your local area.    Ask your waste management company about rules for putting opioids in the trash.  The company will be able to give you specific directions. Scratch out personal information on the original medicine label so it cannot be read. Then put it in the trash. Do not  label the trash or put any information on it about the opioids. It should look like regular household trash so no one is tempted to look for the opioids. Keep the trash out of the reach of children and animals. Always make sure trash is secure.    Talk to officials if you live in a facility.  If you live in a nursing home or assisted living center, talk to an official. The person will know the rules for your area.    Other ways to manage pain:   Ask your healthcare provider about non-opioid medicines to control pain.  Nonprescription medicines include NSAIDs (such as ibuprofen) and acetaminophen. Prescription medicines include muscle relaxers, antidepressants, and steroids.    Pain may be managed without any medicines.  Some ways to relieve pain include massage, aromatherapy, or meditation. Physical or occupational therapy may also help.    For more information:   Drug Enforcement Administration  [address] [place] [address]  Phone: 1- 898 - [phone]  Web Address: https://www.[employer].gov/drug_disposal/     Food and Drug Administration  [address] [place] [address]  Phone: 8- 149 - 501-7804  Web Address: http://www.fda.gov    Follow up with your doctor or pain specialist as directed:  You may need to have your dose adjusted. Your doctor or pain specialist can also help you find ways to manage pain without opioids. Write down your questions so you remember to ask them during your visits.  © Copyright [employer] 2022 Information is for End User's use only and may not be sold, redistributed or otherwise used for commercial purposes. All illustrations and images included in CareNotes® are the copyrighted property of [employer] or [employer]  The above information is an  only. It is not intended as medical advice for individual conditions or treatments. Talk to your doctor, nurse or pharmacist before following any medical regimen to see  if it is safe and effective for you.   Gabapentin (By mouth)   Gabapentin (mook-a-PEN-tin)  Treats seizures and pain caused by shingles.   Brand Name(s): FusePapranav Fanatrex, Neurontin   There may be other brand names for this medicine.  When This Medicine Should Not Be Used:   This medicine is not right for everyone. Do not use it if you had an allergic reaction to gabapentin.  How to Use This Medicine:   Capsule, Liquid, Tablet  Take your medicine as directed. Your dose may need to be changed several times to find what works best for you. If you have epilepsy, do not allow more than 12 hours to pass between doses.  Capsule: Swallow the capsule whole with plenty of water. Do not open, crush, or chew it.  Gralise® tablet: Swallow the tablet whole . Do not crush, break, or chew it.  Neurontin® tablet: If you break a tablet into 2 pieces, use the second half as your next dose. Do not use the half-tablet if the whole tablet has been cut or broken after 28 days.  Oral liquid: Measure the oral liquid medicine with a marked measuring spoon, oral syringe, or medicine cup.  This medicine should come with a Medication Guide. Ask your pharmacist for a copy if you do not have one.  Missed dose: Take a dose as soon as you remember. If it is almost time for your next dose, wait until then and take a regular dose. Do not take extra medicine to make up for a missed dose.  Store the medicine in a closed container at room temperature, away from heat, moisture, and direct light. Store the Neurontin® oral liquid in the refrigerator. Do not freeze.  Drugs and Foods to Avoid:   Ask your doctor or pharmacist before using any other medicine, including over-the-counter medicines, vitamins, and herbal products.  Some medicines can affect how gabapentin works. Tell your doctor if you also using hydrocodone or morphine.  If you take an antacid, wait at least 2 hours before you take gabapentin.  Do not drink alcohol while you are using this  medicine.  Tell your doctor if you use anything else that makes you sleepy. Some examples are allergy medicine, narcotic pain medicine, and alcohol. Tell your doctor if you are also using lorazepam, oxycodone, or zolpidem.  Warnings While Using This Medicine:   Tell your doctor if you are pregnant or breastfeeding, or if you have kidney problems (including patients receiving dialysis) or lung problems. Tell your doctor if you have a history of depression or mental health problems.  This medicine may cause the following problems:  Drug reaction with eosinophilia and systemic symptoms (DRESS) or multiorgan hypersensitivity, which may damage the liver, kidney, blood, heart, or muscles  Changes in mood or behavior, including suicidal thoughts or behavior  Respiratory depression (serious breathing problem that can be life-threatening), when used with narcotic pain medicines  Do not stop using this medicine suddenly. Your doctor will need to slowly decrease your dose before you stop it completely.  This medicine may make you dizzy or drowsy. Do not drive or do anything else that could be dangerous until you know how this medicine affects you.  Tell any doctor or dentist who treats you that you are using this medicine. This medicine may affect certain medical test results.  Your doctor will check your progress and the effects of this medicine at regular visits. Keep all appointments.  Keep all medicine out of the reach of children. Never share your medicine with anyone.  Possible Side Effects While Using This Medicine:   Call your doctor right away if you notice any of these side effects:  Allergic reaction: Itching or hives, swelling in your face or hands, swelling or tingling in your mouth or throat, chest tightness, trouble breathing  Behavior problems, aggression, restlessness, trouble concentrating, moodiness (especially in children)  Blistering, peeling, red skin rash  Blue lips, fingernails, or skin, chest pain,  fast heartbeat, trouble breathing  Change in how much or how often you urinate, bloody or cloudy urine  Dark urine or pale stools, nausea, vomiting, loss of appetite, stomach pain, yellow skin or eyes  Fever, chills, cough, sore throat, body aches  Problems with coordination, shakiness, unsteadiness, unusual eye movement  Rapid weight gain, swelling in your hands, ankles, or feet  Rash, swollen or tender glands in the neck, armpit, or groin  Unusual moods or behaviors, thoughts of hurting yourself, feeling depressed  If you notice these less serious side effects, talk with your doctor:   Dizziness, drowsiness, sleepiness, tiredness  If you notice other side effects that you think are caused by this medicine, tell your doctor.   Call your doctor for medical advice about side effects. You may report side effects to FDA at 4-163-FDA-7634    © Copyright [employer] 2022 Information is for End User's use only and may not be sold, redistributed or otherwise used for commercial purposes.  The above information is an  only. It is not intended as medical advice for individual conditions or treatments. Talk to your doctor, nurse or pharmacist before following any medical regimen to see if it is safe and effective for you.

## 2024-07-01 NOTE — PROGRESS NOTES
Pain Medicine Follow-Up Note    Assessment:  1. Chronic pain syndrome    2. Rheumatoid arthritis of multiple sites with negative rheumatoid factor (HCC)    3. Stage 3 chronic kidney disease, unspecified whether stage 3a or 3b CKD (HCC)    4. Long-term current use of opiate analgesic    5. Uncomplicated opioid dependence (HCC)        Plan:  Orders Placed This Encounter   Procedures    MM OF_Amphetamine Definitive Test    MM OF_Alprazolam Definitive    MM OF_Buprenorphine Definitive Test    MM OF_Clonazepam Definitive    MM OF_Cocaine Definitive Test    MM OF_Codeine Definitive    MM OF_Diazepam Definitive    MM OF_Fentanyl Definitive Test    MM OF_Heroin Definitive Test    MM OF_Hydrocodone Definitive    MM OF_Hydromorphone Definitive    MM OF_Lorazepam Definitive    MM OF_MDMA Definitive Test    MM OF_Meperidine Definitive Test    MM OF_Methadone Definitive Test    MM OF_Methylphenidate Definitive Test    MM OF_Morphine Definitive    MM OF_Oxazepam Definitive    MM OF_Oxycodone Definitive Test - Oral Fluid    MM OF_Oxymorphone Definitive Test    MM OF_Phencyclidine Definitive Test    MM OF_Temazepam Definitive    MM OF_THC Definitive Test    MM OF_Tramadol Definitive Test    MM ALL_Prescribed Meds and Special Instructions     Order Specific Question:   [facility] Is GABAPENTIN prescribed?     Answer:   Yes     Order Specific Question:   [facility] Is OXYCODONE/APAP prescribed?     Answer:   Yes       New Medications Ordered This Visit   Medications    oxyCODONE-acetaminophen (PERCOCET) 5-325 mg per tablet     Sig: Take 0.5 tablets by mouth every 6 (six) hours as needed for moderate pain Max Daily Amount: 2 tablets Do not start before August 29, 2024.     Dispense:  60 tablet     Refill:  0     Fill on 8/29/2024    oxyCODONE-acetaminophen (PERCOCET) 5-325 mg per tablet     Sig: Take 0.5 tablets by mouth every 6 (six) hours as needed for moderate pain Max Daily Amount: 2 tablets Do not start before July 2, 2024.      Dispense:  60 tablet     Refill:  0     Fill on 7/2/2024    oxyCODONE-acetaminophen (PERCOCET) 5-325 mg per tablet     Sig: Take 0.5 tablets by mouth every 6 (six) hours as needed for moderate pain Max Daily Amount: 2 tablets Do not start before July 31, 2024.     Dispense:  60 tablet     Refill:  0     Fill on 7/31/2024       My impressions and treatment recommendations were discussed in detail with the patient who verbalized understanding and had no further questions.      The patient continues to report an overall reduction of her pain level and improvement with her functioning without significant side effects using oxycodone/acetaminophen 5/325 mg tablet, patient takes half a tablet every 6 hours along with gabapentin 400 mg capsule daily at bedtime, therefore I will continue the patient on these medications.  Oxycodone/acetaminophen 5/325 mg tablet e-prescribed to the patient's pharmacy with a do not fill until date of 7/2/2024, 7/31/2024 and 8/29/2024.     [place] Prescription Drug Monitoring Program report was reviewed and was appropriate     There are risks associated with opioid medications, including dependence, addiction and tolerance. The patient understands and agrees to use these medications only as prescribed. Potential side effects of the medications include, but are not limited to, constipation, drowsiness, addiction, impaired judgment and risk of fatal overdose if not taken as prescribed. The patient was warned against driving while taking sedation medications.  Sharing medications is a felony. At this point in time, the patient is showing no signs of addiction, abuse, diversion or suicidal ideation.    An oral drug screen swab was collected at today's office visit. The swab will be sent for confirmatory testing. The drug screen is medically necessary because the patient is either dependent on opioid medication or is being considered for opioid medication therapy and the results could impact  ongoing or future treatment. The drug screen is to evaluate for the presences or absence of prescribed, non-prescribed, and/or illicit drugs/substances.     Follow-up is planned in 3 months time or sooner as warranted.  Discharge instructions were provided. I personally saw and examined the patient and I agree with the above discussed plan of care.    History of Present Illness:    Dennise Arnett is a 89 y.o. female who presents to [facility] for interval re-evaluation of the above stated pain complaints. The patient has a past medical and chronic pain history as outlined in the assessment section. She was last seen on 4/2/2024.    At today's visit patient states that their pain symptoms are the same with a pain score of 0/10 on the verbal numeric pain scale (patient is on medication).  The patient's pain is worse in the morning.  The patient's pain is constant in nature.  And the quality of the patient's pain is described as dull-aching, throbbing.  The patient's pain is located in the bilateral arms, mid back, bilateral low back, and bilateral  legs.  The patient states the amount of pain relief she is obtaining from her current pain relievers is enough to make a real difference in her life by reducing her pain symptoms by 80%.  Patient denies any side effects using oxycodone/acetaminophen or gabapentin.  Patient does suffer from constipation and occasionally she uses prune juice or juice to stay regular.    Pain Contract Signed:  2/27/2024  Last Drug Screen:  2/27/2024  New Drug Screen: 7/1/2024    Other than as stated above, the patient denies any interval changes in medications, medical condition, mental condition, symptoms, or allergies since the last office visit.         Review of Systems:    Review of Systems   Respiratory:  Negative for shortness of breath.    Cardiovascular:  Negative for chest pain.   Gastrointestinal:  Positive for nausea. Negative for constipation, diarrhea  and vomiting.   Musculoskeletal:  Positive for arthralgias, back pain and gait problem. Negative for joint swelling and myalgias.        Joint stiffness decreased range of motion   Skin:  Negative for rash.   Neurological:  Positive for weakness. Negative for dizziness and seizures.   All other systems reviewed and are negative.        Past Medical History:   Diagnosis Date    Arthritis     Carpal tunnel syndrome     Degeneration of lumbar intervertebral disc     Postmenopausal osteoporosis     Primary generalized (osteo)arthritis     Rheumatoid arthritis (HCC)     Right shoulder pain        Past Surgical History:   Procedure Laterality Date    CARPAL TUNNEL RELEASE      CATARACT EXTRACTION      CHOLECYSTECTOMY      KNEE SURGERY      MASS EXCISION Right 1/2/2019    Procedure: EXCISION BIOPSY TISSUE LESION/MASS HAND;  Surgeon: Dylan Baugh DO;  Location: [facility];  Service: Orthopedics    TONSILLECTOMY         Family History   Problem Relation Age of Onset    Cancer Sister     Breast cancer Mother     Breast cancer Maternal Grandmother     No Known Problems Father     No Known Problems Brother     No Known Problems Maternal Aunt     No Known Problems Maternal Uncle     No Known Problems Paternal Aunt     No Known Problems Paternal Uncle     No Known Problems Maternal Grandfather     No Known Problems Paternal Grandmother     No Known Problems Paternal Grandfather     ADD / ADHD Neg Hx     Anesthesia problems Neg Hx     Clotting disorder Neg Hx     Collagen disease Neg Hx     Diabetes Neg Hx     Dislocations Neg Hx     Learning disabilities Neg Hx     Neurological problems Neg Hx     Osteoporosis Neg Hx     Rheumatologic disease Neg Hx     Scoliosis Neg Hx     Vascular Disease Neg Hx        Social History     Occupational History    Not on file   Tobacco Use    Smoking status: Former     Current packs/day: 0.00     Average packs/day: 0.5 packs/day for 44.0 years (22.0 ttl pk-yrs)     Types: Cigarettes     Start  date: 1954     Quit date: 1998     Years since quittin.5    Smokeless tobacco: Never   Vaping Use    Vaping status: Never Used   Substance and Sexual Activity    Alcohol use: Not Currently     Comment: rarely    Drug use: Never    Sexual activity: Never         Current Outpatient Medications:     [START ON 2024] oxyCODONE-acetaminophen (PERCOCET) 5-325 mg per tablet, Take 0.5 tablets by mouth every 6 (six) hours as needed for moderate pain Max Daily Amount: 2 tablets Do not start before 2024., Disp: 60 tablet, Rfl: 0    [START ON 2024] oxyCODONE-acetaminophen (PERCOCET) 5-325 mg per tablet, Take 0.5 tablets by mouth every 6 (six) hours as needed for moderate pain Max Daily Amount: 2 tablets Do not start before 2024., Disp: 60 tablet, Rfl: 0    [START ON 2024] oxyCODONE-acetaminophen (PERCOCET) 5-325 mg per tablet, Take 0.5 tablets by mouth every 6 (six) hours as needed for moderate pain Max Daily Amount: 2 tablets Do not start before 2024., Disp: 60 tablet, Rfl: 0    albuterol (ProAir HFA) 90 mcg/act inhaler, Inhale 2 puffs every 6 (six) hours as needed for wheezing (Patient not taking: Reported on 2024), Disp: 8.5 g, Rfl: 0    Anoro Ellipta 62.5-25 MCG/ACT inhaler, INHALE 1 PUFF DAILY, Disp: 60 each, Rfl: 10    budesonide-formoterol (Symbicort) 160-4.5 mcg/act inhaler, Inhale 2 puffs 2 (two) times a day Rinse mouth after use., Disp: 10.2 g, Rfl: 3    carvedilol (COREG) 3.125 mg tablet, TAKE ONE TABLET BY MOUTH TWICE A DAY WITH MEALS, Disp: 180 tablet, Rfl: 0    docusate sodium (COLACE) 100 mg capsule, Take 2 capsules (200 mg total) by mouth daily (Patient not taking: Reported on 2024), Disp: 90 capsule, Rfl: 0    famotidine (PEPCID) 40 MG tablet, Take 1 tablet (40 mg total) by mouth daily at bedtime, Disp: 90 tablet, Rfl: 3    folic acid (FOLVITE) 1 mg tablet, Take 1 tablet (1 mg total) by mouth daily, Disp: 90 tablet, Rfl: 1    gabapentin  "(NEURONTIN) 400 mg capsule, TAKE 1 CAPSULE (400 MG TOTAL) BY MOUTH DAILY AT BEDTIME, Disp: 90 capsule, Rfl: 0    lidocaine (LIDODERM) 5 %, Apply 1 patch topically daily for 7 days Remove & Discard patch within 12 hours or as directed by MD, Disp: 7 patch, Rfl: 0    pantoprazole (PROTONIX) 40 mg tablet, TAKE 1 TABLET (40 MG TOTAL) BY MOUTH DAILY (Patient not taking: Reported on 5/6/2024), Disp: 90 tablet, Rfl: 1    predniSONE 2.5 mg tablet, 1 TABLET ORAL ONCE A DAY IN IN THE MORNING WITH FOOD 90 DAYS, Disp: 90 tablet, Rfl: 0    Prolia 60 MG/ML, , Disp: , Rfl:     No Known Allergies    Physical Exam:    /63   Pulse 80   Ht 5' 3\" (1.6 m)   Wt 64.9 kg (143 lb)   BMI 25.33 kg/m²     Constitutional:normal, well developed, well nourished, alert, in no distress and non-toxic and no overt pain behavior.  Eyes:anicteric  HEENT:grossly intact  Neck:supple, symmetric, trachea midline and no masses   Pulmonary:even and unlabored  Cardiovascular:No edema or pitting edema present  Skin:Normal without rashes or lesions and well hydrated  Psychiatric:Mood and affect appropriate  Neurologic:Cranial Nerves II-XII grossly intact  Musculoskeletal:in wheelchair      Orders Placed This Encounter   Procedures    MM OF_Amphetamine Definitive Test    MM OF_Alprazolam Definitive    MM OF_Buprenorphine Definitive Test    MM OF_Clonazepam Definitive    MM OF_Cocaine Definitive Test    MM OF_Codeine Definitive    MM OF_Diazepam Definitive    MM OF_Fentanyl Definitive Test    MM OF_Heroin Definitive Test    MM OF_Hydrocodone Definitive    MM OF_Hydromorphone Definitive    MM OF_Lorazepam Definitive    MM OF_MDMA Definitive Test    MM OF_Meperidine Definitive Test    MM OF_Methadone Definitive Test    MM OF_Methylphenidate Definitive Test    MM OF_Morphine Definitive    MM OF_Oxazepam Definitive    MM OF_Oxycodone Definitive Test - Oral Fluid    MM OF_Oxymorphone Definitive Test    MM OF_Phencyclidine Definitive Test    MM OF_Temazepam " Definitive    MM OF_THC Definitive Test    MM OF_Tramadol Definitive Test    MM ALL_Prescribed Meds and Special Instructions       This document was created using speech voice recognition software.   Grammatical errors, random word insertions, pronoun errors, and incomplete sentences are an occasional consequence of this system due to software limitations, ambient noise, and hardware issues.   Any formal questions or concerns about content, text, or information contained within the body of this dictation should be directly addressed to the provider for clarification.

## 2024-07-03 ENCOUNTER — TELEPHONE (OUTPATIENT)
Dept: RADIOLOGY | Facility: CLINIC | Age: 89
End: 2024-07-03

## 2024-07-03 NOTE — TELEPHONE ENCOUNTER
----- Message from Ruma LORA sent at 7/3/2024  9:53 AM EDT -----  Regarding: FW: Rejected oral swab unable to urinate in office    ----- Message -----  From: Amanda Marte  Sent: 7/3/2024   9:44 AM EDT  To: Rebecca Mackey; [facility]  Subject: FW: Rejected oral swab unable to urinate in #      ----- Message -----  From: STEVEN Bradley  Sent: 7/3/2024   8:45 AM EDT  To: [facility]  Subject: FW: Rejected oral swab unable to urinate in #    Needs valid drug screen at next visit or we will need to discontinue medications.  ----- Message -----  From: Mario Gonzalez MD  Sent: 7/3/2024   7:47 AM EDT  To: STEVEN Bradley  Subject: RE: Rejected oral swab unable to urinate in #    We would have to discontinue therapy. I think another MA should do the oral swab next time as well.  ----- Message -----  From: STEVEN Bradley  Sent: 7/2/2024   4:11 PM EDT  To: Mario Gonzalez MD  Subject: Rejected oral swab unable to urinate in offi#    Patient was annoyed that I would not allow her to bring in a urine sample from home and she did not understand how to use the oral swab according to Rebeka she kept chewing on it so obviously the sample was no good either so I do not know what the solution is going to be for this patient long-term since she uses Percocet and does not seem to be able to give us any drug screening fluids.  ----- Message -----  From: Interface: [facility]  Sent: 7/2/2024  11:57 AM EDT  To: STEVEN Bradley

## 2024-07-23 DIAGNOSIS — J45.30 MILD PERSISTENT ASTHMA WITHOUT COMPLICATION: ICD-10-CM

## 2024-07-23 RX ORDER — BUDESONIDE AND FORMOTEROL FUMARATE DIHYDRATE 160; 4.5 UG/1; UG/1
AEROSOL RESPIRATORY (INHALATION)
Qty: 10.2 G | Refills: 5 | Status: SHIPPED | OUTPATIENT
Start: 2024-07-23

## 2024-08-22 ENCOUNTER — APPOINTMENT (EMERGENCY)
Dept: RADIOLOGY | Facility: HOSPITAL | Age: 89
End: 2024-08-22
Payer: COMMERCIAL

## 2024-08-22 ENCOUNTER — HOSPITAL ENCOUNTER (EMERGENCY)
Facility: HOSPITAL | Age: 89
Discharge: HOME | End: 2024-08-22
Attending: EMERGENCY MEDICINE
Payer: COMMERCIAL

## 2024-08-22 ENCOUNTER — NURSE TRIAGE (OUTPATIENT)
Age: 89
End: 2024-08-22

## 2024-08-22 ENCOUNTER — OFFICE VISIT (OUTPATIENT)
Dept: FAMILY MEDICINE CLINIC | Facility: CLINIC | Age: 89
End: 2024-08-22
Payer: COMMERCIAL

## 2024-08-22 VITALS
HEIGHT: 63 IN | HEART RATE: 84 BPM | TEMPERATURE: 97.8 F | DIASTOLIC BLOOD PRESSURE: 74 MMHG | SYSTOLIC BLOOD PRESSURE: 173 MMHG | RESPIRATION RATE: 18 BRPM | OXYGEN SATURATION: 92 % | BODY MASS INDEX: 25.33 KG/M2

## 2024-08-22 VITALS
DIASTOLIC BLOOD PRESSURE: 74 MMHG | HEART RATE: 78 BPM | RESPIRATION RATE: 18 BRPM | SYSTOLIC BLOOD PRESSURE: 171 MMHG | OXYGEN SATURATION: 93 % | TEMPERATURE: 98.2 F

## 2024-08-22 DIAGNOSIS — R07.9 CHEST PAIN: Primary | ICD-10-CM

## 2024-08-22 DIAGNOSIS — R07.89 FEELING OF CHEST TIGHTNESS: ICD-10-CM

## 2024-08-22 DIAGNOSIS — R07.89 CHEST PRESSURE: ICD-10-CM

## 2024-08-22 DIAGNOSIS — K21.9 GASTROESOPHAGEAL REFLUX DISEASE WITHOUT ESOPHAGITIS: ICD-10-CM

## 2024-08-22 DIAGNOSIS — R52 BODY ACHES: Primary | ICD-10-CM

## 2024-08-22 LAB
2HR DELTA HS TROPONIN: 4 NG/L
ALBUMIN SERPL BCG-MCNC: 3.4 G/DL (ref 3.5–5)
ALP SERPL-CCNC: 49 U/L (ref 34–104)
ALT SERPL W P-5'-P-CCNC: 8 U/L (ref 7–52)
ANION GAP SERPL CALCULATED.3IONS-SCNC: 7 MMOL/L (ref 4–13)
ANISOCYTOSIS BLD QL SMEAR: PRESENT
AST SERPL W P-5'-P-CCNC: 15 U/L (ref 13–39)
BASOPHILS # BLD MANUAL: 0 THOUSAND/UL (ref 0–0.1)
BASOPHILS NFR MAR MANUAL: 0 % (ref 0–1)
BILIRUB SERPL-MCNC: 0.44 MG/DL (ref 0.2–1)
BUN SERPL-MCNC: 13 MG/DL (ref 5–25)
CALCIUM ALBUM COR SERPL-MCNC: 10.2 MG/DL (ref 8.3–10.1)
CALCIUM SERPL-MCNC: 9.7 MG/DL (ref 8.4–10.2)
CARDIAC TROPONIN I PNL SERPL HS: 39 NG/L
CARDIAC TROPONIN I PNL SERPL HS: 43 NG/L
CHLORIDE SERPL-SCNC: 102 MMOL/L (ref 96–108)
CO2 SERPL-SCNC: 28 MMOL/L (ref 21–32)
CREAT SERPL-MCNC: 0.75 MG/DL (ref 0.6–1.3)
EOSINOPHIL # BLD MANUAL: 0.11 THOUSAND/UL (ref 0–0.4)
EOSINOPHIL NFR BLD MANUAL: 1 % (ref 0–6)
ERYTHROCYTE [DISTWIDTH] IN BLOOD BY AUTOMATED COUNT: 13.8 % (ref 11.6–15.1)
GFR SERPL CREATININE-BSD FRML MDRD: 68 ML/MIN/1.73SQ M
GLUCOSE SERPL-MCNC: 112 MG/DL (ref 65–140)
HCT VFR BLD AUTO: 40 % (ref 34.8–46.1)
HGB BLD-MCNC: 12.9 G/DL (ref 11.5–15.4)
LIPASE SERPL-CCNC: <6 U/L (ref 11–82)
LYMPHOCYTES # BLD AUTO: 2.24 THOUSAND/UL (ref 0.6–4.47)
LYMPHOCYTES # BLD AUTO: 20 % (ref 14–44)
MCH RBC QN AUTO: 31.5 PG (ref 26.8–34.3)
MCHC RBC AUTO-ENTMCNC: 32.3 G/DL (ref 31.4–37.4)
MCV RBC AUTO: 98 FL (ref 82–98)
MONOCYTES # BLD AUTO: 2.88 THOUSAND/UL (ref 0–1.22)
MONOCYTES NFR BLD: 27 % (ref 4–12)
NEUTROPHILS # BLD MANUAL: 5.45 THOUSAND/UL (ref 1.85–7.62)
NEUTS SEG NFR BLD AUTO: 51 % (ref 43–75)
PLATELET # BLD AUTO: 144 THOUSANDS/UL (ref 149–390)
PLATELET BLD QL SMEAR: ABNORMAL
PMV BLD AUTO: 11.3 FL (ref 8.9–12.7)
POTASSIUM SERPL-SCNC: 4.2 MMOL/L (ref 3.5–5.3)
PROT SERPL-MCNC: 7.2 G/DL (ref 6.4–8.4)
RBC # BLD AUTO: 4.1 MILLION/UL (ref 3.81–5.12)
RBC MORPH BLD: PRESENT
SARS-COV-2 AG UPPER RESP QL IA: NEGATIVE
SODIUM SERPL-SCNC: 137 MMOL/L (ref 135–147)
VALID CONTROL: NORMAL
VARIANT LYMPHS # BLD AUTO: 1 %
WBC # BLD AUTO: 10.68 THOUSAND/UL (ref 4.31–10.16)

## 2024-08-22 PROCEDURE — 84484 ASSAY OF TROPONIN QUANT: CPT | Performed by: EMERGENCY MEDICINE

## 2024-08-22 PROCEDURE — 99214 OFFICE O/P EST MOD 30 MIN: CPT | Performed by: FAMILY MEDICINE

## 2024-08-22 PROCEDURE — 71045 X-RAY EXAM CHEST 1 VIEW: CPT

## 2024-08-22 PROCEDURE — 93005 ELECTROCARDIOGRAM TRACING: CPT

## 2024-08-22 PROCEDURE — 80053 COMPREHEN METABOLIC PANEL: CPT | Performed by: EMERGENCY MEDICINE

## 2024-08-22 PROCEDURE — 36415 COLL VENOUS BLD VENIPUNCTURE: CPT | Performed by: EMERGENCY MEDICINE

## 2024-08-22 PROCEDURE — 85007 BL SMEAR W/DIFF WBC COUNT: CPT | Performed by: EMERGENCY MEDICINE

## 2024-08-22 PROCEDURE — 87811 SARS-COV-2 COVID19 W/OPTIC: CPT | Performed by: FAMILY MEDICINE

## 2024-08-22 PROCEDURE — 85027 COMPLETE CBC AUTOMATED: CPT | Performed by: EMERGENCY MEDICINE

## 2024-08-22 PROCEDURE — 99285 EMERGENCY DEPT VISIT HI MDM: CPT | Performed by: EMERGENCY MEDICINE

## 2024-08-22 PROCEDURE — G2211 COMPLEX E/M VISIT ADD ON: HCPCS | Performed by: FAMILY MEDICINE

## 2024-08-22 PROCEDURE — 99285 EMERGENCY DEPT VISIT HI MDM: CPT

## 2024-08-22 PROCEDURE — 83690 ASSAY OF LIPASE: CPT | Performed by: EMERGENCY MEDICINE

## 2024-08-22 RX ORDER — PANTOPRAZOLE SODIUM 40 MG/1
40 TABLET, DELAYED RELEASE ORAL DAILY
Qty: 90 TABLET | Refills: 1 | Status: SHIPPED | OUTPATIENT
Start: 2024-08-22

## 2024-08-22 NOTE — TELEPHONE ENCOUNTER
Regarding: SOB, congestion, upset stomach  ----- Message from Cynthia BLEVINS sent at 8/22/2024  5:31 PM EDT -----  Patients son called stating pt wanted an appt tomorrow morning but he was trying to have her seen today    Pt experiences shortness of breath but not all the time.  Said she has congestion and an upset stomach.  Tried reaching out to office but got a weird dial tone and then my call was disconnected.  If Son can be contacted    [phone]

## 2024-08-22 NOTE — PROGRESS NOTES
Dennise Arnett 11/30/1929 female MRN: [id number]    [facility] OFFICE VISIT  [facility]      ASSESSMENT/PLAN  Dennise Arnett is a 89 y.o. female presents to the office for    Diagnoses and all orders for this visit:    Body aches  -     POCT Rapid Covid Ag    Chest pressure  -     Transfer to other facility    Feeling of chest tightness  -     Transfer to other facility    Gastroesophageal reflux disease without esophagitis  -     pantoprazole (PROTONIX) 40 mg tablet; Take 1 tablet (40 mg total) by mouth daily       Did advise the patient that I would like her to go to the emergency room  At this time might be secondary to acid reflux which I restarted her on Protonix.  However given elevated blood pressure/92% oxygen just with sitting/persistent chest pressure I recommend emergency evaluation.  Son will be taking her directly to the emergency room         Future Appointments   Date Time Provider Department Center   10/1/2024  2:45 PM Mario Gonzalez MD [facility]          SUBJECTIVE  CC: Shortness of Breath, bodyaches, and indestion      HPI:  Dennise Arnett is a 89 y.o. female who presents for an acute appointment.  Patient's son called our office today stating that the patient felt unwell.  Patient states that she has been having belching problems for the last 7 days.  However today she has had significant chest pressure that has not gone away.  States that it does not get better with inhalation.  She states that she has not been around any sick contacts.  Does feel little bit short of breath.  Patient denies any recent COVID exposures as well.  Has been trying to wean off of the acid reflux medicine since the last time we spoke.  Review of Systems    Historical Information   The patient history was reviewed as follows:  Past Medical History:   Diagnosis Date   • Arthritis    • Carpal tunnel syndrome    • Degeneration of lumbar intervertebral  disc    • Postmenopausal osteoporosis    • Primary generalized (osteo)arthritis    • Rheumatoid arthritis (HCC)    • Right shoulder pain          Medications:     Current Outpatient Medications:   •  albuterol (ProAir HFA) 90 mcg/act inhaler, Inhale 2 puffs every 6 (six) hours as needed for wheezing, Disp: 8.5 g, Rfl: 0  •  budesonide-formoterol (Symbicort) 160-4.5 mcg/act inhaler, INHALE TWO PUFFS TWO (TWO) TIMES A DAY RINSE MOUTH AFTER USE., Disp: 10.2 g, Rfl: 5  •  carvedilol (COREG) 3.125 mg tablet, TAKE ONE TABLET BY MOUTH TWICE A DAY WITH MEALS, Disp: 180 tablet, Rfl: 0  •  folic acid (FOLVITE) 1 mg tablet, Take 1 tablet (1 mg total) by mouth daily, Disp: 90 tablet, Rfl: 1  •  gabapentin (NEURONTIN) 400 mg capsule, TAKE 1 CAPSULE (400 MG TOTAL) BY MOUTH DAILY AT BEDTIME, Disp: 90 capsule, Rfl: 0  •  [START ON 8/29/2024] oxyCODONE-acetaminophen (PERCOCET) 5-325 mg per tablet, Take 0.5 tablets by mouth every 6 (six) hours as needed for moderate pain Max Daily Amount: 2 tablets Do not start before August 29, 2024., Disp: 60 tablet, Rfl: 0  •  oxyCODONE-acetaminophen (PERCOCET) 5-325 mg per tablet, Take 0.5 tablets by mouth every 6 (six) hours as needed for moderate pain Max Daily Amount: 2 tablets Do not start before July 2, 2024., Disp: 60 tablet, Rfl: 0  •  oxyCODONE-acetaminophen (PERCOCET) 5-325 mg per tablet, Take 0.5 tablets by mouth every 6 (six) hours as needed for moderate pain Max Daily Amount: 2 tablets Do not start before July 31, 2024., Disp: 60 tablet, Rfl: 0  •  pantoprazole (PROTONIX) 40 mg tablet, Take 1 tablet (40 mg total) by mouth daily, Disp: 90 tablet, Rfl: 1  •  predniSONE 2.5 mg tablet, 1 TABLET ORAL ONCE A DAY IN IN THE MORNING WITH FOOD 90 DAYS, Disp: 90 tablet, Rfl: 0  •  Anoro Ellipta 62.5-25 MCG/ACT inhaler, INHALE 1 PUFF DAILY (Patient not taking: Reported on 8/22/2024), Disp: 60 each, Rfl: 10  •  docusate sodium (COLACE) 100 mg capsule, Take 2 capsules (200 mg total) by mouth daily  "(Patient not taking: Reported on 4/4/2024), Disp: 90 capsule, Rfl: 0  •  famotidine (PEPCID) 40 MG tablet, Take 1 tablet (40 mg total) by mouth daily at bedtime (Patient not taking: Reported on 8/22/2024), Disp: 90 tablet, Rfl: 3  •  lidocaine (LIDODERM) 5 %, Apply 1 patch topically daily for 7 days Remove & Discard patch within 12 hours or as directed by MD, Disp: 7 patch, Rfl: 0  •  Prolia 60 MG/ML, , Disp: , Rfl:     No Known Allergies    OBJECTIVE  Vitals:   Vitals:    08/22/24 1816   BP: (!) 173/74   BP Location: Left arm   Patient Position: Sitting   Cuff Size: Standard   Pulse: 84   Resp: 18   Temp: 97.8 °F (36.6 °C)   TempSrc: Temporal   SpO2: 92%   Height: 5' 3\" (1.6 m)         Physical Exam  Vitals reviewed.   Constitutional:       Appearance: She is well-developed.   HENT:      Head: Normocephalic and atraumatic.   Eyes:      Extraocular Movements: EOM normal.      Conjunctiva/sclera: Conjunctivae normal.      Pupils: Pupils are equal, round, and reactive to light.   Cardiovascular:      Rate and Rhythm: Normal rate and regular rhythm.      Heart sounds: Normal heart sounds, S1 normal and S2 normal. No murmur heard.  Pulmonary:      Effort: Pulmonary effort is normal. No respiratory distress.      Breath sounds: Normal breath sounds. No wheezing.   Musculoskeletal:         General: No edema. Normal range of motion.      Cervical back: Normal range of motion and neck supple.   Skin:     General: Skin is warm.   Neurological:      Mental Status: She is alert and oriented to person, place, and time.   Psychiatric:         Mood and Affect: Mood and affect normal.         Speech: Speech normal.         Behavior: Behavior normal.         Thought Content: Thought content normal.         Judgment: Judgment normal.                    Verna Cordon MD,   [facility]  8/22/2024      "

## 2024-08-22 NOTE — TELEPHONE ENCOUNTER
"Reason for Disposition  • Nursing judgment    Answer Assessment - Initial Assessment Questions  1. REASON FOR CALL or QUESTION: \"What is your reason for calling today?\" or \"How can I best help you?\" or \"What question do you have that I can help answer?\"      Called son, states he got an appt at the office right now .    Protocols used: Information Only Call - No Triage-ADULT-[place]    "

## 2024-08-22 NOTE — ED PROVIDER NOTES
History  Chief Complaint   Patient presents with    Chest Pain     Pt reports cp that began this morning at 2 am. Reports low spo2 at home around 92%. Reports history of smoking and reports sob     Patient presents for evaluation of chest pain that began this morning.  She describes it as a tightness across her chest.  Denies any modifying factors for her symptoms.  No shortness of breath.  Patient states she no longer has that tightness or discomfort in her chest.  She was seen by her primary care physician and sent in to rule out ACS.      History provided by:  Patient   used: No    Chest Pain      Prior to Admission Medications   Prescriptions Last Dose Informant Patient Reported? Taking?   Anoro Ellipta 62.5-25 MCG/ACT inhaler   No No   Sig: INHALE 1 PUFF DAILY   Patient not taking: Reported on 8/22/2024   Prolia 60 MG/ML   Yes No   Patient not taking: Reported on 4/4/2024   albuterol (ProAir HFA) 90 mcg/act inhaler   No No   Sig: Inhale 2 puffs every 6 (six) hours as needed for wheezing   budesonide-formoterol (Symbicort) 160-4.5 mcg/act inhaler   No No   Sig: INHALE TWO PUFFS TWO (TWO) TIMES A DAY RINSE MOUTH AFTER USE.   carvedilol (COREG) 3.125 mg tablet   No No   Sig: TAKE ONE TABLET BY MOUTH TWICE A DAY WITH MEALS   docusate sodium (COLACE) 100 mg capsule   No No   Sig: Take 2 capsules (200 mg total) by mouth daily   Patient not taking: Reported on 4/4/2024   famotidine (PEPCID) 40 MG tablet   No No   Sig: Take 1 tablet (40 mg total) by mouth daily at bedtime   Patient not taking: Reported on 8/22/2024   folic acid (FOLVITE) 1 mg tablet   No No   Sig: Take 1 tablet (1 mg total) by mouth daily   gabapentin (NEURONTIN) 400 mg capsule   No No   Sig: TAKE 1 CAPSULE (400 MG TOTAL) BY MOUTH DAILY AT BEDTIME   lidocaine (LIDODERM) 5 %  Self No No   Sig: Apply 1 patch topically daily for 7 days Remove & Discard patch within 12 hours or as directed by MD   oxyCODONE-acetaminophen (PERCOCET)  5-325 mg per tablet   No No   Sig: Take 0.5 tablets by mouth every 6 (six) hours as needed for moderate pain Max Daily Amount: 2 tablets Do not start before 2024.   oxyCODONE-acetaminophen (PERCOCET) 5-325 mg per tablet   No No   Sig: Take 0.5 tablets by mouth every 6 (six) hours as needed for moderate pain Max Daily Amount: 2 tablets Do not start before 2024.   oxyCODONE-acetaminophen (PERCOCET) 5-325 mg per tablet   No No   Sig: Take 0.5 tablets by mouth every 6 (six) hours as needed for moderate pain Max Daily Amount: 2 tablets Do not start before 2024.   pantoprazole (PROTONIX) 40 mg tablet   No No   Sig: Take 1 tablet (40 mg total) by mouth daily   predniSONE 2.5 mg tablet   No No   Si TABLET ORAL ONCE A DAY IN IN THE MORNING WITH FOOD 90 DAYS      Facility-Administered Medications: None       Past Medical History:   Diagnosis Date    Arthritis     Carpal tunnel syndrome     Degeneration of lumbar intervertebral disc     Postmenopausal osteoporosis     Primary generalized (osteo)arthritis     Rheumatoid arthritis (HCC)     Right shoulder pain        Past Surgical History:   Procedure Laterality Date    CARPAL TUNNEL RELEASE      CATARACT EXTRACTION      CHOLECYSTECTOMY      KNEE SURGERY      MASS EXCISION Right 2019    Procedure: EXCISION BIOPSY TISSUE LESION/MASS HAND;  Surgeon: Dylan Baugh DO;  Location: [facility];  Service: Orthopedics    TONSILLECTOMY         Family History   Problem Relation Age of Onset    Cancer Sister     Breast cancer Mother     Breast cancer Maternal Grandmother     No Known Problems Father     No Known Problems Brother     No Known Problems Maternal Aunt     No Known Problems Maternal Uncle     No Known Problems Paternal Aunt     No Known Problems Paternal Uncle     No Known Problems Maternal Grandfather     No Known Problems Paternal Grandmother     No Known Problems Paternal Grandfather     ADD / ADHD Neg Hx     Anesthesia problems Neg Hx      Clotting disorder Neg Hx     Collagen disease Neg Hx     Diabetes Neg Hx     Dislocations Neg Hx     Learning disabilities Neg Hx     Neurological problems Neg Hx     Osteoporosis Neg Hx     Rheumatologic disease Neg Hx     Scoliosis Neg Hx     Vascular Disease Neg Hx      I have reviewed and agree with the history as documented.    E-Cigarette/Vaping    E-Cigarette Use Never User      E-Cigarette/Vaping Substances     Social History     Tobacco Use    Smoking status: Former     Current packs/day: 0.00     Average packs/day: 0.5 packs/day for 44.0 years (22.0 ttl pk-yrs)     Types: Cigarettes     Start date: 1954     Quit date: 1998     Years since quittin.6    Smokeless tobacco: Never   Vaping Use    Vaping status: Never Used   Substance Use Topics    Alcohol use: Not Currently     Comment: rarely    Drug use: Never       Review of Systems   Respiratory:  Positive for chest tightness.    Cardiovascular:  Positive for chest pain.   All other systems reviewed and are negative.      Physical Exam  Physical Exam  Vitals and nursing note reviewed.   Constitutional:       General: She is not in acute distress.  Cardiovascular:      Rate and Rhythm: Normal rate and regular rhythm.   Pulmonary:      Effort: Pulmonary effort is normal. No respiratory distress.      Breath sounds: Normal breath sounds.   Abdominal:      Tenderness: There is no abdominal tenderness.   Skin:     Capillary Refill: Capillary refill takes less than 2 seconds.      Findings: No rash.   Neurological:      General: No focal deficit present.      Mental Status: She is alert and oriented to person, place, and time.         Vital Signs  ED Triage Vitals   Temperature Pulse Respirations Blood Pressure SpO2   24   (!) 97 °F (36.1 °C) 72 18 148/66 92 %      Temp Source Heart Rate Source Patient Position - Orthostatic VS BP Location FiO2 (%)   24  08/22/24 2139 08/22/24 2139 --   Oral Monitor Lying Left arm       Pain Score       --                  Vitals:    08/22/24 1930 08/22/24 2139 08/22/24 2215   BP: 148/66 (!) 177/75 (!) 171/74   Pulse: 72 68 78   Patient Position - Orthostatic VS:  Lying Lying         Visual Acuity      ED Medications  Medications - No data to display    Diagnostic Studies  Results Reviewed       Procedure Component Value Units Date/Time    HS Troponin I 2hr [[id number]]  (Normal) Collected: 08/22/24 2158    Lab Status: Final result Specimen: Blood from Arm, Left Updated: 08/22/24 2226     hs TnI 2hr 43 ng/L      Delta 2hr hsTnI 4 ng/L     RBC Morphology Reflex Test [[id number]] Collected: 08/22/24 2003    Lab Status: Final result Specimen: Blood from Arm, Right Updated: 08/22/24 2101    CBC and differential [[id number]]  (Abnormal) Collected: 08/22/24 2003    Lab Status: Final result Specimen: Blood from Arm, Right Updated: 08/22/24 2042     WBC 10.68 Thousand/uL      RBC 4.10 Million/uL      Hemoglobin 12.9 g/dL      Hematocrit 40.0 %      MCV 98 fL      MCH 31.5 pg      MCHC 32.3 g/dL      RDW 13.8 %      MPV 11.3 fL      Platelets 144 Thousands/uL     Narrative:      This is an appended report.  These results have been appended to a previously verified report.    Manual Differential(PHLEBS Do Not Order) [[id number]]  (Abnormal) Collected: 08/22/24 2003    Lab Status: Final result Specimen: Blood from Arm, Right Updated: 08/22/24 2042     Segmented % 51 %      Lymphocytes % 20 %      Monocytes % 27 %      Eosinophils % 1 %      Basophils % 0 %      Atypical Lymphocytes % 1 %      Absolute Neutrophils 5.45 Thousand/uL      Absolute Lymphocytes 2.24 Thousand/uL      Absolute Monocytes 2.88 Thousand/uL      Absolute Eosinophils 0.11 Thousand/uL      Absolute Basophils 0.00 Thousand/uL      Total Counted --     RBC Morphology Present     Platelet Estimate Borderline     Anisocytosis Present    HS Troponin 0hr (reflex protocol) [[id number]]   (Normal) Collected: 08/22/24 2003    Lab Status: Final result Specimen: Blood from Arm, Right Updated: 08/22/24 2035     hs TnI 0hr 39 ng/L     Comprehensive metabolic panel [[id number]]  (Abnormal) Collected: 08/22/24 2003    Lab Status: Final result Specimen: Blood from Arm, Right Updated: 08/22/24 2033     Sodium 137 mmol/L      Potassium 4.2 mmol/L      Chloride 102 mmol/L      CO2 28 mmol/L      ANION GAP 7 mmol/L      BUN 13 mg/dL      Creatinine 0.75 mg/dL      Glucose 112 mg/dL      Calcium 9.7 mg/dL      Corrected Calcium 10.2 mg/dL      AST 15 U/L      ALT 8 U/L      Alkaline Phosphatase 49 U/L      Total Protein 7.2 g/dL      Albumin 3.4 g/dL      Total Bilirubin 0.44 mg/dL      eGFR 68 ml/min/1.73sq m     Narrative:      National Kidney Disease Foundation guidelines for Chronic Kidney Disease (CKD):     Stage 1 with normal or high GFR (GFR > 90 mL/min/1.73 square meters)    Stage 2 Mild CKD (GFR = 60-89 mL/min/1.73 square meters)    Stage 3A Moderate CKD (GFR = 45-59 mL/min/1.73 square meters)    Stage 3B Moderate CKD (GFR = 30-44 mL/min/1.73 square meters)    Stage 4 Severe CKD (GFR = 15-29 mL/min/1.73 square meters)    Stage 5 End Stage CKD (GFR <15 mL/min/1.73 square meters)  Note: GFR calculation is accurate only with a steady state creatinine    Lipase [[id number]]  (Abnormal) Collected: 08/22/24 2003    Lab Status: Final result Specimen: Blood from Arm, Right Updated: 08/22/24 2033     Lipase <6 u/L                    XR chest 1 view portable   Final Result by Andria Kiran MD (08/22 2037)      No acute cardiopulmonary disease.            Workstation performed: [id number]                    Procedures  ECG 12 Lead Documentation Only    Date/Time: 8/22/2024 7:43 PM    Performed by: Laureano Ascencio DO  Authorized by: Laureano Ascencio DO    ECG reviewed by me, the ED Provider: yes    Patient location:  ED  Previous ECG:     Previous ECG:  Compared to current    Comparison ECG info:  Previous  LBBB  Interpretation:     Interpretation: abnormal    Rate:     ECG rate:  69    ECG rate assessment: normal    Rhythm:     Rhythm: sinus rhythm    Ectopy:     Ectopy: none    QRS:     QRS axis:  Left  Conduction:     Conduction: abnormal      Abnormal conduction: complete LBBB             ED Course  ED Course as of 08/23/24 1628   Thu Aug 22, 2024   2226 hs TnI 2hr: 43               HEART Risk Score      Flowsheet Row Most Recent Value   Heart Score Risk Calculator    History 1 Filed at: 08/22/2024 2227   ECG 1 Filed at: 08/22/2024 2227   Age 2 Filed at: 08/22/2024 2227   Risk Factors 1 Filed at: 08/22/2024 2227   Troponin 1 Filed at: 08/22/2024 2227   HEART Score 6 Filed at: 08/22/2024 2227                          SBIRT 22yo+      Flowsheet Row Most Recent Value   LENIN: How many times in the past year have you...    Used an illegal drug or used a prescription medication for non-medical reasons? Never Filed at: 08/22/2024 1932                      Medical Decision Making  Pulse ox 93% on room air indicating adequate oxygenation.  CXR: NAD as read by me      Differential diagnose include but not limited to arrhythmia, ACS, COPD, CHF, musculoskeletal pain, GERD    Patient remained chest pain-free in the ER laboratory evaluation was negative for ACS recommend follow-up with cardiology.    Amount and/or Complexity of Data Reviewed  Labs: ordered. Decision-making details documented in ED Course.  Radiology: ordered and independent interpretation performed.  ECG/medicine tests: ordered and independent interpretation performed.                 Disposition  Final diagnoses:   Chest pain     Time reflects when diagnosis was documented in both MDM as applicable and the Disposition within this note       Time User Action Codes Description Comment    8/22/2024 10:28 PM Laureano Ascencio Add [R07.9] Chest pain           ED Disposition       ED Disposition   Discharge    Condition   Stable    Date/Time   Thu Aug 22, 2024 2228     Comment   Dennise Arnett discharge to home/self care.                   Follow-up Information    None         Discharge Medication List as of 8/22/2024 10:28 PM        CONTINUE these medications which have NOT CHANGED    Details   albuterol (ProAir HFA) 90 mcg/act inhaler Inhale 2 puffs every 6 (six) hours as needed for wheezing, Starting Wed 1/31/2024, Normal      Anoro Ellipta 62.5-25 MCG/ACT inhaler INHALE 1 PUFF DAILY, Starting Wed 6/5/2024, Normal      budesonide-formoterol (Symbicort) 160-4.5 mcg/act inhaler INHALE TWO PUFFS TWO (TWO) TIMES A DAY RINSE MOUTH AFTER USE., Normal      carvedilol (COREG) 3.125 mg tablet TAKE ONE TABLET BY MOUTH TWICE A DAY WITH MEALS, Normal      docusate sodium (COLACE) 100 mg capsule Take 2 capsules (200 mg total) by mouth daily, Starting Mon 4/3/2023, No Print      famotidine (PEPCID) 40 MG tablet Take 1 tablet (40 mg total) by mouth daily at bedtime, Starting Thu 4/4/2024, Until Sun 3/30/2025, Normal      folic acid (FOLVITE) 1 mg tablet Take 1 tablet (1 mg total) by mouth daily, Starting Fri 3/8/2024, Normal      gabapentin (NEURONTIN) 400 mg capsule TAKE 1 CAPSULE (400 MG TOTAL) BY MOUTH DAILY AT BEDTIME, Starting Mon 7/1/2024, Until Sun 9/29/2024, Normal      lidocaine (LIDODERM) 5 % Apply 1 patch topically daily for 7 days Remove & Discard patch within 12 hours or as directed by MD, Starting Sat 5/16/2020, Until Sat 5/23/2020, Print      !! oxyCODONE-acetaminophen (PERCOCET) 5-325 mg per tablet Take 0.5 tablets by mouth every 6 (six) hours as needed for moderate pain Max Daily Amount: 2 tablets Do not start before August 29, 2024., Starting Thu 8/29/2024, Normal      !! oxyCODONE-acetaminophen (PERCOCET) 5-325 mg per tablet Take 0.5 tablets by mouth every 6 (six) hours as needed for moderate pain Max Daily Amount: 2 tablets Do not start before July 2, 2024., Starting Tue 7/2/2024, Normal      !! oxyCODONE-acetaminophen (PERCOCET) 5-325 mg per tablet Take 0.5 tablets by  mouth every 6 (six) hours as needed for moderate pain Max Daily Amount: 2 tablets Do not start before July 31, 2024., Starting Wed 7/31/2024, Normal      pantoprazole (PROTONIX) 40 mg tablet Take 1 tablet (40 mg total) by mouth daily, Starting Thu 8/22/2024, Normal      predniSONE 2.5 mg tablet 1 TABLET ORAL ONCE A DAY IN IN THE MORNING WITH FOOD 90 DAYS, Normal      Prolia 60 MG/ML Starting Tue 1/10/2023, Historical Med       !! - Potential duplicate medications found. Please discuss with provider.              PDMP Review         Value Time User    PDMP Reviewed  Yes 7/1/2024  2:35 PM STEVEN Bradley            ED Provider  Electronically Signed by             Laureano Ascencio DO  08/23/24 [id number]

## 2024-08-23 ENCOUNTER — TELEPHONE (OUTPATIENT)
Age: 89
End: 2024-08-23

## 2024-08-23 LAB
ATRIAL RATE: 69 BPM
P AXIS: 56 DEGREES
PR INTERVAL: 168 MS
QRS AXIS: -54 DEGREES
QRSD INTERVAL: 118 MS
QT INTERVAL: 398 MS
QTC INTERVAL: 426 MS
T WAVE AXIS: 89 DEGREES
VENTRICULAR RATE: 69 BPM

## 2024-08-23 PROCEDURE — 93010 ELECTROCARDIOGRAM REPORT: CPT | Performed by: INTERNAL MEDICINE

## 2024-08-23 NOTE — ED NOTES
I advised pt urine sample is needed, pt states unable to give urine at this time.Made provider aware.     Staci Savage RN  08/22/24 [id number]

## 2024-08-23 NOTE — TELEPHONE ENCOUNTER
[facility] calling on behalf of patient stating someone reached out to them thinking they had  coverage. She stated there is a new ID number [id number]- [employer] medicare advantage plan [place]    I ran the RTE for current ins/id number and it is coming back still verified and covered- please confirm if this needs updating.  Did not want to remove this insurance without the patient's knowledge.  Please advise.

## 2024-09-03 DIAGNOSIS — M05.79 RHEUMATOID ARTHRITIS INVOLVING MULTIPLE SITES WITH POSITIVE RHEUMATOID FACTOR (HCC): ICD-10-CM

## 2024-09-04 RX ORDER — FOLIC ACID 1 MG/1
TABLET ORAL DAILY
Qty: 90 TABLET | Refills: 0 | Status: SHIPPED | OUTPATIENT
Start: 2024-09-04

## 2024-10-01 ENCOUNTER — TELEPHONE (OUTPATIENT)
Age: 89
End: 2024-10-01

## 2024-10-01 DIAGNOSIS — M06.09 RHEUMATOID ARTHRITIS OF MULTIPLE SITES WITH NEGATIVE RHEUMATOID FACTOR (HCC): ICD-10-CM

## 2024-10-01 DIAGNOSIS — G89.4 CHRONIC PAIN SYNDROME: ICD-10-CM

## 2024-10-01 RX ORDER — OXYCODONE AND ACETAMINOPHEN 5; 325 MG/1; MG/1
0.5 TABLET ORAL EVERY 6 HOURS PRN
Qty: 30 TABLET | Refills: 0 | Status: SHIPPED | OUTPATIENT
Start: 2024-10-01 | End: 2024-10-16

## 2024-10-01 NOTE — TELEPHONE ENCOUNTER
Called patients son to relay message about medication.  Two weeks worth of meds will be called into the pharmacy on file so that she does not run out of medication until her appt on 10/15/2024.    Thank you,  Aislinn MENDES

## 2024-10-11 RX ORDER — GABAPENTIN 400 MG/1
400 CAPSULE ORAL
Qty: 90 CAPSULE | Refills: 0 | Status: SHIPPED | OUTPATIENT
Start: 2024-10-11 | End: 2025-01-09

## 2024-10-11 NOTE — TELEPHONE ENCOUNTER
Pt contacted Call Center requested refill of their medication.        Doctor Name: DARBY      Medication Name: Gabapentin      Dosage of Med: 400mg      Frequency of Med: daily       Remaining Medication: 2 tabs      Pharmacy and Location:     [address]    Pt. Preferred Callback Phone Number:       Thank you.       PLEASE ADVISE PATIENTS:    REFILL REQUESTS WILL BE PROCESSED WITHIN 24-48 HOURS.

## 2024-10-15 ENCOUNTER — OFFICE VISIT (OUTPATIENT)
Age: 89
End: 2024-10-15
Payer: COMMERCIAL

## 2024-10-15 VITALS
BODY MASS INDEX: 25.33 KG/M2 | DIASTOLIC BLOOD PRESSURE: 75 MMHG | HEART RATE: 85 BPM | SYSTOLIC BLOOD PRESSURE: 142 MMHG | WEIGHT: 143 LBS

## 2024-10-15 DIAGNOSIS — M06.09 RHEUMATOID ARTHRITIS OF MULTIPLE SITES WITH NEGATIVE RHEUMATOID FACTOR (HCC): ICD-10-CM

## 2024-10-15 DIAGNOSIS — M54.12 CERVICAL RADICULOPATHY: Primary | ICD-10-CM

## 2024-10-15 DIAGNOSIS — G89.4 CHRONIC PAIN SYNDROME: ICD-10-CM

## 2024-10-15 PROCEDURE — 99214 OFFICE O/P EST MOD 30 MIN: CPT | Performed by: STUDENT IN AN ORGANIZED HEALTH CARE EDUCATION/TRAINING PROGRAM

## 2024-10-15 RX ORDER — OXYCODONE AND ACETAMINOPHEN 5; 325 MG/1; MG/1
0.5 TABLET ORAL EVERY 6 HOURS PRN
Qty: 60 TABLET | Refills: 0 | Status: SHIPPED | OUTPATIENT
Start: 2024-12-14 | End: 2025-01-13

## 2024-10-15 RX ORDER — OXYCODONE AND ACETAMINOPHEN 5; 325 MG/1; MG/1
0.5 TABLET ORAL EVERY 6 HOURS PRN
Qty: 60 TABLET | Refills: 0 | Status: SHIPPED | OUTPATIENT
Start: 2024-10-15 | End: 2024-11-14

## 2024-10-15 RX ORDER — OXYCODONE AND ACETAMINOPHEN 5; 325 MG/1; MG/1
0.5 TABLET ORAL EVERY 6 HOURS PRN
Qty: 60 TABLET | Refills: 0 | Status: SHIPPED | OUTPATIENT
Start: 2024-11-14 | End: 2024-12-14

## 2024-10-15 NOTE — PROGRESS NOTES
Pain Medicine Follow-Up Note    Assessment:  1. Cervical radiculopathy    2. Chronic pain syndrome    3. Rheumatoid arthritis of multiple sites with negative rheumatoid factor (HCC)      Patient is a 89-year-old woman who presents as a follow-up visit after last being seen by Marisol Guevara on 7/1/2024 for chronic pain syndrome, rheumatoid arthritis and long-term current use of opiate analgesic.  At that time patient was continued on Percocet 5-3 25 taking half a tablet every 6 hours as needed.  Patient states symptoms are worse rating her pain as a 8 out of 10 on numeric rating scale.  She states when she takes the medication her pain is a 0.  Pain is worse throughout the day and the pain is constant.  The quality pain is a sharp, shooting, numbing pain primarily in her neck rating down her left arm along with her right knee.    Given patient continuing to report significant benefit from her current regimen of Percocet and gabapentin, will continue at current dose. Office out of oral swabs today so patient counseled to follow up in the next two weeks for this.     Plan:  Continue Percocet 5-325 mg taking 0.5 tablets q6hrs prn; 60 tablets; 3 months  Oral swab at later date once in office   Continue gabapentin 400 mg nightly   Follow up in three months   No orders of the defined types were placed in this encounter.      New Medications Ordered This Visit   Medications    oxyCODONE-acetaminophen (PERCOCET) 5-325 mg per tablet     Sig: Take 0.5 tablets by mouth every 6 (six) hours as needed for moderate pain Max Daily Amount: 2 tablets Do not start before November 14, 2024.     Dispense:  60 tablet     Refill:  0     Fill on 7/31/2024    oxyCODONE-acetaminophen (PERCOCET) 5-325 mg per tablet     Sig: Take 0.5 tablets by mouth every 6 (six) hours as needed for moderate pain Max Daily Amount: 2 tablets     Dispense:  60 tablet     Refill:  0     Fill on 7/31/2024    oxyCODONE-acetaminophen (PERCOCET) 5-325 mg per tablet      Sig: Take 0.5 tablets by mouth every 6 (six) hours as needed for moderate pain Max Daily Amount: 2 tablets Do not start before December 14, 2024.     Dispense:  60 tablet     Refill:  0     Fill on 7/31/2024       My impressions and treatment recommendations were discussed in detail with the patient who verbalized understanding and had no further questions.      Complete risks and benefits including bleeding, infection, tissue reaction, nerve injury and allergic reaction were discussed. The approach was demonstrated using models and literature was provided. Verbal and written consent was obtained.      Follow-up is planned in three months time or sooner as warranted.  Discharge instructions were provided. I personally saw and examined the patient and I agree with the above discussed plan of care.    History of Present Illness:    Dennise Arnett is a 89 y.o. female who presents to [facility] for interval re-evaluation of the above stated pain complaints. The patient has a past medical and chronic pain history as outlined in the assessment section. She was last seen on 7/1/2024.    Patient is a 89-year-old woman who presents as a follow-up visit after last being seen by Marisol Guevara on 7/1/2024 for chronic pain syndrome, rheumatoid arthritis and long-term current use of opiate analgesic.  At that time patient was continued on Percocet 5-3 25 taking half a tablet every 6 hours as needed.  Patient states symptoms are worse rating her pain as a 8 out of 10 on numeric rating scale.  She states when she takes the medication her pain is a 0.  Pain is worse throughout the day and the pain is constant.  The quality pain is a sharp, shooting, numbing pain primarily in her neck rating down her left arm along with her right knee.    Pain Contract Signed:  Active  Last Urine Drug Screen:  Pending     Other than as stated above, the patient denies any interval changes in medications, medical  condition, mental condition, symptoms, or allergies since the last office visit.         Review of Systems:    Review of Systems   Constitutional:  Negative for unexpected weight change.   HENT:  Negative for ear pain.    Eyes:  Negative for visual disturbance.   Respiratory:  Negative for shortness of breath and wheezing.    Gastrointestinal:  Negative for abdominal pain.   Musculoskeletal:  Positive for gait problem. Negative for back pain.        Decreased ROM, joint and muscle pain   Neurological:  Positive for weakness and numbness.   Psychiatric/Behavioral:  Positive for sleep disturbance. Negative for decreased concentration.          Past Medical History:   Diagnosis Date    Arthritis     Carpal tunnel syndrome     Degeneration of lumbar intervertebral disc     Postmenopausal osteoporosis     Primary generalized (osteo)arthritis     Rheumatoid arthritis (HCC)     Right shoulder pain        Past Surgical History:   Procedure Laterality Date    CARPAL TUNNEL RELEASE      CATARACT EXTRACTION      CHOLECYSTECTOMY      KNEE SURGERY      MASS EXCISION Right 1/2/2019    Procedure: EXCISION BIOPSY TISSUE LESION/MASS HAND;  Surgeon: Dylan Baugh DO;  Location: [facility];  Service: Orthopedics    TONSILLECTOMY         Family History   Problem Relation Age of Onset    Cancer Sister     Breast cancer Mother     Breast cancer Maternal Grandmother     No Known Problems Father     No Known Problems Brother     No Known Problems Maternal Aunt     No Known Problems Maternal Uncle     No Known Problems Paternal Aunt     No Known Problems Paternal Uncle     No Known Problems Maternal Grandfather     No Known Problems Paternal Grandmother     No Known Problems Paternal Grandfather     ADD / ADHD Neg Hx     Anesthesia problems Neg Hx     Clotting disorder Neg Hx     Collagen disease Neg Hx     Diabetes Neg Hx     Dislocations Neg Hx     Learning disabilities Neg Hx     Neurological problems Neg Hx     Osteoporosis Neg Hx      Rheumatologic disease Neg Hx     Scoliosis Neg Hx     Vascular Disease Neg Hx        Social History     Occupational History    Not on file   Tobacco Use    Smoking status: Former     Current packs/day: 0.00     Average packs/day: 0.5 packs/day for 44.0 years (22.0 ttl pk-yrs)     Types: Cigarettes     Start date: 1954     Quit date: 1998     Years since quittin.8    Smokeless tobacco: Never   Vaping Use    Vaping status: Never Used   Substance and Sexual Activity    Alcohol use: Not Currently     Comment: rarely    Drug use: Never    Sexual activity: Never         Current Outpatient Medications:     albuterol (ProAir HFA) 90 mcg/act inhaler, Inhale 2 puffs every 6 (six) hours as needed for wheezing, Disp: 8.5 g, Rfl: 0    Anoro Ellipta 62.5-25 MCG/ACT inhaler, INHALE 1 PUFF DAILY, Disp: 60 each, Rfl: 10    budesonide-formoterol (Symbicort) 160-4.5 mcg/act inhaler, INHALE TWO PUFFS TWO (TWO) TIMES A DAY RINSE MOUTH AFTER USE., Disp: 10.2 g, Rfl: 5    carvedilol (COREG) 3.125 mg tablet, TAKE ONE TABLET BY MOUTH TWICE A DAY WITH MEALS, Disp: 180 tablet, Rfl: 0    folic acid (FOLVITE) 1 mg tablet, TAKE ONE CAPSULE BY MOUTH DAILY, Disp: 90 tablet, Rfl: 0    gabapentin (NEURONTIN) 400 mg capsule, Take 1 capsule (400 mg total) by mouth daily at bedtime, Disp: 90 capsule, Rfl: 0    [START ON 2024] oxyCODONE-acetaminophen (PERCOCET) 5-325 mg per tablet, Take 0.5 tablets by mouth every 6 (six) hours as needed for moderate pain Max Daily Amount: 2 tablets Do not start before 2024., Disp: 60 tablet, Rfl: 0    oxyCODONE-acetaminophen (PERCOCET) 5-325 mg per tablet, Take 0.5 tablets by mouth every 6 (six) hours as needed for moderate pain Max Daily Amount: 2 tablets, Disp: 60 tablet, Rfl: 0    [START ON 2024] oxyCODONE-acetaminophen (PERCOCET) 5-325 mg per tablet, Take 0.5 tablets by mouth every 6 (six) hours as needed for moderate pain Max Daily Amount: 2 tablets Do not start before  December 14, 2024., Disp: 60 tablet, Rfl: 0    pantoprazole (PROTONIX) 40 mg tablet, Take 1 tablet (40 mg total) by mouth daily, Disp: 90 tablet, Rfl: 1    predniSONE 2.5 mg tablet, 1 TABLET ORAL ONCE A DAY IN IN THE MORNING WITH FOOD 90 DAYS, Disp: 90 tablet, Rfl: 0    docusate sodium (COLACE) 100 mg capsule, Take 2 capsules (200 mg total) by mouth daily (Patient not taking: Reported on 4/4/2024), Disp: 90 capsule, Rfl: 0    famotidine (PEPCID) 40 MG tablet, Take 1 tablet (40 mg total) by mouth daily at bedtime (Patient not taking: Reported on 10/15/2024), Disp: 90 tablet, Rfl: 3    lidocaine (LIDODERM) 5 %, Apply 1 patch topically daily for 7 days Remove & Discard patch within 12 hours or as directed by MD, Disp: 7 patch, Rfl: 0    oxyCODONE-acetaminophen (PERCOCET) 5-325 mg per tablet, Take 0.5 tablets by mouth every 6 (six) hours as needed for moderate pain Max Daily Amount: 2 tablets Do not start before July 2, 2024. (Patient not taking: Reported on 10/15/2024), Disp: 60 tablet, Rfl: 0    oxyCODONE-acetaminophen (PERCOCET) 5-325 mg per tablet, Take 0.5 tablets by mouth every 6 (six) hours as needed for moderate pain for up to 15 days Max Daily Amount: 2 tablets (Patient not taking: Reported on 10/15/2024), Disp: 30 tablet, Rfl: 0    Prolia 60 MG/ML, , Disp: , Rfl:     No Known Allergies    Physical Exam:    /75   Pulse 85   Wt 64.9 kg (143 lb)   BMI 25.33 kg/m²     Constitutional:normal, well developed, well nourished, alert, in no distress and non-toxic and no overt pain behavior.  Eyes:anicteric  HEENT:grossly intact  Neck:supple, symmetric, trachea midline and no masses   Pulmonary:even and unlabored  Cardiovascular:No edema or pitting edema present  Skin:Normal without rashes or lesions and well hydrated  Psychiatric:Mood and affect appropriate  Neurologic:Cranial Nerves II-XII grossly intact  Musculoskeletal:in wheelchair.       Imaging    No orders to display         No orders of the defined  types were placed in this encounter.

## 2024-10-23 ENCOUNTER — IMMUNIZATIONS (OUTPATIENT)
Dept: FAMILY MEDICINE CLINIC | Facility: CLINIC | Age: 89
End: 2024-10-23
Payer: COMMERCIAL

## 2024-10-23 DIAGNOSIS — Z23 ENCOUNTER FOR IMMUNIZATION: Primary | ICD-10-CM

## 2024-10-23 PROCEDURE — 90662 IIV NO PRSV INCREASED AG IM: CPT

## 2024-10-23 PROCEDURE — G0008 ADMIN INFLUENZA VIRUS VAC: HCPCS

## 2024-10-28 ENCOUNTER — TELEPHONE (OUTPATIENT)
Age: 89
End: 2024-10-28

## 2024-10-28 DIAGNOSIS — Z00.00 MEDICARE ANNUAL WELLNESS VISIT, SUBSEQUENT: Primary | ICD-10-CM

## 2024-10-28 NOTE — TELEPHONE ENCOUNTER
Patients son called in requesting a dermatology referral for a skin check because she has new spots on her back. Please advise. Thank you.

## 2024-10-29 ENCOUNTER — TELEPHONE (OUTPATIENT)
Age: 89
End: 2024-10-29

## 2024-10-29 NOTE — TELEPHONE ENCOUNTER
"I placed  \"Open referral\"    Please give information for   Dr. Elias ( long wait)  Dr. Patrica Rosales's office"

## 2024-10-29 NOTE — TELEPHONE ENCOUNTER
Patients son called looking to schedule a new patient appointment for his mom at the [facility] office advised at this time the schedule is not available that we have a new provider starting in the new year. Patient ended call.

## 2024-11-13 DIAGNOSIS — R06.02 SOB (SHORTNESS OF BREATH): ICD-10-CM

## 2024-11-14 RX ORDER — CARVEDILOL 3.12 MG/1
3.12 TABLET ORAL 2 TIMES DAILY WITH MEALS
Qty: 180 TABLET | Refills: 0 | Status: ON HOLD | OUTPATIENT
Start: 2024-11-14

## 2024-11-18 ENCOUNTER — OFFICE VISIT (OUTPATIENT)
Dept: FAMILY MEDICINE CLINIC | Facility: CLINIC | Age: 89
End: 2024-11-18
Payer: COMMERCIAL

## 2024-11-18 VITALS
SYSTOLIC BLOOD PRESSURE: 96 MMHG | HEIGHT: 63 IN | TEMPERATURE: 97.8 F | RESPIRATION RATE: 12 BRPM | OXYGEN SATURATION: 94 % | BODY MASS INDEX: 24.8 KG/M2 | WEIGHT: 140 LBS | DIASTOLIC BLOOD PRESSURE: 60 MMHG | HEART RATE: 64 BPM

## 2024-11-18 DIAGNOSIS — R05.9 COUGH, UNSPECIFIED TYPE: ICD-10-CM

## 2024-11-18 DIAGNOSIS — J02.9 SORE THROAT: Primary | ICD-10-CM

## 2024-11-18 PROBLEM — D69.6 THROMBOCYTOPENIA (HCC): Status: ACTIVE | Noted: 2024-11-18

## 2024-11-18 PROCEDURE — 99213 OFFICE O/P EST LOW 20 MIN: CPT | Performed by: FAMILY MEDICINE

## 2024-11-18 PROCEDURE — G2211 COMPLEX E/M VISIT ADD ON: HCPCS | Performed by: FAMILY MEDICINE

## 2024-11-18 RX ORDER — AMOXICILLIN 875 MG/1
875 TABLET, COATED ORAL 2 TIMES DAILY
Qty: 14 TABLET | Refills: 0 | Status: ON HOLD | OUTPATIENT
Start: 2024-11-18 | End: 2024-11-25

## 2024-11-20 ENCOUNTER — APPOINTMENT (EMERGENCY)
Dept: RADIOLOGY | Facility: HOSPITAL | Age: 89
DRG: 189 | End: 2024-11-20
Payer: COMMERCIAL

## 2024-11-20 ENCOUNTER — APPOINTMENT (INPATIENT)
Dept: RADIOLOGY | Facility: HOSPITAL | Age: 89
DRG: 189 | End: 2024-11-20
Payer: COMMERCIAL

## 2024-11-20 ENCOUNTER — HOSPITAL ENCOUNTER (INPATIENT)
Facility: HOSPITAL | Age: 89
LOS: 6 days | Discharge: HOME | DRG: 189 | End: 2024-11-26
Admitting: FAMILY MEDICINE
Payer: COMMERCIAL

## 2024-11-20 DIAGNOSIS — J18.9 PNEUMONIA: Primary | ICD-10-CM

## 2024-11-20 DIAGNOSIS — R09.02 HYPOXIA: ICD-10-CM

## 2024-11-20 PROBLEM — R65.10 SIRS (SYSTEMIC INFLAMMATORY RESPONSE SYNDROME) (HCC): Status: ACTIVE | Noted: 2024-11-20

## 2024-11-20 PROBLEM — R73.9 HYPERGLYCEMIA: Status: ACTIVE | Noted: 2024-11-20

## 2024-11-20 PROBLEM — J96.00 ACUTE RESPIRATORY FAILURE (HCC): Status: ACTIVE | Noted: 2024-11-20

## 2024-11-20 LAB
ALBUMIN SERPL BCG-MCNC: 3.5 G/DL (ref 3.5–5)
ALP SERPL-CCNC: 45 U/L (ref 34–104)
ALT SERPL W P-5'-P-CCNC: 7 U/L (ref 7–52)
ANION GAP SERPL CALCULATED.3IONS-SCNC: 6 MMOL/L (ref 4–13)
AST SERPL W P-5'-P-CCNC: 15 U/L (ref 13–39)
B PARAP IS1001 DNA NPH QL NAA+NON-PROBE: NOT DETECTED
B PERT.PT PRMT NPH QL NAA+NON-PROBE: NOT DETECTED
BASE EXCESS BLDA CALC-SCNC: 2 MMOL/L (ref -2–3)
BASOPHILS # BLD MANUAL: 0.24 THOUSAND/UL (ref 0–0.1)
BASOPHILS NFR MAR MANUAL: 2 % (ref 0–1)
BILIRUB SERPL-MCNC: 0.68 MG/DL (ref 0.2–1)
BUN SERPL-MCNC: 20 MG/DL (ref 5–25)
C PNEUM DNA NPH QL NAA+NON-PROBE: NOT DETECTED
CA-I BLD-SCNC: 1.33 MMOL/L (ref 1.12–1.32)
CALCIUM SERPL-MCNC: 9.6 MG/DL (ref 8.4–10.2)
CHLORIDE SERPL-SCNC: 100 MMOL/L (ref 96–108)
CO2 SERPL-SCNC: 27 MMOL/L (ref 21–32)
CREAT SERPL-MCNC: 0.86 MG/DL (ref 0.6–1.3)
EOSINOPHIL # BLD MANUAL: 0 THOUSAND/UL (ref 0–0.4)
EOSINOPHIL NFR BLD MANUAL: 0 % (ref 0–6)
ERYTHROCYTE [DISTWIDTH] IN BLOOD BY AUTOMATED COUNT: 14.7 % (ref 11.6–15.1)
FIO2 GAS DIL.REBREATH: 36 L
FLUAV AG UPPER RESP QL IA.RAPID: NEGATIVE
FLUAV RNA NPH QL NAA+NON-PROBE: NOT DETECTED
FLUBV AG UPPER RESP QL IA.RAPID: NEGATIVE
FLUBV RNA NPH QL NAA+NON-PROBE: NOT DETECTED
GFR SERPL CREATININE-BSD FRML MDRD: 58 ML/MIN/1.73SQ M
GLUCOSE SERPL-MCNC: 140 MG/DL (ref 65–140)
GLUCOSE SERPL-MCNC: 170 MG/DL (ref 65–140)
GLUCOSE SERPL-MCNC: 185 MG/DL (ref 65–140)
GLUCOSE SERPL-MCNC: 214 MG/DL (ref 65–140)
HADV DNA NPH QL NAA+NON-PROBE: NOT DETECTED
HCO3 BLDA-SCNC: 27.3 MMOL/L (ref 22–28)
HCOV 229E RNA NPH QL NAA+NON-PROBE: NOT DETECTED
HCOV HKU1 RNA NPH QL NAA+NON-PROBE: NOT DETECTED
HCOV NL63 RNA NPH QL NAA+NON-PROBE: NOT DETECTED
HCOV OC43 RNA NPH QL NAA+NON-PROBE: NOT DETECTED
HCT VFR BLD AUTO: 44.7 % (ref 34.8–46.1)
HCT VFR BLD CALC: 36 % (ref 34.8–46.1)
HGB BLD-MCNC: 14.4 G/DL (ref 11.5–15.4)
HGB BLDA-MCNC: 12.2 G/DL (ref 11.5–15.4)
HMPV RNA NPH QL NAA+NON-PROBE: NOT DETECTED
HPIV1 RNA NPH QL NAA+NON-PROBE: NOT DETECTED
HPIV2 RNA NPH QL NAA+NON-PROBE: NOT DETECTED
HPIV3 RNA NPH QL NAA+NON-PROBE: NOT DETECTED
HPIV4 RNA NPH QL NAA+NON-PROBE: NOT DETECTED
LACTATE SERPL-SCNC: 0.8 MMOL/L (ref 0.5–2)
LYMPHOCYTES # BLD AUTO: 1.18 THOUSAND/UL (ref 0.6–4.47)
LYMPHOCYTES # BLD AUTO: 7 % (ref 14–44)
M PNEUMO DNA NPH QL NAA+NON-PROBE: NOT DETECTED
MCH RBC QN AUTO: 31.2 PG (ref 26.8–34.3)
MCHC RBC AUTO-ENTMCNC: 32.2 G/DL (ref 31.4–37.4)
MCV RBC AUTO: 97 FL (ref 82–98)
MONOCYTES # BLD AUTO: 1.88 THOUSAND/UL (ref 0–1.22)
MONOCYTES NFR BLD: 16 % (ref 4–12)
NEUTROPHILS # BLD MANUAL: 8.46 THOUSAND/UL (ref 1.85–7.62)
NEUTS BAND NFR BLD MANUAL: 2 % (ref 0–8)
NEUTS SEG NFR BLD AUTO: 70 % (ref 43–75)
PCO2 BLD: 29 MMOL/L (ref 21–32)
PCO2 BLD: 47 MM HG (ref 36–44)
PCO2 BLD: 84 MM HG
PCO2 BLDA: 46.9 MM HG
PH BLD: 7.37 [PH]
PH BLD: 7.37 [PH] (ref 7.35–7.45)
PLATELET # BLD AUTO: 92 THOUSANDS/UL (ref 149–390)
PLATELET BLD QL SMEAR: ABNORMAL
PMV BLD AUTO: 11.1 FL (ref 8.9–12.7)
PO2 BLD: 85 MM HG (ref 75–129)
POTASSIUM BLD-SCNC: 3.6 MMOL/L (ref 3.5–5.3)
POTASSIUM SERPL-SCNC: 3.7 MMOL/L (ref 3.5–5.3)
PROCALCITONIN SERPL-MCNC: 0.43 NG/ML
PROT SERPL-MCNC: 7.5 G/DL (ref 6.4–8.4)
RBC # BLD AUTO: 4.61 MILLION/UL (ref 3.81–5.12)
RBC MORPH BLD: NORMAL
RSV RNA NPH QL NAA+NON-PROBE: DETECTED
RV+EV RNA NPH QL NAA+NON-PROBE: NOT DETECTED
SAO2 % BLD FROM PO2: 96 % (ref 60–85)
SARS-COV+SARS-COV-2 AG RESP QL IA.RAPID: NEGATIVE
SARS-COV-2 RNA NPH QL NAA+NON-PROBE: NOT DETECTED
SODIUM BLD-SCNC: 136 MMOL/L (ref 136–145)
SODIUM SERPL-SCNC: 133 MMOL/L (ref 135–147)
SPECIMEN SOURCE: ABNORMAL
VARIANT LYMPHS # BLD AUTO: 3 %
WBC # BLD AUTO: 11.75 THOUSAND/UL (ref 4.31–10.16)

## 2024-11-20 PROCEDURE — 82330 ASSAY OF CALCIUM: CPT

## 2024-11-20 PROCEDURE — 85014 HEMATOCRIT: CPT

## 2024-11-20 PROCEDURE — 83605 ASSAY OF LACTIC ACID: CPT | Performed by: INTERNAL MEDICINE

## 2024-11-20 PROCEDURE — 99285 EMERGENCY DEPT VISIT HI MDM: CPT

## 2024-11-20 PROCEDURE — 99222 1ST HOSP IP/OBS MODERATE 55: CPT | Performed by: INTERNAL MEDICINE

## 2024-11-20 PROCEDURE — 82803 BLOOD GASES ANY COMBINATION: CPT

## 2024-11-20 PROCEDURE — 85027 COMPLETE CBC AUTOMATED: CPT

## 2024-11-20 PROCEDURE — 96360 HYDRATION IV INFUSION INIT: CPT

## 2024-11-20 PROCEDURE — 36600 WITHDRAWAL OF ARTERIAL BLOOD: CPT

## 2024-11-20 PROCEDURE — 85007 BL SMEAR W/DIFF WBC COUNT: CPT

## 2024-11-20 PROCEDURE — 0202U NFCT DS 22 TRGT SARS-COV-2: CPT | Performed by: INTERNAL MEDICINE

## 2024-11-20 PROCEDURE — 71046 X-RAY EXAM CHEST 2 VIEWS: CPT

## 2024-11-20 PROCEDURE — 94664 DEMO&/EVAL PT USE INHALER: CPT

## 2024-11-20 PROCEDURE — 36415 COLL VENOUS BLD VENIPUNCTURE: CPT

## 2024-11-20 PROCEDURE — 87040 BLOOD CULTURE FOR BACTERIA: CPT | Performed by: INTERNAL MEDICINE

## 2024-11-20 PROCEDURE — 84132 ASSAY OF SERUM POTASSIUM: CPT

## 2024-11-20 PROCEDURE — 87811 SARS-COV-2 COVID19 W/OPTIC: CPT

## 2024-11-20 PROCEDURE — 71275 CT ANGIOGRAPHY CHEST: CPT

## 2024-11-20 PROCEDURE — 94760 N-INVAS EAR/PLS OXIMETRY 1: CPT

## 2024-11-20 PROCEDURE — 94668 MNPJ CHEST WALL SBSQ: CPT

## 2024-11-20 PROCEDURE — 80053 COMPREHEN METABOLIC PANEL: CPT

## 2024-11-20 PROCEDURE — 82948 REAGENT STRIP/BLOOD GLUCOSE: CPT

## 2024-11-20 PROCEDURE — 82947 ASSAY GLUCOSE BLOOD QUANT: CPT

## 2024-11-20 PROCEDURE — 94640 AIRWAY INHALATION TREATMENT: CPT

## 2024-11-20 PROCEDURE — 87804 INFLUENZA ASSAY W/OPTIC: CPT

## 2024-11-20 PROCEDURE — 84295 ASSAY OF SERUM SODIUM: CPT

## 2024-11-20 PROCEDURE — 84145 PROCALCITONIN (PCT): CPT

## 2024-11-20 RX ORDER — SODIUM CHLORIDE FOR INHALATION 0.9 %
3 VIAL, NEBULIZER (ML) INHALATION
Status: DISCONTINUED | OUTPATIENT
Start: 2024-11-20 | End: 2024-11-20

## 2024-11-20 RX ORDER — CEFEPIME HYDROCHLORIDE 2 G/50ML
2000 INJECTION, SOLUTION INTRAVENOUS ONCE
Status: COMPLETED | OUTPATIENT
Start: 2024-11-20 | End: 2024-11-20

## 2024-11-20 RX ORDER — ENOXAPARIN SODIUM 100 MG/ML
40 INJECTION SUBCUTANEOUS DAILY
Status: DISCONTINUED | OUTPATIENT
Start: 2024-11-20 | End: 2024-11-20

## 2024-11-20 RX ORDER — FOLIC ACID 1 MG/1
1 TABLET ORAL DAILY
Status: DISCONTINUED | OUTPATIENT
Start: 2024-11-20 | End: 2024-11-26 | Stop reason: HOSPADM

## 2024-11-20 RX ORDER — GABAPENTIN 400 MG/1
400 CAPSULE ORAL
Status: DISCONTINUED | OUTPATIENT
Start: 2024-11-20 | End: 2024-11-26 | Stop reason: HOSPADM

## 2024-11-20 RX ORDER — OXYCODONE AND ACETAMINOPHEN 5; 325 MG/1; MG/1
0.5 TABLET ORAL EVERY 6 HOURS PRN
Refills: 0 | Status: DISCONTINUED | OUTPATIENT
Start: 2024-11-20 | End: 2024-11-26 | Stop reason: HOSPADM

## 2024-11-20 RX ORDER — SODIUM CHLORIDE 9 MG/ML
75 INJECTION, SOLUTION INTRAVENOUS CONTINUOUS
Status: DISPENSED | OUTPATIENT
Start: 2024-11-20 | End: 2024-11-21

## 2024-11-20 RX ORDER — METHYLPREDNISOLONE SODIUM SUCCINATE 40 MG/ML
40 INJECTION, POWDER, LYOPHILIZED, FOR SOLUTION INTRAMUSCULAR; INTRAVENOUS EVERY 8 HOURS
Status: DISCONTINUED | OUTPATIENT
Start: 2024-11-20 | End: 2024-11-22

## 2024-11-20 RX ORDER — AZITHROMYCIN 250 MG/1
500 TABLET, FILM COATED ORAL EVERY 24 HOURS
Status: COMPLETED | OUTPATIENT
Start: 2024-11-20 | End: 2024-11-22

## 2024-11-20 RX ORDER — ACETAMINOPHEN 325 MG/1
650 TABLET ORAL EVERY 6 HOURS PRN
Status: DISCONTINUED | OUTPATIENT
Start: 2024-11-20 | End: 2024-11-26 | Stop reason: HOSPADM

## 2024-11-20 RX ORDER — BUDESONIDE AND FORMOTEROL FUMARATE DIHYDRATE 160; 4.5 UG/1; UG/1
2 AEROSOL RESPIRATORY (INHALATION) 2 TIMES DAILY
Status: DISCONTINUED | OUTPATIENT
Start: 2024-11-20 | End: 2024-11-26 | Stop reason: HOSPADM

## 2024-11-20 RX ORDER — PANTOPRAZOLE SODIUM 40 MG/1
40 TABLET, DELAYED RELEASE ORAL
Status: DISCONTINUED | OUTPATIENT
Start: 2024-11-20 | End: 2024-11-26 | Stop reason: HOSPADM

## 2024-11-20 RX ORDER — ALBUTEROL SULFATE 90 UG/1
2 INHALANT RESPIRATORY (INHALATION) EVERY 6 HOURS PRN
Status: DISCONTINUED | OUTPATIENT
Start: 2024-11-20 | End: 2024-11-26 | Stop reason: HOSPADM

## 2024-11-20 RX ORDER — LEVALBUTEROL INHALATION SOLUTION 1.25 MG/3ML
1.25 SOLUTION RESPIRATORY (INHALATION)
Status: DISCONTINUED | OUTPATIENT
Start: 2024-11-20 | End: 2024-11-26 | Stop reason: HOSPADM

## 2024-11-20 RX ORDER — INSULIN LISPRO 100 [IU]/ML
1-5 INJECTION, SOLUTION INTRAVENOUS; SUBCUTANEOUS
Status: DISCONTINUED | OUTPATIENT
Start: 2024-11-20 | End: 2024-11-26 | Stop reason: HOSPADM

## 2024-11-20 RX ORDER — PREDNISONE 1 MG/1
2.5 TABLET ORAL DAILY
Status: DISCONTINUED | OUTPATIENT
Start: 2024-11-20 | End: 2024-11-20

## 2024-11-20 RX ORDER — CARVEDILOL 3.12 MG/1
3.12 TABLET ORAL 2 TIMES DAILY WITH MEALS
Status: DISCONTINUED | OUTPATIENT
Start: 2024-11-20 | End: 2024-11-26 | Stop reason: HOSPADM

## 2024-11-20 RX ORDER — CEFTRIAXONE 1 G/50ML
1000 INJECTION, SOLUTION INTRAVENOUS EVERY 24 HOURS
Status: DISCONTINUED | OUTPATIENT
Start: 2024-11-21 | End: 2024-11-22

## 2024-11-20 RX ORDER — GUAIFENESIN 600 MG/1
600 TABLET, EXTENDED RELEASE ORAL 2 TIMES DAILY
Status: DISCONTINUED | OUTPATIENT
Start: 2024-11-20 | End: 2024-11-26 | Stop reason: HOSPADM

## 2024-11-20 RX ADMIN — SODIUM CHLORIDE 500 ML: 0.9 INJECTION, SOLUTION INTRAVENOUS at 12:09

## 2024-11-20 RX ADMIN — AZITHROMYCIN DIHYDRATE 500 MG: 250 TABLET ORAL at 15:06

## 2024-11-20 RX ADMIN — GABAPENTIN 400 MG: 400 CAPSULE ORAL at 21:54

## 2024-11-20 RX ADMIN — SODIUM CHLORIDE 1000 ML: 0.9 INJECTION, SOLUTION INTRAVENOUS at 16:22

## 2024-11-20 RX ADMIN — BUDESONIDE AND FORMOTEROL FUMARATE DIHYDRATE 2 PUFF: 160; 4.5 AEROSOL RESPIRATORY (INHALATION) at 18:21

## 2024-11-20 RX ADMIN — METHYLPREDNISOLONE SODIUM SUCCINATE 40 MG: 40 INJECTION, POWDER, FOR SOLUTION INTRAMUSCULAR; INTRAVENOUS at 21:53

## 2024-11-20 RX ADMIN — METHYLPREDNISOLONE SODIUM SUCCINATE 40 MG: 40 INJECTION, POWDER, FOR SOLUTION INTRAMUSCULAR; INTRAVENOUS at 15:06

## 2024-11-20 RX ADMIN — GUAIFENESIN 600 MG: 600 TABLET, EXTENDED RELEASE ORAL at 18:21

## 2024-11-20 RX ADMIN — INSULIN LISPRO 1 UNITS: 100 INJECTION, SOLUTION INTRAVENOUS; SUBCUTANEOUS at 21:54

## 2024-11-20 RX ADMIN — LEVALBUTEROL HYDROCHLORIDE 1.25 MG: 1.25 SOLUTION RESPIRATORY (INHALATION) at 20:15

## 2024-11-20 RX ADMIN — CEFEPIME HYDROCHLORIDE 2000 MG: 2 INJECTION, SOLUTION INTRAVENOUS at 13:48

## 2024-11-20 RX ADMIN — IOHEXOL 85 ML: 350 INJECTION, SOLUTION INTRAVENOUS at 17:16

## 2024-11-20 RX ADMIN — IPRATROPIUM BROMIDE 0.5 MG: 0.5 SOLUTION RESPIRATORY (INHALATION) at 14:18

## 2024-11-20 RX ADMIN — IPRATROPIUM BROMIDE 0.5 MG: 0.5 SOLUTION RESPIRATORY (INHALATION) at 20:15

## 2024-11-20 RX ADMIN — LEVALBUTEROL HYDROCHLORIDE 1.25 MG: 1.25 SOLUTION RESPIRATORY (INHALATION) at 14:18

## 2024-11-20 NOTE — ASSESSMENT & PLAN NOTE
on admission labs  No history of diabetes  Likely in the setting of chronic steroid use  Check HA1C  SSI with accu checks  Hypoglycemia protocol

## 2024-11-20 NOTE — ASSESSMENT & PLAN NOTE
Follows outpatient with Rheumatology  Continue gabapentin 400mg HS  PRN percocet 5-325mg 0.5mg tablet Q6H PRN  Prednisone 2.5mg daily, on hold while receiving IV solumedrol

## 2024-11-20 NOTE — ASSESSMENT & PLAN NOTE
Per chart review, chronic  Platelet count 92  Likely exacerbation by SIRS. No signs/symptoms of bleeding  Monitor CBC daily  Hold off on DVT prophylaxis for now

## 2024-11-20 NOTE — PROGRESS NOTES
"Name: Dennise Arnett      : 1929      MRN: [id number]  Encounter Provider: Dania Wood MD  Encounter Date: 2024   Encounter department: [facility][note 3] PRACTICE  :  Assessment & Plan  Sore throat    Orders:    amoxicillin (AMOXIL) 875 mg tablet; Take 1 tablet (875 mg total) by mouth 2 (two) times a day for 7 days  Throat sprays/lozenges/gargles prn  OTC tylenol prn  Otc biotene and/or lemon drops for mouth dryness  Cough, unspecified type    Orders:    amoxicillin (AMOXIL) 875 mg tablet; Take 1 tablet (875 mg total) by mouth 2 (two) times a day for 7 days  Will check cxr if persists  OTC Mucinex prn     History of Present Illness     Sore Throat   This is a new problem. The current episode started 1 to 4 weeks ago. There has been no fever. Associated symptoms include congestion, coughing and a hoarse voice. Pertinent negatives include no diarrhea, shortness of breath, trouble swallowing or vomiting. She has had no exposure to strep or mono.     Review of Systems   HENT:  Positive for congestion, hoarse voice and sore throat. Negative for trouble swallowing.    Respiratory:  Positive for cough. Negative for shortness of breath.    Gastrointestinal:  Negative for diarrhea and vomiting.          Objective   BP 96/60 (BP Location: Left arm, Patient Position: Sitting, Cuff Size: Standard)   Pulse 64   Temp 97.8 °F (36.6 °C) (Temporal)   Resp 12   Ht 5' 3\" (1.6 m)   Wt 63.5 kg (140 lb)   SpO2 94%   BMI 24.80 kg/m²      Physical Exam  Vitals and nursing note reviewed.   Constitutional:       General: She is not in acute distress.  HENT:      Nose: Congestion present.      Mouth/Throat:      Mouth: Mucous membranes are dry. No oral lesions.      Pharynx: Posterior oropharyngeal erythema present. No oropharyngeal exudate.   Eyes:      Conjunctiva/sclera: Conjunctivae normal.   Cardiovascular:      Rate and Rhythm: Normal rate and regular rhythm.   Pulmonary:      Effort: Pulmonary " effort is normal. No respiratory distress.      Comments: No localized w/r/r  Abdominal:      General: Bowel sounds are normal.      Palpations: Abdomen is soft.      Tenderness: There is no abdominal tenderness.   Musculoskeletal:      Cervical back: Neck supple.   Skin:     General: Skin is warm and dry.      Findings: No rash.   Neurological:      Mental Status: She is alert.

## 2024-11-20 NOTE — ASSESSMENT & PLAN NOTE
POA. Meeting SIRS criteria for tachypnea and leukocytosis  CXR (wet read) without acute cardiopulmonary disease  Likely related to viral illness and/or COPD exacerbation  COVID/FLU/RSV negative  UA needs to be collected  Lactic acid WNL  Procalcitonin elevated 0.43. Follow up repeat  Follow up BC x 2  Continue IV ceftriaxone and azithromycin  Trend WBC and fever curve

## 2024-11-20 NOTE — ASSESSMENT & PLAN NOTE
Patient presents to the ED for ongoing cough.  Reports that cough has been persistent over the last 3 days.  Denies any associated shortness of breath.  Reports some pleuritic chest pain with coughing, but is now resolved since arriving to the ED.  Denies any associated fevers/chills, recent sick contacts, nausea, vomiting, diarrhea, urinary complaints.  Currently on 4L NC, not on oxygen at baseline  CXR (wet read) negative for acute cardiopulmonary process  Check CTA PE study  COVID/FLU/RSV negative. Check RP2  Patient with 30 year smoking history. Likely in the setting of undiagnosed COPD exacerbated by viral illness  IV solumedrol 40mg TID  Xoponex/atrovent nebulizers  Ceftriaxone and azithromycin, follow infectious work up  Respiratory protocol, incentive spirometry, oscillatory positive expiratory pressure  Continuous pulse ox, titrate oxygen to maintain O2 sats 89% or greater  Ambulatory pulse ox prior to discharge

## 2024-11-20 NOTE — H&P
H&P - Hospitalist   Name: Dennise Arnett 89 y.o. female I MRN: [id number]  Unit/Bed#: [address] [place] [address] I Date of Admission: 11/20/2024   Date of Service: 11/20/2024 I Hospital Day: 0     Assessment & Plan  Acute respiratory failure (HCC)  Patient presents to the ED for ongoing cough.  Reports that cough has been persistent over the last 3 days.  Denies any associated shortness of breath.  Reports some pleuritic chest pain with coughing, but is now resolved since arriving to the ED.  Denies any associated fevers/chills, recent sick contacts, nausea, vomiting, diarrhea, urinary complaints.  Currently on 4L NC, not on oxygen at baseline  CXR (wet read) negative for acute cardiopulmonary process  Check CTA PE study  COVID/FLU/RSV negative. Check RP2  Patient with 30 year smoking history. Likely in the setting of undiagnosed COPD exacerbated by viral illness  IV solumedrol 40mg TID  Xoponex/atrovent nebulizers  Ceftriaxone and azithromycin, follow infectious work up  Respiratory protocol, incentive spirometry, oscillatory positive expiratory pressure  Continuous pulse ox, titrate oxygen to maintain O2 sats 89% or greater  Ambulatory pulse ox prior to discharge  SIRS (systemic inflammatory response syndrome) ([facility])  POA. Meeting SIRS criteria for tachypnea and leukocytosis  CXR (wet read) without acute cardiopulmonary disease  Likely related to viral illness and/or COPD exacerbation  COVID/FLU/RSV negative  UA needs to be collected  Lactic acid WNL  Procalcitonin elevated 0.43. Follow up repeat  Follow up BC x 2  Continue IV ceftriaxone and azithromycin  Trend WBC and fever curve  Rheumatoid arthritis of multiple sites with negative rheumatoid factor (HCC)  Follows outpatient with Rheumatology  Continue gabapentin 400mg HS  PRN percocet 5-325mg 0.5mg tablet Q6H PRN  Prednisone 2.5mg daily, on hold while receiving IV solumedrol  Stage 3 chronic kidney disease, unspecified whether stage 3a or 3b CKD (HCC)  Lab Results    Component Value Date    EGFR 58 11/20/2024    EGFR 68 08/22/2024    EGFR 49 05/06/2024    CREATININE 0.86 11/20/2024    CREATININE 0.75 08/22/2024    CREATININE 0.98 05/06/2024   Creatinine stable at baseline  Avoid hypotension and nephrotoxic agents  Monitor vitals per routine  Thrombocytopenia (HCC)  Per chart review, chronic  Platelet count 92  Likely exacerbation by SIRS. No signs/symptoms of bleeding  Monitor CBC daily  Hold off on DVT prophylaxis for now  Hyperglycemia   on admission labs  No history of diabetes  Likely in the setting of chronic steroid use  Check HA1C  SSI with accu checks  Hypoglycemia protocol      VTE Pharmacologic Prophylaxis: VTE Score: 8 High Risk (Score >/= 5) - Pharmacological DVT Prophylaxis Contraindicated. Sequential Compression Devices Ordered.  Code Status: Level 1 - Full Code   Discussion with family: Updated  (son) at bedside.    Anticipated Length of Stay: Patient will be admitted on an inpatient basis with an anticipated length of stay of greater than 2 midnights secondary to acute respiratory failure.    History of Present Illness   Chief Complaint: Pamela Arnett is a 89 y.o. female with a PMH of rheumatoid arthritis, CKD, thrombocytopenia. Patient presents to the ED for ongoing cough.  Reports that cough has been persistent over the last 3 days.  Denies any associated shortness of breath.  Reports some pleuritic chest pain with coughing, but is now resolved since arriving to the ED.  Denies any associated fevers/chills, recent sick contacts, nausea, vomiting, diarrhea, urinary complaints.  On initial workup in the ED patient found to have acute respiratory failure.  Patient requiring medical admission for hypoxia and further infectious workup.  All patient family questions answered to the best of my ability.    Review of Systems   Constitutional:  Positive for appetite change. Negative for chills and fever.   HENT:  Negative for ear pain  and sore throat.    Eyes:  Negative for pain and visual disturbance.   Respiratory:  Positive for cough. Negative for shortness of breath.    Cardiovascular:  Negative for chest pain and palpitations.   Gastrointestinal:  Negative for abdominal pain and vomiting.   Genitourinary:  Negative for dysuria and hematuria.   Musculoskeletal:  Negative for arthralgias and back pain.   Skin:  Negative for color change and rash.   Neurological:  Negative for seizures and syncope.   All other systems reviewed and are negative.      Historical Information   Past Medical History:   Diagnosis Date    Arthritis     Carpal tunnel syndrome     Degeneration of lumbar intervertebral disc     Postmenopausal osteoporosis     Primary generalized (osteo)arthritis     Rheumatoid arthritis (HCC)     Right shoulder pain      Past Surgical History:   Procedure Laterality Date    CARPAL TUNNEL RELEASE      CATARACT EXTRACTION      CHOLECYSTECTOMY      KNEE SURGERY      MASS EXCISION Right 2019    Procedure: EXCISION BIOPSY TISSUE LESION/MASS HAND;  Surgeon: Dylan Baugh DO;  Location: [facility];  Service: Orthopedics    TONSILLECTOMY       Social History     Tobacco Use    Smoking status: Former     Current packs/day: 0.00     Average packs/day: 0.5 packs/day for 44.0 years (22.0 ttl pk-yrs)     Types: Cigarettes     Start date: 1954     Quit date: 1998     Years since quittin.9    Smokeless tobacco: Never   Vaping Use    Vaping status: Never Used   Substance and Sexual Activity    Alcohol use: Not Currently     Comment: rarely    Drug use: Never    Sexual activity: Never     E-Cigarette/Vaping    E-Cigarette Use Never User      E-Cigarette/Vaping Substances    Nicotine No     THC No     CBD No     Flavoring No     Other No     Unknown No      Family History   Problem Relation Age of Onset    Cancer Sister     Breast cancer Mother     Breast cancer Maternal Grandmother     No Known Problems Father     No Known Problems  Brother     No Known Problems Maternal Aunt     No Known Problems Maternal Uncle     No Known Problems Paternal Aunt     No Known Problems Paternal Uncle     No Known Problems Maternal Grandfather     No Known Problems Paternal Grandmother     No Known Problems Paternal Grandfather     ADD / ADHD Neg Hx     Anesthesia problems Neg Hx     Clotting disorder Neg Hx     Collagen disease Neg Hx     Diabetes Neg Hx     Dislocations Neg Hx     Learning disabilities Neg Hx     Neurological problems Neg Hx     Osteoporosis Neg Hx     Rheumatologic disease Neg Hx     Scoliosis Neg Hx     Vascular Disease Neg Hx      Social History:  Marital Status:    Occupation: NA  Patient Pre-hospital Living Situation: Home  Patient Pre-hospital Level of Mobility: manual wheelchair  Patient Pre-hospital Diet Restrictions: None    Meds/Allergies   I have reviewed home medications using recent Epic encounter.  Prior to Admission medications    Medication Sig Start Date End Date Taking? Authorizing Provider   albuterol (ProAir HFA) 90 mcg/act inhaler Inhale 2 puffs every 6 (six) hours as needed for wheezing 1/31/24  Yes Rosemary Franklin MD   amoxicillin (AMOXIL) 875 mg tablet Take 1 tablet (875 mg total) by mouth 2 (two) times a day for 7 days 11/18/24 11/25/24 Yes Dania Wood MD   Anoro Ellipta 62.5-25 MCG/ACT inhaler INHALE 1 PUFF DAILY 6/5/24  Yes Verna Ybarra MD   budesonide-formoterol (Symbicort) 160-4.5 mcg/act inhaler INHALE TWO PUFFS TWO (TWO) TIMES A DAY RINSE MOUTH AFTER USE. 7/23/24  Yes Dhaval Higgins, DO   carvedilol (COREG) 3.125 mg tablet TAKE ONE TABLET BY MOUTH TWICE A DAY WITH MEALS 11/14/24  Yes Galo Yañez MD   folic acid (FOLVITE) 1 mg tablet TAKE ONE CAPSULE BY MOUTH DAILY 9/4/24  Yes Teja Leblanc,    gabapentin (NEURONTIN) 400 mg capsule Take 1 capsule (400 mg total) by mouth daily at bedtime 10/11/24 1/9/25 Yes STEVEN Bradley   oxyCODONE-acetaminophen (PERCOCET)  5-325 mg per tablet Take 0.5 tablets by mouth every 6 (six) hours as needed for moderate pain Max Daily Amount: 2 tablets Do not start before November 14, 2024. 11/14/24 12/14/24 Yes Mario Gonzalez MD   oxyCODONE-acetaminophen (PERCOCET) 5-325 mg per tablet Take 0.5 tablets by mouth every 6 (six) hours as needed for moderate pain Max Daily Amount: 2 tablets Do not start before December 14, 2024. 12/14/24 1/13/25 Yes Mario Gonzalez MD   pantoprazole (PROTONIX) 40 mg tablet Take 1 tablet (40 mg total) by mouth daily 8/22/24  Yes Verna Ybarra MD   predniSONE 2.5 mg tablet 1 TABLET ORAL ONCE A DAY IN IN THE MORNING WITH FOOD 90 DAYS 4/2/24  Yes Galo Yañez MD   lidocaine (LIDODERM) 5 % Apply 1 patch topically daily for 7 days Remove & Discard patch within 12 hours or as directed by MD 5/16/20 5/23/20  Adalberto Jackson MD   docusate sodium (COLACE) 100 mg capsule Take 2 capsules (200 mg total) by mouth daily  Patient not taking: Reported on 4/4/2024 4/3/23 11/20/24  Rosemary Farnklin MD   famotidine (PEPCID) 40 MG tablet Take 1 tablet (40 mg total) by mouth daily at bedtime  Patient not taking: Reported on 10/15/2024 4/4/24 11/20/24  Verna Ybarra MD   oxyCODONE-acetaminophen (PERCOCET) 5-325 mg per tablet Take 0.5 tablets by mouth every 6 (six) hours as needed for moderate pain Max Daily Amount: 2 tablets Do not start before July 2, 2024.  Patient not taking: Reported on 10/15/2024 7/2/24 11/20/24  STEVEN Bradley   Prolia 60 MG/ML  1/10/23 11/20/24  Historical Provider, MD     No Known Allergies    Objective :  Temp:  [97.8 °F (36.6 °C)-98.5 °F (36.9 °C)] 98 °F (36.7 °C)  HR:  [66-84] 73  BP: ()/(48-96) 90/62  Resp:  [16-34] 16  SpO2:  [84 %-100 %] 95 %  O2 Device: Nasal cannula  Nasal Cannula O2 Flow Rate (L/min):  [4 L/min-5 L/min] 5 L/min    Physical Exam  Vitals and nursing note reviewed.   Constitutional:       General: She is not in acute distress.     Appearance:  "She is well-developed. She is ill-appearing.      Comments: Cachetic   HENT:      Head: Normocephalic and atraumatic.   Eyes:      Conjunctiva/sclera: Conjunctivae normal.   Cardiovascular:      Rate and Rhythm: Normal rate and regular rhythm.      Heart sounds: No murmur heard.  Pulmonary:      Effort: Respiratory distress present.      Comments: Decreased breath sounds bilaterally  Abdominal:      Palpations: Abdomen is soft.      Tenderness: There is no abdominal tenderness.   Musculoskeletal:         General: No swelling.      Cervical back: Neck supple.      Right lower leg: No edema.      Left lower leg: No edema.   Skin:     General: Skin is warm and dry.      Capillary Refill: Capillary refill takes less than 2 seconds.   Neurological:      Mental Status: She is alert. Mental status is at baseline.   Psychiatric:         Mood and Affect: Mood normal.         Lines/Drains:            Lab Results: I have reviewed the following results:  Results from last 7 days   Lab Units 11/20/24  1349 11/20/24  1122   WBC Thousand/uL  --  11.75*   HEMOGLOBIN g/dL  --  14.4   I STAT HEMOGLOBIN g/dl 12.2  --    HEMATOCRIT %  --  44.7   HEMATOCRIT, ISTAT % 36  --    PLATELETS Thousands/uL  --  92*   BANDS PCT %  --  2   LYMPHO PCT %  --  7*   MONO PCT %  --  16*   EOS PCT %  --  0     Results from last 7 days   Lab Units 11/20/24  1349 11/20/24  1122   SODIUM mmol/L  --  133*   POTASSIUM mmol/L  --  3.7   CHLORIDE mmol/L  --  100   CO2 mmol/L  --  27   CO2, I-STAT mmol/L 29  --    BUN mg/dL  --  20   CREATININE mg/dL  --  0.86   ANION GAP mmol/L  --  6   CALCIUM mg/dL  --  9.6   ALBUMIN g/dL  --  3.5   TOTAL BILIRUBIN mg/dL  --  0.68   ALK PHOS U/L  --  45   ALT U/L  --  7   AST U/L  --  15   GLUCOSE RANDOM mg/dL  --  170*             No results found for: \"HGBA1C\"  Results from last 7 days   Lab Units 11/20/24  1337 11/20/24  1122   LACTIC ACID mmol/L 0.8  --    PROCALCITONIN ng/ml  --  0.43*       Imaging Results Review: I " personally reviewed the following image studies in PACS and associated radiology reports: chest xray. My interpretation of the radiology images/reports is: no acute cardiopulmonary process.  Other Study Results Review: No additional pertinent studies reviewed.    Administrative Statements   I have spent a total time of 30 minutes in caring for this patient on the day of the visit/encounter including Diagnostic results, Risks and benefits of tx options, Instructions for management, Patient and family education, Counseling / Coordination of care, Documenting in the medical record, Reviewing / ordering tests, medicine, procedures  , Obtaining or reviewing history  , and Communicating with other healthcare professionals .    ** Please Note: This note has been constructed using a voice recognition system. **

## 2024-11-20 NOTE — ED PROVIDER NOTES
Time reflects when diagnosis was documented in both MDM as applicable and the Disposition within this note       Time User Action Codes Description Comment    11/20/2024 12:47 PM YoharrisitisMarvin Add [J18.9] Pneumonia     11/20/2024 12:47 PM NoeitisMarvin Add [R09.02] Hypoxia           ED Disposition       ED Disposition   Admit    Condition   Stable    Date/Time   Wed Nov 20, 2024 12:47 PM    Comment   Case was discussed with maya and the patient's admission status was agreed to be Admission Status: inpatient status to the service of maya .               Assessment & Plan       Medical Decision Making  89F presenting to the ED hypoxic and with complaints of viral pneumonia. Given progression, concern for potential development of superimposed bacterial pna. Labs/xray obtained. Due to progression, will treat with course of abx. Given hypoxia, patient would benefit from closer monitoring and managemnet. Will admit for hypoxia/treatment.     Amount and/or Complexity of Data Reviewed  Labs: ordered. Decision-making details documented in ED Course.  Radiology: ordered.    Risk  Prescription drug management.  Decision regarding hospitalization.        ED Course as of 11/24/24 1124   Wed Nov 20, 2024   1225 Platelet Count(!): 92  Decreased but near baseline 100-120       Medications   albuterol (PROVENTIL HFA,VENTOLIN HFA) inhaler 2 puff (has no administration in time range)   budesonide-formoterol (SYMBICORT) 160-4.5 mcg/act inhaler 2 puff (has no administration in time range)   carvedilol (COREG) tablet 3.125 mg (has no administration in time range)   folic acid (FOLVITE) tablet 1 mg (has no administration in time range)   gabapentin (NEURONTIN) capsule 400 mg (has no administration in time range)   oxyCODONE-acetaminophen (PERCOCET) 5-325 mg per tablet 0.5 tablet (has no administration in time range)   pantoprazole (PROTONIX) EC tablet 40 mg (has no administration in time range)   acetaminophen (TYLENOL) tablet 650  mg (has no administration in time range)   guaiFENesin (MUCINEX) 12 hr tablet 600 mg (has no administration in time range)   levalbuterol (XOPENEX) inhalation solution 1.25 mg (has no administration in time range)   ipratropium (ATROVENT) 0.02 % inhalation solution 0.5 mg (has no administration in time range)   sodium chloride 0.9 % bolus 500 mL (0 mL Intravenous Stopped 11/20/24 1344)   cefepime (MAXIPIME) IVPB (premix in dextrose) 2,000 mg 50 mL (2,000 mg Intravenous New Bag 11/20/24 1348)   azithromycin (ZITHROMAX) tablet 500 mg (has no administration in time range)       ED Risk Strat Scores                                               History of Present Illness       Chief Complaint   Patient presents with    Shortness of Breath     Pt c/o sob for a few days, with sore throat, decreased appetite, and weakness. Is on PO ABX but unsure why.        Past Medical History:   Diagnosis Date    Arthritis     Carpal tunnel syndrome     Degeneration of lumbar intervertebral disc     Postmenopausal osteoporosis     Primary generalized (osteo)arthritis     Rheumatoid arthritis (HCC)     Right shoulder pain       Past Surgical History:   Procedure Laterality Date    CARPAL TUNNEL RELEASE      CATARACT EXTRACTION      CHOLECYSTECTOMY      KNEE SURGERY      MASS EXCISION Right 1/2/2019    Procedure: EXCISION BIOPSY TISSUE LESION/MASS HAND;  Surgeon: Dylan Baugh DO;  Location: [facility];  Service: Orthopedics    TONSILLECTOMY        Family History   Problem Relation Age of Onset    Cancer Sister     Breast cancer Mother     Breast cancer Maternal Grandmother     No Known Problems Father     No Known Problems Brother     No Known Problems Maternal Aunt     No Known Problems Maternal Uncle     No Known Problems Paternal Aunt     No Known Problems Paternal Uncle     No Known Problems Maternal Grandfather     No Known Problems Paternal Grandmother     No Known Problems Paternal Grandfather     ADD / ADHD Neg Hx     Anesthesia  problems Neg Hx     Clotting disorder Neg Hx     Collagen disease Neg Hx     Diabetes Neg Hx     Dislocations Neg Hx     Learning disabilities Neg Hx     Neurological problems Neg Hx     Osteoporosis Neg Hx     Rheumatologic disease Neg Hx     Scoliosis Neg Hx     Vascular Disease Neg Hx       Social History     Tobacco Use    Smoking status: Former     Current packs/day: 0.00     Average packs/day: 0.5 packs/day for 44.0 years (22.0 ttl pk-yrs)     Types: Cigarettes     Start date: 1954     Quit date: 1998     Years since quittin.9    Smokeless tobacco: Never   Vaping Use    Vaping status: Never Used   Substance Use Topics    Alcohol use: Not Currently     Comment: rarely    Drug use: Never      E-Cigarette/Vaping    E-Cigarette Use Never User       E-Cigarette/Vaping Substances    Nicotine No     THC No     CBD No     Flavoring No     Other No     Unknown No       I have reviewed and agree with the history as documented.     89F presenting to the ED with sob and cough as well as sore throat and decreased PO intake. Has been feeling this way for a few days. Was on course of amoxicillin for past 2 days with progressiv worsening of symptoms. Denies cp, dizziness, f/c, or other complaints.         Review of Systems   All other systems reviewed and are negative.          Objective       ED Triage Vitals   Temperature Pulse Blood Pressure Respirations SpO2 Patient Position - Orthostatic VS   24 1059 24 1059 24 1059 24 1059 24 1059 24 1059   98.5 °F (36.9 °C) 78 161/68 (!) 32 (!) 84 % Sitting      Temp Source Heart Rate Source BP Location FiO2 (%) Pain Score    24 1059 24 1059 24 1059 -- 24 1542    Oral Monitor Left arm  No Pain      Vitals      Date and Time Temp Pulse SpO2 Resp BP Pain Score FACES Pain Rating User   24 0758 97.2 °F (36.2 °C) 64 96 % 16 166/84 -- -- DII   24 0756 97.2 °F (36.2 °C) 65 98 % 16 166/84 -- -- DII    11/24/24 0743 -- -- 94 % -- -- -- -- SP   11/24/24 0020 -- -- 95 % -- -- -- -- TA   11/23/24 1931 -- 74 -- -- -- No Pain -- TA   11/23/24 1931 97.5 °F (36.4 °C) -- 97 % 16 165/85 -- -- DII   11/23/24 1909 97.7 °F (36.5 °C) 83 96 % 16 146/75 -- -- DII   11/23/24 1740 -- 73 94 % -- 144/77 -- -- DII   11/23/24 1405 -- -- 94 % -- -- -- -- TD   11/23/24 0836 97.3 °F (36.3 °C) 73 92 % 18 143/75 -- -- DII   11/23/24 0731 -- 80 95 % 17 -- -- -- TD   11/22/24 2217 97.4 °F (36.3 °C) -- -- -- 179/81 -- -- DII   11/22/24 2100 -- -- -- -- -- No Pain -- AD   11/22/24 1521 97.9 °F (36.6 °C) 72 94 % -- 155/68 -- -- DII   11/22/24 1328 -- -- 94 % -- -- -- -- MIAH   11/22/24 1058 -- -- -- -- -- No Pain -- KLS   11/22/24 0922 -- -- -- -- -- No Pain -- JEL   11/22/24 0900 97.6 °F (36.4 °C) 85 94 % 22 132/72 -- -- DII   11/22/24 0900 -- 88 -- -- 132/72 -- -- JEL   11/22/24 0736 -- -- 94 % -- -- -- -- MIAH   11/21/24 2226 -- -- -- -- -- No Pain -- AD   11/21/24 2226 97.8 °F (36.6 °C) 90 96 % 16 147/61 -- -- DII   11/21/24 1604 97.7 °F (36.5 °C) 107 94 % -- 156/75 -- -- DII   11/21/24 1340 -- -- 96 % -- -- -- -- TD   11/21/24 0940 -- -- -- -- -- No Pain -- VLS   11/21/24 0811 97.8 °F (36.6 °C) 95 94 % 18 163/75 No Pain -- TREY   11/21/24 0810 97.8 °F (36.6 °C) 102 95 % -- 163/75 -- -- DII   11/21/24 0746 -- 93 96 % 18 -- -- -- TD   11/21/24 0253 97.1 °F (36.2 °C) 84 92 % 18 162/78 -- -- DII   11/20/24 2224 97.8 °F (36.6 °C) 84 92 % 18 138/70 -- -- DII   11/20/24 2100 -- -- -- -- -- No Pain -- DS   11/20/24 2017 -- 77 -- 18 -- -- -- SRP   11/20/24 1828 -- 93 95 % -- 141/67 -- -- DII   11/20/24 1613 -- 73 95 % -- 90/62 -- -- LEB   11/20/24 1542 -- -- -- -- -- No Pain -- LEB   11/20/24 1542 98 °F (36.7 °C) 72 92 % 16 83/48 -- -- DII   11/20/24 1418 -- 82 96 % 18 -- -- -- TD   11/20/24 1413 97.8 °F (36.6 °C) 84 95 % -- 131/96 -- -- DII   11/20/24 1345 98.1 °F (36.7 °C) 75 95 % 33 148/64 -- -- OE   11/20/24 1300 -- 69 -- 34 135/62 -- -- OE    11/20/24 1230 -- 66 100 % 30 127/58 -- -- OE   11/20/24 1200 -- 69 99 % 33 123/58 -- -- OE   11/20/24 1147 -- -- 99 % 30 -- -- -- OE   11/20/24 1144 -- 70 99 % -- 132/60 -- -- OE   11/20/24 1059 98.5 °F (36.9 °C) 78 84 % 32 161/68 -- -- JC            Physical Exam  Constitutional:       General: She is not in acute distress.     Appearance: Normal appearance. She is not ill-appearing or toxic-appearing.   HENT:      Head: Normocephalic and atraumatic.      Mouth/Throat:      Mouth: Mucous membranes are moist.   Eyes:      Extraocular Movements: Extraocular movements intact.   Pulmonary:      Breath sounds: Rhonchi present.      Comments: tachypneic  Abdominal:      Palpations: Abdomen is soft.   Skin:     General: Skin is warm.   Neurological:      Mental Status: She is alert.   Psychiatric:         Mood and Affect: Mood normal.         Results Reviewed       Procedure Component Value Units Date/Time    Blood culture #1 [[id number]] Collected: 11/20/24 1337    Lab Status: Preliminary result Specimen: Blood from Arm, Left Updated: 11/23/24 1901     Blood Culture No Growth at 72 hrs.    Blood culture #2 [[id number]] Collected: 11/20/24 1337    Lab Status: Preliminary result Specimen: Blood from Arm, Right Updated: 11/23/24 1901     Blood Culture No Growth at 72 hrs.    Procalcitonin, Next Day AM Collection [[id number]]  (Abnormal) Collected: 11/21/24 0517    Lab Status: Final result Specimen: Blood from Arm, Left Updated: 11/21/24 0631     Procalcitonin 0.59 ng/ml     Basic metabolic panel [[id number]]  (Abnormal) Collected: 11/21/24 0517    Lab Status: Final result Specimen: Blood from Arm, Left Updated: 11/21/24 0620     Sodium 134 mmol/L      Potassium 3.8 mmol/L      Chloride 103 mmol/L      CO2 23 mmol/L      ANION GAP 8 mmol/L      BUN 20 mg/dL      Creatinine 0.91 mg/dL      Glucose 159 mg/dL      Calcium 9.5 mg/dL      eGFR 54 ml/min/1.73sq m     Narrative:      National Kidney Disease Foundation guidelines for  Chronic Kidney Disease (CKD):     Stage 1 with normal or high GFR (GFR > 90 mL/min/1.73 square meters)    Stage 2 Mild CKD (GFR = 60-89 mL/min/1.73 square meters)    Stage 3A Moderate CKD (GFR = 45-59 mL/min/1.73 square meters)    Stage 3B Moderate CKD (GFR = 30-44 mL/min/1.73 square meters)    Stage 4 Severe CKD (GFR = 15-29 mL/min/1.73 square meters)    Stage 5 End Stage CKD (GFR <15 mL/min/1.73 square meters)  Note: GFR calculation is accurate only with a steady state creatinine    Magnesium [[id number]]  (Normal) Collected: 11/21/24 0517    Lab Status: Final result Specimen: Blood from Arm, Left Updated: 11/21/24 0620     Magnesium 1.9 mg/dL     CBC (With Platelets) [[id number]]  (Abnormal) Collected: 11/21/24 0517    Lab Status: Final result Specimen: Blood from Arm, Left Updated: 11/21/24 0558     WBC 6.23 Thousand/uL      RBC 4.40 Million/uL      Hemoglobin 13.7 g/dL      Hematocrit 42.8 %      MCV 97 fL      MCH 31.1 pg      MCHC 32.0 g/dL      RDW 14.6 %      Platelets 80 Thousands/uL      MPV 11.9 fL     Respiratory Panel 2.1(RP2)with COVID19 [[id number]]  (Abnormal) Collected: 11/20/24 1446    Lab Status: Final result Specimen: Nasopharyngeal Swab Updated: 11/20/24 1950     Adenovirus Not Detected     Bordetella parapertussis Not Detected     Bordetella pertussis Not Detected     Chlamydia pneumoniae Not Detected     SARS-CoV-2 Not Detected     Coronavirus 229E Not Detected     Coronavirus HKU1 Not Detected     Coronavirus NL63 Not Detected     Coronavirus OC43 Not Detected     Human Metapneumovirus Not Detected     Rhino/Enterovirus Not Detected     Influenza A Not Detected     Influenza B Not Detected     Mycoplasma pneumoniae Not Detected     Parainfluenza 1 Not Detected     Parainfluenza 2 Not Detected     Parainfluenza 3 Not Detected     Parainfluenza 4 Not Detected     Respiratory Syncytial Virus Detected    Lactic acid, plasma (w/reflex if result > 2.0) [[id number]]  (Normal) Collected: 11/20/24 2684     Lab Status: Final result Specimen: Blood from Arm, Left Updated: 11/20/24 1408     LACTIC ACID 0.8 mmol/L     Narrative:      Result may be elevated if tourniquet was used during collection.    POCT Blood Gas (CG8+) [[id number]]  (Abnormal) Collected: 11/20/24 1349    Lab Status: Final result Specimen: Arterial Updated: 11/20/24 1351     pH, Art i-STAT 7.372     pH, i-STAT Temp Corrected 7.373     pCO2, Art i-STAT 47.0 mm HG      pCO2, i-STAT TC 46.9 mm HG      pO2, ART i-STAT 85.0 mm HG      pO2, i-STAT TC 84 mm HG      BE, i-STAT 2 mmol/L      HCO3, Art i-STAT 27.3 mmol/L      CO2, i-STAT 29 mmol/L      O2 Sat, i-STAT 96 %      SODIUM, I-STAT 136 mmol/l      Potassium, i-STAT 3.6 mmol/L      Calcium, Ionized i-STAT 1.33 mmol/L      Hct, i-STAT 36 %      Hgb, i-STAT 12.2 g/dl      Glucose, i-STAT 140 mg/dl      POC FIO2 36 L      Specimen Type ARTERIAL    RBC Morphology Reflex Test [[id number]] Collected: 11/20/24 1122    Lab Status: Final result Specimen: Blood from Arm, Right Updated: 11/20/24 1301    CBC and differential [[id number]]  (Abnormal) Collected: 11/20/24 1122    Lab Status: Final result Specimen: Blood from Arm, Right Updated: 11/20/24 1234     WBC 11.75 Thousand/uL      RBC 4.61 Million/uL      Hemoglobin 14.4 g/dL      Hematocrit 44.7 %      MCV 97 fL      MCH 31.2 pg      MCHC 32.2 g/dL      RDW 14.7 %      MPV 11.1 fL      Platelets 92 Thousands/uL     Narrative:      This is an appended report.  These results have been appended to a previously verified report.    Manual Differential(PHLEBS Do Not Order) [[id number]]  (Abnormal) Collected: 11/20/24 1122    Lab Status: Final result Specimen: Blood from Arm, Right Updated: 11/20/24 1234     Segmented % 70 %      Bands % 2 %      Lymphocytes % 7 %      Monocytes % 16 %      Eosinophils % 0 %      Basophils % 2 %      Atypical Lymphocytes % 3 %      Absolute Neutrophils 8.46 Thousand/uL      Absolute Lymphocytes 1.18 Thousand/uL      Absolute Monocytes  1.88 Thousand/uL      Absolute Eosinophils 0.00 Thousand/uL      Absolute Basophils 0.24 Thousand/uL      Total Counted --     RBC Morphology Normal     Platelet Estimate Decreased    FLU/COVID Rapid Antigen (30 min. TAT) - Preferred screening test in ED [[id number]]  (Normal) Collected: 11/20/24 1122    Lab Status: Final result Specimen: Nares from Nose Updated: 11/20/24 1216     SARS COV Rapid Antigen Negative     Influenza A Rapid Antigen Negative     Influenza B Rapid Antigen Negative    Narrative:      This test has been performed using the [employer] Dorene 2 FLU+SARS Antigen test under the Emergency Use Authorization (EUA). This test has been validated by the  and verified by the performing laboratory. The Dorene uses lateral flow immunofluorescent sandwich assay to detect SARS-COV, Influenza A and Influenza B Antigen.     The [employer] Dorene 2 SARS Antigen test does not differentiate between SARS-CoV and SARS-CoV-2.     Negative results are presumptive and may be confirmed with a molecular assay, if necessary, for patient management. Negative results do not rule out SARS-CoV-2 or influenza infection and should not be used as the sole basis for treatment or patient management decisions. A negative test result may occur if the level of antigen in a sample is below the limit of detection of this test.     Positive results are indicative of the presence of viral antigens, but do not rule out bacterial infection or co-infection with other viruses.     All test results should be used as an adjunct to clinical observations and other information available to the provider.    FOR PEDIATRIC PATIENTS - copy/paste COVID Guidelines URL to browser: [email or web address]    Procalcitonin [[id number]]  (Abnormal) Collected: 11/20/24 1122    Lab Status: Final result Specimen: Blood from Arm, Right Updated: 11/20/24 1214     Procalcitonin 0.43 ng/ml     Comprehensive  metabolic panel [[id number]]  (Abnormal) Collected: 11/20/24 1122    Lab Status: Final result Specimen: Blood from Arm, Right Updated: 11/20/24 1207     Sodium 133 mmol/L      Potassium 3.7 mmol/L      Chloride 100 mmol/L      CO2 27 mmol/L      ANION GAP 6 mmol/L      BUN 20 mg/dL      Creatinine 0.86 mg/dL      Glucose 170 mg/dL      Calcium 9.6 mg/dL      AST 15 U/L      ALT 7 U/L      Alkaline Phosphatase 45 U/L      Total Protein 7.5 g/dL      Albumin 3.5 g/dL      Total Bilirubin 0.68 mg/dL      eGFR 58 ml/min/1.73sq m     Narrative:      National Kidney Disease Foundation guidelines for Chronic Kidney Disease (CKD):     Stage 1 with normal or high GFR (GFR > 90 mL/min/1.73 square meters)    Stage 2 Mild CKD (GFR = 60-89 mL/min/1.73 square meters)    Stage 3A Moderate CKD (GFR = 45-59 mL/min/1.73 square meters)    Stage 3B Moderate CKD (GFR = 30-44 mL/min/1.73 square meters)    Stage 4 Severe CKD (GFR = 15-29 mL/min/1.73 square meters)    Stage 5 End Stage CKD (GFR <15 mL/min/1.73 square meters)  Note: GFR calculation is accurate only with a steady state creatinine    UA w Reflex to Microscopic w Reflex to Culture [[id number]]     Lab Status: No result Specimen: Urine             CTA chest pe study   Final Interpretation by Preston Rodriguez MD (11/20 1910)         1. Diffuse bronchial inflammation. No evidence of pneumonia.   2. No evidence of acute pulmonary embolus.                  Workstation performed: [id number]         XR chest 2 views   Final Interpretation by Haim Muller MD (11/20 9638)      No acute cardiopulmonary disease.            Workstation performed: [id number]             Procedures    ED Medication and Procedure Management   Prior to Admission Medications   Prescriptions Last Dose Informant Patient Reported? Taking?   Anoro Ellipta 62.5-25 MCG/ACT inhaler 11/20/2024 Self No Yes   Sig: INHALE 1 PUFF DAILY   albuterol (ProAir HFA) 90 mcg/act inhaler 11/20/2024 Self No Yes   Sig:  Inhale 2 puffs every 6 (six) hours as needed for wheezing   amoxicillin (AMOXIL) 875 mg tablet 2024  No Yes   Sig: Take 1 tablet (875 mg total) by mouth 2 (two) times a day for 7 days   budesonide-formoterol (Symbicort) 160-4.5 mcg/act inhaler 2024 Morning Self No Yes   Sig: INHALE TWO PUFFS TWO (TWO) TIMES A DAY RINSE MOUTH AFTER USE.   carvedilol (COREG) 3.125 mg tablet 2024 Morning Self No Yes   Sig: TAKE ONE TABLET BY MOUTH TWICE A DAY WITH MEALS   folic acid (FOLVITE) 1 mg tablet 2024 Morning Self No Yes   Sig: TAKE ONE CAPSULE BY MOUTH DAILY   gabapentin (NEURONTIN) 400 mg capsule 2024 Evening Self No Yes   Sig: Take 1 capsule (400 mg total) by mouth daily at bedtime   lidocaine (LIDODERM) 5 %  Self No No   Sig: Apply 1 patch topically daily for 7 days Remove & Discard patch within 12 hours or as directed by MD   oxyCODONE-acetaminophen (PERCOCET) 5-325 mg per tablet 2024 Self No Yes   Sig: Take 0.5 tablets by mouth every 6 (six) hours as needed for moderate pain Max Daily Amount: 2 tablets Do not start before 2024.   oxyCODONE-acetaminophen (PERCOCET) 5-325 mg per tablet 2024 Self No Yes   Sig: Take 0.5 tablets by mouth every 6 (six) hours as needed for moderate pain Max Daily Amount: 2 tablets Do not start before 2024.   pantoprazole (PROTONIX) 40 mg tablet 2024 Morning Self No Yes   Sig: Take 1 tablet (40 mg total) by mouth daily   predniSONE 2.5 mg tablet 2024 Morning Self No Yes   Si TABLET ORAL ONCE A DAY IN IN THE MORNING WITH FOOD 90 DAYS      Facility-Administered Medications: None     Current Discharge Medication List        CONTINUE these medications which have NOT CHANGED    Details   albuterol (ProAir HFA) 90 mcg/act inhaler Inhale 2 puffs every 6 (six) hours as needed for wheezing  Qty: 8.5 g, Refills: 0    Comments: Substitution to a formulary equivalent within the same pharmaceutical class is  authorized.  Associated Diagnoses: SOB (shortness of breath)      amoxicillin (AMOXIL) 875 mg tablet Take 1 tablet (875 mg total) by mouth 2 (two) times a day for 7 days  Qty: 14 tablet, Refills: 0    Associated Diagnoses: Sore throat; Cough, unspecified type      Anoro Ellipta 62.5-25 MCG/ACT inhaler INHALE 1 PUFF DAILY  Qty: 60 each, Refills: 10    Associated Diagnoses: SOB (shortness of breath)      budesonide-formoterol (Symbicort) 160-4.5 mcg/act inhaler INHALE TWO PUFFS TWO (TWO) TIMES A DAY RINSE MOUTH AFTER USE.  Qty: 10.2 g, Refills: 5    Associated Diagnoses: Mild persistent asthma without complication      carvedilol (COREG) 3.125 mg tablet TAKE ONE TABLET BY MOUTH TWICE A DAY WITH MEALS  Qty: 180 tablet, Refills: 0    Associated Diagnoses: SOB (shortness of breath)      folic acid (FOLVITE) 1 mg tablet TAKE ONE CAPSULE BY MOUTH DAILY  Qty: 90 tablet, Refills: 0    Associated Diagnoses: Rheumatoid arthritis involving multiple sites with positive rheumatoid factor (HCC)      gabapentin (NEURONTIN) 400 mg capsule Take 1 capsule (400 mg total) by mouth daily at bedtime  Qty: 90 capsule, Refills: 0    Associated Diagnoses: Rheumatoid arthritis of multiple sites with negative rheumatoid factor (HCC)      !! oxyCODONE-acetaminophen (PERCOCET) 5-325 mg per tablet Take 0.5 tablets by mouth every 6 (six) hours as needed for moderate pain Max Daily Amount: 2 tablets Do not start before November 14, 2024.  Qty: 60 tablet, Refills: 0    Comments: Fill on 7/31/2024  Associated Diagnoses: Chronic pain syndrome; Rheumatoid arthritis of multiple sites with negative rheumatoid factor (HCC)      !! oxyCODONE-acetaminophen (PERCOCET) 5-325 mg per tablet Take 0.5 tablets by mouth every 6 (six) hours as needed for moderate pain Max Daily Amount: 2 tablets Do not start before December 14, 2024.  Qty: 60 tablet, Refills: 0    Comments: Fill on 7/31/2024  Associated Diagnoses: Chronic pain syndrome; Rheumatoid arthritis of  multiple sites with negative rheumatoid factor (HCC)      pantoprazole (PROTONIX) 40 mg tablet Take 1 tablet (40 mg total) by mouth daily  Qty: 90 tablet, Refills: 1    Associated Diagnoses: Gastroesophageal reflux disease without esophagitis      predniSONE 2.5 mg tablet 1 TABLET ORAL ONCE A DAY IN IN THE MORNING WITH FOOD 90 DAYS  Qty: 90 tablet, Refills: 0    Associated Diagnoses: Rheumatoid arthritis of multiple sites with negative rheumatoid factor (HCC)      lidocaine (LIDODERM) 5 % Apply 1 patch topically daily for 7 days Remove & Discard patch within 12 hours or as directed by MD  Qty: 7 patch, Refills: 0    Associated Diagnoses: Right shoulder pain       !! - Potential duplicate medications found. Please discuss with provider.        No discharge procedures on file.  ED SEPSIS DOCUMENTATION   Time reflects when diagnosis was documented in both MDM as applicable and the Disposition within this note       Time User Action Codes Description Comment    11/20/2024 12:47 PM Marvin Mirza [J18.9] Pneumonia     11/20/2024 12:47 PM Marvin Mirza [R09.02] Hypoxia                  Marvin Mirza MD  11/24/24 1124

## 2024-11-20 NOTE — ASSESSMENT & PLAN NOTE
Lab Results   Component Value Date    EGFR 58 11/20/2024    EGFR 68 08/22/2024    EGFR 49 05/06/2024    CREATININE 0.86 11/20/2024    CREATININE 0.75 08/22/2024    CREATININE 0.98 05/06/2024   Creatinine stable at baseline  Avoid hypotension and nephrotoxic agents  Monitor vitals per routine

## 2024-11-20 NOTE — PLAN OF CARE
Problem: PAIN - ADULT  Goal: Verbalizes/displays adequate comfort level or baseline comfort level  Description: Interventions:  - Encourage patient to monitor pain and request assistance  - Assess pain using appropriate pain scale  - Administer analgesics based on type and severity of pain and evaluate response  - Implement non-pharmacological measures as appropriate and evaluate response  - Consider cultural and social influences on pain and pain management  - Notify physician/advanced practitioner if interventions unsuccessful or patient reports new pain  Outcome: Progressing     Problem: INFECTION - ADULT  Goal: Absence or prevention of progression during hospitalization  Description: INTERVENTIONS:  - Assess and monitor for signs and symptoms of infection  - Monitor lab/diagnostic results  - Monitor all insertion sites, i.e. indwelling lines, tubes, and drains  - Monitor endotracheal if appropriate and nasal secretions for changes in amount and color  - [place] appropriate cooling/warming therapies per order  - Administer medications as ordered  - Instruct and encourage patient and family to use good hand hygiene technique  - Identify and instruct in appropriate isolation precautions for identified infection/condition  Outcome: Progressing     Problem: SAFETY ADULT  Goal: Patient will remain free of falls  Description: INTERVENTIONS:  - Educate patient/family on patient safety including physical limitations  - Instruct patient to call for assistance with activity   - Consult OT/PT to assist with strengthening/mobility   - Keep Call bell within reach  - Keep bed low and locked with side rails adjusted as appropriate  - Keep care items and personal belongings within reach  - Initiate and maintain comfort rounds  - Make Fall Risk Sign visible to staff  - Offer Toileting every 2 Hours, in advance of need  - Initiate/Maintain bed/chair alarm  - Obtain necessary fall risk management equipment:bed/chair alarm   -  Apply yellow socks and bracelet for high fall risk patients  - Consider moving patient to room near nurses station  Outcome: Progressing  Goal: Maintain or return to baseline ADL function  Description: INTERVENTIONS:  -  Assess patient's ability to carry out ADLs; assess patient's baseline for ADL function and identify physical deficits which impact ability to perform ADLs (bathing, care of mouth/teeth, toileting, grooming, dressing, etc.)  - Assess/evaluate cause of self-care deficits   - Assess range of motion  - Assess patient's mobility; develop plan if impaired  - Assess patient's need for assistive devices and provide as appropriate  - Encourage maximum independence but intervene and supervise when necessary  - Involve family in performance of ADLs  - Assess for home care needs following discharge   - Consider OT consult to assist with ADL evaluation and planning for discharge  - Provide patient education as appropriate  Outcome: Progressing  Goal: Maintains/Returns to pre admission functional level  Description: INTERVENTIONS:  - Perform AM-PAC 6 Click Basic Mobility/ Daily Activity assessment daily.  - Set and communicate daily mobility goal to care team and patient/family/caregiver.   - Collaborate with rehabilitation services on mobility goals if consulted  - Perform Range of Motion 3 times a day.  - Reposition patient every 3 hours.  - Dangle patient 3 times a day  - Stand patient 3 times a day  - Ambulate patient 3 times a day  - Out of bed to chair 3 times a day   - Out of bed for meals 3 times a day  - Out of bed for toileting  - Record patient progress and toleration of activity level   Outcome: Progressing     Problem: DISCHARGE PLANNING  Goal: Discharge to home or other facility with appropriate resources  Description: INTERVENTIONS:  - Identify barriers to discharge w/patient and caregiver  - Arrange for needed discharge resources and transportation as appropriate  - Identify discharge learning  needs (meds, wound care, etc.)  - Arrange for interpretive services to assist at discharge as needed  - Refer to Case Management Department for coordinating discharge planning if the patient needs post-hospital services based on physician/advanced practitioner order or complex needs related to functional status, cognitive ability, or social support system  Outcome: Progressing     Problem: Knowledge Deficit  Goal: Patient/family/caregiver demonstrates understanding of disease process, treatment plan, medications, and discharge instructions  Description: Complete learning assessment and assess knowledge base.  Interventions:  - Provide teaching at level of understanding  - Provide teaching via preferred learning methods  Outcome: Progressing

## 2024-11-21 LAB
ANION GAP SERPL CALCULATED.3IONS-SCNC: 8 MMOL/L (ref 4–13)
BUN SERPL-MCNC: 20 MG/DL (ref 5–25)
CALCIUM SERPL-MCNC: 9.5 MG/DL (ref 8.4–10.2)
CHLORIDE SERPL-SCNC: 103 MMOL/L (ref 96–108)
CO2 SERPL-SCNC: 23 MMOL/L (ref 21–32)
CREAT SERPL-MCNC: 0.91 MG/DL (ref 0.6–1.3)
DME PARACHUTE DELIVERY DATE REQUESTED: NORMAL
DME PARACHUTE ITEM DESCRIPTION: NORMAL
DME PARACHUTE ORDER STATUS: NORMAL
DME PARACHUTE SUPPLIER NAME: NORMAL
DME PARACHUTE SUPPLIER PHONE: NORMAL
ERYTHROCYTE [DISTWIDTH] IN BLOOD BY AUTOMATED COUNT: 14.6 % (ref 11.6–15.1)
EST. AVERAGE GLUCOSE BLD GHB EST-MCNC: 137 MG/DL
GFR SERPL CREATININE-BSD FRML MDRD: 54 ML/MIN/1.73SQ M
GLUCOSE SERPL-MCNC: 159 MG/DL (ref 65–140)
GLUCOSE SERPL-MCNC: 167 MG/DL (ref 65–140)
GLUCOSE SERPL-MCNC: 193 MG/DL (ref 65–140)
GLUCOSE SERPL-MCNC: 210 MG/DL (ref 65–140)
GLUCOSE SERPL-MCNC: 238 MG/DL (ref 65–140)
HBA1C MFR BLD: 6.4 %
HCT VFR BLD AUTO: 42.8 % (ref 34.8–46.1)
HGB BLD-MCNC: 13.7 G/DL (ref 11.5–15.4)
MAGNESIUM SERPL-MCNC: 1.9 MG/DL (ref 1.9–2.7)
MCH RBC QN AUTO: 31.1 PG (ref 26.8–34.3)
MCHC RBC AUTO-ENTMCNC: 32 G/DL (ref 31.4–37.4)
MCV RBC AUTO: 97 FL (ref 82–98)
PLATELET # BLD AUTO: 80 THOUSANDS/UL (ref 149–390)
PMV BLD AUTO: 11.9 FL (ref 8.9–12.7)
POTASSIUM SERPL-SCNC: 3.8 MMOL/L (ref 3.5–5.3)
PROCALCITONIN SERPL-MCNC: 0.59 NG/ML
RBC # BLD AUTO: 4.4 MILLION/UL (ref 3.81–5.12)
SODIUM SERPL-SCNC: 134 MMOL/L (ref 135–147)
WBC # BLD AUTO: 6.23 THOUSAND/UL (ref 4.31–10.16)

## 2024-11-21 PROCEDURE — 94760 N-INVAS EAR/PLS OXIMETRY 1: CPT

## 2024-11-21 PROCEDURE — 82948 REAGENT STRIP/BLOOD GLUCOSE: CPT

## 2024-11-21 PROCEDURE — 94668 MNPJ CHEST WALL SBSQ: CPT

## 2024-11-21 PROCEDURE — 84145 PROCALCITONIN (PCT): CPT | Performed by: INTERNAL MEDICINE

## 2024-11-21 PROCEDURE — 83735 ASSAY OF MAGNESIUM: CPT | Performed by: INTERNAL MEDICINE

## 2024-11-21 PROCEDURE — 85027 COMPLETE CBC AUTOMATED: CPT | Performed by: INTERNAL MEDICINE

## 2024-11-21 PROCEDURE — 97163 PT EVAL HIGH COMPLEX 45 MIN: CPT

## 2024-11-21 PROCEDURE — 97116 GAIT TRAINING THERAPY: CPT

## 2024-11-21 PROCEDURE — 99232 SBSQ HOSP IP/OBS MODERATE 35: CPT | Performed by: INTERNAL MEDICINE

## 2024-11-21 PROCEDURE — 83036 HEMOGLOBIN GLYCOSYLATED A1C: CPT | Performed by: FAMILY MEDICINE

## 2024-11-21 PROCEDURE — 94640 AIRWAY INHALATION TREATMENT: CPT

## 2024-11-21 PROCEDURE — 94664 DEMO&/EVAL PT USE INHALER: CPT

## 2024-11-21 PROCEDURE — 80048 BASIC METABOLIC PNL TOTAL CA: CPT | Performed by: INTERNAL MEDICINE

## 2024-11-21 RX ADMIN — INSULIN LISPRO 1 UNITS: 100 INJECTION, SOLUTION INTRAVENOUS; SUBCUTANEOUS at 07:56

## 2024-11-21 RX ADMIN — BUDESONIDE AND FORMOTEROL FUMARATE DIHYDRATE 2 PUFF: 160; 4.5 AEROSOL RESPIRATORY (INHALATION) at 18:49

## 2024-11-21 RX ADMIN — GUAIFENESIN 600 MG: 600 TABLET, EXTENDED RELEASE ORAL at 18:48

## 2024-11-21 RX ADMIN — GUAIFENESIN 600 MG: 600 TABLET, EXTENDED RELEASE ORAL at 09:57

## 2024-11-21 RX ADMIN — METHYLPREDNISOLONE SODIUM SUCCINATE 40 MG: 40 INJECTION, POWDER, FOR SOLUTION INTRAMUSCULAR; INTRAVENOUS at 05:22

## 2024-11-21 RX ADMIN — GABAPENTIN 400 MG: 400 CAPSULE ORAL at 21:41

## 2024-11-21 RX ADMIN — AZITHROMYCIN DIHYDRATE 500 MG: 250 TABLET ORAL at 15:01

## 2024-11-21 RX ADMIN — METHYLPREDNISOLONE SODIUM SUCCINATE 40 MG: 40 INJECTION, POWDER, FOR SOLUTION INTRAMUSCULAR; INTRAVENOUS at 15:01

## 2024-11-21 RX ADMIN — METHYLPREDNISOLONE SODIUM SUCCINATE 40 MG: 40 INJECTION, POWDER, FOR SOLUTION INTRAMUSCULAR; INTRAVENOUS at 21:41

## 2024-11-21 RX ADMIN — IPRATROPIUM BROMIDE 0.5 MG: 0.5 SOLUTION RESPIRATORY (INHALATION) at 07:46

## 2024-11-21 RX ADMIN — CEFTRIAXONE 1000 MG: 1 INJECTION, SOLUTION INTRAVENOUS at 07:57

## 2024-11-21 RX ADMIN — INSULIN LISPRO 2 UNITS: 100 INJECTION, SOLUTION INTRAVENOUS; SUBCUTANEOUS at 12:00

## 2024-11-21 RX ADMIN — LEVALBUTEROL HYDROCHLORIDE 1.25 MG: 1.25 SOLUTION RESPIRATORY (INHALATION) at 19:44

## 2024-11-21 RX ADMIN — BUDESONIDE AND FORMOTEROL FUMARATE DIHYDRATE 2 PUFF: 160; 4.5 AEROSOL RESPIRATORY (INHALATION) at 09:58

## 2024-11-21 RX ADMIN — LEVALBUTEROL HYDROCHLORIDE 1.25 MG: 1.25 SOLUTION RESPIRATORY (INHALATION) at 13:40

## 2024-11-21 RX ADMIN — IPRATROPIUM BROMIDE 0.5 MG: 0.5 SOLUTION RESPIRATORY (INHALATION) at 13:40

## 2024-11-21 RX ADMIN — INSULIN LISPRO 2 UNITS: 100 INJECTION, SOLUTION INTRAVENOUS; SUBCUTANEOUS at 16:50

## 2024-11-21 RX ADMIN — CARVEDILOL 3.12 MG: 3.12 TABLET, FILM COATED ORAL at 08:23

## 2024-11-21 RX ADMIN — PANTOPRAZOLE SODIUM 40 MG: 40 TABLET, DELAYED RELEASE ORAL at 05:22

## 2024-11-21 RX ADMIN — CARVEDILOL 3.12 MG: 3.12 TABLET, FILM COATED ORAL at 16:51

## 2024-11-21 RX ADMIN — IPRATROPIUM BROMIDE 0.5 MG: 0.5 SOLUTION RESPIRATORY (INHALATION) at 19:44

## 2024-11-21 RX ADMIN — LEVALBUTEROL HYDROCHLORIDE 1.25 MG: 1.25 SOLUTION RESPIRATORY (INHALATION) at 07:46

## 2024-11-21 RX ADMIN — INSULIN LISPRO 1 UNITS: 100 INJECTION, SOLUTION INTRAVENOUS; SUBCUTANEOUS at 21:42

## 2024-11-21 RX ADMIN — FOLIC ACID 1 MG: 1 TABLET ORAL at 09:57

## 2024-11-21 NOTE — PLAN OF CARE
Problem: PAIN - ADULT  Goal: Verbalizes/displays adequate comfort level or baseline comfort level  Description: Interventions:  - Encourage patient to monitor pain and request assistance  - Assess pain using appropriate pain scale  - Administer analgesics based on type and severity of pain and evaluate response  - Implement non-pharmacological measures as appropriate and evaluate response  - Consider cultural and social influences on pain and pain management  - Notify physician/advanced practitioner if interventions unsuccessful or patient reports new pain  Outcome: Progressing     Problem: INFECTION - ADULT  Goal: Absence or prevention of progression during hospitalization  Description: INTERVENTIONS:  - Assess and monitor for signs and symptoms of infection  - Monitor lab/diagnostic results  - Monitor all insertion sites, i.e. indwelling lines, tubes, and drains  - Monitor endotracheal if appropriate and nasal secretions for changes in amount and color  - [place] appropriate cooling/warming therapies per order  - Administer medications as ordered  - Instruct and encourage patient and family to use good hand hygiene technique  - Identify and instruct in appropriate isolation precautions for identified infection/condition  Outcome: Progressing     Problem: SAFETY ADULT  Goal: Patient will remain free of falls  Description: INTERVENTIONS:  - Educate patient/family on patient safety including physical limitations  - Instruct patient to call for assistance with activity   - Consult OT/PT to assist with strengthening/mobility   - Keep Call bell within reach  - Keep bed low and locked with side rails adjusted as appropriate  - Keep care items and personal belongings within reach  - Initiate and maintain comfort rounds  - Make Fall Risk Sign visible to staff  - Offer Toileting every 2 Hours, in advance of need  - Initiate/Maintain bed/chair alarm  - Obtain necessary fall risk management equipment:bed/chair alarm   -  Apply yellow socks and bracelet for high fall risk patients  - Consider moving patient to room near nurses station  Outcome: Progressing  Goal: Maintain or return to baseline ADL function  Description: INTERVENTIONS:  -  Assess patient's ability to carry out ADLs; assess patient's baseline for ADL function and identify physical deficits which impact ability to perform ADLs (bathing, care of mouth/teeth, toileting, grooming, dressing, etc.)  - Assess/evaluate cause of self-care deficits   - Assess range of motion  - Assess patient's mobility; develop plan if impaired  - Assess patient's need for assistive devices and provide as appropriate  - Encourage maximum independence but intervene and supervise when necessary  - Involve family in performance of ADLs  - Assess for home care needs following discharge   - Consider OT consult to assist with ADL evaluation and planning for discharge  - Provide patient education as appropriate  Outcome: Progressing  Goal: Maintains/Returns to pre admission functional level  Description: INTERVENTIONS:  - Perform AM-PAC 6 Click Basic Mobility/ Daily Activity assessment daily.  - Set and communicate daily mobility goal to care team and patient/family/caregiver.   - Collaborate with rehabilitation services on mobility goals if consulted  - Perform Range of Motion 3 times a day.  - Reposition patient every 3 hours.  - Dangle patient 3 times a day  - Stand patient 3 times a day  - Ambulate patient 3 times a day  - Out of bed to chair 3 times a day   - Out of bed for meals 3 times a day  - Out of bed for toileting  - Record patient progress and toleration of activity level   Outcome: Progressing     Problem: DISCHARGE PLANNING  Goal: Discharge to home or other facility with appropriate resources  Description: INTERVENTIONS:  - Identify barriers to discharge w/patient and caregiver  - Arrange for needed discharge resources and transportation as appropriate  - Identify discharge learning  needs (meds, wound care, etc.)  - Arrange for interpretive services to assist at discharge as needed  - Refer to Case Management Department for coordinating discharge planning if the patient needs post-hospital services based on physician/advanced practitioner order or complex needs related to functional status, cognitive ability, or social support system  Outcome: Progressing     Problem: RESPIRATORY - ADULT  Goal: Achieves optimal ventilation and oxygenation  Description: INTERVENTIONS:  - Assess for changes in respiratory status  - Assess for changes in mentation and behavior  - Position to facilitate oxygenation and minimize respiratory effort  - Oxygen administered by appropriate delivery if ordered  - Initiate smoking cessation education as indicated  - Encourage broncho-pulmonary hygiene including cough, deep breathe, Incentive Spirometry  - Assess the need for suctioning and aspirate as needed  - Assess and instruct to report SOB or any respiratory difficulty  - Respiratory Therapy support as indicated  Outcome: Progressing     Problem: Prexisting or High Potential for Compromised Skin Integrity  Goal: Skin integrity is maintained or improved  Description: INTERVENTIONS:  - Identify patients at risk for skin breakdown  - Assess and monitor skin integrity  - Assess and monitor nutrition and hydration status  - Monitor labs   - Assess for incontinence   - Turn and reposition patient  - Assist with mobility/ambulation  - Relieve pressure over bony prominences  - Avoid friction and shearing  - Provide appropriate hygiene as needed including keeping skin clean and dry  - Evaluate need for skin moisturizer/barrier cream  - Collaborate with interdisciplinary team   - Patient/family teaching  - Consider wound care consult   Outcome: Progressing

## 2024-11-21 NOTE — PLAN OF CARE
Problem: PAIN - ADULT  Goal: Verbalizes/displays adequate comfort level or baseline comfort level  Description: Interventions:  - Encourage patient to monitor pain and request assistance  - Assess pain using appropriate pain scale  - Administer analgesics based on type and severity of pain and evaluate response  - Implement non-pharmacological measures as appropriate and evaluate response  - Consider cultural and social influences on pain and pain management  - Notify physician/advanced practitioner if interventions unsuccessful or patient reports new pain  Outcome: Progressing     Problem: INFECTION - ADULT  Goal: Absence or prevention of progression during hospitalization  Description: INTERVENTIONS:  - Assess and monitor for signs and symptoms of infection  - Monitor lab/diagnostic results  - Monitor all insertion sites, i.e. indwelling lines, tubes, and drains  - Monitor endotracheal if appropriate and nasal secretions for changes in amount and color  - [place] appropriate cooling/warming therapies per order  - Administer medications as ordered  - Instruct and encourage patient and family to use good hand hygiene technique  - Identify and instruct in appropriate isolation precautions for identified infection/condition  Outcome: Progressing     Problem: SAFETY ADULT  Goal: Patient will remain free of falls  Description: INTERVENTIONS:  - Educate patient/family on patient safety including physical limitations  - Instruct patient to call for assistance with activity   - Consult OT/PT to assist with strengthening/mobility   - Keep Call bell within reach  - Keep bed low and locked with side rails adjusted as appropriate  - Keep care items and personal belongings within reach  - Initiate and maintain comfort rounds  - Make Fall Risk Sign visible to staff  - Offer Toileting every 2 Hours, in advance of need  - Initiate/Maintain bed/chair alarm  - Obtain necessary fall risk management equipment:bed/chair alarm   -  Apply yellow socks and bracelet for high fall risk patients  - Consider moving patient to room near nurses station  Outcome: Progressing  Goal: Maintain or return to baseline ADL function  Description: INTERVENTIONS:  -  Assess patient's ability to carry out ADLs; assess patient's baseline for ADL function and identify physical deficits which impact ability to perform ADLs (bathing, care of mouth/teeth, toileting, grooming, dressing, etc.)  - Assess/evaluate cause of self-care deficits   - Assess range of motion  - Assess patient's mobility; develop plan if impaired  - Assess patient's need for assistive devices and provide as appropriate  - Encourage maximum independence but intervene and supervise when necessary  - Involve family in performance of ADLs  - Assess for home care needs following discharge   - Consider OT consult to assist with ADL evaluation and planning for discharge  - Provide patient education as appropriate  Outcome: Progressing  Goal: Maintains/Returns to pre admission functional level  Description: INTERVENTIONS:  - Perform AM-PAC 6 Click Basic Mobility/ Daily Activity assessment daily.  - Set and communicate daily mobility goal to care team and patient/family/caregiver.   - Collaborate with rehabilitation services on mobility goals if consulted  - Perform Range of Motion 3 times a day.  - Reposition patient every 3 hours.  - Dangle patient 3 times a day  - Stand patient 3 times a day  - Ambulate patient 3 times a day  - Out of bed to chair 3 times a day   - Out of bed for meals 3 times a day  - Out of bed for toileting  - Record patient progress and toleration of activity level   Outcome: Progressing     Problem: DISCHARGE PLANNING  Goal: Discharge to home or other facility with appropriate resources  Description: INTERVENTIONS:  - Identify barriers to discharge w/patient and caregiver  - Arrange for needed discharge resources and transportation as appropriate  - Identify discharge learning  needs (meds, wound care, etc.)  - Arrange for interpretive services to assist at discharge as needed  - Refer to Case Management Department for coordinating discharge planning if the patient needs post-hospital services based on physician/advanced practitioner order or complex needs related to functional status, cognitive ability, or social support system  Outcome: Progressing     Problem: Knowledge Deficit  Goal: Patient/family/caregiver demonstrates understanding of disease process, treatment plan, medications, and discharge instructions  Description: Complete learning assessment and assess knowledge base.  Interventions:  - Provide teaching at level of understanding  - Provide teaching via preferred learning methods  Outcome: Progressing     Problem: RESPIRATORY - ADULT  Goal: Achieves optimal ventilation and oxygenation  Description: INTERVENTIONS:  - Assess for changes in respiratory status  - Assess for changes in mentation and behavior  - Position to facilitate oxygenation and minimize respiratory effort  - Oxygen administered by appropriate delivery if ordered  - Initiate smoking cessation education as indicated  - Encourage broncho-pulmonary hygiene including cough, deep breathe, Incentive Spirometry  - Assess the need for suctioning and aspirate as needed  - Assess and instruct to report SOB or any respiratory difficulty  - Respiratory Therapy support as indicated  Outcome: Progressing     Problem: Prexisting or High Potential for Compromised Skin Integrity  Goal: Skin integrity is maintained or improved  Description: INTERVENTIONS:  - Identify patients at risk for skin breakdown  - Assess and monitor skin integrity  - Assess and monitor nutrition and hydration status  - Monitor labs   - Assess for incontinence   - Turn and reposition patient  - Assist with mobility/ambulation  - Relieve pressure over bony prominences  - Avoid friction and shearing  - Provide appropriate hygiene as needed including keeping  skin clean and dry  - Evaluate need for skin moisturizer/barrier cream  - Collaborate with interdisciplinary team   - Patient/family teaching  - Consider wound care consult   Outcome: Progressing

## 2024-11-21 NOTE — ASSESSMENT & PLAN NOTE
Patient presents to the ED for ongoing cough.  Reports that cough has been persistent over the last 3 days.  Denies any associated shortness of breath.  Reports some pleuritic chest pain with coughing, but is now resolved since arriving to the ED.  Denies any associated fevers/chills, recent sick contacts, nausea, vomiting, diarrhea, urinary complaints.  CXR (wet read) negative for acute cardiopulmonary process  CTA chest PE study with diffuse bronchial inflammation. No evidence of pneumonia. No evidence of acute pulmonary embolus  COVID/FLU negative  RP2 positive for RSV  Patient with 30 year smoking history. Likely in the setting of tracheobronchitis, undiagnosed COPD, viral illness  IV solumedrol 40mg TID  Xoponex/atrovent nebulizers  Ceftriaxone and azithromycin, follow infectious work up  Respiratory protocol, incentive spirometry, oscillatory positive expiratory pressure  Continuous pulse ox. Currently on 4.5L NC, titrate oxygen to maintain O2 sats 89% or greater  Ambulatory pulse ox prior to discharge

## 2024-11-21 NOTE — ASSESSMENT & PLAN NOTE
Lab Results   Component Value Date    EGFR 54 11/21/2024    EGFR 58 11/20/2024    EGFR 68 08/22/2024    CREATININE 0.91 11/21/2024    CREATININE 0.86 11/20/2024    CREATININE 0.75 08/22/2024   Creatinine stable at baseline  Avoid hypotension and nephrotoxic agents  Monitor vitals per routine

## 2024-11-21 NOTE — ASSESSMENT & PLAN NOTE
POA. Meeting SIRS criteria for tachypnea and leukocytosis  CXR without acute cardiopulmonary disease  Lactic acid WNL  Procalcitonin trends: 0.43 -> 0.59  COVID/FLU negative  RP2 positive for RSV  Follow up BC x 2  Continue IV ceftriaxone (D1) and azithromycin (D2). Will discontinue ceftriaxone if BC come back negative  Trend WBC and fever curve

## 2024-11-21 NOTE — ASSESSMENT & PLAN NOTE
on admission labs  No history of diabetes  Likely in the setting of chronic steroid use  HA1C pending  SSI with accu checks  Hypoglycemia protocol

## 2024-11-21 NOTE — ASSESSMENT & PLAN NOTE
Per chart review, chronic  Platelet count 80  Likely exacerbation by SIRS. No signs/symptoms of bleeding  Monitor CBC daily  Hold off on DVT prophylaxis for now

## 2024-11-21 NOTE — PROGRESS NOTES
Progress Note - Hospitalist   Name: Dennise Arnett 89 y.o. female I MRN: [id number]  Unit/Bed#: [address] [place] [address] I Date of Admission: 11/20/2024   Date of Service: 11/21/2024 I Hospital Day: 1    Assessment & Plan  Acute respiratory failure (HCC)  Patient presents to the ED for ongoing cough.  Reports that cough has been persistent over the last 3 days.  Denies any associated shortness of breath.  Reports some pleuritic chest pain with coughing, but is now resolved since arriving to the ED.  Denies any associated fevers/chills, recent sick contacts, nausea, vomiting, diarrhea, urinary complaints.  CXR (wet read) negative for acute cardiopulmonary process  CTA chest PE study with diffuse bronchial inflammation. No evidence of pneumonia. No evidence of acute pulmonary embolus  COVID/FLU negative  RP2 positive for RSV  Patient with 30 year smoking history. Likely in the setting of tracheobronchitis, undiagnosed COPD, viral illness  IV solumedrol 40mg TID  Xoponex/atrovent nebulizers  Ceftriaxone and azithromycin, follow infectious work up  Respiratory protocol, incentive spirometry, oscillatory positive expiratory pressure  Continuous pulse ox. Currently on 4.5L NC, titrate oxygen to maintain O2 sats 89% or greater  Ambulatory pulse ox prior to discharge  SIRS (systemic inflammatory response syndrome) (HCC)  POA. Meeting SIRS criteria for tachypnea and leukocytosis  CXR without acute cardiopulmonary disease  Lactic acid WNL  Procalcitonin trends: 0.43 -> 0.59  COVID/FLU negative  RP2 positive for RSV  Follow up BC x 2  Continue IV ceftriaxone (D1) and azithromycin (D2). Will discontinue ceftriaxone if BC come back negative  Trend WBC and fever curve  Rheumatoid arthritis of multiple sites with negative rheumatoid factor (HCC)  Follows outpatient with Rheumatology  Continue gabapentin 400mg HS  PRN percocet 5-325mg 0.5mg tablet Q6H PRN  Prednisone 2.5mg daily, on hold while receiving IV solumedrol  Stage 3 chronic  kidney disease, unspecified whether stage 3a or 3b CKD ([facility])  Lab Results   Component Value Date    EGFR 54 11/21/2024    EGFR 58 11/20/2024    EGFR 68 08/22/2024    CREATININE 0.91 11/21/2024    CREATININE 0.86 11/20/2024    CREATININE 0.75 08/22/2024   Creatinine stable at baseline  Avoid hypotension and nephrotoxic agents  Monitor vitals per routine  Thrombocytopenia ([facility])  Per chart review, chronic  Platelet count 80  Likely exacerbation by SIRS. No signs/symptoms of bleeding  Monitor CBC daily  Hold off on DVT prophylaxis for now  Hyperglycemia   on admission labs  No history of diabetes  Likely in the setting of chronic steroid use  HA1C pending  SSI with accu checks  Hypoglycemia protocol    VTE Pharmacologic Prophylaxis: VTE Score: 8 High Risk (Score >/= 5) - Pharmacological DVT Prophylaxis Contraindicated. Sequential Compression Devices Ordered.    Mobility:   Basic Mobility Inpatient Raw Score: 13  JH-HLM Goal: 4: Move to chair/commode  JH-HLM Achieved: 2: Bed activities/Dependent transfer  JH-HLM Goal NOT achieved. Continue with multidisciplinary rounding and encourage appropriate mobility to improve upon JH-HLM goals.    Patient Centered Rounds: I performed bedside rounds with nursing staff today.   Discussions with Specialists or Other Care Team Provider: Nursing, CM    Education and Discussions with Family / Patient: Updated  (son) at bedside.    Current Length of Stay: 1 day(s)  Current Patient Status: Inpatient   Certification Statement: The patient will continue to require additional inpatient hospital stay due to acute respiratory failure  Discharge Plan: Anticipate discharge in 48-72 hrs to TBD pending PT/OT evaluation    Code Status: Level 1 - Full Code    Subjective   Patient reports she feels better than yesterday, states that she's having more of a cough with mucous. Reports decreased appetite and drinking but states that she will try to eat and drink water.  Denies chest  pain, SOB, abdominal pain.    Objective :  Temp:  [97.1 °F (36.2 °C)-98.5 °F (36.9 °C)] 97.8 °F (36.6 °C)  HR:  [] 95  BP: ()/(48-96) 163/75  Resp:  [16-34] 18  SpO2:  [84 %-100 %] 94 %  O2 Device: Nasal cannula  Nasal Cannula O2 Flow Rate (L/min):  [4 L/min-5 L/min] 4.5 L/min    Body mass index is 24.8 kg/m².     Input and Output Summary (last 24 hours):     Intake/Output Summary (Last 24 hours) at 11/21/2024 0936  Last data filed at 11/21/2024 0517  Gross per 24 hour   Intake --   Output 2 ml   Net -2 ml       Physical Exam  Constitutional:       General: She is not in acute distress.  Cardiovascular:      Rate and Rhythm: Normal rate and regular rhythm.      Heart sounds: Normal heart sounds.   Pulmonary:      Effort: No respiratory distress.      Comments: Decreased breath sounds bilaterally. Upper airway congestion  Abdominal:      General: Bowel sounds are normal.      Palpations: Abdomen is soft.      Tenderness: There is no abdominal tenderness.   Skin:     General: Skin is warm and dry.   Neurological:      Mental Status: She is alert.   Psychiatric:         Mood and Affect: Mood normal.           Lines/Drains:              Lab Results: I have reviewed the following results:   Results from last 7 days   Lab Units 11/21/24  0517 11/20/24  1349 11/20/24  1122   WBC Thousand/uL 6.23  --  11.75*   HEMOGLOBIN g/dL 13.7  --  14.4   I STAT HEMOGLOBIN   --    < >  --    HEMATOCRIT % 42.8  --  44.7   HEMATOCRIT, ISTAT   --    < >  --    PLATELETS Thousands/uL 80*  --  92*   BANDS PCT %  --   --  2   LYMPHO PCT %  --   --  7*   MONO PCT %  --   --  16*   EOS PCT %  --   --  0    < > = values in this interval not displayed.     Results from last 7 days   Lab Units 11/21/24  0517 11/20/24  1349 11/20/24  1122   SODIUM mmol/L 134*  --  133*   POTASSIUM mmol/L 3.8  --  3.7   CHLORIDE mmol/L 103  --  100   CO2 mmol/L 23  --  27   CO2, I-STAT   --    < >  --    BUN mg/dL 20  --  20   CREATININE mg/dL 0.91  --   0.86   ANION GAP mmol/L 8  --  6   CALCIUM mg/dL 9.5  --  9.6   ALBUMIN g/dL  --   --  3.5   TOTAL BILIRUBIN mg/dL  --   --  0.68   ALK PHOS U/L  --   --  45   ALT U/L  --   --  7   AST U/L  --   --  15   GLUCOSE RANDOM mg/dL 159*  --  170*    < > = values in this interval not displayed.         Results from last 7 days   Lab Units 11/21/24  0725 11/20/24  2146 11/20/24  2032   POC GLUCOSE mg/dl 167* 185* 214*         Results from last 7 days   Lab Units 11/21/24  0517 11/20/24  1337 11/20/24  1122   LACTIC ACID mmol/L  --  0.8  --    PROCALCITONIN ng/ml 0.59*  --  0.43*       Recent Cultures (last 7 days):   Results from last 7 days   Lab Units 11/20/24  1337   BLOOD CULTURE  Received in Microbiology Lab. Culture in Progress.  Received in Microbiology Lab. Culture in Progress.       Imaging Results Review: I reviewed radiology reports from this admission including: CT chest.  Other Study Results Review: No additional pertinent studies reviewed.    Last 24 Hours Medication List:     Current Facility-Administered Medications:     acetaminophen (TYLENOL) tablet 650 mg, Q6H PRN    albuterol (PROVENTIL HFA,VENTOLIN HFA) inhaler 2 puff, Q6H PRN    azithromycin (ZITHROMAX) tablet 500 mg, Q24H    budesonide-formoterol (SYMBICORT) 160-4.5 mcg/act inhaler 2 puff, BID    carvedilol (COREG) tablet 3.125 mg, BID With Meals    cefTRIAXone (ROCEPHIN) IVPB (premix in dextrose) 1,000 mg 50 mL, Q24H, Last Rate: 1,000 mg (11/21/24 0757)    folic acid (FOLVITE) tablet 1 mg, Daily    gabapentin (NEURONTIN) capsule 400 mg, HS    guaiFENesin (MUCINEX) 12 hr tablet 600 mg, BID    insulin lispro (HumALOG/ADMELOG) 100 units/mL subcutaneous injection 1-5 Units, 4x Daily (AC & HS) **AND** Fingerstick Glucose (POCT), 4x Daily AC and at bedtime    ipratropium (ATROVENT) 0.02 % inhalation solution 0.5 mg, TID    levalbuterol (XOPENEX) inhalation solution 1.25 mg, TID **AND** [DISCONTINUED] sodium chloride 0.9 % inhalation solution 3 mL, TID     methylPREDNISolone sodium succinate (Solu-MEDROL) injection 40 mg, Q8H    oxyCODONE-acetaminophen (PERCOCET) 5-325 mg per tablet 0.5 tablet, Q6H PRN    pantoprazole (PROTONIX) EC tablet 40 mg, Early Morning    Administrative Statements   Today, Patient Was Seen By: Raeann Jackson PA-C  I have spent a total time of 30 minutes in caring for this patient on the day of the visit/encounter including Diagnostic results, Risks and benefits of tx options, Instructions for management, Patient and family education, Importance of tx compliance, Counseling / Coordination of care, Documenting in the medical record, Reviewing / ordering tests, medicine, procedures  , Obtaining or reviewing history  , and Communicating with other healthcare professionals .    **Please Note: This note may have been constructed using a voice recognition system.**

## 2024-11-21 NOTE — PHYSICAL THERAPY NOTE
PHYSICAL THERAPY EVALUATION/TREATMENT    NAME:  Dennise Arnett  AGE:   89 y.o.  Mrn:   [id number]  Length Of Stay: 1    ADMIT DX:  Shortness of breath [R06.02]  Dehydration [E86.0]  Pneumonia [J18.9]  Hypoxia [R09.02]    Past Medical History:   Diagnosis Date    Arthritis     Carpal tunnel syndrome     Degeneration of lumbar intervertebral disc     Postmenopausal osteoporosis     Primary generalized (osteo)arthritis     Rheumatoid arthritis (HCC)     Right shoulder pain      Past Surgical History:   Procedure Laterality Date    CARPAL TUNNEL RELEASE      CATARACT EXTRACTION      CHOLECYSTECTOMY      KNEE SURGERY      MASS EXCISION Right 1/2/2019    Procedure: EXCISION BIOPSY TISSUE LESION/MASS HAND;  Surgeon: Dylan Baugh DO;  Location: [facility];  Service: Orthopedics    TONSILLECTOMY          11/21/24 0940   PT Last Visit   PT Visit Date 11/21/24   Note Type   Note type Evaluation   Pain Assessment   Pain Assessment Tool 0-10   Pain Score No Pain   Restrictions/Precautions   Other Precautions Fall Risk;Bed Alarm;Chair Alarm;Cognitive;Contact/isolation;Droplet precautions  (4 L O2 via NC; IV; [place])   Home Living   Type of Home House   Home Layout Two level;Able to live on main level with bedroom/bathroom;Performs ADLs on one level;Ramped entrance  (Patient maintains a first-floor set up with full bathroom)   Bathroom Shower/Tub Walk-in shower   Bathroom Toilet Standard   Bathroom Equipment Shower chair;Grab bars around toilet   Bathroom Accessibility Accessible   Home Equipment Wheelchair-manual  (Rollator)   Prior Function   Level of [place] Needs assistance with ADLs;Modified independent with wheelchair;Needs assistance with IADLS  (Patient's DIL assist patient with dressing and bathing)   Lives With Son  (+ DIL)   Receives Help From Family   IADLs Family/Friend/Other provides meals;Family/Friend/Other provides medication management;Family/Friend/Other provides transportation   Falls in the  "last 6 months 0  (Denies)   Vocational Retired   Comments Patient reports nonambulatory for almost 1 year.  Patient is mod I in the wheelchair throughout the home, can self propel.  SPT at baseline.  Assist up/down the ramp when in/out of the house.   General   Additional Pertinent History Patient is admitted with a cough x 3 days.  In the ED patient was found to have acute respiratory failure.   Family/Caregiver Present Yes  (Patient's son Diaz)   Cognition   Overall Cognitive Status Impaired   Arousal/Participation Cooperative   Orientation Level Oriented to person;Oriented to situation  (Patient knows she is in the hospital but states \"[facility]\" patient is generally oriented to time states \"November\" \"1924\" (pt meant to say 2024) and does not know the day of the week)   Memory Decreased recall of precautions;Decreased recall of recent events;Decreased short term memory   Following Commands Follows one step commands with increased time or repetition   Comments At least 2 patient identifiers including name and date of birth.  Patient is very \"set in her ways\" decreased responsiveness and follow-through of PT directions at times   Subjective   Subjective \"I did not get to be 89 listening to other people, but using my own commonsense\"   RLE Assessment   RLE Assessment WFL  (Grossly 3-3+/5)   LLE Assessment   LLE Assessment WFL  (Grossly 3-3+/5)   Light Touch   RLE Light Touch Grossly intact   LLE Light Touch Grossly intact   Proprioception   RLE Proprioception Grossly intact   LLE Proprioception Grossly Intact   Bed Mobility   Supine to Sit 4  Minimal assistance   Additional items Assist x 1;Verbal cues;Increased time required;HOB elevated;Bedrails   Additional Comments Patient received supine in bed incontinent of urine.  Patient requesting to don personal depends/pads   Transfers   Sit to Stand 4  Minimal assistance   Additional items Assist x 1;Verbal cues;Increased time required "   Stand to Sit 4  Minimal assistance   Additional items Assist x 1;Verbal cues;Increased time required   Stand pivot 4  Minimal assistance  (Bed to chair)   Additional items Assist x 1;Verbal cues;Increased time required   Ambulation/Elevation   Gait pattern Short stride;Step to;Decreased foot clearance;Decreased heel strike;Decreased toe off   Gait Assistance 4  Minimal assist   Additional items Assist x 1;Verbal cues;Tactile cues   Assistive Device None  (Minimal handhold assist)   Distance Few steps bed to chair; SaO2 on 4 L O2 remained above 90% at rest and with limited activity   Balance   Static Sitting Fair   Static Standing Fair -   Dynamic Standing Fair -   Endurance Deficit   Endurance Deficit Yes   Endurance Deficit Description Limited overall activity and mobility endurance   Activity Tolerance   Activity Tolerance Patient limited by fatigue   Nurse Made Aware EDWIN Bowen   Assessment   Problem List Decreased strength;Decreased endurance;Impaired balance;Decreased mobility;Decreased cognition;Impaired judgement;Decreased safety awareness   Assessment Patient seen for Physical Therapy evaluation. Patient admitted with Acute respiratory failure (HCC).  Comorbidities affecting patient's physical performance include: RA, CKD 3, SIRS, generalized OA, hyperglycemia.  Personal factors affecting patient at time of initial evaluation include: lives in two story house, inability to ambulate household distances, inability to navigate community distances, inability to navigate level surfaces without external assistance, inability to perform dynamic tasks in community, and decreased cognition. Prior to admission, patient was independent with functional mobility with SPT and w/c mob -mod I, requiring assist for ADLS, requiring assist for IADLS, living with son and DIL in a two level home with no steps to enter, and lives in a multilevel house but has 1st floor setup.  Please find objective findings from Physical Therapy  assessment regarding body systems outlined above with impairments and limitations including weakness, impaired balance, decreased endurance, impaired coordination, gait deviations, decreased activity tolerance, decreased functional mobility tolerance, decreased safety awareness, impaired judgement, fall risk, and decreased cognition.  The Barthel Index was used as a functional outcome tool presenting with a score of Barthel Index Score: 35 today indicating marked limitations of functional mobility and ADLS.  Patient's clinical presentation is currently unstable/unpredictable as seen in patient's presentation of vital sign response, varying levels of cognitive performance, increased fall risk, new onset of impairment of functional mobility, decreased endurance, and new onset of weakness. Pt would benefit from continued Physical Therapy treatment to address deficits as defined above and maximize level of functional mobility. As demonstrated by objective findings, the assigned level of complexity for this evaluation is high.    The patient's -[facility] Basic Mobility Inpatient Short Form Raw Score is 15. A Raw score of less than or equal to 16 suggests the patient may benefit from discharge to post-acute rehabilitation services. Please also refer to the recommendation of the Physical Therapist for safe discharge planning.   Goals   Patient Goals To feel better and go home   STG Expiration Date 12/01/24   Short Term Goal #1 Patient will: Increase bilateral LE strength 1/2 grade to facilitate independent mobility, Perform all bed mobility tasks independently to improve pt's independence w/ repositioning for decrease risk of skin breakdown, Perform all transfers independently consistently from various height surfaces in order to improve I w/ engagement w/ real-world environments/situations, Ambulate at least 15-20 ft. with roller walker modified independent w/o LOB to facilitate return and engagement w/ previous living  environment, Increase all balance 1 grade to decrease risk for falls, Tolerate at least 15 consecutive minutes of activity to demonstrate improved activity tolerance and endurance, and Tolerate 3 hr OOB to faciliate upright tolerance   Plan   Treatment/Interventions ADL retraining;Functional transfer training;LE strengthening/ROM;Therapeutic exercise;Endurance training;Cognitive reorientation;Patient/family training;Equipment eval/education;Bed mobility;Gait training;Compensatory technique education;Spoke to nursing;Spoke to case management;Family   PT Frequency 3-5x/wk   Discharge Recommendation   Rehab Resource Intensity Level, PT III (Minimum Resource Intensity)   AM-PAC Basic Mobility Inpatient   Turning in Flat Bed Without Bedrails 3   Lying on Back to Sitting on Edge of Flat Bed Without Bedrails 3   Moving Bed to Chair 3   Standing Up From Chair Using Arms 3   Walk in Room 2   Climb 3-5 Stairs With Railing 1   Basic Mobility Inpatient Raw Score 15   Basic Mobility Standardized Score 36.97   [facility] Highest Level Of Mobility   -HL Goal 4: Move to chair/commode   -HL Achieved 4: Move to chair/commode   Barthel Index   Feeding 10   Bathing 0   Grooming Score 0   Dressing Score 5   Bladder Score 0   Bowels Score 5   Toilet Use Score 5   Transfers (Bed/Chair) Score 10   Mobility (Level Surface) Score 0   Stairs Score 0   Barthel Index Score 35   Additional Treatment Session   Start Time 0940   End Time 0958   Treatment Assessment Patient agreeable to trial of ambulation with a roller walker.  Patient transfers sit to stand with min assist and use of armrest with cues for safe hand placement.  Patient ambulates with a roller walker 10 feet with chair follow and minimal assistance.  Patient transfers stand to sit with min assist and cues for safe hand placement.  A: patient with fairly good tolerance to PT evaluation and treatment despite respiratory status.  While admitted, patient will continue to  "benefit from skilled physical therapy services to continue to increase the patient's strength, balance, endurance, safe functional mobility and gait with a roller.  When medically stable for discharge patient is appropriate for Level III Minimum Resource Intensity.   End of Consult   Patient Position at End of Consult Bed/Chair alarm activated;Bedside chair;All needs within reach   End of Consult Comments This PT spoke with patient's son at end of PT session - son stating \"I wish she would walk at home, I know she can do it but she is afraid of falling\"   Licensure   [place] License Number  Marybel GAVIRIA Deborahdoris, PT [id number]   Portions of the documentation may have been created using voice recognition software. Occasional wrong word or sound alike substitutions may have occurred due to the inherent limitation of the voice recognition software. Read the chart carefully and recognize, using context, where substitutions have occurred.     "

## 2024-11-21 NOTE — PLAN OF CARE
Problem: PHYSICAL THERAPY ADULT  Goal: Performs mobility at highest level of function for planned discharge setting.  See evaluation for individualized goals.  Description: Treatment/Interventions: ADL retraining, Functional transfer training, LE strengthening/ROM, Therapeutic exercise, Endurance training, Cognitive reorientation, Patient/family training, Equipment eval/education, Bed mobility, Gait training, Compensatory technique education, Spoke to nursing, Spoke to case management, Family          See flowsheet documentation for full assessment, interventions and recommendations.  Note:    Problem List: Decreased strength, Decreased endurance, Impaired balance, Decreased mobility, Decreased cognition, Impaired judgement, Decreased safety awareness  Assessment: Patient seen for Physical Therapy evaluation. Patient admitted with Acute respiratory failure (HCC).  Comorbidities affecting patient's physical performance include: RA, CKD 3, SIRS, generalized OA, hyperglycemia.  Personal factors affecting patient at time of initial evaluation include: lives in two story house, inability to ambulate household distances, inability to navigate community distances, inability to navigate level surfaces without external assistance, inability to perform dynamic tasks in community, and decreased cognition. Prior to admission, patient was independent with functional mobility with SPT and w/c mob -mod I, requiring assist for ADLS, requiring assist for IADLS, living with son and DIL in a two level home with no steps to enter, and lives in a multilevel house but has 1st floor setup.  Please find objective findings from Physical Therapy assessment regarding body systems outlined above with impairments and limitations including weakness, impaired balance, decreased endurance, impaired coordination, gait deviations, decreased activity tolerance, decreased functional mobility tolerance, decreased safety awareness, impaired judgement,  fall risk, and decreased cognition.  The Barthel Index was used as a functional outcome tool presenting with a score of Barthel Index Score: 35 today indicating marked limitations of functional mobility and ADLS.  Patient's clinical presentation is currently unstable/unpredictable as seen in patient's presentation of vital sign response, varying levels of cognitive performance, increased fall risk, new onset of impairment of functional mobility, decreased endurance, and new onset of weakness. Pt would benefit from continued Physical Therapy treatment to address deficits as defined above and maximize level of functional mobility. As demonstrated by objective findings, the assigned level of complexity for this evaluation is high.The patient's -[facility] Basic Mobility Inpatient Short Form Raw Score is 15. A Raw score of less than or equal to 16 suggests the patient may benefit from discharge to post-acute rehabilitation services. Please also refer to the recommendation of the Physical Therapist for safe discharge planning.        Rehab Resource Intensity Level, PT: III (Minimum Resource Intensity)    See flowsheet documentation for full assessment.

## 2024-11-21 NOTE — CASE MANAGEMENT
Case Management Assessment & Discharge Planning Note    Patient name Dennise Arnett  Location [address] [place] [address]-* MRN [id number]  : 1929 Date 2024       Current Admission Date: 2024  Current Admission Diagnosis:Acute respiratory failure (HCC)   Patient Active Problem List    Diagnosis Date Noted Date Diagnosed    SIRS (systemic inflammatory response syndrome) (HCC) 2024     Acute respiratory failure (HCC) 2024     Hyperglycemia 2024     Thrombocytopenia (HCC) 2024     Stage 3 chronic kidney disease, unspecified whether stage 3a or 3b CKD (HCC) 2024     Rheumatoid arthritis of multiple sites with negative rheumatoid factor (HCC) 2017     Generalized osteoarthritis 2012       LOS (days): 1  Geometric Mean LOS (GMLOS) (days): 2.3  Days to GMLOS:1.3     OBJECTIVE:    Risk of Unplanned Readmission Score: 12.26      Current admission status: Inpatient     Preferred Pharmacy:   [facility]  [place], [place] -  [employer]   [facility] [address]  Phone: [phone] Fax: [phone]    Primary Care Provider: Verna Ybarra MD    Primary Insurance: [employer]  Secondary Insurance:     ASSESSMENT:  Active Health Care Proxies    There are no active Health Care Proxies on file.    Readmission Root Cause  30 Day Readmission: No    Patient Information  Admitted from:: Home  Mental Status: Alert  During Assessment patient was accompanied by: Son  Assessment information provided by:: Son  Primary Caregiver: Family  Caregiver's Name:: Diaz Arnett (son)  Caregiver's Relationship to Patient:: Family Member  Caregiver's Telephone Number:: [phone]  Support Systems: Son, Daughter  County of Residence: [place]  What city do you live in?: [place], [place]  Home entry access options. Select all that apply.: Ramp  Type of Current Residence: 2 story home  Upon entering residence, is there a bedroom on the main floor (no  further steps)?: Yes  Upon entering residence, is there a bathroom on the main floor (no further steps)?: Yes  Living Arrangements: Lives w/ Son (and DIL)    Activities of Daily Living Prior to Admission  Functional Status: Assistance  Completes ADLs independently?: No  Level of ADL dependence: Assistance  Ambulates independently?: No  Level of ambulatory dependence: Assistance  Does patient use assisted devices?: Yes  Assisted Devices (DME) used: Wheelchair, Rollator, Shower Chair  Does patient currently own DME?: Yes  What DME does the patient currently own?: Rollator, Walker, Shower Chair  Does patient currently have HHC?: No     Patient Information Continued  Income Source: Pension/shelter  Does patient have prescription coverage?: Yes  Does patient receive dialysis treatments?: No     Means of Transportation  Means of Transport to [facility]:: Family transport    DISCHARGE DETAILS:    Discharge planning discussed with:: Patient, Son Diaz  Freedom of Choice: Yes  Comments - Freedom of Choice: SW met bedside with patient and son to introduce role, complete assessment, and discuss discharge planning needs. SW reviewed recommendation by PT for HHC services and a RW. Patient and son consented to referrals for both but did request to be notified of any OOP cost before moving forward. No choice for DME provider.  CM contacted family/caregiver?: Yes  Were Treatment Team discharge recommendations reviewed with patient/caregiver?: Yes  Did patient/caregiver verbalize understanding of patient care needs?: Yes  Were patient/caregiver advised of the risks associated with not following Treatment Team discharge recommendations?: Yes    Contacts  Patient Contacts: Diaz Arnett (son)  Relationship to Patient:: Family  Contact Method: In Person  Reason/Outcome: Continuity of Care, Emergency Contact, Discharge Planning     DME Referral Provided  Referral made for DME?: Yes  DME referral completed for the following items::  Nikos YANES Supplier Name:: [employer]    Other Referral/Resources/Interventions Provided:  Interventions: HHC, DME  Referral Comments: Order for RW submitted via [place]. Approval pending. [facility] referrals sent in [place].     Treatment Team Recommendation: Home with Home Health Care  Discharge Destination Plan:: Home with Home Health Care  Transport at Discharge : Family

## 2024-11-21 NOTE — UTILIZATION REVIEW
Initial Clinical Review    Admission: Date/Time/Statement:   Admission Orders (From admission, onward)       Ordered        11/20/24 1251  INPATIENT ADMISSION  Once                          Orders Placed This Encounter   Procedures    INPATIENT ADMISSION     Standing Status:   Standing     Number of Occurrences:   1     Level of Care:   Med Surg [16]     Estimated length of stay:   More than 2 Midnights     Certification:   I certify that inpatient services are medically necessary for this patient for a duration of greater than two midnights. See H&P and MD Progress Notes for additional information about the patient's course of treatment.     ED Arrival Information       Expected   -    Arrival   11/20/2024 10:50    Acuity   Emergent              Means of arrival   Ambulance    Escorted by   [facility]    Service   Hospitalist    Admission type   Emergency              Arrival complaint   SOB, dehydration             Chief Complaint   Patient presents with    Shortness of Breath     Pt c/o sob for a few days, with sore throat, decreased appetite, and weakness. Is on PO ABX but unsure why.        Initial Presentation:  89 yof to ER from home via EMS for persistent cough x 3 days with associated pleuritic chest pain. Hx rheumatoid arthritis, CKD, thrombocytopenia. Presents tachypneic, hypoxic in 80's, cough, decreased breath sounds. Admission CXR neg. CTA chest: Diffuse bronchial inflammation. No evidence of pneumonia. No evidence of acute pulmonary embolus. Labs: WBC 11.75, plt 92, na 133, gluc 170, procalcitonin 0.43, RSV+.  Admitted to inpatient status for acute respiratory failure 2nd RSV, O2 requirements @ 5ltr nc. Started on IVABT, IV steroids, nebs atc. Respiratory protocol, incentive spirometry, oscillatory positive expiratory pressure. Continuous pulse ox, titrate oxygen to maintain O2 sats 89% or greater    Anticipated Length of Stay/Certification Statement:   Patient will be admitted on an inpatient  basis with an anticipated length of stay of greater than 2 midnights secondary to acute respiratory failure.     Date: 11/21/24   Day 2:   Acute respiratory failure 2nd +RSV. Remains on IVABT, IV steroids, nebs atc. Lungs with decreased breath sounds bilaterally, upper airway congestion noted. Continuous pulse ox. Currently on 4.5L NC, titrate oxygen to maintain O2 sats 89% or greater. Patient reports she feels better than yesterday, states that she's having more of a cough with mucous. Reports decreased appetite and drinking but states that she will try to eat and drink water.         ED Treatment-Medication Administration from 11/20/2024 1050 to 11/20/2024 1359         Date/Time Order Dose Route Action     11/20/2024 1209 sodium chloride 0.9 % bolus 500 mL 500 mL Intravenous New Bag     11/20/2024 1348 cefepime (MAXIPIME) IVPB (premix in dextrose) 2,000 mg 50 mL 2,000 mg Intravenous New Bag            Scheduled Medications:  azithromycin, 500 mg, Oral, Q24H  budesonide-formoterol, 2 puff, Inhalation, BID  carvedilol, 3.125 mg, Oral, BID With Meals  cefTRIAXone, 1,000 mg, Intravenous, Q24H  folic acid, 1 mg, Oral, Daily  gabapentin, 400 mg, Oral, HS  guaiFENesin, 600 mg, Oral, BID  insulin lispro, 1-5 Units, Subcutaneous, 4x Daily (AC & HS)  ipratropium, 0.5 mg, Nebulization, TID  levalbuterol, 1.25 mg, Nebulization, TID  methylPREDNISolone sodium succinate, 40 mg, Intravenous, Q8H  pantoprazole, 40 mg, Oral, Early Morning      PRN Meds:  acetaminophen, 650 mg, Oral, Q6H PRN  albuterol, 2 puff, Inhalation, Q6H PRN  oxyCODONE-acetaminophen, 0.5 tablet, Oral, Q6H PRN      ED Triage Vitals   Temperature Pulse Respirations Blood Pressure SpO2 Pain Score   11/20/24 1059 11/20/24 1059 11/20/24 1059 11/20/24 1059 11/20/24 1059 11/20/24 1542   98.5 °F (36.9 °C) 78 (!) 32 161/68 (!) 84 % No Pain     Weight (last 2 days)       None            Vital Signs (last 3 days)       Date/Time Temp Pulse Resp BP MAP (mmHg) SpO2  Calculated FIO2 (%) - Nasal Cannula Nasal Cannula O2 Flow Rate (L/min) O2 Device Patient Position - Orthostatic VS Pain    11/21/24 1340 -- -- -- -- -- 96 % -- -- -- -- --    11/21/24 0940 -- -- -- -- -- -- -- -- -- -- No Pain    11/21/24 0811 97.8 °F (36.6 °C) 95 18 163/75 104 94 % 38 4.5 L/min Nasal cannula Sitting No Pain    11/21/24 08:10:42 97.8 °F (36.6 °C) 102 -- 163/75 104 95 % -- -- -- -- --    11/21/24 0746 -- 93 18 -- -- 96 % 40 5 L/min Nasal cannula -- --    11/21/24 02:53:04 97.1 °F (36.2 °C) 84 18 162/78 106 92 % -- -- -- -- --    11/20/24 22:24:20 97.8 °F (36.6 °C) 84 18 138/70 93 92 % -- -- -- -- --    11/20/24 2100 -- -- -- -- -- -- 40 5 L/min Nasal cannula -- No Pain    11/20/24 2017 -- 77 18 -- -- -- 40 5 L/min Nasal cannula -- --    11/20/24 18:28:48 -- 93 -- 141/67 92 95 % -- -- -- -- --    11/20/24 1700 -- -- -- -- -- -- 40 5 L/min -- -- --    11/20/24 1613 -- 73 -- 90/62 60 95 % -- -- -- -- --    11/20/24 15:42:25 98 °F (36.7 °C) 72 16 83/48 60 92 % -- -- -- -- No Pain    11/20/24 1418 -- 82 18 -- -- 96 % 36 4 L/min Nasal cannula -- --    11/20/24 14:13:36 97.8 °F (36.6 °C) 84 -- 131/96 108 95 % -- -- -- -- --    11/20/24 1345 98.1 °F (36.7 °C) 75 33 148/64 92 95 % 36 4 L/min Nasal cannula -- --    11/20/24 1300 -- 69 34 135/62 89 -- -- -- -- -- --    11/20/24 1230 -- 66 30 127/58 84 100 % 36 4 L/min Nasal cannula -- --    11/20/24 1200 -- 69 33 123/58 83 99 % -- -- -- -- --    11/20/24 1147 -- -- 30 -- -- 99 % 36 4 L/min Nasal cannula -- --    11/20/24 1144 -- 70 -- 132/60 87 99 % -- -- -- -- --    11/20/24 1059 98.5 °F (36.9 °C) 78 32 161/68 98 84 % -- -- None (Room air) Sitting --              Pertinent Labs/Diagnostic Test Results:   Radiology:  CTA chest pe study   Final Interpretation by Preston Rodriguez MD (11/20 1910)         1. Diffuse bronchial inflammation. No evidence of pneumonia.   2. No evidence of acute pulmonary embolus.                  Workstation performed: [id number]     "     XR chest 2 views   Final Interpretation by Haim Muller MD (11/20 1258)      No acute cardiopulmonary disease.            Workstation performed: [id number]           Cardiology:  No orders to display     GI:  No orders to display       Results from last 7 days   Lab Units 11/20/24  1446   SARS-COV-2  Not Detected     Results from last 7 days   Lab Units 11/21/24  0517 11/20/24  1349 11/20/24  1122   WBC Thousand/uL 6.23  --  11.75*   HEMOGLOBIN g/dL 13.7  --  14.4   I STAT HEMOGLOBIN g/dl  --  12.2  --    HEMATOCRIT % 42.8  --  44.7   HEMATOCRIT, ISTAT %  --  36  --    PLATELETS Thousands/uL 80*  --  92*   BANDS PCT %  --   --  2         Results from last 7 days   Lab Units 11/21/24  0517 11/20/24  1349 11/20/24  1122   SODIUM mmol/L 134*  --  133*   POTASSIUM mmol/L 3.8  --  3.7   CHLORIDE mmol/L 103  --  100   CO2 mmol/L 23  --  27   CO2, I-STAT mmol/L  --  29  --    ANION GAP mmol/L 8  --  6   BUN mg/dL 20  --  20   CREATININE mg/dL 0.91  --  0.86   EGFR ml/min/1.73sq m 54  --  58   CALCIUM mg/dL 9.5  --  9.6   CALCIUM, IONIZED, ISTAT mmol/L  --  1.33*  --    MAGNESIUM mg/dL 1.9  --   --      Results from last 7 days   Lab Units 11/20/24  1122   AST U/L 15   ALT U/L 7   ALK PHOS U/L 45   TOTAL PROTEIN g/dL 7.5   ALBUMIN g/dL 3.5   TOTAL BILIRUBIN mg/dL 0.68     Results from last 7 days   Lab Units 11/21/24  1133 11/21/24  0725 11/20/24  2146 11/20/24  2032   POC GLUCOSE mg/dl 238* 167* 185* 214*     Results from last 7 days   Lab Units 11/21/24  0517 11/20/24  1122   GLUCOSE RANDOM mg/dL 159* 170*         Results from last 7 days   Lab Units 11/21/24  0517   HEMOGLOBIN A1C % 6.4*   EAG mg/dl 137     No results found for: \"BETA-HYDROXYBUTYRATE\"           Results from last 7 days   Lab Units 11/20/24  1349   I STAT BASE EXC mmol/L 2   I STAT O2 SAT % 96*   ISTAT PH ART  7.372   I STAT ART PCO2 mm HG 47.0*   I STAT ART PO2 mm HG 85.0   I STAT ART HCO3 mmol/L 27.3                         Results from " last 7 days   Lab Units 11/21/24  0517 11/20/24  1122   PROCALCITONIN ng/ml 0.59* 0.43*     Results from last 7 days   Lab Units 11/20/24  1337   LACTIC ACID mmol/L 0.8                                                     Results from last 7 days   Lab Units 11/20/24  1446   INFLUENZA B  Not Detected   RESPIRATORY SYNCYTIAL VIRUS  Detected*     Results from last 7 days   Lab Units 11/20/24  1446   ADENOVIRUS  Not Detected   BORDETELLA PARAPERTUSSIS  Not Detected   BORDETELLA PERTUSSIS  Not Detected   CHLAMYDIA PNEUMONIAE  Not Detected   CORONAVIRUS 229E  Not Detected   CORONAVIRUS HKU1  Not Detected   CORONAVIRUS NL63  Not Detected   CORONAVIRUS OC43  Not Detected   METAPNEUMOVIRUS  Not Detected   RHINOVIRUS  Not Detected   MYCOPLASMA PNEUMONIAE  Not Detected   PARAINFLUENZA 1  Not Detected   PARAINFLUENZA 2  Not Detected   PARAINFLUENZA 3  Not Detected   PARAINFLUENZA 4  Not Detected                         Results from last 7 days   Lab Units 11/20/24  1337   BLOOD CULTURE  Received in Microbiology Lab. Culture in Progress.  Received in Microbiology Lab. Culture in Progress.                   Past Medical History:   Diagnosis Date    Arthritis     Carpal tunnel syndrome     Degeneration of lumbar intervertebral disc     Postmenopausal osteoporosis     Primary generalized (osteo)arthritis     Rheumatoid arthritis (HCC)     Right shoulder pain      Present on Admission:   Rheumatoid arthritis of multiple sites with negative rheumatoid factor (HCC)   Stage 3 chronic kidney disease, unspecified whether stage 3a or 3b CKD (HCC)   Thrombocytopenia (HCC)      Admitting Diagnosis: Shortness of breath [R06.02]  Dehydration [E86.0]  Pneumonia [J18.9]  Hypoxia [R09.02]  Age/Sex: 89 y.o. female    Network Utilization Review Department  ATTENTION: Please call with any questions or concerns to [phone] and carefully listen to the prompts so that you are directed to the right person. All voicemails are confidential.   For  Discharge needs, contact Care Management DC Support Team at [phone] opt. 2  Send all requests for admission clinical reviews, approved or denied determinations and any other requests to dedicated fax number below belonging to the campus where the patient is receiving treatment. List of dedicated fax numbers for the Facilities:  FACILITY NAME UR FAX NUMBER   ADMISSION DENIALS (Administrative/Medical Necessity) [phone]   DISCHARGE SUPPORT TEAM (NETWORK) [phone]   PARENT CHILD HEALTH (Maternity/NICU/Pediatrics) [phone]   [facility] [phone]   [facility] [phone]   [facility] [phone]   [facility] [phone]   [facility] [phone]   [facility] [phone]   [facility] [phone]   [facility] [phone]   [facility] [phone]   [facility] [phone]   [facility] [phone]   [facility] [phone]

## 2024-11-22 PROBLEM — E87.6 HYPOKALEMIA: Status: ACTIVE | Noted: 2024-11-22

## 2024-11-22 LAB
ANION GAP SERPL CALCULATED.3IONS-SCNC: 5 MMOL/L (ref 4–13)
BUN SERPL-MCNC: 24 MG/DL (ref 5–25)
CALCIUM SERPL-MCNC: 9.1 MG/DL (ref 8.4–10.2)
CHLORIDE SERPL-SCNC: 104 MMOL/L (ref 96–108)
CO2 SERPL-SCNC: 26 MMOL/L (ref 21–32)
CREAT SERPL-MCNC: 0.81 MG/DL (ref 0.6–1.3)
ERYTHROCYTE [DISTWIDTH] IN BLOOD BY AUTOMATED COUNT: 14.5 % (ref 11.6–15.1)
GFR SERPL CREATININE-BSD FRML MDRD: 62 ML/MIN/1.73SQ M
GLUCOSE SERPL-MCNC: 149 MG/DL (ref 65–140)
GLUCOSE SERPL-MCNC: 179 MG/DL (ref 65–140)
GLUCOSE SERPL-MCNC: 180 MG/DL (ref 65–140)
GLUCOSE SERPL-MCNC: 193 MG/DL (ref 65–140)
GLUCOSE SERPL-MCNC: 276 MG/DL (ref 65–140)
HCT VFR BLD AUTO: 38 % (ref 34.8–46.1)
HGB BLD-MCNC: 12.3 G/DL (ref 11.5–15.4)
MCH RBC QN AUTO: 30.9 PG (ref 26.8–34.3)
MCHC RBC AUTO-ENTMCNC: 32.4 G/DL (ref 31.4–37.4)
MCV RBC AUTO: 96 FL (ref 82–98)
PLATELET # BLD AUTO: 83 THOUSANDS/UL (ref 149–390)
PMV BLD AUTO: 12.1 FL (ref 8.9–12.7)
POTASSIUM SERPL-SCNC: 3.2 MMOL/L (ref 3.5–5.3)
PROCALCITONIN SERPL-MCNC: 0.49 NG/ML
RBC # BLD AUTO: 3.98 MILLION/UL (ref 3.81–5.12)
SODIUM SERPL-SCNC: 135 MMOL/L (ref 135–147)
WBC # BLD AUTO: 12.34 THOUSAND/UL (ref 4.31–10.16)

## 2024-11-22 PROCEDURE — 80048 BASIC METABOLIC PNL TOTAL CA: CPT | Performed by: INTERNAL MEDICINE

## 2024-11-22 PROCEDURE — 85027 COMPLETE CBC AUTOMATED: CPT | Performed by: INTERNAL MEDICINE

## 2024-11-22 PROCEDURE — 97167 OT EVAL HIGH COMPLEX 60 MIN: CPT

## 2024-11-22 PROCEDURE — 82948 REAGENT STRIP/BLOOD GLUCOSE: CPT

## 2024-11-22 PROCEDURE — 94640 AIRWAY INHALATION TREATMENT: CPT

## 2024-11-22 PROCEDURE — 99232 SBSQ HOSP IP/OBS MODERATE 35: CPT | Performed by: FAMILY MEDICINE

## 2024-11-22 PROCEDURE — 94760 N-INVAS EAR/PLS OXIMETRY 1: CPT

## 2024-11-22 PROCEDURE — 84145 PROCALCITONIN (PCT): CPT | Performed by: INTERNAL MEDICINE

## 2024-11-22 PROCEDURE — 94668 MNPJ CHEST WALL SBSQ: CPT

## 2024-11-22 RX ORDER — POTASSIUM CHLORIDE 1500 MG/1
40 TABLET, EXTENDED RELEASE ORAL ONCE
Status: COMPLETED | OUTPATIENT
Start: 2024-11-22 | End: 2024-11-22

## 2024-11-22 RX ORDER — METHYLPREDNISOLONE SODIUM SUCCINATE 40 MG/ML
40 INJECTION, POWDER, LYOPHILIZED, FOR SOLUTION INTRAMUSCULAR; INTRAVENOUS EVERY 12 HOURS SCHEDULED
Status: DISPENSED | OUTPATIENT
Start: 2024-11-22 | End: 2024-11-25

## 2024-11-22 RX ADMIN — METHYLPREDNISOLONE SODIUM SUCCINATE 40 MG: 40 INJECTION, POWDER, FOR SOLUTION INTRAMUSCULAR; INTRAVENOUS at 21:53

## 2024-11-22 RX ADMIN — FOLIC ACID 1 MG: 1 TABLET ORAL at 08:52

## 2024-11-22 RX ADMIN — LEVALBUTEROL HYDROCHLORIDE 1.25 MG: 1.25 SOLUTION RESPIRATORY (INHALATION) at 07:32

## 2024-11-22 RX ADMIN — CARVEDILOL 3.12 MG: 3.12 TABLET, FILM COATED ORAL at 09:00

## 2024-11-22 RX ADMIN — CEFTRIAXONE 1000 MG: 1 INJECTION, SOLUTION INTRAVENOUS at 08:51

## 2024-11-22 RX ADMIN — IPRATROPIUM BROMIDE 0.5 MG: 0.5 SOLUTION RESPIRATORY (INHALATION) at 13:28

## 2024-11-22 RX ADMIN — GUAIFENESIN 600 MG: 600 TABLET, EXTENDED RELEASE ORAL at 08:52

## 2024-11-22 RX ADMIN — IPRATROPIUM BROMIDE 0.5 MG: 0.5 SOLUTION RESPIRATORY (INHALATION) at 20:10

## 2024-11-22 RX ADMIN — INSULIN LISPRO 1 UNITS: 100 INJECTION, SOLUTION INTRAVENOUS; SUBCUTANEOUS at 08:52

## 2024-11-22 RX ADMIN — AZITHROMYCIN DIHYDRATE 500 MG: 250 TABLET ORAL at 12:45

## 2024-11-22 RX ADMIN — CARVEDILOL 3.12 MG: 3.12 TABLET, FILM COATED ORAL at 17:11

## 2024-11-22 RX ADMIN — INSULIN LISPRO 3 UNITS: 100 INJECTION, SOLUTION INTRAVENOUS; SUBCUTANEOUS at 12:40

## 2024-11-22 RX ADMIN — BUDESONIDE AND FORMOTEROL FUMARATE DIHYDRATE 2 PUFF: 160; 4.5 AEROSOL RESPIRATORY (INHALATION) at 09:01

## 2024-11-22 RX ADMIN — LEVALBUTEROL HYDROCHLORIDE 1.25 MG: 1.25 SOLUTION RESPIRATORY (INHALATION) at 20:10

## 2024-11-22 RX ADMIN — GABAPENTIN 400 MG: 400 CAPSULE ORAL at 21:54

## 2024-11-22 RX ADMIN — POTASSIUM CHLORIDE 40 MEQ: 1500 TABLET, EXTENDED RELEASE ORAL at 17:11

## 2024-11-22 RX ADMIN — LEVALBUTEROL HYDROCHLORIDE 1.25 MG: 1.25 SOLUTION RESPIRATORY (INHALATION) at 13:28

## 2024-11-22 RX ADMIN — BUDESONIDE AND FORMOTEROL FUMARATE DIHYDRATE 2 PUFF: 160; 4.5 AEROSOL RESPIRATORY (INHALATION) at 17:11

## 2024-11-22 RX ADMIN — IPRATROPIUM BROMIDE 0.5 MG: 0.5 SOLUTION RESPIRATORY (INHALATION) at 07:32

## 2024-11-22 RX ADMIN — GUAIFENESIN 600 MG: 600 TABLET, EXTENDED RELEASE ORAL at 17:11

## 2024-11-22 RX ADMIN — PANTOPRAZOLE SODIUM 40 MG: 40 TABLET, DELAYED RELEASE ORAL at 06:29

## 2024-11-22 RX ADMIN — METHYLPREDNISOLONE SODIUM SUCCINATE 40 MG: 40 INJECTION, POWDER, FOR SOLUTION INTRAMUSCULAR; INTRAVENOUS at 06:29

## 2024-11-22 RX ADMIN — INSULIN LISPRO 1 UNITS: 100 INJECTION, SOLUTION INTRAVENOUS; SUBCUTANEOUS at 21:54

## 2024-11-22 NOTE — OCCUPATIONAL THERAPY NOTE
OT EVALUATION       11/22/24 1058   OT Last Visit   OT Visit Date 11/22/24   Note Type   Note type Evaluation   Pain Assessment   Pain Assessment Tool 0-10   Pain Score No Pain   Restrictions/Precautions   Other Precautions Chair Alarm;Bed Alarm;Fall Risk;Contact/isolation;Droplet precautions;O2   Home Living   Type of Home House   Home Layout Two level;Able to live on main level with bedroom/bathroom;Ramped entrance  (patient stays on 1st floor with full bathroom)   Bathroom Shower/Tub Walk-in shower   Bathroom Toilet Standard   Bathroom Equipment Shower chair;Grab bars around toilet   Home Equipment Wheelchair-manual  (rollator)   Prior Function   Level of [place] Needs assistance with ADLs;Needs assistance with IADLS;Modified independent with wheelchair  (assist for dressing and bathing from daughter in law)   Lives With Son  (daughter in law)   Receives Help From Family   IADLs Family/Friend/Other provides medication management;Family/Friend/Other provides meals;Family/Friend/Other provides transportation   Comments Patient reports nonambulatory for almost 1 year.  Patient is independent in the wheelchair throughout the home, can self propel.  Stand pivot transfers at baseline.  Assist up/down the ramp when in/out of the house.   Lifestyle   Reciprocal Relationships daughter in law present for session   ADL   Eating Assistance 5  Supervision/Setup   Grooming Assistance 5  Supervision/Setup   UB Bathing Assistance 4  Minimal Assistance   LB Bathing Assistance 3  Moderate Assistance   UB Dressing Assistance 4  Minimal Assistance   LB Dressing Assistance 3  Moderate Assistance   Toileting Assistance  3  Moderate Assistance   Transfers   Sit to Stand 4  Minimal assistance   Stand to Sit 4  Minimal assistance   Stand pivot 4  Minimal assistance   Additional items   (bed to chair)   Balance   Static Sitting Fair   Dynamic Sitting Fair   Static Standing Fair -   Dynamic Standing Fair -   Activity Tolerance    Activity Tolerance Patient limited by fatigue   RUE Assessment   RUE Assessment WFL   LUE Assessment   LUE Assessment WFL   Cognition   Overall Cognitive Status WFL   Arousal/Participation Cooperative   Attention Attends with cues to redirect   Orientation Level Oriented to person;Oriented to place;Oriented to situation   Following Commands Follows one step commands with increased time or repetition   Assessment   Limitation Decreased ADL status;Decreased UE strength;Decreased Safe judgement during ADL;Decreased endurance;Decreased high-level ADLs;Decreased self-care trans  (decreased balance and mobility)   Prognosis Good   Assessment Patient evaluated by Occupational Therapy.  Patient admitted with Acute respiratory failure (HCC).  The patients occupational profile, medical and therapy history includes a extensive additional review of physical, cognitive, or psychosocial history related to current functional performance.  Comorbidities affecting functional mobility and ADLS include: arthritis.  Prior to admission, patient was requiring assist for ADLS and requiring assist for IADLS.  The evaluation identifies the following performance deficits: weakness, impaired balance, decreased endurance, increased fall risk, new onset of impairment of functional mobility, decreased ADLS, decreased IADLS, decreased activity tolerance, decreased safety awareness, impaired judgement, and decreased strength, that result in activity limitations and/or participation restrictions. This evaluation requires clinical decision making of high complexity, because the patient presents with comorbidites that affect occupational performance and required significant modification of tasks or assistance with consideration of multiple treatment options.  The Barthel Index was used as a functional outcome tool presenting with a score of Barthel Index Score: 40, indicating marked limitations of functional mobility and ADLS.  The patient's raw  score on the AM-PAC Daily Activity Inpatient Short Form is 16. A raw score of less than 19 suggests the patient may benefit from discharge to post-acute rehabilitation services, however pt requires assist at baseline for ADLS therefore recommend home with family assist for ADLS. Please refer to the recommendation of the Occupational Therapist for safe discharge planning.  Patient will benefit from skilled Occupational Therapy services to address above deficits and facilitate a safe return to prior level of function.   Goals   Patient Goals to feel better and go home   STG Time Frame   (1-7 days)   Short Term Goal  Goals established to promote Patient Goals: to feel better and go home:  Eating: independent; Grooming: independent seated; Bathing: min assist; Upper Body Dressing supervision; Lower Body Dressing: min assist; Toileting: min assist; Patient will increase stand pivot commode transfer to supervision with least restrictive assistive device to increase performance and safety with ADLS and functional mobility; Patient will increase standing tolerance to 2 minutes during ADL task to decrease assistance level and decrease fall risk; Patient will increase bed mobility to supervision in preparation for ADLS and transfers; Patient will tolerate 5 minutes of UE ROM/strengthening to increase general activity tolerance and performance in ADLS/IADLS; Patient will improve functional activity tolerance to 10 minutes of sustained functional tasks to increase participation in basic self-care and decrease assistance level;  Patient will be able to to verbalize understanding and perform energy conservation/proper body mechanics during ADLS and functional mobility at least 75% of the time with minimal cueing to decrease signs of fatigue and increase stamina to return to prior level of function; Patient will increase dynamic sitting balance to fair+ to improve the ability to sit at edge of bed or on a chair for ADLS;  Patient  will increase dynamic standing balance to fair to improve postural stability and decrease fall risk during standing ADLS and transfers.   LTG Time Frame   (8-14 days)   Long Term Goal Bathing: supervision; Upper Body Dressing independent; Lower Body Dressing: supervision; Toileting: supervision; Patient will increase stand pivot commode transfer to independent with least restrictive assistive device to increase performance and safety with ADLS and functional mobility; Patient will increase standing tolerance to 4 minutes during ADL task to decrease assistance level and decrease fall risk; Patient will increase bed mobility to independent in preparation for ADLS and transfers; Patient will tolerate 10 minutes of UE ROM/strengthening to increase general activity tolerance and performance in ADLS/IADLS; Patient will improve functional activity tolerance to 20 minutes of sustained functional tasks to increase participation in basic self-care and decrease assistance level;  Patient will be able to to verbalize understanding and perform energy conservation/proper body mechanics during ADLS and functional mobility at least 90% of the time to decrease signs of fatigue and increase stamina to return to prior level of function; Patient will increase dynamic sitting balance to good to improve the ability to sit at edge of bed or on a chair for ADLS;  Patient will increase dynamic standing balance to fair+ to improve postural stability and decrease fall risk during standing ADLS and transfers.   Pt will score >/= 20/24 on AM-PAC Daily Activity Inpatient scale to promote safe independence with ADLs and functional mobility; Pt will score >/= 70/100 on Barthel Index in order to decrease caregiver assistance needed and increase ability to perform ADLs and functional mobility.   Plan   Treatment Interventions ADL retraining;Functional transfer training;UE strengthening/ROM;Endurance training;Patient/family training;Equipment  evaluation/education;Activityengagement;Compensatory technique education   Goal Expiration Date 12/06/24   OT Frequency 3-5x/wk   Discharge Recommendation   Rehab Resource Intensity Level, OT III (Minimum Resource Intensity)   AM-PAC Daily Activity Inpatient   Lower Body Dressing 2   Bathing 2   Toileting 2   Upper Body Dressing 3   Grooming 3   Eating 4   Daily Activity Raw Score 16   Daily Activity Standardized Score (Calc for Raw Score >=11) 35.96   AM-PAC Applied Cognition Inpatient   Following a Speech/Presentation 3   Understanding Ordinary Conversation 4   Taking Medications 3   Remembering Where Things Are Placed or Put Away 3   Remembering List of 4-5 Errands 3   Taking Care of Complicated Tasks 2   Applied Cognition Raw Score 18   Applied Cognition Standardized Score 38.07   Barthel Index   Feeding 10   Bathing 0   Grooming Score 0   Dressing Score 5   Bladder Score 5   Bowels Score 5   Toilet Use Score 5   Transfers (Bed/Chair) Score 10   Mobility (Level Surface) Score 0   Stairs Score 0   Barthel Index Score 40   Licensure   [place] License Number  Alize Medina MS OTR/L [id number]

## 2024-11-22 NOTE — PROGRESS NOTES
Progress Note - Hospitalist   Name: Dennise Arnett 89 y.o. female I MRN: [id number]  Unit/Bed#: [address] [place] [address] I Date of Admission: 11/20/2024   Date of Service: 11/22/2024 I Hospital Day: 2     Assessment & Plan  Acute respiratory failure (HCC)  Patient presents to the ED for ongoing cough.  Reports that cough has been persistent over the last 3 days.  Denies any associated shortness of breath.  Reports some pleuritic chest pain with coughing, but is now resolved since arriving to the ED.  Denies any associated fevers/chills, recent sick contacts, nausea, vomiting, diarrhea, urinary complaints.  CXR (wet read) negative for acute cardiopulmonary process  CTA chest PE study with diffuse bronchial inflammation. No evidence of pneumonia. No evidence of acute pulmonary embolus  COVID/FLU negative  RP2 positive for RSV  Patient with 30 year smoking history. Likely in the setting of tracheobronchitis, undiagnosed COPD, viral illness  IV solumedrol 40mg TID  Xoponex/atrovent nebulizers  Ceftriaxone and azithromycin, follow infectious work up  Respiratory protocol, incentive spirometry, oscillatory positive expiratory pressure  Continuous pulse ox. Currently on 4.5L NC, titrate oxygen to maintain O2 sats 89% or greater  Ambulatory pulse ox prior to discharge    11/22 -overall improving.  Patient found to be RSV positive.  Decrease IV Solu-Medrol to 40 mg every 12 hours.  Patient currently on 3 L of oxygen via nasal cannula.  Continue supplemental oxygen therapy as needed and wean as tolerated.  Patient will need an ambulatory walk test prior to discharge.  SIRS (systemic inflammatory response syndrome) ([facility])  POA. Meeting SIRS criteria for tachypnea and leukocytosis  CXR without acute cardiopulmonary disease  Lactic acid WNL  Procalcitonin trends: 0.43 -> 0.59  COVID/FLU negative  RP2 positive for RSV  Follow up BC x 2  Continue IV ceftriaxone (D1) and azithromycin (D2). Will discontinue ceftriaxone if BC come back  negative  Trend WBC and fever curve  Rheumatoid arthritis of multiple sites with negative rheumatoid factor (HCC)  Follows outpatient with Rheumatology  Continue gabapentin 400mg HS  PRN percocet 5-325mg 0.5mg tablet Q6H PRN  Prednisone 2.5mg daily, on hold while receiving IV solumedrol  Stage 3 chronic kidney disease, unspecified whether stage 3a or 3b CKD ([facility])  Lab Results   Component Value Date    EGFR 62 11/22/2024    EGFR 54 11/21/2024    EGFR 58 11/20/2024    CREATININE 0.81 11/22/2024    CREATININE 0.91 11/21/2024    CREATININE 0.86 11/20/2024   Creatinine stable at baseline  Avoid hypotension and nephrotoxic agents  Monitor vitals per routine  Thrombocytopenia ([facility])  Per chart review, chronic  Platelet count 80  Likely exacerbation by SIRS. No signs/symptoms of bleeding  Monitor CBC daily  Hold off on DVT prophylaxis for now    11/22 - Platelet count 83. Will continue to monitor.  Hyperglycemia   on admission labs  No history of diabetes  Likely in the setting of chronic steroid use  HA1C pending  SSI with accu checks  Hypoglycemia protocol    11/22 - Hemoglobin A1c 6.4 consistent with prediabetes.  Hypokalemia  Potassium 3.2 which will be repleted   VTE Pharmacologic Prophylaxis: VTE Score: 8 SCD's.    Mobility:   Basic Mobility Inpatient Raw Score: 15  -[facility] Goal: 4: Move to chair/commode  -[facility] Achieved: 5: Stand (1 or more minutes)    Patient Centered Rounds: I performed bedside rounds with nursing staff today.     Education and Discussions with Family / Patient: Discussed with son at bedside.    Current Length of Stay: 2 day(s)  Current Patient Status: Inpatient   Certification Statement: The patient will continue to require additional inpatient hospital stay due to above.  Discharge Plan: Home.    Code Status: Level 1 - Full Code    Subjective   Patient seen and examined at bedside.  No acute events overnight.  Patient reports feeling less short of breath.  No chest pain.    Objective :  Temp:   [97.6 °F (36.4 °C)-97.8 °F (36.6 °C)] 97.6 °F (36.4 °C)  HR:  [] 85  BP: (132-156)/(61-75) 132/72  Resp:  [16-22] 22  SpO2:  [94 %-96 %] 94 %  O2 Device: Nasal cannula  Nasal Cannula O2 Flow Rate (L/min):  [3 L/min-3.5 L/min] 3 L/min    Body mass index is 24.8 kg/m².     Input and Output Summary (last 24 hours):   No intake or output data in the 24 hours ending 11/22/24 1507    Physical Exam  HENT:      Head: Normocephalic.      Mouth/Throat:      Mouth: Mucous membranes are moist.   Cardiovascular:      Rate and Rhythm: Normal rate.   Pulmonary:      Effort: Pulmonary effort is normal. No respiratory distress.      Comments: Decreased in the bases.  Abdominal:      Palpations: Abdomen is soft.      Tenderness: There is no abdominal tenderness.   Skin:     General: Skin is warm.   Neurological:      Mental Status: She is alert.   Psychiatric:         Mood and Affect: Mood normal.         Lab Results: I have reviewed the following results:   Results from last 7 days   Lab Units 11/22/24 0458 11/20/24  1349 11/20/24  1122   WBC Thousand/uL 12.34*   < > 11.75*   HEMOGLOBIN g/dL 12.3   < > 14.4   I STAT HEMOGLOBIN   --    < >  --    HEMATOCRIT % 38.0   < > 44.7   HEMATOCRIT, ISTAT   --    < >  --    PLATELETS Thousands/uL 83*   < > 92*   BANDS PCT %  --   --  2   LYMPHO PCT %  --   --  7*   MONO PCT %  --   --  16*   EOS PCT %  --   --  0    < > = values in this interval not displayed.     Results from last 7 days   Lab Units 11/22/24  0458 11/20/24  1349 11/20/24  1122   SODIUM mmol/L 135   < > 133*   POTASSIUM mmol/L 3.2*   < > 3.7   CHLORIDE mmol/L 104   < > 100   CO2 mmol/L 26   < > 27   CO2, I-STAT   --    < >  --    BUN mg/dL 24   < > 20   CREATININE mg/dL 0.81   < > 0.86   ANION GAP mmol/L 5   < > 6   CALCIUM mg/dL 9.1   < > 9.6   ALBUMIN g/dL  --   --  3.5   TOTAL BILIRUBIN mg/dL  --   --  0.68   ALK PHOS U/L  --   --  45   ALT U/L  --   --  7   AST U/L  --   --  15   GLUCOSE RANDOM mg/dL 193*   < > 170*     < > = values in this interval not displayed.     Results from last 7 days   Lab Units 11/22/24  1132 11/22/24  0713 11/21/24 2018 11/21/24  1616 11/21/24  1133 11/21/24  0725 11/20/24  2146 11/20/24  2032   POC GLUCOSE mg/dl 276* 180* 193* 210* 238* 167* 185* 214*     Results from last 7 days   Lab Units 11/21/24  0517   HEMOGLOBIN A1C % 6.4*     Results from last 7 days   Lab Units 11/22/24  0458 11/21/24  0517 11/20/24  1337 11/20/24  1122   LACTIC ACID mmol/L  --   --  0.8  --    PROCALCITONIN ng/ml 0.49* 0.59*  --  0.43*     Recent Cultures (last 7 days):   Results from last 7 days   Lab Units 11/20/24  1337   BLOOD CULTURE  No Growth at 24 hrs.  No Growth at 24 hrs.     Last 24 Hours Medication List:     Current Facility-Administered Medications:     acetaminophen (TYLENOL) tablet 650 mg, Q6H PRN    albuterol (PROVENTIL HFA,VENTOLIN HFA) inhaler 2 puff, Q6H PRN    budesonide-formoterol (SYMBICORT) 160-4.5 mcg/act inhaler 2 puff, BID    carvedilol (COREG) tablet 3.125 mg, BID With Meals    folic acid (FOLVITE) tablet 1 mg, Daily    gabapentin (NEURONTIN) capsule 400 mg, HS    guaiFENesin (MUCINEX) 12 hr tablet 600 mg, BID    insulin lispro (HumALOG/ADMELOG) 100 units/mL subcutaneous injection 1-5 Units, 4x Daily (AC & HS) **AND** Fingerstick Glucose (POCT), 4x Daily AC and at bedtime    ipratropium (ATROVENT) 0.02 % inhalation solution 0.5 mg, TID    levalbuterol (XOPENEX) inhalation solution 1.25 mg, TID **AND** [DISCONTINUED] sodium chloride 0.9 % inhalation solution 3 mL, TID    methylPREDNISolone sodium succinate (Solu-MEDROL) injection 40 mg, Q12H LISSETH    oxyCODONE-acetaminophen (PERCOCET) 5-325 mg per tablet 0.5 tablet, Q6H PRN    pantoprazole (PROTONIX) EC tablet 40 mg, Early Morning    potassium chloride (Klor-Con M20) CR tablet 40 mEq, Once    Administrative Statements   Today, Patient Was Seen By: Georges Ding MD    **Please Note: This note may have been constructed using a voice  recognition system.**

## 2024-11-22 NOTE — PHYSICAL THERAPY NOTE
PT Cancellation   11/22/24 1556   PT Last Visit   PT Visit Date 11/22/24   Note Type   Note Type Cancelled Session   Cancel Reasons Refusal  (Pt just got back to bed after family left.  No exhausted and wants to sleep.  Could not encourage pt to participate with in bed exercises or sit at EOB.  Will follow.)   Licensure   [place] License Number  Malinda Suarez PT  [id number]

## 2024-11-22 NOTE — ASSESSMENT & PLAN NOTE
Per chart review, chronic  Platelet count 80  Likely exacerbation by SIRS. No signs/symptoms of bleeding  Monitor CBC daily  Hold off on DVT prophylaxis for now    11/22 - Platelet count 83. Will continue to monitor.

## 2024-11-22 NOTE — UTILIZATION REVIEW
SEE INITIAL REVIEW AT BOTTOM  Date: 11/22/2024  Day 3: Has surpassed a 2nd midnight with active treatments and services.  Presents with persistent cough w pleuritic chest pain w coughing,   On exam Currently weaning NC on 3.5 L NC- no O2 @ baseline  Diagnosis/Plan      Acute hypoxic respiratory failure   On NC weaning on 3.5 L NC, non productive cough, WASSERMAN; scheduled nebs, IV Steroids, IV Rocephin  Suspected underlying COPD with likely exacerbation / 30-year history of smoking   Ambulatory pulse ox prior to discharge   Hyperglycemia  SSI  Thrombocytopenia  Daily CBC  RA  Continue gabapentin 400mg HS  PRN percocet 5-325mg 0.5mg tablet Q6H PRN  Prednisone 2.5mg daily, on hold while receiving IV solumedrol  IP CM DC coordination   Home with Home Health Care     Medications:   Scheduled Medications:  budesonide-formoterol, 2 puff, Inhalation, BID  carvedilol, 3.125 mg, Oral, BID With Meals  folic acid, 1 mg, Oral, Daily  gabapentin, 400 mg, Oral, HS  guaiFENesin, 600 mg, Oral, BID  insulin lispro, 1-5 Units, Subcutaneous, 4x Daily (AC & HS)  ipratropium, 0.5 mg, Nebulization, TID  levalbuterol, 1.25 mg, Nebulization, TID  methylPREDNISolone sodium succinate, 40 mg, Intravenous, Q12H LISSETH  pantoprazole, 40 mg, Oral, Early Morning  11/22 x1 IV =  cefTRIAXone (ROCEPHIN) IVPB (premix in dextrose) 1,000 mg 50 mL  Dose: 1,000 mg  Freq: Every 24 hours Route: IV  Last Dose: 1,000 mg (11/22/24 0851)  Start: 11/21/24 0800 End: 11/22/24 1026  Continuous IV Infusions:     PRN Meds:  acetaminophen, 650 mg, Oral, Q6H PRN  albuterol, 2 puff, Inhalation, Q6H PRN  oxyCODONE-acetaminophen, 0.5 tablet, Oral, Q6H PRN      Discharge Plan: TBD    Vital Signs (last 3 days)       Date/Time Temp Pulse Resp BP MAP (mmHg) SpO2 Calculated FIO2 (%) - Nasal Cannula Nasal Cannula O2 Flow Rate (L/min) O2 Device Patient Position - Orthostatic VS Pain    11/22/24 1328 -- -- -- -- -- 94 % 32 3 L/min Nasal cannula -- --    11/22/24 1058 -- -- -- -- -- --  -- -- -- -- No Pain    11/22/24 09:00:17 97.6 °F (36.4 °C) 85 22 132/72 92 94 % -- -- -- -- --    11/22/24 0900 -- 88 -- 132/72 -- -- -- -- -- -- --    11/22/24 0736 -- -- -- -- -- 94 % 34 3.5 L/min Nasal cannula -- --    11/21/24 2226 97.8 °F (36.6 °C) 90 16 147/61 90 96 % 34 3.5 L/min Nasal cannula Lying No Pain    11/21/24 1949 -- -- -- -- -- -- 34 3.5 L/min Nasal cannula -- --    11/21/24 16:04:26 97.7 °F (36.5 °C) 107 -- 156/75 102 94 % -- -- -- -- --    11/21/24 1340 -- -- -- -- -- 96 % -- -- -- -- --    11/21/24 0940 -- -- -- -- -- -- -- -- -- -- No Pain    11/21/24 0811 97.8 °F (36.6 °C) 95 18 163/75 104 94 % 38 4.5 L/min Nasal cannula Sitting No Pain    11/21/24 08:10:42 97.8 °F (36.6 °C) 102 -- 163/75 104 95 % -- -- -- -- --    11/21/24 0746 -- 93 18 -- -- 96 % 40 5 L/min Nasal cannula -- --    11/21/24 02:53:04 97.1 °F (36.2 °C) 84 18 162/78 106 92 % -- -- -- -- --    11/20/24 22:24:20 97.8 °F (36.6 °C) 84 18 138/70 93 92 % -- -- -- -- --    11/20/24 2100 -- -- -- -- -- -- 40 5 L/min Nasal cannula -- No Pain    11/20/24 2017 -- 77 18 -- -- -- 40 5 L/min Nasal cannula -- --    11/20/24 18:28:48 -- 93 -- 141/67 92 95 % -- -- -- -- --    11/20/24 1700 -- -- -- -- -- -- 40 5 L/min -- -- --    11/20/24 1613 -- 73 -- 90/62 60 95 % -- -- -- -- --    11/20/24 15:42:25 98 °F (36.7 °C) 72 16 83/48 60 92 % -- -- -- -- No Pain    11/20/24 1418 -- 82 18 -- -- 96 % 36 4 L/min Nasal cannula -- --    11/20/24 14:13:36 97.8 °F (36.6 °C) 84 -- 131/96 108 95 % -- -- -- -- --    11/20/24 1345 98.1 °F (36.7 °C) 75 33 148/64 92 95 % 36 4 L/min Nasal cannula -- --    11/20/24 1300 -- 69 34 135/62 89 -- -- -- -- -- --    11/20/24 1230 -- 66 30 127/58 84 100 % 36 4 L/min Nasal cannula -- --    11/20/24 1200 -- 69 33 123/58 83 99 % -- -- -- -- --    11/20/24 1147 -- -- 30 -- -- 99 % 36 4 L/min Nasal cannula -- --    11/20/24 1144 -- 70 -- 132/60 87 99 % -- -- -- -- --    11/20/24 1059 98.5 °F (36.9 °C) 78 32 161/68 98 84 % -- --  None (Room air) Sitting --          Weight (last 2 days)       None            Pertinent Labs/Diagnostic Results:   Radiology:  CTA chest pe study   Final Interpretation by Preston Rodriguez MD (11/20 1910)         1. Diffuse bronchial inflammation. No evidence of pneumonia.   2. No evidence of acute pulmonary embolus.                  Workstation performed: [id number]         XR chest 2 views   Final Interpretation by Haim Muller MD (11/20 1258)      No acute cardiopulmonary disease.            Workstation performed: [id number]           Cardiology:  No orders to display     GI:  No orders to display       Results from last 7 days   Lab Units 11/20/24  1446   SARS-COV-2  Not Detected     Results from last 7 days   Lab Units 11/22/24  0458 11/21/24  0517 11/20/24  1349 11/20/24  1122   WBC Thousand/uL 12.34* 6.23  --  11.75*   HEMOGLOBIN g/dL 12.3 13.7  --  14.4   I STAT HEMOGLOBIN g/dl  --   --  12.2  --    HEMATOCRIT % 38.0 42.8  --  44.7   HEMATOCRIT, ISTAT %  --   --  36  --    PLATELETS Thousands/uL 83* 80*  --  92*   BANDS PCT %  --   --   --  2         Results from last 7 days   Lab Units 11/22/24  0458 11/21/24  0517 11/20/24  1349 11/20/24  1122   SODIUM mmol/L 135 134*  --  133*   POTASSIUM mmol/L 3.2* 3.8  --  3.7   CHLORIDE mmol/L 104 103  --  100   CO2 mmol/L 26 23  --  27   CO2, I-STAT mmol/L  --   --  29  --    ANION GAP mmol/L 5 8  --  6   BUN mg/dL 24 20  --  20   CREATININE mg/dL 0.81 0.91  --  0.86   EGFR ml/min/1.73sq m 62 54  --  58   CALCIUM mg/dL 9.1 9.5  --  9.6   CALCIUM, IONIZED, ISTAT mmol/L  --   --  1.33*  --    MAGNESIUM mg/dL  --  1.9  --   --      Results from last 7 days   Lab Units 11/20/24  1122   AST U/L 15   ALT U/L 7   ALK PHOS U/L 45   TOTAL PROTEIN g/dL 7.5   ALBUMIN g/dL 3.5   TOTAL BILIRUBIN mg/dL 0.68     Results from last 7 days   Lab Units 11/22/24  1132 11/22/24  0713 11/21/24 2018 11/21/24  1616 11/21/24  1133 11/21/24  0725 11/20/24 2146 11/20/24 2032  "  POC GLUCOSE mg/dl 276* 180* 193* 210* 238* 167* 185* 214*     Results from last 7 days   Lab Units 11/22/24  0458 11/21/24  0517 11/20/24  1122   GLUCOSE RANDOM mg/dL 193* 159* 170*         Results from last 7 days   Lab Units 11/21/24  0517   HEMOGLOBIN A1C % 6.4*   EAG mg/dl 137     No results found for: \"BETA-HYDROXYBUTYRATE\"           Results from last 7 days   Lab Units 11/20/24  1349   I STAT BASE EXC mmol/L 2   I STAT O2 SAT % 96*   ISTAT PH ART  7.372   I STAT ART PCO2 mm HG 47.0*   I STAT ART PO2 mm HG 85.0   I STAT ART HCO3 mmol/L 27.3                         Results from last 7 days   Lab Units 11/22/24  0458 11/21/24  0517 11/20/24  1122   PROCALCITONIN ng/ml 0.49* 0.59* 0.43*     Results from last 7 days   Lab Units 11/20/24  1337   LACTIC ACID mmol/L 0.8                                                     Results from last 7 days   Lab Units 11/20/24  1446   INFLUENZA B  Not Detected   RESPIRATORY SYNCYTIAL VIRUS  Detected*     Results from last 7 days   Lab Units 11/20/24  1446   ADENOVIRUS  Not Detected   BORDETELLA PARAPERTUSSIS  Not Detected   BORDETELLA PERTUSSIS  Not Detected   CHLAMYDIA PNEUMONIAE  Not Detected   CORONAVIRUS 229E  Not Detected   CORONAVIRUS HKU1  Not Detected   CORONAVIRUS NL63  Not Detected   CORONAVIRUS OC43  Not Detected   METAPNEUMOVIRUS  Not Detected   RHINOVIRUS  Not Detected   MYCOPLASMA PNEUMONIAE  Not Detected   PARAINFLUENZA 1  Not Detected   PARAINFLUENZA 2  Not Detected   PARAINFLUENZA 3  Not Detected   PARAINFLUENZA 4  Not Detected                         Results from last 7 days   Lab Units 11/20/24  1337   BLOOD CULTURE  No Growth at 24 hrs.  No Growth at 24 hrs.                   Network Utilization Review Department  ATTENTION: Please call with any questions or concerns to [phone] and carefully listen to the prompts so that you are directed to the right person. All voicemails are confidential.   For Discharge needs, contact Care Management DC Support " Team at [phone] opt. 2  Send all requests for admission clinical reviews, approved or denied determinations and any other requests to dedicated fax number below belonging to the campus where the patient is receiving treatment. List of dedicated fax numbers for the Facilities:  FACILITY NAME UR FAX NUMBER   ADMISSION DENIALS (Administrative/Medical Necessity) [phone]   DISCHARGE SUPPORT TEAM (NETWORK) [phone]   PARENT CHILD HEALTH (Maternity/NICU/Pediatrics) [phone]   General acute hospital [phone]   [facility] [phone]   [facility] [phone]   [facility] [phone]   [facility] [phone]   [facility] [phone]   [facility] [phone]   [facility] [phone]   [facility] [phone]   [facility] [phone]   [facility] [phone]   [facility] [phone]

## 2024-11-22 NOTE — ASSESSMENT & PLAN NOTE
on admission labs  No history of diabetes  Likely in the setting of chronic steroid use  HA1C pending  SSI with accu checks  Hypoglycemia protocol    11/22 - Hemoglobin A1c 6.4 consistent with prediabetes.

## 2024-11-22 NOTE — ASSESSMENT & PLAN NOTE
Patient presents to the ED for ongoing cough.  Reports that cough has been persistent over the last 3 days.  Denies any associated shortness of breath.  Reports some pleuritic chest pain with coughing, but is now resolved since arriving to the ED.  Denies any associated fevers/chills, recent sick contacts, nausea, vomiting, diarrhea, urinary complaints.  CXR (wet read) negative for acute cardiopulmonary process  CTA chest PE study with diffuse bronchial inflammation. No evidence of pneumonia. No evidence of acute pulmonary embolus  COVID/FLU negative  RP2 positive for RSV  Patient with 30 year smoking history. Likely in the setting of tracheobronchitis, undiagnosed COPD, viral illness  IV solumedrol 40mg TID  Xoponex/atrovent nebulizers  Ceftriaxone and azithromycin, follow infectious work up  Respiratory protocol, incentive spirometry, oscillatory positive expiratory pressure  Continuous pulse ox. Currently on 4.5L NC, titrate oxygen to maintain O2 sats 89% or greater  Ambulatory pulse ox prior to discharge    11/22 -overall improving.  Patient found to be RSV positive.  Decrease IV Solu-Medrol to 40 mg every 12 hours.  Patient currently on 3 L of oxygen via nasal cannula.  Continue supplemental oxygen therapy as needed and wean as tolerated.  Patient will need an ambulatory walk test prior to discharge.

## 2024-11-22 NOTE — CASE MANAGEMENT
Case Management Discharge Planning Note    Patient name Dennise Arnett  Location [address] [place] [address]/[address] [place] [address]-* MRN [id number]  : 1929 Date 2024       Current Admission Date: 2024  Current Admission Diagnosis:Acute respiratory failure (HCC)   Patient Active Problem List    Diagnosis Date Noted Date Diagnosed    SIRS (systemic inflammatory response syndrome) (HCC) 2024     Acute respiratory failure (HCC) 2024     Hyperglycemia 2024     Thrombocytopenia (HCC) 2024     Stage 3 chronic kidney disease, unspecified whether stage 3a or 3b CKD (HCC) 2024     Rheumatoid arthritis of multiple sites with negative rheumatoid factor (HCC) 2017     Generalized osteoarthritis 2012       LOS (days): 2  Geometric Mean LOS (GMLOS) (days): 2.3  Days to GMLOS:0.3     OBJECTIVE:  Risk of Unplanned Readmission Score: 11.66         Current admission status: Inpatient   Preferred Pharmacy:   [facility] - [place], [place] -  [address]   [place] [address]  Phone: [phone] Fax: [phone]    Primary Care Provider: Verna Ybarra MD    Primary Insurance: [employer] REP  Secondary Insurance:     DISCHARGE DETAILS:    Discharge planning discussed with:: Patient, Son Diaz  Freedom of Choice: Yes  Comments - Freedom of Choice: SW met bedside with patient and son to advise of $6-7 copay for RW and reported $65/visit copay for home therapy services. Patient/son decided they would like to move forward with RW purchase but would like to cancel home care referral at this time.  CM contacted family/caregiver?: Yes  Were Treatment Team discharge recommendations reviewed with patient/caregiver?: Yes  Did patient/caregiver verbalize understanding of patient care needs?: Yes  Were patient/caregiver advised of the risks associated with not following Treatment Team discharge recommendations?: Yes    Contacts  Patient Contacts: Diaz Arnett  (son)  Relationship to Patient:: Family  Contact Method: In Person  Reason/Outcome: Continuity of Care, Emergency Contact, Discharge Planning    Other Referral/Resources/Interventions Provided:  Interventions: HHC, DME  Referral Comments: HHC referral canceled in [place]. [facility] liaison Liam to deliver RW bedside today.     Treatment Team Recommendation: Home with Home Health Care  Discharge Destination Plan:: Home  Transport at Discharge : Family     IMM Given (Date):: 11/22/24  IMM Given to:: Family (IMM#2 reviewed with patient and son. Both gave verbal understanding. Son signed per patient's request. Original provided. Copy placed in scan bin.)

## 2024-11-22 NOTE — ASSESSMENT & PLAN NOTE
"Presenting with left posterior, constant \"muscle straining\" pain that extends from the thigh down to the calf.  Patient obtained this injury after slipping on the rug 3 days ago.  X-ray of left tibia and left knee unremarkable for traumatic injury  MRI lumbar spine noted with chronic degenerative changes/disc herniation  Continue supportive measures with Ace bandages, ice packs, as needed Tylenol  PT/OT evaluation completed recommend level III; home PT referral placed on discharge    " Lab Results   Component Value Date    EGFR 62 11/22/2024    EGFR 54 11/21/2024    EGFR 58 11/20/2024    CREATININE 0.81 11/22/2024    CREATININE 0.91 11/21/2024    CREATININE 0.86 11/20/2024   Creatinine stable at baseline  Avoid hypotension and nephrotoxic agents  Monitor vitals per routine

## 2024-11-23 LAB
ANION GAP SERPL CALCULATED.3IONS-SCNC: 5 MMOL/L (ref 4–13)
ANISOCYTOSIS BLD QL SMEAR: PRESENT
BASOPHILS # BLD MANUAL: 0 THOUSAND/UL (ref 0–0.1)
BASOPHILS NFR MAR MANUAL: 0 % (ref 0–1)
BUN SERPL-MCNC: 25 MG/DL (ref 5–25)
CALCIUM SERPL-MCNC: 9.4 MG/DL (ref 8.4–10.2)
CHLORIDE SERPL-SCNC: 107 MMOL/L (ref 96–108)
CO2 SERPL-SCNC: 26 MMOL/L (ref 21–32)
CREAT SERPL-MCNC: 0.82 MG/DL (ref 0.6–1.3)
EOSINOPHIL # BLD MANUAL: 0 THOUSAND/UL (ref 0–0.4)
EOSINOPHIL NFR BLD MANUAL: 0 % (ref 0–6)
ERYTHROCYTE [DISTWIDTH] IN BLOOD BY AUTOMATED COUNT: 14.3 % (ref 11.6–15.1)
GFR SERPL CREATININE-BSD FRML MDRD: 61 ML/MIN/1.73SQ M
GLUCOSE SERPL-MCNC: 165 MG/DL (ref 65–140)
GLUCOSE SERPL-MCNC: 173 MG/DL (ref 65–140)
GLUCOSE SERPL-MCNC: 193 MG/DL (ref 65–140)
GLUCOSE SERPL-MCNC: 198 MG/DL (ref 65–140)
GLUCOSE SERPL-MCNC: 199 MG/DL (ref 65–140)
HCT VFR BLD AUTO: 39.2 % (ref 34.8–46.1)
HGB BLD-MCNC: 12.9 G/DL (ref 11.5–15.4)
LYMPHOCYTES # BLD AUTO: 0.67 THOUSAND/UL (ref 0.6–4.47)
LYMPHOCYTES # BLD AUTO: 6 % (ref 14–44)
MAGNESIUM SERPL-MCNC: 2 MG/DL (ref 1.9–2.7)
MCH RBC QN AUTO: 31.3 PG (ref 26.8–34.3)
MCHC RBC AUTO-ENTMCNC: 32.9 G/DL (ref 31.4–37.4)
MCV RBC AUTO: 95 FL (ref 82–98)
MONOCYTES # BLD AUTO: 0.56 THOUSAND/UL (ref 0–1.22)
MONOCYTES NFR BLD: 5 % (ref 4–12)
NEUTROPHILS # BLD MANUAL: 9.95 THOUSAND/UL (ref 1.85–7.62)
NEUTS BAND NFR BLD MANUAL: 1 % (ref 0–8)
NEUTS SEG NFR BLD AUTO: 88 % (ref 43–75)
PLATELET # BLD AUTO: 91 THOUSANDS/UL (ref 149–390)
PLATELET BLD QL SMEAR: ABNORMAL
PMV BLD AUTO: 11.7 FL (ref 8.9–12.7)
POIKILOCYTOSIS BLD QL SMEAR: PRESENT
POTASSIUM SERPL-SCNC: 3.9 MMOL/L (ref 3.5–5.3)
PROCALCITONIN SERPL-MCNC: 0.31 NG/ML
RBC # BLD AUTO: 4.12 MILLION/UL (ref 3.81–5.12)
RBC MORPH BLD: PRESENT
SODIUM SERPL-SCNC: 138 MMOL/L (ref 135–147)
WBC # BLD AUTO: 11.18 THOUSAND/UL (ref 4.31–10.16)

## 2024-11-23 PROCEDURE — 85027 COMPLETE CBC AUTOMATED: CPT | Performed by: FAMILY MEDICINE

## 2024-11-23 PROCEDURE — 99232 SBSQ HOSP IP/OBS MODERATE 35: CPT | Performed by: NURSE PRACTITIONER

## 2024-11-23 PROCEDURE — 84145 PROCALCITONIN (PCT): CPT | Performed by: FAMILY MEDICINE

## 2024-11-23 PROCEDURE — 82948 REAGENT STRIP/BLOOD GLUCOSE: CPT

## 2024-11-23 PROCEDURE — 85007 BL SMEAR W/DIFF WBC COUNT: CPT | Performed by: FAMILY MEDICINE

## 2024-11-23 PROCEDURE — 94668 MNPJ CHEST WALL SBSQ: CPT

## 2024-11-23 PROCEDURE — 94664 DEMO&/EVAL PT USE INHALER: CPT

## 2024-11-23 PROCEDURE — 80048 BASIC METABOLIC PNL TOTAL CA: CPT | Performed by: FAMILY MEDICINE

## 2024-11-23 PROCEDURE — 94640 AIRWAY INHALATION TREATMENT: CPT

## 2024-11-23 PROCEDURE — 94760 N-INVAS EAR/PLS OXIMETRY 1: CPT

## 2024-11-23 PROCEDURE — 83735 ASSAY OF MAGNESIUM: CPT | Performed by: FAMILY MEDICINE

## 2024-11-23 RX ADMIN — METHYLPREDNISOLONE SODIUM SUCCINATE 40 MG: 40 INJECTION, POWDER, FOR SOLUTION INTRAMUSCULAR; INTRAVENOUS at 09:56

## 2024-11-23 RX ADMIN — GABAPENTIN 400 MG: 400 CAPSULE ORAL at 22:34

## 2024-11-23 RX ADMIN — PANTOPRAZOLE SODIUM 40 MG: 40 TABLET, DELAYED RELEASE ORAL at 05:10

## 2024-11-23 RX ADMIN — BUDESONIDE AND FORMOTEROL FUMARATE DIHYDRATE 2 PUFF: 160; 4.5 AEROSOL RESPIRATORY (INHALATION) at 09:59

## 2024-11-23 RX ADMIN — LEVALBUTEROL HYDROCHLORIDE 1.25 MG: 1.25 SOLUTION RESPIRATORY (INHALATION) at 14:05

## 2024-11-23 RX ADMIN — IPRATROPIUM BROMIDE 0.5 MG: 0.5 SOLUTION RESPIRATORY (INHALATION) at 19:36

## 2024-11-23 RX ADMIN — METHYLPREDNISOLONE SODIUM SUCCINATE 40 MG: 40 INJECTION, POWDER, FOR SOLUTION INTRAMUSCULAR; INTRAVENOUS at 22:33

## 2024-11-23 RX ADMIN — CARVEDILOL 3.12 MG: 3.12 TABLET, FILM COATED ORAL at 17:34

## 2024-11-23 RX ADMIN — INSULIN LISPRO 1 UNITS: 100 INJECTION, SOLUTION INTRAVENOUS; SUBCUTANEOUS at 09:58

## 2024-11-23 RX ADMIN — CARVEDILOL 3.12 MG: 3.12 TABLET, FILM COATED ORAL at 09:57

## 2024-11-23 RX ADMIN — LEVALBUTEROL HYDROCHLORIDE 1.25 MG: 1.25 SOLUTION RESPIRATORY (INHALATION) at 19:36

## 2024-11-23 RX ADMIN — BUDESONIDE AND FORMOTEROL FUMARATE DIHYDRATE 2 PUFF: 160; 4.5 AEROSOL RESPIRATORY (INHALATION) at 17:34

## 2024-11-23 RX ADMIN — INSULIN LISPRO 1 UNITS: 100 INJECTION, SOLUTION INTRAVENOUS; SUBCUTANEOUS at 17:36

## 2024-11-23 RX ADMIN — INSULIN LISPRO 1 UNITS: 100 INJECTION, SOLUTION INTRAVENOUS; SUBCUTANEOUS at 12:06

## 2024-11-23 RX ADMIN — GUAIFENESIN 600 MG: 600 TABLET, EXTENDED RELEASE ORAL at 09:57

## 2024-11-23 RX ADMIN — IPRATROPIUM BROMIDE 0.5 MG: 0.5 SOLUTION RESPIRATORY (INHALATION) at 07:31

## 2024-11-23 RX ADMIN — IPRATROPIUM BROMIDE 0.5 MG: 0.5 SOLUTION RESPIRATORY (INHALATION) at 14:05

## 2024-11-23 RX ADMIN — GUAIFENESIN 600 MG: 600 TABLET, EXTENDED RELEASE ORAL at 17:34

## 2024-11-23 RX ADMIN — INSULIN LISPRO 1 UNITS: 100 INJECTION, SOLUTION INTRAVENOUS; SUBCUTANEOUS at 22:34

## 2024-11-23 RX ADMIN — FOLIC ACID 1 MG: 1 TABLET ORAL at 09:56

## 2024-11-23 RX ADMIN — LEVALBUTEROL HYDROCHLORIDE 1.25 MG: 1.25 SOLUTION RESPIRATORY (INHALATION) at 07:31

## 2024-11-23 NOTE — ASSESSMENT & PLAN NOTE
POA. Meeting SIRS criteria for tachypnea and leukocytosis  CXR without acute cardiopulmonary disease  Lactic acid WNL  Procalcitonin trends: 0.43 -> 0.59  COVID/FLU negative  RP2 positive for RSV  Follow up BC x 2  Trend WBC and fever curve

## 2024-11-23 NOTE — DISCHARGE INSTR - OTHER ORDERS
Skin care plans:    1-Apply Prevent barrier cream or equivalent to bilateral sacrobuttocks, mid-upper back and heels 2x/day and as needed.  2-Float heels on 2 pillows to offload pressure so heels are not in contact with mattress or pillows.  3-Use pressure redistribution cushion in chair when out of bed if able. Avoid prolonged sitting.  4-Moisturize skin daily with skin nourishing cream.  5-Turn/reposition every 2 hrs for pressure re-distribution on skin.

## 2024-11-23 NOTE — ASSESSMENT & PLAN NOTE
Patient presents to the ED for ongoing cough.  Reports that cough has been persistent over the last 3 days.  Denies any associated shortness of breath.  Reports some pleuritic chest pain with coughing, but is now resolved since arriving to the ED.  Denies any associated fevers/chills, recent sick contacts, nausea, vomiting, diarrhea, urinary complaints.  CXR (wet read) negative for acute cardiopulmonary process  CTA chest PE study with diffuse bronchial inflammation. No evidence of pneumonia. No evidence of acute pulmonary embolus  COVID/FLU negative  RP2 positive for RSV  Patient with 30 year smoking history, RSV positive   IV solumedrol 40mg TID  Xoponex/atrovent nebulizers  Ceftriaxone and azithromycin, follow infectious work up  Respiratory protocol, incentive spirometry, oscillatory positive expiratory pressure  Continuous pulse ox. Currently on 3L NC, titrate oxygen to maintain O2 sats 89% or greater  Ambulatory pulse ox prior to discharge  Diffuse wheezing noted - continue plan as above, not ready for discharge

## 2024-11-23 NOTE — PLAN OF CARE
Problem: PAIN - ADULT  Goal: Verbalizes/displays adequate comfort level or baseline comfort level  Description: Interventions:  - Encourage patient to monitor pain and request assistance  - Assess pain using appropriate pain scale  - Administer analgesics based on type and severity of pain and evaluate response  - Implement non-pharmacological measures as appropriate and evaluate response  - Consider cultural and social influences on pain and pain management  - Notify physician/advanced practitioner if interventions unsuccessful or patient reports new pain  Outcome: Progressing     Problem: INFECTION - ADULT  Goal: Absence or prevention of progression during hospitalization  Description: INTERVENTIONS:  - Assess and monitor for signs and symptoms of infection  - Monitor lab/diagnostic results  - Monitor all insertion sites, i.e. indwelling lines, tubes, and drains  - Monitor endotracheal if appropriate and nasal secretions for changes in amount and color  - [place] appropriate cooling/warming therapies per order  - Administer medications as ordered  - Instruct and encourage patient and family to use good hand hygiene technique  - Identify and instruct in appropriate isolation precautions for identified infection/condition  Outcome: Progressing     Problem: SAFETY ADULT  Goal: Patient will remain free of falls  Description: INTERVENTIONS:  - Educate patient/family on patient safety including physical limitations  - Instruct patient to call for assistance with activity   - Consult OT/PT to assist with strengthening/mobility   - Keep Call bell within reach  - Keep bed low and locked with side rails adjusted as appropriate  - Keep care items and personal belongings within reach  - Initiate and maintain comfort rounds  - Make Fall Risk Sign visible to staff  - Offer Toileting every 2 Hours, in advance of need  - Initiate/Maintain bed/chair alarm  - Obtain necessary fall risk management equipment:bed/chair alarm   -  Apply yellow socks and bracelet for high fall risk patients  - Consider moving patient to room near nurses station  Outcome: Progressing  Goal: Maintain or return to baseline ADL function  Description: INTERVENTIONS:  -  Assess patient's ability to carry out ADLs; assess patient's baseline for ADL function and identify physical deficits which impact ability to perform ADLs (bathing, care of mouth/teeth, toileting, grooming, dressing, etc.)  - Assess/evaluate cause of self-care deficits   - Assess range of motion  - Assess patient's mobility; develop plan if impaired  - Assess patient's need for assistive devices and provide as appropriate  - Encourage maximum independence but intervene and supervise when necessary  - Involve family in performance of ADLs  - Assess for home care needs following discharge   - Consider OT consult to assist with ADL evaluation and planning for discharge  - Provide patient education as appropriate  Outcome: Progressing  Goal: Maintains/Returns to pre admission functional level  Description: INTERVENTIONS:  - Perform AM-PAC 6 Click Basic Mobility/ Daily Activity assessment daily.  - Set and communicate daily mobility goal to care team and patient/family/caregiver.   - Collaborate with rehabilitation services on mobility goals if consulted  - Perform Range of Motion 3 times a day.  - Reposition patient every 3 hours.  - Dangle patient 3 times a day  - Stand patient 3 times a day  - Ambulate patient 3 times a day  - Out of bed to chair 3 times a day   - Out of bed for meals 3 times a day  - Out of bed for toileting  - Record patient progress and toleration of activity level   Outcome: Progressing     Problem: DISCHARGE PLANNING  Goal: Discharge to home or other facility with appropriate resources  Description: INTERVENTIONS:  - Identify barriers to discharge w/patient and caregiver  - Arrange for needed discharge resources and transportation as appropriate  - Identify discharge learning  needs (meds, wound care, etc.)  - Arrange for interpretive services to assist at discharge as needed  - Refer to Case Management Department for coordinating discharge planning if the patient needs post-hospital services based on physician/advanced practitioner order or complex needs related to functional status, cognitive ability, or social support system  Outcome: Progressing     Problem: Knowledge Deficit  Goal: Patient/family/caregiver demonstrates understanding of disease process, treatment plan, medications, and discharge instructions  Description: Complete learning assessment and assess knowledge base.  Interventions:  - Provide teaching at level of understanding  - Provide teaching via preferred learning methods  Outcome: Progressing     Problem: RESPIRATORY - ADULT  Goal: Achieves optimal ventilation and oxygenation  Description: INTERVENTIONS:  - Assess for changes in respiratory status  - Assess for changes in mentation and behavior  - Position to facilitate oxygenation and minimize respiratory effort  - Oxygen administered by appropriate delivery if ordered  - Initiate smoking cessation education as indicated  - Encourage broncho-pulmonary hygiene including cough, deep breathe, Incentive Spirometry  - Assess the need for suctioning and aspirate as needed  - Assess and instruct to report SOB or any respiratory difficulty  - Respiratory Therapy support as indicated  Outcome: Progressing

## 2024-11-23 NOTE — PROGRESS NOTES
Progress Note - Hospitalist   Name: Dennise Arnett 89 y.o. female I MRN: [id number]  Unit/Bed#: [address] [place] [address] I Date of Admission: 11/20/2024   Date of Service: 11/23/2024 I Hospital Day: 3    Assessment & Plan  Acute respiratory failure (HCC)  Patient presents to the ED for ongoing cough.  Reports that cough has been persistent over the last 3 days.  Denies any associated shortness of breath.  Reports some pleuritic chest pain with coughing, but is now resolved since arriving to the ED.  Denies any associated fevers/chills, recent sick contacts, nausea, vomiting, diarrhea, urinary complaints.  CXR (wet read) negative for acute cardiopulmonary process  CTA chest PE study with diffuse bronchial inflammation. No evidence of pneumonia. No evidence of acute pulmonary embolus  COVID/FLU negative  RP2 positive for RSV  Patient with 30 year smoking history, RSV positive   IV solumedrol 40mg TID  Xoponex/atrovent nebulizers  Ceftriaxone and azithromycin, follow infectious work up  Respiratory protocol, incentive spirometry, oscillatory positive expiratory pressure  Continuous pulse ox. Currently on 3L NC, titrate oxygen to maintain O2 sats 89% or greater  Ambulatory pulse ox prior to discharge  Diffuse wheezing noted - continue plan as above, not ready for discharge   SIRS (systemic inflammatory response syndrome) (HCC)  POA. Meeting SIRS criteria for tachypnea and leukocytosis  CXR without acute cardiopulmonary disease  Lactic acid WNL  Procalcitonin trends: 0.43 -> 0.59  COVID/FLU negative  RP2 positive for RSV  Follow up BC x 2  Trend WBC and fever curve  Rheumatoid arthritis of multiple sites with negative rheumatoid factor (HCC)  Follows outpatient with Rheumatology  Continue gabapentin 400mg HS  PRN percocet 5-325mg 0.5mg tablet Q6H PRN  Prednisone 2.5mg daily, on hold while receiving IV solumedrol  Stage 3 chronic kidney disease, unspecified whether stage 3a or 3b CKD ([facility])  Lab Results   Component Value Date     EGFR 61 11/23/2024    EGFR 62 11/22/2024    EGFR 54 11/21/2024    CREATININE 0.82 11/23/2024    CREATININE 0.81 11/22/2024    CREATININE 0.91 11/21/2024   Creatinine stable at baseline  Avoid hypotension and nephrotoxic agents  Monitor vitals per routine  Thrombocytopenia (HCC)  Per chart review, chronic  Platelet count 80  Likely exacerbation by SIRS. No signs/symptoms of bleeding  Monitor CBC daily  Hold off on DVT prophylaxis for now  Hyperglycemia   on admission labs  No history of diabetes  Likely in the setting of chronic steroid use  HA1C 6.4  SSI with accu checks  Hypoglycemia protocol    VTE Pharmacologic Prophylaxis: VTE Score: 8 High Risk (Score >/= 5) - Pharmacological DVT Prophylaxis Contraindicated. Sequential Compression Devices Ordered.    Mobility:   Basic Mobility Inpatient Raw Score: 11  -HLM Goal: 4: Move to chair/commode  JH-HLM Achieved: 1: Laying in bed  JH-HLM Goal NOT achieved. Continue with multidisciplinary rounding and encourage appropriate mobility to improve upon JH-HLM goals.    Patient Centered Rounds: I performed bedside rounds with nursing staff today.   Discussions with Specialists or Other Care Team Provider:     Education and Discussions with Family / Patient:     Current Length of Stay: 3 day(s)  Current Patient Status: Inpatient   Certification Statement: The patient will continue to require additional inpatient hospital stay due to continued shortness of breath requiring oxygen   Discharge Plan: Anticipate discharge in 24-48 hrs to home with home services.    Code Status: Level 1 - Full Code    Subjective   Patient lying in bed, reports feeling a little worse today.  She has a persistent productive cough.      Objective :  Temp:  [97.4 °F (36.3 °C)-97.9 °F (36.6 °C)] 97.4 °F (36.3 °C)  HR:  [72-88] 80  BP: (132-179)/(68-81) 179/81  Resp:  [17-22] 17  SpO2:  [94 %-95 %] 95 %  O2 Device: Nasal cannula  Nasal Cannula O2 Flow Rate (L/min):  [3 L/min-3.5 L/min] 3  L/min    Body mass index is 24.8 kg/m².     Input and Output Summary (last 24 hours):     Intake/Output Summary (Last 24 hours) at 11/23/2024 0818  Last data filed at 11/22/2024 0922  Gross per 24 hour   Intake 120 ml   Output --   Net 120 ml       Physical Exam  Vitals and nursing note reviewed.   Constitutional:       Appearance: Normal appearance.   HENT:      Head: Normocephalic.      Nose: Nose normal.   Eyes:      Extraocular Movements: Extraocular movements intact.   Cardiovascular:      Rate and Rhythm: Normal rate and regular rhythm.   Pulmonary:      Effort: Pulmonary effort is normal.      Breath sounds: Wheezing (diffuse) present.   Abdominal:      General: Abdomen is flat. Bowel sounds are normal.      Palpations: Abdomen is soft.   Musculoskeletal:         General: No swelling. Normal range of motion.   Skin:     General: Skin is warm and dry.   Neurological:      General: No focal deficit present.      Mental Status: She is alert and oriented to person, place, and time.           Lines/Drains:              Lab Results: I have reviewed the following results:   Results from last 7 days   Lab Units 11/23/24  0510   WBC Thousand/uL 11.18*   HEMOGLOBIN g/dL 12.9   HEMATOCRIT % 39.2   PLATELETS Thousands/uL 91*   BANDS PCT % 1   LYMPHO PCT % 6*   MONO PCT % 5   EOS PCT % 0     Results from last 7 days   Lab Units 11/23/24  0510 11/20/24  1349 11/20/24  1122   SODIUM mmol/L 138   < > 133*   POTASSIUM mmol/L 3.9   < > 3.7   CHLORIDE mmol/L 107   < > 100   CO2 mmol/L 26   < > 27   CO2, I-STAT   --    < >  --    BUN mg/dL 25   < > 20   CREATININE mg/dL 0.82   < > 0.86   ANION GAP mmol/L 5   < > 6   CALCIUM mg/dL 9.4   < > 9.6   ALBUMIN g/dL  --   --  3.5   TOTAL BILIRUBIN mg/dL  --   --  0.68   ALK PHOS U/L  --   --  45   ALT U/L  --   --  7   AST U/L  --   --  15   GLUCOSE RANDOM mg/dL 199*   < > 170*    < > = values in this interval not displayed.         Results from last 7 days   Lab Units 11/23/24  0700  11/22/24  2041 11/22/24  1614 11/22/24  1132 11/22/24  0713 11/21/24  2018 11/21/24  1616 11/21/24  1133 11/21/24  0725 11/20/24  2146 11/20/24  2032   POC GLUCOSE mg/dl 198* 179* 149* 276* 180* 193* 210* 238* 167* 185* 214*     Results from last 7 days   Lab Units 11/21/24  0517   HEMOGLOBIN A1C % 6.4*     Results from last 7 days   Lab Units 11/23/24  0510 11/22/24  0458 11/21/24  0517 11/20/24  1337 11/20/24  1122   LACTIC ACID mmol/L  --   --   --  0.8  --    PROCALCITONIN ng/ml 0.31* 0.49* 0.59*  --  0.43*       Recent Cultures (last 7 days):   Results from last 7 days   Lab Units 11/20/24  1337   BLOOD CULTURE  No Growth at 48 hrs.  No Growth at 48 hrs.             Last 24 Hours Medication List:     Current Facility-Administered Medications:     acetaminophen (TYLENOL) tablet 650 mg, Q6H PRN    albuterol (PROVENTIL HFA,VENTOLIN HFA) inhaler 2 puff, Q6H PRN    budesonide-formoterol (SYMBICORT) 160-4.5 mcg/act inhaler 2 puff, BID    carvedilol (COREG) tablet 3.125 mg, BID With Meals    folic acid (FOLVITE) tablet 1 mg, Daily    gabapentin (NEURONTIN) capsule 400 mg, HS    guaiFENesin (MUCINEX) 12 hr tablet 600 mg, BID    insulin lispro (HumALOG/ADMELOG) 100 units/mL subcutaneous injection 1-5 Units, 4x Daily (AC & HS) **AND** Fingerstick Glucose (POCT), 4x Daily AC and at bedtime    ipratropium (ATROVENT) 0.02 % inhalation solution 0.5 mg, TID    levalbuterol (XOPENEX) inhalation solution 1.25 mg, TID **AND** [DISCONTINUED] sodium chloride 0.9 % inhalation solution 3 mL, TID    methylPREDNISolone sodium succinate (Solu-MEDROL) injection 40 mg, Q12H LISSETH    oxyCODONE-acetaminophen (PERCOCET) 5-325 mg per tablet 0.5 tablet, Q6H PRN    pantoprazole (PROTONIX) EC tablet 40 mg, Early Morning    Administrative Statements   Today, Patient Was Seen By: STEVEN Dawkins      **Please Note: This note may have been constructed using a voice recognition system.**

## 2024-11-23 NOTE — WOUND OSTOMY CARE
"Progress Note - Wound   Dennise Arnett 89 y.o. female MRN: [id number]  Unit/Bed#: [address] [place] [address] Encounter: [id number]        Assessment:   This is a 89 year old female patient admitted on 11/20/24 with acute respiratory failure. Wound consult received for \"back wound\" and patient was agreeable to have wound visit done.    Findings:  1-Atypical wound to mid upper back which was dry and brown/purple in color and suspicious for possible malignancy. Discussed with patient's son that patient stated she would not be willing to follow up with Dermatologist. Orders in place for skin care.  2-Bilateral heels intact - patient refused inspection of sacrobuttocks. Orders are in place for skin care and for prevention.    Skin care plans:  1-Apply Prevent barrier cream to bilateral sacrobuttocks, mid-upper back and heels BID and PRN  2-Float heels on 2 pillows to offload pressure so heels are not in contact with mattress or pillows.  3-Ehob pressure redistribution cushion in chair when out of bed if able. Avoid prolonged sitting.  4-Moisturize skin daily with skin nourishing cream.  5-Turn/reposition q2h or when medically stable for pressure re-distribution on skin.     Wound 11/20/24 Atypical;Back Medial;Superior (Active)   Wound Image   11/22/24 0922   Wound Description Dry;Brown;Light purple 11/22/24 0922   Diane-wound Assessment Intact 11/22/24 0922   Wound Length (cm) 1 cm 11/22/24 0922   Wound Width (cm) 1.3 cm 11/22/24 0922   Wound Depth (cm) 0 cm 11/22/24 0922   Wound Surface Area (cm^2) 1.3 cm^2 11/22/24 0922   Wound Volume (cm^3) 0 cm^3 11/22/24 0922   Calculated Wound Volume (cm^3) 0 cm^3 11/22/24 0922   Drainage Amount None 11/22/24 0922   Treatments Cleansed 11/22/24 0922   Dressing Protective barrier 11/22/24 0922   Dressing Changed New 11/22/24 0922   Dressing Status Intact 11/22/24 0922     Discussed assessment findings, and plan of care/recommendations with Vilma PINEDA.    Wound care signing off, please call or " text with questions and concerns.    Recommendations written as orders.  Ju Shabazz MSN, RN, CWON

## 2024-11-23 NOTE — ASSESSMENT & PLAN NOTE
on admission labs  No history of diabetes  Likely in the setting of chronic steroid use  HA1C 6.4  SSI with accu checks  Hypoglycemia protocol   Dr. Nam

## 2024-11-23 NOTE — ASSESSMENT & PLAN NOTE
Lab Results   Component Value Date    EGFR 61 11/23/2024    EGFR 62 11/22/2024    EGFR 54 11/21/2024    CREATININE 0.82 11/23/2024    CREATININE 0.81 11/22/2024    CREATININE 0.91 11/21/2024   Creatinine stable at baseline  Avoid hypotension and nephrotoxic agents  Monitor vitals per routine

## 2024-11-24 LAB
ANION GAP SERPL CALCULATED.3IONS-SCNC: 5 MMOL/L (ref 4–13)
BASOPHILS # BLD AUTO: 0.02 THOUSANDS/ΜL (ref 0–0.1)
BASOPHILS NFR BLD AUTO: 0 % (ref 0–1)
BUN SERPL-MCNC: 27 MG/DL (ref 5–25)
CALCIUM SERPL-MCNC: 9.4 MG/DL (ref 8.4–10.2)
CHLORIDE SERPL-SCNC: 105 MMOL/L (ref 96–108)
CO2 SERPL-SCNC: 27 MMOL/L (ref 21–32)
CREAT SERPL-MCNC: 0.74 MG/DL (ref 0.6–1.3)
EOSINOPHIL # BLD AUTO: 0.01 THOUSAND/ΜL (ref 0–0.61)
EOSINOPHIL NFR BLD AUTO: 0 % (ref 0–6)
ERYTHROCYTE [DISTWIDTH] IN BLOOD BY AUTOMATED COUNT: 14.6 % (ref 11.6–15.1)
GFR SERPL CREATININE-BSD FRML MDRD: 69 ML/MIN/1.73SQ M
GLUCOSE SERPL-MCNC: 160 MG/DL (ref 65–140)
GLUCOSE SERPL-MCNC: 182 MG/DL (ref 65–140)
GLUCOSE SERPL-MCNC: 187 MG/DL (ref 65–140)
GLUCOSE SERPL-MCNC: 192 MG/DL (ref 65–140)
GLUCOSE SERPL-MCNC: 193 MG/DL (ref 65–140)
HCT VFR BLD AUTO: 39.8 % (ref 34.8–46.1)
HGB BLD-MCNC: 13.1 G/DL (ref 11.5–15.4)
IMM GRANULOCYTES # BLD AUTO: 0.2 THOUSAND/UL (ref 0–0.2)
IMM GRANULOCYTES NFR BLD AUTO: 2 % (ref 0–2)
LYMPHOCYTES # BLD AUTO: 1.24 THOUSANDS/ΜL (ref 0.6–4.47)
LYMPHOCYTES NFR BLD AUTO: 12 % (ref 14–44)
MAGNESIUM SERPL-MCNC: 2.2 MG/DL (ref 1.9–2.7)
MCH RBC QN AUTO: 31.3 PG (ref 26.8–34.3)
MCHC RBC AUTO-ENTMCNC: 32.9 G/DL (ref 31.4–37.4)
MCV RBC AUTO: 95 FL (ref 82–98)
MONOCYTES # BLD AUTO: 0.68 THOUSAND/ΜL (ref 0.17–1.22)
MONOCYTES NFR BLD AUTO: 7 % (ref 4–12)
NEUTROPHILS # BLD AUTO: 7.89 THOUSANDS/ΜL (ref 1.85–7.62)
NEUTS SEG NFR BLD AUTO: 79 % (ref 43–75)
NRBC BLD AUTO-RTO: 0 /100 WBCS
PLATELET # BLD AUTO: 86 THOUSANDS/UL (ref 149–390)
PMV BLD AUTO: 11.6 FL (ref 8.9–12.7)
POTASSIUM SERPL-SCNC: 4.1 MMOL/L (ref 3.5–5.3)
RBC # BLD AUTO: 4.19 MILLION/UL (ref 3.81–5.12)
SODIUM SERPL-SCNC: 137 MMOL/L (ref 135–147)
WBC # BLD AUTO: 10.04 THOUSAND/UL (ref 4.31–10.16)

## 2024-11-24 PROCEDURE — 85025 COMPLETE CBC W/AUTO DIFF WBC: CPT | Performed by: NURSE PRACTITIONER

## 2024-11-24 PROCEDURE — 94664 DEMO&/EVAL PT USE INHALER: CPT

## 2024-11-24 PROCEDURE — 94668 MNPJ CHEST WALL SBSQ: CPT

## 2024-11-24 PROCEDURE — 80048 BASIC METABOLIC PNL TOTAL CA: CPT | Performed by: NURSE PRACTITIONER

## 2024-11-24 PROCEDURE — 94640 AIRWAY INHALATION TREATMENT: CPT

## 2024-11-24 PROCEDURE — 99232 SBSQ HOSP IP/OBS MODERATE 35: CPT | Performed by: NURSE PRACTITIONER

## 2024-11-24 PROCEDURE — 94760 N-INVAS EAR/PLS OXIMETRY 1: CPT

## 2024-11-24 PROCEDURE — 82948 REAGENT STRIP/BLOOD GLUCOSE: CPT

## 2024-11-24 PROCEDURE — 83735 ASSAY OF MAGNESIUM: CPT | Performed by: NURSE PRACTITIONER

## 2024-11-24 RX ADMIN — METHYLPREDNISOLONE SODIUM SUCCINATE 40 MG: 40 INJECTION, POWDER, FOR SOLUTION INTRAMUSCULAR; INTRAVENOUS at 20:52

## 2024-11-24 RX ADMIN — INSULIN LISPRO 1 UNITS: 100 INJECTION, SOLUTION INTRAVENOUS; SUBCUTANEOUS at 11:36

## 2024-11-24 RX ADMIN — BUDESONIDE AND FORMOTEROL FUMARATE DIHYDRATE 2 PUFF: 160; 4.5 AEROSOL RESPIRATORY (INHALATION) at 18:58

## 2024-11-24 RX ADMIN — METHYLPREDNISOLONE SODIUM SUCCINATE 40 MG: 40 INJECTION, POWDER, FOR SOLUTION INTRAMUSCULAR; INTRAVENOUS at 08:08

## 2024-11-24 RX ADMIN — CARVEDILOL 3.12 MG: 3.12 TABLET, FILM COATED ORAL at 08:08

## 2024-11-24 RX ADMIN — FOLIC ACID 1 MG: 1 TABLET ORAL at 08:08

## 2024-11-24 RX ADMIN — LEVALBUTEROL HYDROCHLORIDE 1.25 MG: 1.25 SOLUTION RESPIRATORY (INHALATION) at 07:37

## 2024-11-24 RX ADMIN — BUDESONIDE AND FORMOTEROL FUMARATE DIHYDRATE 2 PUFF: 160; 4.5 AEROSOL RESPIRATORY (INHALATION) at 08:08

## 2024-11-24 RX ADMIN — INSULIN LISPRO 1 UNITS: 100 INJECTION, SOLUTION INTRAVENOUS; SUBCUTANEOUS at 16:00

## 2024-11-24 RX ADMIN — GUAIFENESIN 600 MG: 600 TABLET, EXTENDED RELEASE ORAL at 08:08

## 2024-11-24 RX ADMIN — INSULIN LISPRO 1 UNITS: 100 INJECTION, SOLUTION INTRAVENOUS; SUBCUTANEOUS at 07:52

## 2024-11-24 RX ADMIN — IPRATROPIUM BROMIDE 0.5 MG: 0.5 SOLUTION RESPIRATORY (INHALATION) at 19:25

## 2024-11-24 RX ADMIN — OXYCODONE HYDROCHLORIDE AND ACETAMINOPHEN 0.5 TABLET: 5; 325 TABLET ORAL at 21:05

## 2024-11-24 RX ADMIN — PANTOPRAZOLE SODIUM 40 MG: 40 TABLET, DELAYED RELEASE ORAL at 05:10

## 2024-11-24 RX ADMIN — IPRATROPIUM BROMIDE 0.5 MG: 0.5 SOLUTION RESPIRATORY (INHALATION) at 13:31

## 2024-11-24 RX ADMIN — CARVEDILOL 3.12 MG: 3.12 TABLET, FILM COATED ORAL at 17:52

## 2024-11-24 RX ADMIN — IPRATROPIUM BROMIDE 0.5 MG: 0.5 SOLUTION RESPIRATORY (INHALATION) at 07:37

## 2024-11-24 RX ADMIN — GABAPENTIN 400 MG: 400 CAPSULE ORAL at 21:06

## 2024-11-24 RX ADMIN — INSULIN LISPRO 1 UNITS: 100 INJECTION, SOLUTION INTRAVENOUS; SUBCUTANEOUS at 21:07

## 2024-11-24 RX ADMIN — GUAIFENESIN 600 MG: 600 TABLET, EXTENDED RELEASE ORAL at 17:53

## 2024-11-24 RX ADMIN — LEVALBUTEROL HYDROCHLORIDE 1.25 MG: 1.25 SOLUTION RESPIRATORY (INHALATION) at 19:25

## 2024-11-24 RX ADMIN — LEVALBUTEROL HYDROCHLORIDE 1.25 MG: 1.25 SOLUTION RESPIRATORY (INHALATION) at 13:31

## 2024-11-24 NOTE — ASSESSMENT & PLAN NOTE
Lab Results   Component Value Date    EGFR 69 11/24/2024    EGFR 61 11/23/2024    EGFR 62 11/22/2024    CREATININE 0.74 11/24/2024    CREATININE 0.82 11/23/2024    CREATININE 0.81 11/22/2024   Creatinine stable at baseline  Avoid hypotension and nephrotoxic agents  Monitor vitals per routine

## 2024-11-24 NOTE — ASSESSMENT & PLAN NOTE
Patient presents to the ED for ongoing cough.  Reports that cough has been persistent over the last 3 days.  Denies any associated shortness of breath.  Reports some pleuritic chest pain with coughing, but is now resolved since arriving to the ED.  Denies any associated fevers/chills, recent sick contacts, nausea, vomiting, diarrhea, urinary complaints.  CXR (wet read) negative for acute cardiopulmonary process  CTA chest PE study with diffuse bronchial inflammation. No evidence of pneumonia. No evidence of acute pulmonary embolus  COVID/FLU negative  RP2 positive for RSV  Patient with 30 year smoking history, RSV positive   IV solumedrol 40mg TID titrated to BID   Xoponex/atrovent nebulizers  Ceftriaxone and azithromycin, follow infectious work up  Respiratory protocol, incentive spirometry, oscillatory positive expiratory pressure  Continuous pulse ox. Currently on 3L NC, titrate oxygen to maintain O2 sats 89% or greater  Ambulatory pulse ox prior to discharge  Diffuse wheezing noted - continue plan as above, not ready for discharge

## 2024-11-24 NOTE — PLAN OF CARE
Problem: PAIN - ADULT  Goal: Verbalizes/displays adequate comfort level or baseline comfort level  Description: Interventions:  - Encourage patient to monitor pain and request assistance  - Assess pain using appropriate pain scale  - Administer analgesics based on type and severity of pain and evaluate response  - Implement non-pharmacological measures as appropriate and evaluate response  - Consider cultural and social influences on pain and pain management  - Notify physician/advanced practitioner if interventions unsuccessful or patient reports new pain  Outcome: Progressing     Problem: INFECTION - ADULT  Goal: Absence or prevention of progression during hospitalization  Description: INTERVENTIONS:  - Assess and monitor for signs and symptoms of infection  - Monitor lab/diagnostic results  - Monitor all insertion sites, i.e. indwelling lines, tubes, and drains  - Monitor endotracheal if appropriate and nasal secretions for changes in amount and color  - [place] appropriate cooling/warming therapies per order  - Administer medications as ordered  - Instruct and encourage patient and family to use good hand hygiene technique  - Identify and instruct in appropriate isolation precautions for identified infection/condition  Outcome: Progressing     Problem: SAFETY ADULT  Goal: Patient will remain free of falls  Description: INTERVENTIONS:  - Educate patient/family on patient safety including physical limitations  - Instruct patient to call for assistance with activity   - Consult OT/PT to assist with strengthening/mobility   - Keep Call bell within reach  - Keep bed low and locked with side rails adjusted as appropriate  - Keep care items and personal belongings within reach  - Initiate and maintain comfort rounds  - Make Fall Risk Sign visible to staff  - Offer Toileting every 2 Hours, in advance of need  - Initiate/Maintain bed/chair alarm  - Obtain necessary fall risk management equipment:bed/chair alarm   -  Apply yellow socks and bracelet for high fall risk patients  - Consider moving patient to room near nurses station  Outcome: Progressing  Goal: Maintain or return to baseline ADL function  Description: INTERVENTIONS:  -  Assess patient's ability to carry out ADLs; assess patient's baseline for ADL function and identify physical deficits which impact ability to perform ADLs (bathing, care of mouth/teeth, toileting, grooming, dressing, etc.)  - Assess/evaluate cause of self-care deficits   - Assess range of motion  - Assess patient's mobility; develop plan if impaired  - Assess patient's need for assistive devices and provide as appropriate  - Encourage maximum independence but intervene and supervise when necessary  - Involve family in performance of ADLs  - Assess for home care needs following discharge   - Consider OT consult to assist with ADL evaluation and planning for discharge  - Provide patient education as appropriate  Outcome: Progressing  Goal: Maintains/Returns to pre admission functional level  Description: INTERVENTIONS:  - Perform AM-PAC 6 Click Basic Mobility/ Daily Activity assessment daily.  - Set and communicate daily mobility goal to care team and patient/family/caregiver.   - Collaborate with rehabilitation services on mobility goals if consulted  - Perform Range of Motion 3 times a day.  - Reposition patient every 3 hours.  - Dangle patient 3 times a day  - Stand patient 3 times a day  - Ambulate patient 3 times a day  - Out of bed to chair 3 times a day   - Out of bed for meals 3 times a day  - Out of bed for toileting  - Record patient progress and toleration of activity level   Outcome: Progressing     Problem: DISCHARGE PLANNING  Goal: Discharge to home or other facility with appropriate resources  Description: INTERVENTIONS:  - Identify barriers to discharge w/patient and caregiver  - Arrange for needed discharge resources and transportation as appropriate  - Identify discharge learning  needs (meds, wound care, etc.)  - Arrange for interpretive services to assist at discharge as needed  - Refer to Case Management Department for coordinating discharge planning if the patient needs post-hospital services based on physician/advanced practitioner order or complex needs related to functional status, cognitive ability, or social support system  Outcome: Progressing     Problem: Knowledge Deficit  Goal: Patient/family/caregiver demonstrates understanding of disease process, treatment plan, medications, and discharge instructions  Description: Complete learning assessment and assess knowledge base.  Interventions:  - Provide teaching at level of understanding  - Provide teaching via preferred learning methods  Outcome: Progressing     Problem: RESPIRATORY - ADULT  Goal: Achieves optimal ventilation and oxygenation  Description: INTERVENTIONS:  - Assess for changes in respiratory status  - Assess for changes in mentation and behavior  - Position to facilitate oxygenation and minimize respiratory effort  - Oxygen administered by appropriate delivery if ordered  - Initiate smoking cessation education as indicated  - Encourage broncho-pulmonary hygiene including cough, deep breathe, Incentive Spirometry  - Assess the need for suctioning and aspirate as needed  - Assess and instruct to report SOB or any respiratory difficulty  - Respiratory Therapy support as indicated  Outcome: Progressing     Problem: Prexisting or High Potential for Compromised Skin Integrity  Goal: Skin integrity is maintained or improved  Description: INTERVENTIONS:  - Identify patients at risk for skin breakdown  - Assess and monitor skin integrity  - Assess and monitor nutrition and hydration status  - Monitor labs   - Assess for incontinence   - Turn and reposition patient  - Assist with mobility/ambulation  - Relieve pressure over bony prominences  - Avoid friction and shearing  - Provide appropriate hygiene as needed including keeping  skin clean and dry  - Evaluate need for skin moisturizer/barrier cream  - Collaborate with interdisciplinary team   - Patient/family teaching  - Consider wound care consult   Outcome: Progressing

## 2024-11-24 NOTE — PLAN OF CARE
Problem: PAIN - ADULT  Goal: Verbalizes/displays adequate comfort level or baseline comfort level  Description: Interventions:  - Encourage patient to monitor pain and request assistance  - Assess pain using appropriate pain scale  - Administer analgesics based on type and severity of pain and evaluate response  - Implement non-pharmacological measures as appropriate and evaluate response  - Consider cultural and social influences on pain and pain management  - Notify physician/advanced practitioner if interventions unsuccessful or patient reports new pain  Outcome: Progressing     Problem: INFECTION - ADULT  Goal: Absence or prevention of progression during hospitalization  Description: INTERVENTIONS:  - Assess and monitor for signs and symptoms of infection  - Monitor lab/diagnostic results  - Monitor all insertion sites, i.e. indwelling lines, tubes, and drains  - Monitor endotracheal if appropriate and nasal secretions for changes in amount and color  - [place] appropriate cooling/warming therapies per order  - Administer medications as ordered  - Instruct and encourage patient and family to use good hand hygiene technique  - Identify and instruct in appropriate isolation precautions for identified infection/condition  Outcome: Progressing     Problem: SAFETY ADULT  Goal: Patient will remain free of falls  Description: INTERVENTIONS:  - Educate patient/family on patient safety including physical limitations  - Instruct patient to call for assistance with activity   - Consult OT/PT to assist with strengthening/mobility   - Keep Call bell within reach  - Keep bed low and locked with side rails adjusted as appropriate  - Keep care items and personal belongings within reach  - Initiate and maintain comfort rounds  - Make Fall Risk Sign visible to staff  - Offer Toileting every 2 Hours, in advance of need  - Initiate/Maintain bed/chair alarm  - Obtain necessary fall risk management equipment:bed/chair alarm   -  Apply yellow socks and bracelet for high fall risk patients  - Consider moving patient to room near nurses station  Outcome: Progressing  Goal: Maintain or return to baseline ADL function  Description: INTERVENTIONS:  -  Assess patient's ability to carry out ADLs; assess patient's baseline for ADL function and identify physical deficits which impact ability to perform ADLs (bathing, care of mouth/teeth, toileting, grooming, dressing, etc.)  - Assess/evaluate cause of self-care deficits   - Assess range of motion  - Assess patient's mobility; develop plan if impaired  - Assess patient's need for assistive devices and provide as appropriate  - Encourage maximum independence but intervene and supervise when necessary  - Involve family in performance of ADLs  - Assess for home care needs following discharge   - Consider OT consult to assist with ADL evaluation and planning for discharge  - Provide patient education as appropriate  Outcome: Progressing  Goal: Maintains/Returns to pre admission functional level  Description: INTERVENTIONS:  - Perform AM-PAC 6 Click Basic Mobility/ Daily Activity assessment daily.  - Set and communicate daily mobility goal to care team and patient/family/caregiver.   - Collaborate with rehabilitation services on mobility goals if consulted  - Perform Range of Motion 3 times a day.  - Reposition patient every 3 hours.  - Dangle patient 3 times a day  - Stand patient 3 times a day  - Ambulate patient 3 times a day  - Out of bed to chair 3 times a day   - Out of bed for meals 3 times a day  - Out of bed for toileting  - Record patient progress and toleration of activity level   Outcome: Progressing     Problem: DISCHARGE PLANNING  Goal: Discharge to home or other facility with appropriate resources  Description: INTERVENTIONS:  - Identify barriers to discharge w/patient and caregiver  - Arrange for needed discharge resources and transportation as appropriate  - Identify discharge learning  needs (meds, wound care, etc.)  - Arrange for interpretive services to assist at discharge as needed  - Refer to Case Management Department for coordinating discharge planning if the patient needs post-hospital services based on physician/advanced practitioner order or complex needs related to functional status, cognitive ability, or social support system  Outcome: Progressing     Problem: Knowledge Deficit  Goal: Patient/family/caregiver demonstrates understanding of disease process, treatment plan, medications, and discharge instructions  Description: Complete learning assessment and assess knowledge base.  Interventions:  - Provide teaching at level of understanding  - Provide teaching via preferred learning methods  Outcome: Progressing     Problem: RESPIRATORY - ADULT  Goal: Achieves optimal ventilation and oxygenation  Description: INTERVENTIONS:  - Assess for changes in respiratory status  - Assess for changes in mentation and behavior  - Position to facilitate oxygenation and minimize respiratory effort  - Oxygen administered by appropriate delivery if ordered  - Initiate smoking cessation education as indicated  - Encourage broncho-pulmonary hygiene including cough, deep breathe, Incentive Spirometry  - Assess the need for suctioning and aspirate as needed  - Assess and instruct to report SOB or any respiratory difficulty  - Respiratory Therapy support as indicated  Outcome: Progressing     Problem: Prexisting or High Potential for Compromised Skin Integrity  Goal: Skin integrity is maintained or improved  Description: INTERVENTIONS:  - Identify patients at risk for skin breakdown  - Assess and monitor skin integrity  - Assess and monitor nutrition and hydration status  - Monitor labs   - Assess for incontinence   - Turn and reposition patient  - Assist with mobility/ambulation  - Relieve pressure over bony prominences  - Avoid friction and shearing  - Provide appropriate hygiene as needed including keeping  skin clean and dry  - Evaluate need for skin moisturizer/barrier cream  - Collaborate with interdisciplinary team   - Patient/family teaching  - Consider wound care consult   Outcome: Progressing

## 2024-11-24 NOTE — ASSESSMENT & PLAN NOTE
on admission labs  No history of diabetes  Likely in the setting of chronic steroid use  HA1C 6.4  SSI with accu checks  Hypoglycemia protocol

## 2024-11-24 NOTE — PROGRESS NOTES
Progress Note - Hospitalist   Name: Dennise Arnett 89 y.o. female I MRN: [id number]  Unit/Bed#: [address] [place] [address] Date of Admission: 11/20/2024   Date of Service: 11/24/2024 I Hospital Day: 4    Assessment & Plan  Acute respiratory failure (HCC)  Patient presents to the ED for ongoing cough.  Reports that cough has been persistent over the last 3 days.  Denies any associated shortness of breath.  Reports some pleuritic chest pain with coughing, but is now resolved since arriving to the ED.  Denies any associated fevers/chills, recent sick contacts, nausea, vomiting, diarrhea, urinary complaints.  CXR (wet read) negative for acute cardiopulmonary process  CTA chest PE study with diffuse bronchial inflammation. No evidence of pneumonia. No evidence of acute pulmonary embolus  COVID/FLU negative  RP2 positive for RSV  Patient with 30 year smoking history, RSV positive   IV solumedrol 40mg TID titrated to BID   Xoponex/atrovent nebulizers  Ceftriaxone and azithromycin, follow infectious work up  Respiratory protocol, incentive spirometry, oscillatory positive expiratory pressure  Continuous pulse ox. Currently on 3L NC, titrate oxygen to maintain O2 sats 89% or greater  Ambulatory pulse ox prior to discharge  Diffuse wheezing noted - continue plan as above, not ready for discharge   SIRS (systemic inflammatory response syndrome) ([facility])  POA. Meeting SIRS criteria for tachypnea and leukocytosis  CXR without acute cardiopulmonary disease  Lactic acid WNL  Procalcitonin trends: 0.43 -> 0.59  COVID/FLU negative  RP2 positive for RSV  Follow up BC x 2  Trend WBC and fever curve  Rheumatoid arthritis of multiple sites with negative rheumatoid factor ([facility])  Follows outpatient with Rheumatology  Continue gabapentin 400mg HS  PRN percocet 5-325mg 0.5mg tablet Q6H PRN  Prednisone 2.5mg daily, on hold while receiving IV solumedrol  Stage 3 chronic kidney disease, unspecified whether stage 3a or 3b CKD ([facility])  Lab Results    Component Value Date    EGFR 69 11/24/2024    EGFR 61 11/23/2024    EGFR 62 11/22/2024    CREATININE 0.74 11/24/2024    CREATININE 0.82 11/23/2024    CREATININE 0.81 11/22/2024   Creatinine stable at baseline  Avoid hypotension and nephrotoxic agents  Monitor vitals per routine  Thrombocytopenia (HCC)  Per chart review, chronic  Platelet count 80  Likely exacerbation by SIRS. No signs/symptoms of bleeding  Monitor CBC daily  Hold off on DVT prophylaxis for now  Hyperglycemia   on admission labs  No history of diabetes  Likely in the setting of chronic steroid use  HA1C 6.4  SSI with accu checks  Hypoglycemia protocol    VTE Pharmacologic Prophylaxis: VTE Score: 8 Moderate Risk (Score 3-4) - Pharmacological DVT Prophylaxis Contraindicated. Sequential Compression Devices Ordered.    Mobility:   Basic Mobility Inpatient Raw Score: 12  JH-HLM Goal: 4: Move to chair/commode  JH-HLM Achieved: 5: Stand (1 or more minutes)  JH-HLM Goal NOT achieved. Continue with multidisciplinary rounding and encourage appropriate mobility to improve upon JH-HLM goals.    Patient Centered Rounds: I performed bedside rounds with nursing staff today.   Discussions with Specialists or Other Care Team Provider:     Education and Discussions with Family / Patient:     Current Length of Stay: 4 day(s)  Current Patient Status: Inpatient   Certification Statement: The patient will continue to require additional inpatient hospital stay due to oxygen  Discharge Plan: Anticipate discharge in 24-48 hrs to rehab facility.    Code Status: Level 1 - Full Code    Subjective   Patient sitting up in bed, just finished breakfast.  Her oxygen was increased overnight some feelings of shortness of breath.  She reports feeling a bit better this morning, oxygen titrated back down to 3L.      Objective :  Temp:  [97.2 °F (36.2 °C)-97.7 °F (36.5 °C)] 97.2 °F (36.2 °C)  HR:  [64-83] 64  BP: (144-166)/(75-85) 166/84  Resp:  [16] 16  SpO2:  [94 %-98 %] 96  %  O2 Device: Nasal cannula  Nasal Cannula O2 Flow Rate (L/min):  [5 L/min] 5 L/min    Body mass index is 24.8 kg/m².     Input and Output Summary (last 24 hours):   No intake or output data in the 24 hours ending 11/24/24 1157    Physical Exam  Vitals and nursing note reviewed.   Constitutional:       Appearance: Normal appearance.   HENT:      Head: Normocephalic.   Eyes:      Extraocular Movements: Extraocular movements intact.   Cardiovascular:      Rate and Rhythm: Normal rate and regular rhythm.   Pulmonary:      Effort: Pulmonary effort is normal.      Breath sounds: Wheezing (scant, improved from yesterday) present.   Abdominal:      General: Abdomen is flat. Bowel sounds are normal.      Palpations: Abdomen is soft.   Musculoskeletal:         General: Normal range of motion.   Skin:     General: Skin is warm and dry.   Neurological:      General: No focal deficit present.      Mental Status: She is alert and oriented to person, place, and time.           Lines/Drains:              Lab Results: I have reviewed the following results:   Results from last 7 days   Lab Units 11/24/24  0437 11/23/24  0510   WBC Thousand/uL 10.04 11.18*   HEMOGLOBIN g/dL 13.1 12.9   HEMATOCRIT % 39.8 39.2   PLATELETS Thousands/uL 86* 91*   BANDS PCT %  --  1   SEGS PCT % 79*  --    LYMPHO PCT % 12* 6*   MONO PCT % 7 5   EOS PCT % 0 0     Results from last 7 days   Lab Units 11/24/24  0437 11/20/24  1349 11/20/24  1122   SODIUM mmol/L 137   < > 133*   POTASSIUM mmol/L 4.1   < > 3.7   CHLORIDE mmol/L 105   < > 100   CO2 mmol/L 27   < > 27   CO2, I-STAT   --    < >  --    BUN mg/dL 27*   < > 20   CREATININE mg/dL 0.74   < > 0.86   ANION GAP mmol/L 5   < > 6   CALCIUM mg/dL 9.4   < > 9.6   ALBUMIN g/dL  --   --  3.5   TOTAL BILIRUBIN mg/dL  --   --  0.68   ALK PHOS U/L  --   --  45   ALT U/L  --   --  7   AST U/L  --   --  15   GLUCOSE RANDOM mg/dL 193*   < > 170*    < > = values in this interval not displayed.         Results from  last 7 days   Lab Units 11/24/24  1120 11/24/24  0728 11/23/24  2058 11/23/24  1635 11/23/24  1122 11/23/24  0700 11/22/24  2041 11/22/24  1614 11/22/24  1132 11/22/24  0713 11/21/24  2018 11/21/24  1616   POC GLUCOSE mg/dl 187* 182* 165* 173* 193* 198* 179* 149* 276* 180* 193* 210*     Results from last 7 days   Lab Units 11/21/24  0517   HEMOGLOBIN A1C % 6.4*     Results from last 7 days   Lab Units 11/23/24  0510 11/22/24  0458 11/21/24  0517 11/20/24  1337 11/20/24  1122   LACTIC ACID mmol/L  --   --   --  0.8  --    PROCALCITONIN ng/ml 0.31* 0.49* 0.59*  --  0.43*       Recent Cultures (last 7 days):   Results from last 7 days   Lab Units 11/20/24  1337   BLOOD CULTURE  No Growth at 72 hrs.  No Growth at 72 hrs.             Last 24 Hours Medication List:     Current Facility-Administered Medications:     acetaminophen (TYLENOL) tablet 650 mg, Q6H PRN    albuterol (PROVENTIL HFA,VENTOLIN HFA) inhaler 2 puff, Q6H PRN    budesonide-formoterol (SYMBICORT) 160-4.5 mcg/act inhaler 2 puff, BID    carvedilol (COREG) tablet 3.125 mg, BID With Meals    folic acid (FOLVITE) tablet 1 mg, Daily    gabapentin (NEURONTIN) capsule 400 mg, HS    guaiFENesin (MUCINEX) 12 hr tablet 600 mg, BID    insulin lispro (HumALOG/ADMELOG) 100 units/mL subcutaneous injection 1-5 Units, 4x Daily (AC & HS) **AND** Fingerstick Glucose (POCT), 4x Daily AC and at bedtime    ipratropium (ATROVENT) 0.02 % inhalation solution 0.5 mg, TID    levalbuterol (XOPENEX) inhalation solution 1.25 mg, TID **AND** [DISCONTINUED] sodium chloride 0.9 % inhalation solution 3 mL, TID    methylPREDNISolone sodium succinate (Solu-MEDROL) injection 40 mg, Q12H LISSETH    oxyCODONE-acetaminophen (PERCOCET) 5-325 mg per tablet 0.5 tablet, Q6H PRN    pantoprazole (PROTONIX) EC tablet 40 mg, Early Morning    Administrative Statements   Today, Patient Was Seen By: STEVEN Dawkins      **Please Note: This note may have been constructed using a voice recognition  system.**

## 2024-11-25 LAB
ANION GAP SERPL CALCULATED.3IONS-SCNC: 4 MMOL/L (ref 4–13)
BACTERIA BLD CULT: NORMAL
BACTERIA BLD CULT: NORMAL
BASOPHILS # BLD AUTO: 0.04 THOUSANDS/ΜL (ref 0–0.1)
BUN SERPL-MCNC: 25 MG/DL (ref 5–25)
CALCIUM SERPL-MCNC: 9.4 MG/DL (ref 8.4–10.2)
CHLORIDE SERPL-SCNC: 103 MMOL/L (ref 96–108)
CO2 SERPL-SCNC: 29 MMOL/L (ref 21–32)
CREAT SERPL-MCNC: 0.72 MG/DL (ref 0.6–1.3)
EOSINOPHIL # BLD AUTO: 0.03 THOUSAND/ΜL (ref 0–0.61)
ERYTHROCYTE [DISTWIDTH] IN BLOOD BY AUTOMATED COUNT: 14.5 % (ref 11.6–15.1)
GFR SERPL CREATININE-BSD FRML MDRD: 71 ML/MIN/1.73SQ M
GLUCOSE SERPL-MCNC: 156 MG/DL (ref 65–140)
GLUCOSE SERPL-MCNC: 165 MG/DL (ref 65–140)
GLUCOSE SERPL-MCNC: 194 MG/DL (ref 65–140)
GLUCOSE SERPL-MCNC: 195 MG/DL (ref 65–140)
GLUCOSE SERPL-MCNC: 202 MG/DL (ref 65–140)
HCT VFR BLD AUTO: 42.4 % (ref 34.8–46.1)
HGB BLD-MCNC: 13.9 G/DL (ref 11.5–15.4)
IMM GRANULOCYTES # BLD AUTO: 0.32 THOUSAND/UL (ref 0–0.2)
LYMPHOCYTES # BLD AUTO: 1.51 THOUSANDS/ΜL (ref 0.6–4.47)
MCH RBC QN AUTO: 31.3 PG (ref 26.8–34.3)
MCHC RBC AUTO-ENTMCNC: 32.8 G/DL (ref 31.4–37.4)
MCV RBC AUTO: 96 FL (ref 82–98)
MONOCYTES # BLD AUTO: 2.46 THOUSAND/ΜL (ref 0.17–1.22)
NEUTROPHILS # BLD AUTO: 10.19 THOUSANDS/ΜL (ref 1.85–7.62)
NRBC BLD AUTO-RTO: 0 /100 WBCS
PLATELET # BLD AUTO: 89 THOUSANDS/UL (ref 149–390)
PMV BLD AUTO: 11.5 FL (ref 8.9–12.7)
POTASSIUM SERPL-SCNC: 4.1 MMOL/L (ref 3.5–5.3)
RBC # BLD AUTO: 4.44 MILLION/UL (ref 3.81–5.12)
SODIUM SERPL-SCNC: 136 MMOL/L (ref 135–147)
WBC # BLD AUTO: 14.55 THOUSAND/UL (ref 4.31–10.16)

## 2024-11-25 PROCEDURE — 94668 MNPJ CHEST WALL SBSQ: CPT

## 2024-11-25 PROCEDURE — 82948 REAGENT STRIP/BLOOD GLUCOSE: CPT

## 2024-11-25 PROCEDURE — 94640 AIRWAY INHALATION TREATMENT: CPT

## 2024-11-25 PROCEDURE — 85027 COMPLETE CBC AUTOMATED: CPT | Performed by: NURSE PRACTITIONER

## 2024-11-25 PROCEDURE — 94760 N-INVAS EAR/PLS OXIMETRY 1: CPT

## 2024-11-25 PROCEDURE — 99232 SBSQ HOSP IP/OBS MODERATE 35: CPT

## 2024-11-25 PROCEDURE — 80048 BASIC METABOLIC PNL TOTAL CA: CPT | Performed by: NURSE PRACTITIONER

## 2024-11-25 RX ORDER — PREDNISONE 10 MG/1
10 TABLET ORAL DAILY
Status: DISCONTINUED | OUTPATIENT
Start: 2024-12-02 | End: 2024-11-25

## 2024-11-25 RX ORDER — PREDNISONE 1 MG/1
2.5 TABLET ORAL DAILY
Status: DISCONTINUED | OUTPATIENT
Start: 2024-11-26 | End: 2024-11-26

## 2024-11-25 RX ORDER — AMLODIPINE BESYLATE 2.5 MG/1
2.5 TABLET ORAL DAILY
Status: DISCONTINUED | OUTPATIENT
Start: 2024-11-25 | End: 2024-11-26 | Stop reason: HOSPADM

## 2024-11-25 RX ORDER — PREDNISONE 20 MG/1
20 TABLET ORAL DAILY
Status: DISCONTINUED | OUTPATIENT
Start: 2024-11-29 | End: 2024-11-25

## 2024-11-25 RX ADMIN — GUAIFENESIN 600 MG: 600 TABLET, EXTENDED RELEASE ORAL at 17:21

## 2024-11-25 RX ADMIN — CARVEDILOL 3.12 MG: 3.12 TABLET, FILM COATED ORAL at 08:11

## 2024-11-25 RX ADMIN — INSULIN LISPRO 1 UNITS: 100 INJECTION, SOLUTION INTRAVENOUS; SUBCUTANEOUS at 21:14

## 2024-11-25 RX ADMIN — IPRATROPIUM BROMIDE 0.5 MG: 0.5 SOLUTION RESPIRATORY (INHALATION) at 19:15

## 2024-11-25 RX ADMIN — LEVALBUTEROL HYDROCHLORIDE 1.25 MG: 1.25 SOLUTION RESPIRATORY (INHALATION) at 07:23

## 2024-11-25 RX ADMIN — FOLIC ACID 1 MG: 1 TABLET ORAL at 10:56

## 2024-11-25 RX ADMIN — AMLODIPINE BESYLATE 2.5 MG: 2.5 TABLET ORAL at 15:22

## 2024-11-25 RX ADMIN — IPRATROPIUM BROMIDE 0.5 MG: 0.5 SOLUTION RESPIRATORY (INHALATION) at 07:23

## 2024-11-25 RX ADMIN — OXYCODONE HYDROCHLORIDE AND ACETAMINOPHEN 0.5 TABLET: 5; 325 TABLET ORAL at 15:30

## 2024-11-25 RX ADMIN — LEVALBUTEROL HYDROCHLORIDE 1.25 MG: 1.25 SOLUTION RESPIRATORY (INHALATION) at 19:15

## 2024-11-25 RX ADMIN — PANTOPRAZOLE SODIUM 40 MG: 40 TABLET, DELAYED RELEASE ORAL at 05:13

## 2024-11-25 RX ADMIN — INSULIN LISPRO 1 UNITS: 100 INJECTION, SOLUTION INTRAVENOUS; SUBCUTANEOUS at 08:11

## 2024-11-25 RX ADMIN — BUDESONIDE AND FORMOTEROL FUMARATE DIHYDRATE 2 PUFF: 160; 4.5 AEROSOL RESPIRATORY (INHALATION) at 17:21

## 2024-11-25 RX ADMIN — LEVALBUTEROL HYDROCHLORIDE 1.25 MG: 1.25 SOLUTION RESPIRATORY (INHALATION) at 13:19

## 2024-11-25 RX ADMIN — INSULIN LISPRO 1 UNITS: 100 INJECTION, SOLUTION INTRAVENOUS; SUBCUTANEOUS at 12:28

## 2024-11-25 RX ADMIN — GABAPENTIN 400 MG: 400 CAPSULE ORAL at 21:09

## 2024-11-25 RX ADMIN — INSULIN LISPRO 1 UNITS: 100 INJECTION, SOLUTION INTRAVENOUS; SUBCUTANEOUS at 17:21

## 2024-11-25 RX ADMIN — BUDESONIDE AND FORMOTEROL FUMARATE DIHYDRATE 2 PUFF: 160; 4.5 AEROSOL RESPIRATORY (INHALATION) at 10:57

## 2024-11-25 RX ADMIN — CARVEDILOL 3.12 MG: 3.12 TABLET, FILM COATED ORAL at 15:30

## 2024-11-25 RX ADMIN — IPRATROPIUM BROMIDE 0.5 MG: 0.5 SOLUTION RESPIRATORY (INHALATION) at 13:19

## 2024-11-25 RX ADMIN — GUAIFENESIN 600 MG: 600 TABLET, EXTENDED RELEASE ORAL at 10:56

## 2024-11-25 RX ADMIN — METHYLPREDNISOLONE SODIUM SUCCINATE 40 MG: 40 INJECTION, POWDER, FOR SOLUTION INTRAMUSCULAR; INTRAVENOUS at 10:56

## 2024-11-25 NOTE — ASSESSMENT & PLAN NOTE
POA. Meeting SIRS criteria for tachypnea and leukocytosis  CXR without acute cardiopulmonary disease  Lactic acid WNL  Procalcitonin trends: 0.43 -> 0.59  COVID/FLU negative  RP2 positive for RSV  Negative BC x 2  Trend WBC and fever curve

## 2024-11-25 NOTE — PROGRESS NOTES
Progress Note - Hospitalist   Name: Dennise Arnett 89 y.o. female I MRN: [id number]  Unit/Bed#: [address] [place] [address] I Date of Admission: 11/20/2024   Date of Service: 11/25/2024 I Hospital Day: 5    Assessment & Plan  Acute respiratory failure (HCC)  Patient presents to the ED for ongoing cough.  Reports that cough has been persistent over the last 3 days.  Denies any associated shortness of breath.  Reports some pleuritic chest pain with coughing, but is now resolved since arriving to the ED.  Denies any associated fevers/chills, recent sick contacts, nausea, vomiting, diarrhea, urinary complaints.  CXR (wet read) negative for acute cardiopulmonary process  CTA chest PE study with diffuse bronchial inflammation. No evidence of pneumonia. No evidence of acute pulmonary embolus  COVID/FLU negative  RP2 positive for RSV  Patient with 30 year smoking history, RSV positive   IV solumedrol 40mg TID titrated to BID   Xoponex/atrovent nebulizers  Ceftriaxone and azithromycin, follow infectious work up  Respiratory protocol, incentive spirometry, oscillatory positive expiratory pressure  Continuous pulse ox. Currently on 3L NC, titrate oxygen to maintain O2 sats 89% or greater  Ambulatory pulse ox prior to discharge  Discontinue IV Solu-Medrol  Restart home dose prednisone 2.5 mg daily  SIRS (systemic inflammatory response syndrome) (HCC)  POA. Meeting SIRS criteria for tachypnea and leukocytosis  CXR without acute cardiopulmonary disease  Lactic acid WNL  Procalcitonin trends: 0.43 -> 0.59  COVID/FLU negative  RP2 positive for RSV  Negative BC x 2  Trend WBC and fever curve  Rheumatoid arthritis of multiple sites with negative rheumatoid factor (HCC)  Follows outpatient with Rheumatology  Continue gabapentin 400mg HS  PRN percocet 5-325mg 0.5mg tablet Q6H PRN  Prednisone 2.5mg daily, on hold while receiving IV solumedrol  Stage 3 chronic kidney disease, unspecified whether stage 3a or 3b CKD (HCC)  Lab Results   Component  Value Date    EGFR 71 11/25/2024    EGFR 69 11/24/2024    EGFR 61 11/23/2024    CREATININE 0.72 11/25/2024    CREATININE 0.74 11/24/2024    CREATININE 0.82 11/23/2024   Creatinine stable at baseline  Avoid hypotension and nephrotoxic agents  Monitor vitals per routine  Thrombocytopenia (HCC)  Per chart review, chronic  Platelet count 80  Likely exacerbation by SIRS. No signs/symptoms of bleeding  Monitor CBC daily  Hold off on DVT prophylaxis for now  Hyperglycemia   on admission labs  No history of diabetes  Likely in the setting of chronic steroid use  HA1C 6.4  SSI with accu checks  Hypoglycemia protocol    VTE Pharmacologic Prophylaxis: VTE Score: 8 High Risk (Score >/= 5) - Pharmacological DVT Prophylaxis Contraindicated. Sequential Compression Devices Ordered.    Mobility:   Basic Mobility Inpatient Raw Score: 12  JH-HLM Goal: 4: Move to chair/commode  JH-HLM Achieved: 5: Stand (1 or more minutes)  JH-HLM Goal NOT achieved. Continue with multidisciplinary rounding and encourage appropriate mobility to improve upon JH-HLM goals.    Patient Centered Rounds: I performed bedside rounds with nursing staff today.   Discussions with Specialists or Other Care Team Provider: Yes    Education and Discussions with Family / Patient:     Current Length of Stay: 5 day(s)  Current Patient Status: Inpatient   Certification Statement: The patient will continue to require additional inpatient hospital stay due to clinical course  Discharge Plan: Anticipate discharge in 24-48 hrs to home.    Code Status: Level 1 - Full Code    Subjective   Seen and examined resting comfortably.  Patient reports feeling much better and oxygen requirement decreased.  Denies any acute event          Objective :  Temp:  [97.2 °F (36.2 °C)-97.6 °F (36.4 °C)] 97.3 °F (36.3 °C)  HR:  [54-85] 56  BP: (160-184)/(64-92) 170/64  Resp:  [16-17] 17  SpO2:  [92 %-97 %] 92 %  O2 Device: Nasal cannula  Nasal Cannula O2 Flow Rate (L/min):  [3 L/min] 3  L/min    Body mass index is 24.8 kg/m².     Input and Output Summary (last 24 hours):     Intake/Output Summary (Last 24 hours) at 11/25/2024 1518  Last data filed at 11/25/2024 0010  Gross per 24 hour   Intake --   Output 600 ml   Net -600 ml       Physical Exam  Vitals and nursing note reviewed.   Constitutional:       General: She is not in acute distress.     Appearance: She is well-developed.   HENT:      Head: Normocephalic and atraumatic.   Eyes:      Conjunctiva/sclera: Conjunctivae normal.   Cardiovascular:      Rate and Rhythm: Normal rate and regular rhythm.      Heart sounds: No murmur heard.  Pulmonary:      Effort: Pulmonary effort is normal. No respiratory distress.      Breath sounds: Normal breath sounds.   Abdominal:      Palpations: Abdomen is soft.      Tenderness: There is no abdominal tenderness.   Musculoskeletal:         General: No swelling.      Cervical back: Neck supple.   Skin:     General: Skin is warm and dry.      Capillary Refill: Capillary refill takes less than 2 seconds.   Neurological:      Mental Status: She is alert.   Psychiatric:         Mood and Affect: Mood normal.           Lines/Drains:              Lab Results: I have reviewed the following results:   Results from last 7 days   Lab Units 11/25/24  1252 11/24/24  0437 11/23/24  0510   WBC Thousand/uL 14.55* 10.04 11.18*   HEMOGLOBIN g/dL 13.9 13.1 12.9   HEMATOCRIT % 42.4 39.8 39.2   PLATELETS Thousands/uL 89* 86* 91*   BANDS PCT %  --   --  1   SEGS PCT %  --  79*  --    LYMPHO PCT %  --  12* 6*   MONO PCT %  --  7 5   EOS PCT %  --  0 0     Results from last 7 days   Lab Units 11/25/24  1252 11/20/24  1349 11/20/24  1122   SODIUM mmol/L 136   < > 133*   POTASSIUM mmol/L 4.1   < > 3.7   CHLORIDE mmol/L 103   < > 100   CO2 mmol/L 29   < > 27   CO2, I-STAT   --    < >  --    BUN mg/dL 25   < > 20   CREATININE mg/dL 0.72   < > 0.86   ANION GAP mmol/L 4   < > 6   CALCIUM mg/dL 9.4   < > 9.6   ALBUMIN g/dL  --   --  3.5    TOTAL BILIRUBIN mg/dL  --   --  0.68   ALK PHOS U/L  --   --  45   ALT U/L  --   --  7   AST U/L  --   --  15   GLUCOSE RANDOM mg/dL 165*   < > 170*    < > = values in this interval not displayed.         Results from last 7 days   Lab Units 11/25/24  1147 11/25/24  0724 11/24/24  2047 11/24/24  1611 11/24/24  1120 11/24/24  0728 11/23/24  2058 11/23/24  1635 11/23/24  1122 11/23/24  0700 11/22/24  2041 11/22/24  1614   POC GLUCOSE mg/dl 156* 202* 160* 192* 187* 182* 165* 173* 193* 198* 179* 149*     Results from last 7 days   Lab Units 11/21/24  0517   HEMOGLOBIN A1C % 6.4*     Results from last 7 days   Lab Units 11/23/24  0510 11/22/24  0458 11/21/24  0517 11/20/24  1337 11/20/24  1122   LACTIC ACID mmol/L  --   --   --  0.8  --    PROCALCITONIN ng/ml 0.31* 0.49* 0.59*  --  0.43*       Recent Cultures (last 7 days):   Results from last 7 days   Lab Units 11/20/24  1337   BLOOD CULTURE  No Growth After 4 Days.  No Growth After 4 Days.             Last 24 Hours Medication List:     Current Facility-Administered Medications:     acetaminophen (TYLENOL) tablet 650 mg, Q6H PRN    albuterol (PROVENTIL HFA,VENTOLIN HFA) inhaler 2 puff, Q6H PRN    amLODIPine (NORVASC) tablet 2.5 mg, Daily    budesonide-formoterol (SYMBICORT) 160-4.5 mcg/act inhaler 2 puff, BID    carvedilol (COREG) tablet 3.125 mg, BID With Meals    folic acid (FOLVITE) tablet 1 mg, Daily    gabapentin (NEURONTIN) capsule 400 mg, HS    guaiFENesin (MUCINEX) 12 hr tablet 600 mg, BID    insulin lispro (HumALOG/ADMELOG) 100 units/mL subcutaneous injection 1-5 Units, 4x Daily (AC & HS) **AND** Fingerstick Glucose (POCT), 4x Daily AC and at bedtime    ipratropium (ATROVENT) 0.02 % inhalation solution 0.5 mg, TID    levalbuterol (XOPENEX) inhalation solution 1.25 mg, TID **AND** [DISCONTINUED] sodium chloride 0.9 % inhalation solution 3 mL, TID    methylPREDNISolone sodium succinate (Solu-MEDROL) injection 40 mg, Q12H LISSETH    oxyCODONE-acetaminophen  (PERCOCET) 5-325 mg per tablet 0.5 tablet, Q6H PRN    pantoprazole (PROTONIX) EC tablet 40 mg, Early Morning    [START ON 11/26/2024] predniSONE tablet 2.5 mg, Daily    Administrative Statements   Today, Patient Was Seen By: STEVEN Joel      **Please Note: This note may have been constructed using a voice recognition system.**

## 2024-11-25 NOTE — UTILIZATION REVIEW
Initial Clinical Review    Admission: Date/Time/Statement:   Admission Orders (From admission, onward)       Ordered        11/20/24 1251  INPATIENT ADMISSION  Once                          Orders Placed This Encounter   Procedures    INPATIENT ADMISSION     Standing Status:   Standing     Number of Occurrences:   1     Level of Care:   Med Surg [16]     Estimated length of stay:   More than 2 Midnights     Certification:   I certify that inpatient services are medically necessary for this patient for a duration of greater than two midnights. See H&P and MD Progress Notes for additional information about the patient's course of treatment.     ED Arrival Information       Expected   -    Arrival   11/20/2024 10:50    Acuity   Emergent              Means of arrival   Ambulance    Escorted by   [facility]    Service   Hospitalist    Admission type   Emergency              Arrival complaint   SOB, dehydration             Chief Complaint   Patient presents with    Shortness of Breath     Pt c/o sob for a few days, with sore throat, decreased appetite, and weakness. Is on PO ABX but unsure why.        Initial Presentation:  89 yof to ER from home via EMS for persistent cough x 3 days with associated pleuritic chest pain. Hx rheumatoid arthritis, CKD, thrombocytopenia. Presents tachypneic, hypoxic in 80's, cough, decreased breath sounds. Admission CXR neg. CTA chest: Diffuse bronchial inflammation. No evidence of pneumonia. No evidence of acute pulmonary embolus. Labs: WBC 11.75, plt 92, na 133, gluc 170, procalcitonin 0.43, RSV+.  Admitted to inpatient status for acute respiratory failure 2nd RSV, O2 requirements @ 5ltr nc. Started on IVABT, IV steroids, nebs atc. Respiratory protocol, incentive spirometry, oscillatory positive expiratory pressure. Continuous pulse ox, titrate oxygen to maintain O2 sats 89% or greater    Anticipated Length of Stay/Certification Statement:   Patient will be admitted on an inpatient  basis with an anticipated length of stay of greater than 2 midnights secondary to acute respiratory failure.     Date: 11/21/24   Day 2:   Acute respiratory failure 2nd +RSV. Remains on IVABT, IV steroids, nebs atc. Lungs with decreased breath sounds bilaterally, upper airway congestion noted. Continuous pulse ox. Currently on 4.5L NC, titrate oxygen to maintain O2 sats 89% or greater. Patient reports she feels better than yesterday, states that she's having more of a cough with mucous. Reports decreased appetite and drinking but states that she will try to eat and drink water.         ED Treatment-Medication Administration from 11/20/2024 1050 to 11/20/2024 1359         Date/Time Order Dose Route Action     11/20/2024 1209 sodium chloride 0.9 % bolus 500 mL 500 mL Intravenous New Bag     11/20/2024 1348 cefepime (MAXIPIME) IVPB (premix in dextrose) 2,000 mg 50 mL 2,000 mg Intravenous New Bag            Scheduled Medications:  azithromycin, 500 mg, Oral, Q24H  budesonide-formoterol, 2 puff, Inhalation, BID  carvedilol, 3.125 mg, Oral, BID With Meals  cefTRIAXone, 1,000 mg, Intravenous, Q24H  folic acid, 1 mg, Oral, Daily  gabapentin, 400 mg, Oral, HS  guaiFENesin, 600 mg, Oral, BID  insulin lispro, 1-5 Units, Subcutaneous, 4x Daily (AC & HS)  ipratropium, 0.5 mg, Nebulization, TID  levalbuterol, 1.25 mg, Nebulization, TID  methylPREDNISolone sodium succinate, 40 mg, Intravenous, Q8H  pantoprazole, 40 mg, Oral, Early Morning      PRN Meds:  acetaminophen, 650 mg, Oral, Q6H PRN  albuterol, 2 puff, Inhalation, Q6H PRN  oxyCODONE-acetaminophen, 0.5 tablet, Oral, Q6H PRN      ED Triage Vitals   Temperature Pulse Respirations Blood Pressure SpO2 Pain Score   11/20/24 1059 11/20/24 1059 11/20/24 1059 11/20/24 1059 11/20/24 1059 11/20/24 1542   98.5 °F (36.9 °C) 78 (!) 32 161/68 (!) 84 % No Pain     Weight (last 2 days)       Date/Time Weight    11/25/24 0527 63.5 (140)            Vital Signs (last 3 days)       Date/Time  Temp Pulse Resp BP MAP (mmHg) SpO2 Calculated FIO2 (%) - Nasal Cannula Nasal Cannula O2 Flow Rate (L/min) O2 Device Patient Position - Orthostatic VS Pain    11/25/24 0743 -- -- -- -- -- 97 % 32 3 L/min Nasal cannula -- --    11/25/24 0723 -- -- -- -- -- 97 % 32 3 L/min Nasal cannula -- --    11/24/24 22:27:39 97.5 °F (36.4 °C) 60 16 171/70 104 95 % -- -- -- -- --    11/24/24 2105 -- -- -- -- -- -- -- -- -- -- 9    11/24/24 2000 -- -- -- -- -- -- 32 3 L/min Nasal cannula -- --    11/24/24 1925 -- -- -- -- -- -- 32 3 L/min Nasal cannula -- --    11/24/24 1908 97.6 °F (36.4 °C) 78 16 160/92 115 95 % -- -- -- -- --    11/24/24 15:32:12 97.2 °F (36.2 °C) 85 -- 184/92 123 93 % -- -- -- -- --    11/24/24 1335 -- -- -- -- -- 95 % 40 5 L/min Nasal cannula -- --    11/24/24 0830 -- -- -- -- -- -- -- -- -- -- No Pain    11/24/24 07:58:51 97.2 °F (36.2 °C) 64 16 166/84 111 96 % -- -- -- -- --    11/24/24 07:56:03 97.2 °F (36.2 °C) 65 16 166/84 111 98 % -- -- -- -- --    11/24/24 0743 -- -- -- -- -- 94 % 40 5 L/min Nasal cannula -- --    11/24/24 0020 -- -- -- -- -- 95 % 40 5 L/min Nasal cannula -- --    11/23/24 1937 -- -- -- -- -- -- 40 5 L/min Nasal cannula -- --    11/23/24 19:31:58 97.5 °F (36.4 °C) 74 16 165/85 112 97 % 40 5 L/min Nasal cannula Lying No Pain    11/23/24 19:09:16 97.7 °F (36.5 °C) 83 16 146/75 99 96 % -- -- -- -- --    11/23/24 17:40:50 -- 73 -- 144/77 99 94 % -- -- -- -- --    11/23/24 1405 -- -- -- -- -- 94 % -- -- -- -- --    11/23/24 08:36:09 97.3 °F (36.3 °C) 73 18 143/75 98 92 % -- -- Nasal cannula -- --    11/23/24 0731 -- 80 17 -- -- 95 % 32 3 L/min Nasal cannula -- --    11/22/24 22:17:17 97.4 °F (36.3 °C) -- -- 179/81 114 -- -- -- -- -- --    11/22/24 2100 -- -- -- -- -- -- 32 3 L/min Nasal cannula -- No Pain    11/22/24 2011 -- -- -- -- -- -- 32 3 L/min Nasal cannula -- --    11/22/24 15:21:50 97.9 °F (36.6 °C) 72 -- 155/68 97 94 % -- -- -- -- --    11/22/24 1328 -- -- -- -- -- 94 % 32 3 L/min  Nasal cannula -- --    11/22/24 1058 -- -- -- -- -- -- -- -- -- -- No Pain    11/22/24 0922 -- -- -- -- -- -- -- -- -- -- No Pain    11/22/24 09:00:17 97.6 °F (36.4 °C) 85 22 132/72 92 94 % 34 3.5 L/min Nasal cannula -- --    11/22/24 0900 -- 88 -- 132/72 -- -- -- -- -- -- --    11/22/24 0736 -- -- -- -- -- 94 % 34 3.5 L/min Nasal cannula -- --              Pertinent Labs/Diagnostic Test Results:   Radiology:  CTA chest pe study   Final Interpretation by Preston Rodriguez MD (11/20 1910)         1. Diffuse bronchial inflammation. No evidence of pneumonia.   2. No evidence of acute pulmonary embolus.                  Workstation performed: [id number]         XR chest 2 views   Final Interpretation by Haim Muller MD (11/20 5268)      No acute cardiopulmonary disease.            Workstation performed: [id number]           Cardiology:  No orders to display     GI:  No orders to display       Results from last 7 days   Lab Units 11/20/24  1446   SARS-COV-2  Not Detected     Results from last 7 days   Lab Units 11/24/24  0437 11/23/24  0510 11/22/24  0458 11/21/24  0517 11/20/24  1349 11/20/24  1122   WBC Thousand/uL 10.04 11.18* 12.34* 6.23  --  11.75*   HEMOGLOBIN g/dL 13.1 12.9 12.3 13.7  --  14.4   I STAT HEMOGLOBIN g/dl  --   --   --   --  12.2  --    HEMATOCRIT % 39.8 39.2 38.0 42.8  --  44.7   HEMATOCRIT, ISTAT %  --   --   --   --  36  --    PLATELETS Thousands/uL 86* 91* 83* 80*  --  92*   TOTAL NEUT ABS Thousands/µL 7.89*  --   --   --   --   --    BANDS PCT %  --  1  --   --   --  2         Results from last 7 days   Lab Units 11/24/24  0437 11/23/24  0510 11/22/24  0458 11/21/24  0517 11/20/24  1349 11/20/24  1122   SODIUM mmol/L 137 138 135 134*  --  133*   POTASSIUM mmol/L 4.1 3.9 3.2* 3.8  --  3.7   CHLORIDE mmol/L 105 107 104 103  --  100   CO2 mmol/L 27 26 26 23  --  27   CO2, I-STAT mmol/L  --   --   --   --  29  --    ANION GAP mmol/L 5 5 5 8  --  6   BUN mg/dL 27* 25 24 20  --  20  "  CREATININE mg/dL 0.74 0.82 0.81 0.91  --  0.86   EGFR ml/min/1.73sq m 69 61 62 54  --  58   CALCIUM mg/dL 9.4 9.4 9.1 9.5  --  9.6   CALCIUM, IONIZED, ISTAT mmol/L  --   --   --   --  1.33*  --    MAGNESIUM mg/dL 2.2 2.0  --  1.9  --   --      Results from last 7 days   Lab Units 11/20/24  1122   AST U/L 15   ALT U/L 7   ALK PHOS U/L 45   TOTAL PROTEIN g/dL 7.5   ALBUMIN g/dL 3.5   TOTAL BILIRUBIN mg/dL 0.68     Results from last 7 days   Lab Units 11/25/24  0724 11/24/24  2047 11/24/24  1611 11/24/24  1120 11/24/24  0728 11/23/24  2058 11/23/24  1635 11/23/24  1122 11/23/24  0700 11/22/24  2041 11/22/24  1614 11/22/24  1132   POC GLUCOSE mg/dl 202* 160* 192* 187* 182* 165* 173* 193* 198* 179* 149* 276*     Results from last 7 days   Lab Units 11/24/24  0437 11/23/24  0510 11/22/24  0458 11/21/24  0517 11/20/24  1122   GLUCOSE RANDOM mg/dL 193* 199* 193* 159* 170*         Results from last 7 days   Lab Units 11/21/24  0517   HEMOGLOBIN A1C % 6.4*   EAG mg/dl 137     No results found for: \"BETA-HYDROXYBUTYRATE\"           Results from last 7 days   Lab Units 11/20/24  1349   I STAT BASE EXC mmol/L 2   I STAT O2 SAT % 96*   ISTAT PH ART  7.372   I STAT ART PCO2 mm HG 47.0*   I STAT ART PO2 mm HG 85.0   I STAT ART HCO3 mmol/L 27.3                         Results from last 7 days   Lab Units 11/23/24  0510 11/22/24  0458 11/21/24  0517 11/20/24  1122   PROCALCITONIN ng/ml 0.31* 0.49* 0.59* 0.43*     Results from last 7 days   Lab Units 11/20/24  1337   LACTIC ACID mmol/L 0.8                                                     Results from last 7 days   Lab Units 11/20/24  1446   INFLUENZA B  Not Detected   RESPIRATORY SYNCYTIAL VIRUS  Detected*     Results from last 7 days   Lab Units 11/20/24  1446   ADENOVIRUS  Not Detected   BORDETELLA PARAPERTUSSIS  Not Detected   BORDETELLA PERTUSSIS  Not Detected   CHLAMYDIA PNEUMONIAE  Not Detected   CORONAVIRUS 229E  Not Detected   CORONAVIRUS HKU1  Not Detected   CORONAVIRUS " NL63  Not Detected   CORONAVIRUS OC43  Not Detected   METAPNEUMOVIRUS  Not Detected   RHINOVIRUS  Not Detected   MYCOPLASMA PNEUMONIAE  Not Detected   PARAINFLUENZA 1  Not Detected   PARAINFLUENZA 2  Not Detected   PARAINFLUENZA 3  Not Detected   PARAINFLUENZA 4  Not Detected                         Results from last 7 days   Lab Units 11/20/24  1337   BLOOD CULTURE  No Growth After 4 Days.  No Growth After 4 Days.                   Past Medical History:   Diagnosis Date    Arthritis     Carpal tunnel syndrome     Degeneration of lumbar intervertebral disc     Postmenopausal osteoporosis     Primary generalized (osteo)arthritis     Rheumatoid arthritis (HCC)     Right shoulder pain      Present on Admission:   Rheumatoid arthritis of multiple sites with negative rheumatoid factor (HCC)   Stage 3 chronic kidney disease, unspecified whether stage 3a or 3b CKD (HCC)   Thrombocytopenia (HCC)      Admitting Diagnosis: Shortness of breath [R06.02]  Dehydration [E86.0]  Pneumonia [J18.9]  Hypoxia [R09.02]  Age/Sex: 89 y.o. female    Network Utilization Review Department  ATTENTION: Please call with any questions or concerns to [phone] and carefully listen to the prompts so that you are directed to the right person. All voicemails are confidential.   For Discharge needs, contact Care Management DC Support Team at [phone] opt. 2  Send all requests for admission clinical reviews, approved or denied determinations and any other requests to dedicated fax number below belonging to the campus where the patient is receiving treatment. List of dedicated fax numbers for the Facilities:  FACILITY NAME UR FAX NUMBER   ADMISSION DENIALS (Administrative/Medical Necessity) [phone]   DISCHARGE SUPPORT TEAM (NETWORK) [phone]   PARENT CHILD HEALTH (Maternity/NICU/Pediatrics) [phone]   [facility] [phone]   [facility] [phone]   [facility] [phone]   [facility] [phone]   [facility] [phone]   [facility] [phone]   [facility] [phone]   [facility] [phone]   [facility] [phone]   [facility] [phone]   [facility] [phone]   [facility] [phone]       Date: 11/22/2024  Day 3: Has surpassed a 2nd midnight with active treatments and services.  Presents with persistent cough w pleuritic chest pain w coughing,   On exam Currently weaning NC on 3.5 L NC- no O2 @ baseline  Diagnosis/Plan      Acute hypoxic respiratory failure   On NC weaning on 3.5 L NC, non productive cough, WASSERMAN; scheduled nebs, IV Steroids, IV Rocephin  Suspected underlying COPD with likely exacerbation / 30-year history of smoking   Ambulatory pulse ox prior to discharge   Hyperglycemia  SSI  Thrombocytopenia  Daily CBC  RA  Continue gabapentin 400mg HS  PRN percocet 5-325mg 0.5mg tablet Q6H PRN  Prednisone 2.5mg daily, on hold while receiving IV solumedrol  IP CM DC coordination   Home with Home Health Care     Medications:   Scheduled Medications:  budesonide-formoterol, 2 puff, Inhalation, BID  carvedilol, 3.125 mg, Oral, BID With Meals  folic acid, 1 mg, Oral, Daily  gabapentin, 400 mg, Oral, HS  guaiFENesin, 600 mg, Oral, BID  insulin lispro, 1-5 Units, Subcutaneous, 4x Daily (AC & HS)  ipratropium, 0.5 mg, Nebulization, TID  levalbuterol, 1.25 mg, Nebulization, TID  methylPREDNISolone sodium succinate, 40 mg, Intravenous, Q12H LISSETH  pantoprazole, 40 mg, Oral, Early Morning  11/22 x1 IV =  cefTRIAXone (ROCEPHIN) IVPB (premix in dextrose) 1,000 mg 50 mL  Dose: 1,000 mg  Freq: Every 24 hours Route: IV  Last Dose: 1,000 mg (11/22/24 0851)  Start: 11/21/24 0800 End: 11/22/24  1026  Continuous IV Infusions:     PRN Meds:  acetaminophen, 650 mg, Oral, Q6H PRN  albuterol, 2 puff, Inhalation, Q6H PRN  oxyCODONE-acetaminophen, 0.5 tablet, Oral, Q6H PRN      Discharge Plan: TBD    Vital Signs (last 3 days)       Date/Time Temp Pulse Resp BP MAP (mmHg) SpO2 Calculated FIO2 (%) - Nasal Cannula Nasal Cannula O2 Flow Rate (L/min) O2 Device Patient Position - Orthostatic VS Pain    11/25/24 0743 -- -- -- -- -- 97 % 32 3 L/min Nasal cannula -- --    11/25/24 0723 -- -- -- -- -- 97 % 32 3 L/min Nasal cannula -- --    11/24/24 22:27:39 97.5 °F (36.4 °C) 60 16 171/70 104 95 % -- -- -- -- --    11/24/24 2105 -- -- -- -- -- -- -- -- -- -- 9    11/24/24 2000 -- -- -- -- -- -- 32 3 L/min Nasal cannula -- --    11/24/24 1925 -- -- -- -- -- -- 32 3 L/min Nasal cannula -- --    11/24/24 1908 97.6 °F (36.4 °C) 78 16 160/92 115 95 % -- -- -- -- --    11/24/24 15:32:12 97.2 °F (36.2 °C) 85 -- 184/92 123 93 % -- -- -- -- --    11/24/24 1335 -- -- -- -- -- 95 % 40 5 L/min Nasal cannula -- --    11/24/24 0830 -- -- -- -- -- -- -- -- -- -- No Pain    11/24/24 07:58:51 97.2 °F (36.2 °C) 64 16 166/84 111 96 % -- -- -- -- --    11/24/24 07:56:03 97.2 °F (36.2 °C) 65 16 166/84 111 98 % -- -- -- -- --    11/24/24 0743 -- -- -- -- -- 94 % 40 5 L/min Nasal cannula -- --    11/24/24 0020 -- -- -- -- -- 95 % 40 5 L/min Nasal cannula -- --    11/23/24 1937 -- -- -- -- -- -- 40 5 L/min Nasal cannula -- --    11/23/24 19:31:58 97.5 °F (36.4 °C) 74 16 165/85 112 97 % 40 5 L/min Nasal cannula Lying No Pain    11/23/24 19:09:16 97.7 °F (36.5 °C) 83 16 146/75 99 96 % -- -- -- -- --    11/23/24 17:40:50 -- 73 -- 144/77 99 94 % -- -- -- -- --    11/23/24 1405 -- -- -- -- -- 94 % -- -- -- -- --    11/23/24 08:36:09 97.3 °F (36.3 °C) 73 18 143/75 98 92 % -- -- Nasal cannula -- --    11/23/24 0731 -- 80 17 -- -- 95 % 32 3 L/min Nasal cannula -- --    11/22/24 22:17:17 97.4 °F (36.3 °C) -- -- 179/81 114 -- -- -- -- -- --    11/22/24 2100  -- -- -- -- -- -- 32 3 L/min Nasal cannula -- No Pain    11/22/24 2011 -- -- -- -- -- -- 32 3 L/min Nasal cannula -- --    11/22/24 15:21:50 97.9 °F (36.6 °C) 72 -- 155/68 97 94 % -- -- -- -- --    11/22/24 1328 -- -- -- -- -- 94 % 32 3 L/min Nasal cannula -- --    11/22/24 1058 -- -- -- -- -- -- -- -- -- -- No Pain    11/22/24 0922 -- -- -- -- -- -- -- -- -- -- No Pain    11/22/24 09:00:17 97.6 °F (36.4 °C) 85 22 132/72 92 94 % 34 3.5 L/min Nasal cannula -- --    11/22/24 0900 -- 88 -- 132/72 -- -- -- -- -- -- --    11/22/24 0736 -- -- -- -- -- 94 % 34 3.5 L/min Nasal cannula -- --          Weight (last 2 days)       Date/Time Weight    11/25/24 0527 63.5 (140)            Pertinent Labs/Diagnostic Results:   Radiology:  CTA chest pe study   Final Interpretation by Preston Rodriguez MD (11/20 1910)         1. Diffuse bronchial inflammation. No evidence of pneumonia.   2. No evidence of acute pulmonary embolus.                  Workstation performed: [id number]         XR chest 2 views   Final Interpretation by Haim Muller MD (11/20 1580)      No acute cardiopulmonary disease.            Workstation performed: [id number]           Cardiology:  No orders to display     GI:  No orders to display       Results from last 7 days   Lab Units 11/20/24  1446   SARS-COV-2  Not Detected     Results from last 7 days   Lab Units 11/24/24  0437 11/23/24  0510 11/22/24  0458 11/21/24  0517 11/20/24  1349 11/20/24  1122   WBC Thousand/uL 10.04 11.18* 12.34* 6.23  --  11.75*   HEMOGLOBIN g/dL 13.1 12.9 12.3 13.7  --  14.4   I STAT HEMOGLOBIN g/dl  --   --   --   --  12.2  --    HEMATOCRIT % 39.8 39.2 38.0 42.8  --  44.7   HEMATOCRIT, ISTAT %  --   --   --   --  36  --    PLATELETS Thousands/uL 86* 91* 83* 80*  --  92*   TOTAL NEUT ABS Thousands/µL 7.89*  --   --   --   --   --    BANDS PCT %  --  1  --   --   --  2         Results from last 7 days   Lab Units 11/24/24  0437 11/23/24  0510 11/22/24  0458 11/21/24  0517  "11/20/24  1349 11/20/24  1122   SODIUM mmol/L 137 138 135 134*  --  133*   POTASSIUM mmol/L 4.1 3.9 3.2* 3.8  --  3.7   CHLORIDE mmol/L 105 107 104 103  --  100   CO2 mmol/L 27 26 26 23  --  27   CO2, I-STAT mmol/L  --   --   --   --  29  --    ANION GAP mmol/L 5 5 5 8  --  6   BUN mg/dL 27* 25 24 20  --  20   CREATININE mg/dL 0.74 0.82 0.81 0.91  --  0.86   EGFR ml/min/1.73sq m 69 61 62 54  --  58   CALCIUM mg/dL 9.4 9.4 9.1 9.5  --  9.6   CALCIUM, IONIZED, ISTAT mmol/L  --   --   --   --  1.33*  --    MAGNESIUM mg/dL 2.2 2.0  --  1.9  --   --      Results from last 7 days   Lab Units 11/20/24  1122   AST U/L 15   ALT U/L 7   ALK PHOS U/L 45   TOTAL PROTEIN g/dL 7.5   ALBUMIN g/dL 3.5   TOTAL BILIRUBIN mg/dL 0.68     Results from last 7 days   Lab Units 11/25/24  0724 11/24/24  2047 11/24/24  1611 11/24/24  1120 11/24/24  0728 11/23/24  2058 11/23/24  1635 11/23/24  1122 11/23/24  0700 11/22/24  2041 11/22/24  1614 11/22/24  1132   POC GLUCOSE mg/dl 202* 160* 192* 187* 182* 165* 173* 193* 198* 179* 149* 276*     Results from last 7 days   Lab Units 11/24/24  0437 11/23/24  0510 11/22/24  0458 11/21/24  0517 11/20/24  1122   GLUCOSE RANDOM mg/dL 193* 199* 193* 159* 170*         Results from last 7 days   Lab Units 11/21/24  0517   HEMOGLOBIN A1C % 6.4*   EAG mg/dl 137     No results found for: \"BETA-HYDROXYBUTYRATE\"           Results from last 7 days   Lab Units 11/20/24  1349   I STAT BASE EXC mmol/L 2   I STAT O2 SAT % 96*   ISTAT PH ART  7.372   I STAT ART PCO2 mm HG 47.0*   I STAT ART PO2 mm HG 85.0   I STAT ART HCO3 mmol/L 27.3                         Results from last 7 days   Lab Units 11/23/24  0510 11/22/24  0458 11/21/24  0517 11/20/24  1122   PROCALCITONIN ng/ml 0.31* 0.49* 0.59* 0.43*     Results from last 7 days   Lab Units 11/20/24  1337   LACTIC ACID mmol/L 0.8                                                     Results from last 7 days   Lab Units 11/20/24  1446   INFLUENZA B  Not Detected "   RESPIRATORY SYNCYTIAL VIRUS  Detected*     Results from last 7 days   Lab Units 11/20/24  1446   ADENOVIRUS  Not Detected   BORDETELLA PARAPERTUSSIS  Not Detected   BORDETELLA PERTUSSIS  Not Detected   CHLAMYDIA PNEUMONIAE  Not Detected   CORONAVIRUS 229E  Not Detected   CORONAVIRUS HKU1  Not Detected   CORONAVIRUS NL63  Not Detected   CORONAVIRUS OC43  Not Detected   METAPNEUMOVIRUS  Not Detected   RHINOVIRUS  Not Detected   MYCOPLASMA PNEUMONIAE  Not Detected   PARAINFLUENZA 1  Not Detected   PARAINFLUENZA 2  Not Detected   PARAINFLUENZA 3  Not Detected   PARAINFLUENZA 4  Not Detected                         Results from last 7 days   Lab Units 11/20/24  1337   BLOOD CULTURE  No Growth After 4 Days.  No Growth After 4 Days.                   Network Utilization Review Department  ATTENTION: Please call with any questions or concerns to [phone] and carefully listen to the prompts so that you are directed to the right person. All voicemails are confidential.   For Discharge needs, contact Care Management DC Support Team at [phone] opt. [phone]  Send all requests for admission clinical reviews, approved or denied determinations and any other requests to dedicated fax number below belonging to the campus where the patient is receiving treatment. List of dedicated fax numbers for the Facilities:  FACILITY NAME UR FAX NUMBER   ADMISSION DENIALS (Administrative/Medical Necessity) [phone]   DISCHARGE SUPPORT TEAM (NETWORK) [phone]   PARENT CHILD HEALTH (Maternity/NICU/Pediatrics) [phone]   [facility] [phone]   [facility] [phone]   [facility] [phone]   [facility] [phone]   [facility] [phone]   [facility] [phone]   [facility] [phone]   [facility]  [phone]   [facility] [phone]   [facility] [phone]   [facility] [phone]   [facility] [phone]

## 2024-11-25 NOTE — PLAN OF CARE
Problem: PAIN - ADULT  Goal: Verbalizes/displays adequate comfort level or baseline comfort level  Description: Interventions:  - Encourage patient to monitor pain and request assistance  - Assess pain using appropriate pain scale  - Administer analgesics based on type and severity of pain and evaluate response  - Implement non-pharmacological measures as appropriate and evaluate response  - Consider cultural and social influences on pain and pain management  - Notify physician/advanced practitioner if interventions unsuccessful or patient reports new pain  11/24/2024 1947 by Archana Maldonado RN  Outcome: Progressing  11/24/2024 1946 by Archana Maldonado RN  Outcome: Progressing     Problem: INFECTION - ADULT  Goal: Absence or prevention of progression during hospitalization  Description: INTERVENTIONS:  - Assess and monitor for signs and symptoms of infection  - Monitor lab/diagnostic results  - Monitor all insertion sites, i.e. indwelling lines, tubes, and drains  - Monitor endotracheal if appropriate and nasal secretions for changes in amount and color  - [place] appropriate cooling/warming therapies per order  - Administer medications as ordered  - Instruct and encourage patient and family to use good hand hygiene technique  - Identify and instruct in appropriate isolation precautions for identified infection/condition  11/24/2024 1947 by Archana Maldonado RN  Outcome: Progressing  11/24/2024 1946 by Archana Maldonado RN  Outcome: Progressing     Problem: SAFETY ADULT  Goal: Patient will remain free of falls  Description: INTERVENTIONS:  - Educate patient/family on patient safety including physical limitations  - Instruct patient to call for assistance with activity   - Consult OT/PT to assist with strengthening/mobility   - Keep Call bell within reach  - Keep bed low and locked with side rails adjusted as appropriate  - Keep care items and personal belongings within reach  - Initiate and maintain comfort rounds  -  Make Fall Risk Sign visible to staff  - Offer Toileting every 2 Hours, in advance of need  - Initiate/Maintain bed/chair alarm  - Obtain necessary fall risk management equipment:bed/chair alarm   - Apply yellow socks and bracelet for high fall risk patients  - Consider moving patient to room near nurses station  11/24/2024 1947 by Archana Maldonado RN  Outcome: Progressing  11/24/2024 1946 by Archana Maldonado RN  Outcome: Progressing  Goal: Maintain or return to baseline ADL function  Description: INTERVENTIONS:  -  Assess patient's ability to carry out ADLs; assess patient's baseline for ADL function and identify physical deficits which impact ability to perform ADLs (bathing, care of mouth/teeth, toileting, grooming, dressing, etc.)  - Assess/evaluate cause of self-care deficits   - Assess range of motion  - Assess patient's mobility; develop plan if impaired  - Assess patient's need for assistive devices and provide as appropriate  - Encourage maximum independence but intervene and supervise when necessary  - Involve family in performance of ADLs  - Assess for home care needs following discharge   - Consider OT consult to assist with ADL evaluation and planning for discharge  - Provide patient education as appropriate  11/24/2024 1947 by Archana Maldonado RN  Outcome: Progressing  11/24/2024 1946 by Archana Maldonado RN  Outcome: Progressing  Goal: Maintains/Returns to pre admission functional level  Description: INTERVENTIONS:  - Perform AM-PAC 6 Click Basic Mobility/ Daily Activity assessment daily.  - Set and communicate daily mobility goal to care team and patient/family/caregiver.   - Collaborate with rehabilitation services on mobility goals if consulted  - Perform Range of Motion 3 times a day.  - Reposition patient every 3 hours.  - Dangle patient 3 times a day  - Stand patient 3 times a day  - Ambulate patient 3 times a day  - Out of bed to chair 3 times a day   - Out of bed for meals 3 times a day  - Out of bed  for toileting  - Record patient progress and toleration of activity level   11/24/2024 1947 by Archana Maldonado RN  Outcome: Progressing  11/24/2024 1946 by Archana Maldonado RN  Outcome: Progressing     Problem: DISCHARGE PLANNING  Goal: Discharge to home or other facility with appropriate resources  Description: INTERVENTIONS:  - Identify barriers to discharge w/patient and caregiver  - Arrange for needed discharge resources and transportation as appropriate  - Identify discharge learning needs (meds, wound care, etc.)  - Arrange for interpretive services to assist at discharge as needed  - Refer to Case Management Department for coordinating discharge planning if the patient needs post-hospital services based on physician/advanced practitioner order or complex needs related to functional status, cognitive ability, or social support system  11/24/2024 1947 by Archana Maldonado RN  Outcome: Progressing  11/24/2024 1946 by Archana Maldonado RN  Outcome: Progressing     Problem: Knowledge Deficit  Goal: Patient/family/caregiver demonstrates understanding of disease process, treatment plan, medications, and discharge instructions  Description: Complete learning assessment and assess knowledge base.  Interventions:  - Provide teaching at level of understanding  - Provide teaching via preferred learning methods  11/24/2024 1947 by Archana Maldonado RN  Outcome: Progressing  11/24/2024 1946 by Archana Maldonado RN  Outcome: Progressing     Problem: RESPIRATORY - ADULT  Goal: Achieves optimal ventilation and oxygenation  Description: INTERVENTIONS:  - Assess for changes in respiratory status  - Assess for changes in mentation and behavior  - Position to facilitate oxygenation and minimize respiratory effort  - Oxygen administered by appropriate delivery if ordered  - Initiate smoking cessation education as indicated  - Encourage broncho-pulmonary hygiene including cough, deep breathe, Incentive Spirometry  - Assess the need for suctioning  and aspirate as needed  - Assess and instruct to report SOB or any respiratory difficulty  - Respiratory Therapy support as indicated  11/24/2024 1947 by Archana Maldonado RN  Outcome: Progressing  11/24/2024 1946 by Archana Maldonado RN  Outcome: Progressing     Problem: Prexisting or High Potential for Compromised Skin Integrity  Goal: Skin integrity is maintained or improved  Description: INTERVENTIONS:  - Identify patients at risk for skin breakdown  - Assess and monitor skin integrity  - Assess and monitor nutrition and hydration status  - Monitor labs   - Assess for incontinence   - Turn and reposition patient  - Assist with mobility/ambulation  - Relieve pressure over bony prominences  - Avoid friction and shearing  - Provide appropriate hygiene as needed including keeping skin clean and dry  - Evaluate need for skin moisturizer/barrier cream  - Collaborate with interdisciplinary team   - Patient/family teaching  - Consider wound care consult   11/24/2024 1947 by Archana Maldonado RN  Outcome: Progressing  11/24/2024 1946 by Archana Maldonado RN  Outcome: Progressing

## 2024-11-25 NOTE — ASSESSMENT & PLAN NOTE
Patient presents to the ED for ongoing cough.  Reports that cough has been persistent over the last 3 days.  Denies any associated shortness of breath.  Reports some pleuritic chest pain with coughing, but is now resolved since arriving to the ED.  Denies any associated fevers/chills, recent sick contacts, nausea, vomiting, diarrhea, urinary complaints.  CXR (wet read) negative for acute cardiopulmonary process  CTA chest PE study with diffuse bronchial inflammation. No evidence of pneumonia. No evidence of acute pulmonary embolus  COVID/FLU negative  RP2 positive for RSV  Patient with 30 year smoking history, RSV positive   IV solumedrol 40mg TID titrated to BID   Xoponex/atrovent nebulizers  Ceftriaxone and azithromycin, follow infectious work up  Respiratory protocol, incentive spirometry, oscillatory positive expiratory pressure  Continuous pulse ox. Currently on 3L NC, titrate oxygen to maintain O2 sats 89% or greater  Ambulatory pulse ox prior to discharge  Discontinue IV Solu-Medrol  Restart home dose prednisone 2.5 mg daily

## 2024-11-25 NOTE — OCCUPATIONAL THERAPY NOTE
Occupational Therapy Cancel/ Refusal Note     Patient Name: Dennise Arnett  Today's Date: 11/25/2024  Problem List  Principal Problem:    Acute respiratory failure (HCC)  Active Problems:    Rheumatoid arthritis of multiple sites with negative rheumatoid factor (HCC)    Stage 3 chronic kidney disease, unspecified whether stage 3a or 3b CKD (HCC)    Thrombocytopenia (HCC)    SIRS (systemic inflammatory response syndrome) (HCC)    Hyperglycemia          11/25/24 1440   Note Type   Note Type Cancelled Session  (Monday 11/25/24)   Cancel Reasons Refusal  (Pt declined participation despite encouragement / education)   Licensure   [place] License Number  Selena Guthrie, OTR/L [id number]       Selena Guthrie, OTR/L  [id number]  [id number]

## 2024-11-25 NOTE — PLAN OF CARE
Problem: PAIN - ADULT  Goal: Verbalizes/displays adequate comfort level or baseline comfort level  Description: Interventions:  - Encourage patient to monitor pain and request assistance  - Assess pain using appropriate pain scale  - Administer analgesics based on type and severity of pain and evaluate response  - Implement non-pharmacological measures as appropriate and evaluate response  - Consider cultural and social influences on pain and pain management  - Notify physician/advanced practitioner if interventions unsuccessful or patient reports new pain  Outcome: Progressing     Problem: INFECTION - ADULT  Goal: Absence or prevention of progression during hospitalization  Description: INTERVENTIONS:  - Assess and monitor for signs and symptoms of infection  - Monitor lab/diagnostic results  - Monitor all insertion sites, i.e. indwelling lines, tubes, and drains  - Monitor endotracheal if appropriate and nasal secretions for changes in amount and color  - [place] appropriate cooling/warming therapies per order  - Administer medications as ordered  - Instruct and encourage patient and family to use good hand hygiene technique  - Identify and instruct in appropriate isolation precautions for identified infection/condition  Outcome: Progressing     Problem: SAFETY ADULT  Goal: Patient will remain free of falls  Description: INTERVENTIONS:  - Educate patient/family on patient safety including physical limitations  - Instruct patient to call for assistance with activity   - Consult OT/PT to assist with strengthening/mobility   - Keep Call bell within reach  - Keep bed low and locked with side rails adjusted as appropriate  - Keep care items and personal belongings within reach  - Initiate and maintain comfort rounds  - Make Fall Risk Sign visible to staff  - Offer Toileting every 2 Hours, in advance of need  - Initiate/Maintain bed/chair alarm  - Obtain necessary fall risk management equipment:bed/chair alarm   -  Apply yellow socks and bracelet for high fall risk patients  - Consider moving patient to room near nurses station  Outcome: Progressing  Goal: Maintain or return to baseline ADL function  Description: INTERVENTIONS:  -  Assess patient's ability to carry out ADLs; assess patient's baseline for ADL function and identify physical deficits which impact ability to perform ADLs (bathing, care of mouth/teeth, toileting, grooming, dressing, etc.)  - Assess/evaluate cause of self-care deficits   - Assess range of motion  - Assess patient's mobility; develop plan if impaired  - Assess patient's need for assistive devices and provide as appropriate  - Encourage maximum independence but intervene and supervise when necessary  - Involve family in performance of ADLs  - Assess for home care needs following discharge   - Consider OT consult to assist with ADL evaluation and planning for discharge  - Provide patient education as appropriate  Outcome: Progressing  Goal: Maintains/Returns to pre admission functional level  Description: INTERVENTIONS:  - Perform AM-PAC 6 Click Basic Mobility/ Daily Activity assessment daily.  - Set and communicate daily mobility goal to care team and patient/family/caregiver.   - Collaborate with rehabilitation services on mobility goals if consulted  - Perform Range of Motion 3 times a day.  - Reposition patient every 3 hours.  - Dangle patient 3 times a day  - Stand patient 3 times a day  - Ambulate patient 3 times a day  - Out of bed to chair 3 times a day   - Out of bed for meals 3 times a day  - Out of bed for toileting  - Record patient progress and toleration of activity level   Outcome: Progressing     Problem: DISCHARGE PLANNING  Goal: Discharge to home or other facility with appropriate resources  Description: INTERVENTIONS:  - Identify barriers to discharge w/patient and caregiver  - Arrange for needed discharge resources and transportation as appropriate  - Identify discharge learning  needs (meds, wound care, etc.)  - Arrange for interpretive services to assist at discharge as needed  - Refer to Case Management Department for coordinating discharge planning if the patient needs post-hospital services based on physician/advanced practitioner order or complex needs related to functional status, cognitive ability, or social support system  Outcome: Progressing     Problem: Knowledge Deficit  Goal: Patient/family/caregiver demonstrates understanding of disease process, treatment plan, medications, and discharge instructions  Description: Complete learning assessment and assess knowledge base.  Interventions:  - Provide teaching at level of understanding  - Provide teaching via preferred learning methods  Outcome: Progressing     Problem: RESPIRATORY - ADULT  Goal: Achieves optimal ventilation and oxygenation  Description: INTERVENTIONS:  - Assess for changes in respiratory status  - Assess for changes in mentation and behavior  - Position to facilitate oxygenation and minimize respiratory effort  - Oxygen administered by appropriate delivery if ordered  - Initiate smoking cessation education as indicated  - Encourage broncho-pulmonary hygiene including cough, deep breathe, Incentive Spirometry  - Assess the need for suctioning and aspirate as needed  - Assess and instruct to report SOB or any respiratory difficulty  - Respiratory Therapy support as indicated  Outcome: Progressing     Problem: Prexisting or High Potential for Compromised Skin Integrity  Goal: Skin integrity is maintained or improved  Description: INTERVENTIONS:  - Identify patients at risk for skin breakdown  - Assess and monitor skin integrity  - Assess and monitor nutrition and hydration status  - Monitor labs   - Assess for incontinence   - Turn and reposition patient  - Assist with mobility/ambulation  - Relieve pressure over bony prominences  - Avoid friction and shearing  - Provide appropriate hygiene as needed including keeping  skin clean and dry  - Evaluate need for skin moisturizer/barrier cream  - Collaborate with interdisciplinary team   - Patient/family teaching  - Consider wound care consult   Outcome: Progressing

## 2024-11-25 NOTE — ASSESSMENT & PLAN NOTE
Lab Results   Component Value Date    EGFR 71 11/25/2024    EGFR 69 11/24/2024    EGFR 61 11/23/2024    CREATININE 0.72 11/25/2024    CREATININE 0.74 11/24/2024    CREATININE 0.82 11/23/2024   Creatinine stable at baseline  Avoid hypotension and nephrotoxic agents  Monitor vitals per routine

## 2024-11-26 VITALS
HEART RATE: 69 BPM | BODY MASS INDEX: 24.8 KG/M2 | TEMPERATURE: 97.7 F | RESPIRATION RATE: 19 BRPM | SYSTOLIC BLOOD PRESSURE: 143 MMHG | OXYGEN SATURATION: 93 % | WEIGHT: 140 LBS | HEIGHT: 63 IN | DIASTOLIC BLOOD PRESSURE: 56 MMHG

## 2024-11-26 LAB
ERYTHROCYTE [DISTWIDTH] IN BLOOD BY AUTOMATED COUNT: 14.5 % (ref 11.6–15.1)
GLUCOSE SERPL-MCNC: 118 MG/DL (ref 65–140)
GLUCOSE SERPL-MCNC: 175 MG/DL (ref 65–140)
HCT VFR BLD AUTO: 39.7 % (ref 34.8–46.1)
HGB BLD-MCNC: 13 G/DL (ref 11.5–15.4)
MCH RBC QN AUTO: 31.3 PG (ref 26.8–34.3)
MCHC RBC AUTO-ENTMCNC: 32.7 G/DL (ref 31.4–37.4)
MCV RBC AUTO: 96 FL (ref 82–98)
NRBC BLD AUTO-RTO: 0 /100 WBCS
PLATELET # BLD AUTO: 91 THOUSANDS/UL (ref 149–390)
PMV BLD AUTO: 11.9 FL (ref 8.9–12.7)
RBC # BLD AUTO: 4.15 MILLION/UL (ref 3.81–5.12)
WBC # BLD AUTO: 18.65 THOUSAND/UL (ref 4.31–10.16)

## 2024-11-26 PROCEDURE — 82948 REAGENT STRIP/BLOOD GLUCOSE: CPT

## 2024-11-26 PROCEDURE — 94668 MNPJ CHEST WALL SBSQ: CPT

## 2024-11-26 PROCEDURE — 85027 COMPLETE CBC AUTOMATED: CPT

## 2024-11-26 PROCEDURE — 94640 AIRWAY INHALATION TREATMENT: CPT

## 2024-11-26 PROCEDURE — 94760 N-INVAS EAR/PLS OXIMETRY 1: CPT

## 2024-11-26 PROCEDURE — 94761 N-INVAS EAR/PLS OXIMETRY MLT: CPT

## 2024-11-26 PROCEDURE — 99239 HOSP IP/OBS DSCHRG MGMT >30: CPT | Performed by: NURSE PRACTITIONER

## 2024-11-26 RX ORDER — PREDNISONE 20 MG/1
TABLET ORAL
Qty: 13 TABLET | Refills: 0 | Status: SHIPPED | OUTPATIENT
Start: 2024-11-26 | End: 2024-12-07

## 2024-11-26 RX ORDER — GUAIFENESIN 600 MG/1
600 TABLET, EXTENDED RELEASE ORAL 2 TIMES DAILY
Qty: 60 TABLET | Refills: 0 | Status: SHIPPED | OUTPATIENT
Start: 2024-11-26

## 2024-11-26 RX ORDER — PREDNISONE 20 MG/1
40 TABLET ORAL DAILY
Status: DISCONTINUED | OUTPATIENT
Start: 2024-11-26 | End: 2024-11-26 | Stop reason: HOSPADM

## 2024-11-26 RX ADMIN — BUDESONIDE AND FORMOTEROL FUMARATE DIHYDRATE 2 PUFF: 160; 4.5 AEROSOL RESPIRATORY (INHALATION) at 10:31

## 2024-11-26 RX ADMIN — PANTOPRAZOLE SODIUM 40 MG: 40 TABLET, DELAYED RELEASE ORAL at 05:30

## 2024-11-26 RX ADMIN — OXYCODONE HYDROCHLORIDE AND ACETAMINOPHEN 0.5 TABLET: 5; 325 TABLET ORAL at 10:28

## 2024-11-26 RX ADMIN — FOLIC ACID 1 MG: 1 TABLET ORAL at 10:31

## 2024-11-26 RX ADMIN — IPRATROPIUM BROMIDE 0.5 MG: 0.5 SOLUTION RESPIRATORY (INHALATION) at 13:25

## 2024-11-26 RX ADMIN — LEVALBUTEROL HYDROCHLORIDE 1.25 MG: 1.25 SOLUTION RESPIRATORY (INHALATION) at 07:17

## 2024-11-26 RX ADMIN — LEVALBUTEROL HYDROCHLORIDE 1.25 MG: 1.25 SOLUTION RESPIRATORY (INHALATION) at 13:25

## 2024-11-26 RX ADMIN — AMLODIPINE BESYLATE 2.5 MG: 2.5 TABLET ORAL at 10:31

## 2024-11-26 RX ADMIN — PREDNISONE 40 MG: 20 TABLET ORAL at 12:04

## 2024-11-26 RX ADMIN — IPRATROPIUM BROMIDE 0.5 MG: 0.5 SOLUTION RESPIRATORY (INHALATION) at 07:17

## 2024-11-26 RX ADMIN — INSULIN LISPRO 1 UNITS: 100 INJECTION, SOLUTION INTRAVENOUS; SUBCUTANEOUS at 07:55

## 2024-11-26 RX ADMIN — CARVEDILOL 3.12 MG: 3.12 TABLET, FILM COATED ORAL at 08:04

## 2024-11-26 RX ADMIN — GUAIFENESIN 600 MG: 600 TABLET, EXTENDED RELEASE ORAL at 10:31

## 2024-11-26 NOTE — ASSESSMENT & PLAN NOTE
Lab Results   Component Value Date    EGFR 71 11/25/2024    EGFR 69 11/24/2024    EGFR 61 11/23/2024    CREATININE 0.72 11/25/2024    CREATININE 0.74 11/24/2024    CREATININE 0.82 11/23/2024   Creatinine stable at baseline  Follow-up outpatient PCP 1 to 2 weeks postdischarge

## 2024-11-26 NOTE — PLAN OF CARE
Problem: PAIN - ADULT  Goal: Verbalizes/displays adequate comfort level or baseline comfort level  Description: Interventions:  - Encourage patient to monitor pain and request assistance  - Assess pain using appropriate pain scale  - Administer analgesics based on type and severity of pain and evaluate response  - Implement non-pharmacological measures as appropriate and evaluate response  - Consider cultural and social influences on pain and pain management  - Notify physician/advanced practitioner if interventions unsuccessful or patient reports new pain  Outcome: Adequate for Discharge     Problem: INFECTION - ADULT  Goal: Absence or prevention of progression during hospitalization  Description: INTERVENTIONS:  - Assess and monitor for signs and symptoms of infection  - Monitor lab/diagnostic results  - Monitor all insertion sites, i.e. indwelling lines, tubes, and drains  - Monitor endotracheal if appropriate and nasal secretions for changes in amount and color  - [place] appropriate cooling/warming therapies per order  - Administer medications as ordered  - Instruct and encourage patient and family to use good hand hygiene technique  - Identify and instruct in appropriate isolation precautions for identified infection/condition  Outcome: Adequate for Discharge     Problem: SAFETY ADULT  Goal: Patient will remain free of falls  Description: INTERVENTIONS:  - Educate patient/family on patient safety including physical limitations  - Instruct patient to call for assistance with activity   - Consult OT/PT to assist with strengthening/mobility   - Keep Call bell within reach  - Keep bed low and locked with side rails adjusted as appropriate  - Keep care items and personal belongings within reach  - Initiate and maintain comfort rounds  - Make Fall Risk Sign visible to staff  - Offer Toileting every 2 Hours, in advance of need  - Initiate/Maintain bed/chair alarm  - Obtain necessary fall risk management  equipment:bed/chair alarm   - Apply yellow socks and bracelet for high fall risk patients  - Consider moving patient to room near nurses station  Outcome: Adequate for Discharge  Goal: Maintain or return to baseline ADL function  Description: INTERVENTIONS:  -  Assess patient's ability to carry out ADLs; assess patient's baseline for ADL function and identify physical deficits which impact ability to perform ADLs (bathing, care of mouth/teeth, toileting, grooming, dressing, etc.)  - Assess/evaluate cause of self-care deficits   - Assess range of motion  - Assess patient's mobility; develop plan if impaired  - Assess patient's need for assistive devices and provide as appropriate  - Encourage maximum independence but intervene and supervise when necessary  - Involve family in performance of ADLs  - Assess for home care needs following discharge   - Consider OT consult to assist with ADL evaluation and planning for discharge  - Provide patient education as appropriate  Outcome: Adequate for Discharge  Goal: Maintains/Returns to pre admission functional level  Description: INTERVENTIONS:  - Perform AM-PAC 6 Click Basic Mobility/ Daily Activity assessment daily.  - Set and communicate daily mobility goal to care team and patient/family/caregiver.   - Collaborate with rehabilitation services on mobility goals if consulted  - Perform Range of Motion 3 times a day.  - Reposition patient every 3 hours.  - Dangle patient 3 times a day  - Stand patient 3 times a day  - Ambulate patient 3 times a day  - Out of bed to chair 3 times a day   - Out of bed for meals 3 times a day  - Out of bed for toileting  - Record patient progress and toleration of activity level   Outcome: Adequate for Discharge     Problem: DISCHARGE PLANNING  Goal: Discharge to home or other facility with appropriate resources  Description: INTERVENTIONS:  - Identify barriers to discharge w/patient and caregiver  - Arrange for needed discharge resources and  transportation as appropriate  - Identify discharge learning needs (meds, wound care, etc.)  - Arrange for interpretive services to assist at discharge as needed  - Refer to Case Management Department for coordinating discharge planning if the patient needs post-hospital services based on physician/advanced practitioner order or complex needs related to functional status, cognitive ability, or social support system  Outcome: Adequate for Discharge     Problem: Knowledge Deficit  Goal: Patient/family/caregiver demonstrates understanding of disease process, treatment plan, medications, and discharge instructions  Description: Complete learning assessment and assess knowledge base.  Interventions:  - Provide teaching at level of understanding  - Provide teaching via preferred learning methods  Outcome: Adequate for Discharge     Problem: RESPIRATORY - ADULT  Goal: Achieves optimal ventilation and oxygenation  Description: INTERVENTIONS:  - Assess for changes in respiratory status  - Assess for changes in mentation and behavior  - Position to facilitate oxygenation and minimize respiratory effort  - Oxygen administered by appropriate delivery if ordered  - Initiate smoking cessation education as indicated  - Encourage broncho-pulmonary hygiene including cough, deep breathe, Incentive Spirometry  - Assess the need for suctioning and aspirate as needed  - Assess and instruct to report SOB or any respiratory difficulty  - Respiratory Therapy support as indicated  Outcome: Adequate for Discharge     Problem: Prexisting or High Potential for Compromised Skin Integrity  Goal: Skin integrity is maintained or improved  Description: INTERVENTIONS:  - Identify patients at risk for skin breakdown  - Assess and monitor skin integrity  - Assess and monitor nutrition and hydration status  - Monitor labs   - Assess for incontinence   - Turn and reposition patient  - Assist with mobility/ambulation  - Relieve pressure over bony  prominences  - Avoid friction and shearing  - Provide appropriate hygiene as needed including keeping skin clean and dry  - Evaluate need for skin moisturizer/barrier cream  - Collaborate with interdisciplinary team   - Patient/family teaching  - Consider wound care consult   Outcome: Adequate for Discharge

## 2024-11-26 NOTE — RESPIRATORY THERAPY NOTE
Home Oxygen Qualifying Test     Patient name: Dennise Arnett        : 1929   Date of Test:  2024  Diagnosis:    Home Oxygen Test:    Medicare Guidelines require item(s) 1-5 on all ambulatory patients or 1 and 2 on non-ambulatory patients.    1. Baseline SPO2 on Room Air at rest 88 %   If <= 88% on Room Air add O2 via NC to obtain SpO2 >=88%. If LPM needed, document LPM 3 needed to reach =>88%    SPO2 during exertion on Room Air N/A  %  During exertion monitor SPO2. If SPO2 increases >=89%, do not add supplemental oxygen    SPO2 on Oxygen at Rest 94 % at 3 LPM    SPO2 during exertion on Oxygen N/A % at N/A LPM    Test performed during exertion activity.      [x]  Supplemental Home Oxygen is indicated.    []  Client does not qualify for home oxygen.    Respiratory Additional Notes- patient unable to ambulate w/o wheelchair    Demetra Obando, RT

## 2024-11-26 NOTE — ASSESSMENT & PLAN NOTE
Patient presents to the ED for ongoing cough.  Reports that cough has been persistent over the last 3 days.  Denies any associated shortness of breath.  Reports some pleuritic chest pain with coughing, but is now resolved since arriving to the ED.  Denies any associated fevers/chills, recent sick contacts, nausea, vomiting, diarrhea, urinary complaints.  CXR negative for acute cardiopulmonary process  CTA chest PE study with diffuse bronchial inflammation. No evidence of pneumonia. No evidence of acute pulmonary embolus  COVID/FLU negative  RP2 positive for RSV  Patient with 30 year smoking history, RSV positive   IV solumedrol 40mg TID titrated to BID; will transition to prednisone taper 40 mg daily x 3 days, decreasing by 10 mg every third day until reach dose of 10 mg daily; thereafter resume home medication of prednisone 2.5 mg daily  Xoponex/atrovent nebulizers while hospitalized, resume albuterol, Symbicort and Anoro Ellipta on discharge  Ceftriaxone and azithromycin for 3 days  Continuous pulse ox. Currently on 3L NC, titrate oxygen to maintain O2 sats 89% or greater  Ambulatory pulse ox prior to discharge performed, patient qualified for 3 L nasal cannula  Case management aware, O2 delivered to room prior to discharge and arrangements will be made for delivery to the patient's home upon discharge  Patient feels improved, no fevers or chills.  Continues with cough, continue Mucinex and as needed cough syrup on discharge.  Follow-up PCP 1 to 2 weeks postdischarge

## 2024-11-26 NOTE — ASSESSMENT & PLAN NOTE
Per chart review, chronic  Platelet count 80  Likely exacerbation by SIRS. No signs/symptoms of bleeding  Platelet improving to 91  Follow-up outpatient PCP

## 2024-11-26 NOTE — CASE MANAGEMENT
Case Management Discharge Planning Note    Patient name Dennise Arnett  Location [address] [address]/[address] [address]-* MRN [id number]  : 1929 Date 2024       Current Admission Date: 2024  Current Admission Diagnosis:Acute respiratory failure (HCC)   Patient Active Problem List    Diagnosis Date Noted Date Diagnosed    Hypokalemia 2024     SIRS (systemic inflammatory response syndrome) (HCC) 2024     Acute respiratory failure (HCC) 2024     Hyperglycemia 2024     Thrombocytopenia (HCC) 2024     Stage 3 chronic kidney disease, unspecified whether stage 3a or 3b CKD (HCC) 2024     Rheumatoid arthritis of multiple sites with negative rheumatoid factor (HCC) 2017     Generalized osteoarthritis 2012       LOS (days): 6  Geometric Mean LOS (GMLOS) (days): 2.3  Days to GMLOS:-3.6     OBJECTIVE:  Risk of Unplanned Readmission Score: 12.5         Current admission status: Inpatient   Preferred Pharmacy:   [facility] [facility] [address] [email or web address]  [place] [address]  Phone: [phone] Fax: [phone]    Primary Care Provider: Verna Ybarra MD    Primary Insurance: [employer] REP  Secondary Insurance:     DISCHARGE DETAILS:    Discharge planning discussed with:: Patient, Son Diaz  Freedom of Choice: Yes  Comments - Freedom of Choice: No choice for DME provider.  CM contacted family/caregiver?: Yes  Were Treatment Team discharge recommendations reviewed with patient/caregiver?: Yes  Did patient/caregiver verbalize understanding of patient care needs?: Yes  Were patient/caregiver advised of the risks associated with not following Treatment Team discharge recommendations?: Yes    Contacts  Relationship to Patient:: Family  Contact Method: Phone  Phone Number: [phone]  Reason/Outcome: Continuity of Care, Emergency Contact, Discharge Planning     DME Referral Provided  Referral made for DME?: Yes  DME referral  completed for the following items:: Home Oxygen concentrator, Portable Oxygen concentrator  DME Supplier Name:: [facility] [place]    Other Referral/Resources/Interventions Provided:  Interventions: DME  Referral Comments: Order submitted via [place] to [facility] [place] for home oxygen. Approval/delivery pending. Son aware to expect call from [facility] [place].     Treatment Team Recommendation: Home with Home Health Care  Discharge Destination Plan:: Home  Transport at Discharge : Family

## 2024-11-26 NOTE — PLAN OF CARE
Problem: PAIN - ADULT  Goal: Verbalizes/displays adequate comfort level or baseline comfort level  Description: Interventions:  - Encourage patient to monitor pain and request assistance  - Assess pain using appropriate pain scale  - Administer analgesics based on type and severity of pain and evaluate response  - Implement non-pharmacological measures as appropriate and evaluate response  - Consider cultural and social influences on pain and pain management  - Notify physician/advanced practitioner if interventions unsuccessful or patient reports new pain  Outcome: Progressing     Problem: INFECTION - ADULT  Goal: Absence or prevention of progression during hospitalization  Description: INTERVENTIONS:  - Assess and monitor for signs and symptoms of infection  - Monitor lab/diagnostic results  - Monitor all insertion sites, i.e. indwelling lines, tubes, and drains  - Monitor endotracheal if appropriate and nasal secretions for changes in amount and color  - [place] appropriate cooling/warming therapies per order  - Administer medications as ordered  - Instruct and encourage patient and family to use good hand hygiene technique  - Identify and instruct in appropriate isolation precautions for identified infection/condition  Outcome: Progressing     Problem: SAFETY ADULT  Goal: Patient will remain free of falls  Description: INTERVENTIONS:  - Educate patient/family on patient safety including physical limitations  - Instruct patient to call for assistance with activity   - Consult OT/PT to assist with strengthening/mobility   - Keep Call bell within reach  - Keep bed low and locked with side rails adjusted as appropriate  - Keep care items and personal belongings within reach  - Initiate and maintain comfort rounds  - Make Fall Risk Sign visible to staff  - Offer Toileting every 2 Hours, in advance of need  - Initiate/Maintain bed/chair alarm  - Obtain necessary fall risk management equipment:bed/chair alarm   -  Apply yellow socks and bracelet for high fall risk patients  - Consider moving patient to room near nurses station  Outcome: Progressing  Goal: Maintain or return to baseline ADL function  Description: INTERVENTIONS:  -  Assess patient's ability to carry out ADLs; assess patient's baseline for ADL function and identify physical deficits which impact ability to perform ADLs (bathing, care of mouth/teeth, toileting, grooming, dressing, etc.)  - Assess/evaluate cause of self-care deficits   - Assess range of motion  - Assess patient's mobility; develop plan if impaired  - Assess patient's need for assistive devices and provide as appropriate  - Encourage maximum independence but intervene and supervise when necessary  - Involve family in performance of ADLs  - Assess for home care needs following discharge   - Consider OT consult to assist with ADL evaluation and planning for discharge  - Provide patient education as appropriate  Outcome: Progressing  Goal: Maintains/Returns to pre admission functional level  Description: INTERVENTIONS:  - Perform AM-PAC 6 Click Basic Mobility/ Daily Activity assessment daily.  - Set and communicate daily mobility goal to care team and patient/family/caregiver.   - Collaborate with rehabilitation services on mobility goals if consulted  - Perform Range of Motion 3 times a day.  - Reposition patient every 3 hours.  - Dangle patient 3 times a day  - Stand patient 3 times a day  - Ambulate patient 3 times a day  - Out of bed to chair 3 times a day   - Out of bed for meals 3 times a day  - Out of bed for toileting  - Record patient progress and toleration of activity level   Outcome: Progressing     Problem: DISCHARGE PLANNING  Goal: Discharge to home or other facility with appropriate resources  Description: INTERVENTIONS:  - Identify barriers to discharge w/patient and caregiver  - Arrange for needed discharge resources and transportation as appropriate  - Identify discharge learning  needs (meds, wound care, etc.)  - Arrange for interpretive services to assist at discharge as needed  - Refer to Case Management Department for coordinating discharge planning if the patient needs post-hospital services based on physician/advanced practitioner order or complex needs related to functional status, cognitive ability, or social support system  Outcome: Progressing     Problem: Knowledge Deficit  Goal: Patient/family/caregiver demonstrates understanding of disease process, treatment plan, medications, and discharge instructions  Description: Complete learning assessment and assess knowledge base.  Interventions:  - Provide teaching at level of understanding  - Provide teaching via preferred learning methods  Outcome: Progressing     Problem: RESPIRATORY - ADULT  Goal: Achieves optimal ventilation and oxygenation  Description: INTERVENTIONS:  - Assess for changes in respiratory status  - Assess for changes in mentation and behavior  - Position to facilitate oxygenation and minimize respiratory effort  - Oxygen administered by appropriate delivery if ordered  - Initiate smoking cessation education as indicated  - Encourage broncho-pulmonary hygiene including cough, deep breathe, Incentive Spirometry  - Assess the need for suctioning and aspirate as needed  - Assess and instruct to report SOB or any respiratory difficulty  - Respiratory Therapy support as indicated  Outcome: Progressing     Problem: Prexisting or High Potential for Compromised Skin Integrity  Goal: Skin integrity is maintained or improved  Description: INTERVENTIONS:  - Identify patients at risk for skin breakdown  - Assess and monitor skin integrity  - Assess and monitor nutrition and hydration status  - Monitor labs   - Assess for incontinence   - Turn and reposition patient  - Assist with mobility/ambulation  - Relieve pressure over bony prominences  - Avoid friction and shearing  - Provide appropriate hygiene as needed including keeping  skin clean and dry  - Evaluate need for skin moisturizer/barrier cream  - Collaborate with interdisciplinary team   - Patient/family teaching  - Consider wound care consult   Outcome: Progressing

## 2024-11-26 NOTE — ASSESSMENT & PLAN NOTE
on admission labs  No history of diabetes  Likely in the setting of chronic steroid use  HA1C 6.4  Follow-up outpatient PCP 1 to 2 weeks postdischarge

## 2024-11-26 NOTE — ASSESSMENT & PLAN NOTE
Follows outpatient with Rheumatology  Continue gabapentin 400mg HS  PRN percocet 5-325mg 0.5mg tablet Q6H PRN  Prednisone 2.5mg daily to resume after patient's prednisone taper as outlined above

## 2024-11-26 NOTE — NURSING NOTE
AVS printed and reviewed with the patient and her son at the bedside. Opportunity for questions was provided.The patient is discharged to home in stable condition with a rolling walker and oxygen concentrator. She is accompanied by her son and daughter in law.

## 2024-11-26 NOTE — ASSESSMENT & PLAN NOTE
POA. Meeting SIRS criteria for tachypnea and leukocytosis  CXR without acute cardiopulmonary disease  Lactic acid WNL  Procalcitonin trends: 0.43 -> 0.59-0.49-0.31  COVID/FLU negative  RP2 positive for RSV  Negative BC x 2  Negative for fever.  WBC elevated 18.65, suspect secondary to steroid usage  Patient reports that she feels much better and wishes to go home today, when asked if she wanted to stay an additional day she was adamant about going home today  We discussed prednisone taper which she is agreeable to  Follow-up PCP 1 to 2 weeks postdischarge

## 2024-11-26 NOTE — DISCHARGE SUMMARY
Discharge Summary - Hospitalist   Name: Dennise Arnett 89 y.o. female I MRN: [id number]  Unit/Bed#: [address] [place] [address] I Date of Admission: 11/20/2024   Date of Service: 11/26/2024 I Hospital Day: 6     Assessment & Plan  Acute respiratory failure (HCC)  Patient presents to the ED for ongoing cough.  Reports that cough has been persistent over the last 3 days.  Denies any associated shortness of breath.  Reports some pleuritic chest pain with coughing, but is now resolved since arriving to the ED.  Denies any associated fevers/chills, recent sick contacts, nausea, vomiting, diarrhea, urinary complaints.  CXR negative for acute cardiopulmonary process  CTA chest PE study with diffuse bronchial inflammation. No evidence of pneumonia. No evidence of acute pulmonary embolus  COVID/FLU negative  RP2 positive for RSV  Patient with 30 year smoking history, RSV positive   IV solumedrol 40mg TID titrated to BID; will transition to prednisone taper 40 mg daily x 3 days, decreasing by 10 mg every third day until reach dose of 10 mg daily; thereafter resume home medication of prednisone 2.5 mg daily  Xoponex/atrovent nebulizers while hospitalized, resume albuterol, Symbicort and Anoro Ellipta on discharge  Ceftriaxone and azithromycin for 3 days  Continuous pulse ox. Currently on 3L NC, titrate oxygen to maintain O2 sats 89% or greater  Ambulatory pulse ox prior to discharge performed, patient qualified for 3 L nasal cannula  Case management aware, O2 delivered to room prior to discharge and arrangements will be made for delivery to the patient's home upon discharge  Patient feels improved, no fevers or chills.  Continues with cough, continue Mucinex and as needed cough syrup on discharge.  Follow-up PCP 1 to 2 weeks postdischarge  SIRS (systemic inflammatory response syndrome) (HCC)  POA. Meeting SIRS criteria for tachypnea and leukocytosis  CXR without acute cardiopulmonary disease  Lactic acid WNL  Procalcitonin trends: 0.43  -> 0.59-0.49-0.31  COVID/FLU negative  RP2 positive for RSV  Negative BC x 2  Negative for fever.  WBC elevated 18.65, suspect secondary to steroid usage  Patient reports that she feels much better and wishes to go home today, when asked if she wanted to stay an additional day she was adamant about going home today  We discussed prednisone taper which she is agreeable to  Follow-up PCP 1 to 2 weeks postdischarge  Rheumatoid arthritis of multiple sites with negative rheumatoid factor (HCC)  Follows outpatient with Rheumatology  Continue gabapentin 400mg HS  PRN percocet 5-325mg 0.5mg tablet Q6H PRN  Prednisone 2.5mg daily to resume after patient's prednisone taper as outlined above  Stage 3 chronic kidney disease, unspecified whether stage 3a or 3b CKD ([facility])  Lab Results   Component Value Date    EGFR 71 11/25/2024    EGFR 69 11/24/2024    EGFR 61 11/23/2024    CREATININE 0.72 11/25/2024    CREATININE 0.74 11/24/2024    CREATININE 0.82 11/23/2024   Creatinine stable at baseline  Follow-up outpatient PCP 1 to 2 weeks postdischarge  Thrombocytopenia ([facility])  Per chart review, chronic  Platelet count 80  Likely exacerbation by SIRS. No signs/symptoms of bleeding  Platelet improving to 91  Follow-up outpatient PCP  Hyperglycemia   on admission labs  No history of diabetes  Likely in the setting of chronic steroid use  HA1C 6.4  Follow-up outpatient PCP 1 to 2 weeks postdischarge     Medical Problems       Resolved Problems  Date Reviewed: 11/26/2024   None       Discharging Physician / Practitioner: STEVEN Blanco  PCP: Verna Ybarra MD  Admission Date:   Admission Orders (From admission, onward)       Ordered        11/20/24 1251  INPATIENT ADMISSION  Once                          Discharge Date: 11/26/24    Consultations During Hospital Stay:  Respiratory   PT and OT     Procedures Performed:   To evaluation performed by respiratory which patient did qualify for 3 L nasal cannula,  arrangements made for delivery prior to discharge    Significant Findings / Test Results:   Chest x-ray performed 11/20/2024 showed no acute cardiopulmonary disease.  CTA of chest PE study performed 11/20/2024 shows diffuse bronchial inflammation, no evidence of pneumonia, no evidence of acute pulmonary embolus as per radiology report.    Incidental Findings:   None      Test Results Pending at Discharge (will require follow up):   None     Outpatient Tests Requested:  None    Complications: None    Reason for Admission: Cough    Hospital Course:   Dennise Arnett is a 89 y.o. female patient PMH of rheumatoid arthritis, CKD, thrombocytopenia who originally presented to the hospital on 11/20/2024 due to ongoing cough.  Reports cough has been persistent over the past 3 days.  Denies associated shortness of breath.  Reports some pleuritic chest pain with coughing which resolved since arriving to the ED.  Patient was noted to have hypoxia requiring oxygen.  She was admitted for further evaluation and treatment.  She tested positive for RSV, had received 3 days of IV antibiotics.  Procalcitonin trending down.  Patient had also been placed on Solu-Medrol IV for suspected COPD exacerbation.  She is transition to oral prednisone taper starting at 40 mg p.o. daily x 3 days and taper by 10 mg every third day until she reaches the third day of 10 mg.  At that time she will decrease back to her normal home dose of prednisone 2.5 mg daily for her RA.  Home O2 evaluation was performed prior to discharge, she does qualify for O2 3 L nasal cannula.  Case management made aware, arrangements made for delivery of oxygen prior to discharge and to the patient's home.  She is advised to follow-up with her primary care physician 1 to 2 weeks postdischarge.  This was discussed with the patient at the bedside, she verbalized understanding and is agreeable to the plan.          Please see above list of diagnoses and related plan for  "additional information.     Condition at Discharge: fair    Discharge Day Visit / Exam:   Subjective:    \"I am feeling much better and I am going home today\".  Patient reports that she slept okay.  We talked about her breathing, she feels it is improved, still does have cough, is agreeable to having oxygen at home but stated \"I do not want 1 of those big tanks I want the concentrator\".  Good appetite no nausea or vomiting.  She does continue with wheeze, she reports that it only acts up when she is moving around a lot, and feels it is much less than when she first came in.  We discussed continuing with a prednisone taper on discharge which she is agreeable to.    Vitals: Blood Pressure: 143/56 (11/26/24 0759)  Pulse: 69 (11/26/24 1028)  Temperature: 97.7 °F (36.5 °C) (11/26/24 0759)  Temp Source: Oral (11/26/24 0759)  Respirations: 19 (11/26/24 0759)  Height: 5' 3\" (160 cm) (11/20/24 1418)  Weight - Scale: 63.5 kg (140 lb) (11/25/24 0540)  SpO2: 93 % (11/26/24 1325)    Physical Exam  Vitals and nursing note reviewed.   Constitutional:       Appearance: Normal appearance.   HENT:      Head: Normocephalic.      Nose: Nose normal.      Mouth/Throat:      Mouth: Mucous membranes are moist.   Eyes:      Extraocular Movements: Extraocular movements intact.      Conjunctiva/sclera: Conjunctivae normal.      Pupils: Pupils are equal, round, and reactive to light.   Cardiovascular:      Rate and Rhythm: Normal rate and regular rhythm.      Pulses: Normal pulses.      Heart sounds: Murmur heard.   Pulmonary:      Effort: Pulmonary effort is normal. No respiratory distress.      Breath sounds: Wheezing and rhonchi present.      Comments: Has cough at times   Abdominal:      General: Bowel sounds are normal. There is no distension.      Palpations: Abdomen is soft.      Tenderness: There is no abdominal tenderness.   Genitourinary:     Comments: Voiding spontaneously   Musculoskeletal:      Cervical back: Normal range of " motion.      Right lower leg: No edema.      Left lower leg: No edema.      Comments: Reports using wheelchair at home    Skin:     General: Skin is warm and dry.      Capillary Refill: Capillary refill takes less than 2 seconds.   Neurological:      General: No focal deficit present.      Mental Status: She is alert and oriented to person, place, and time.   Psychiatric:         Mood and Affect: Mood normal.         Behavior: Behavior normal.         Thought Content: Thought content normal.         Judgment: Judgment normal.          Discussion with Family: grandson     Discharge instructions/Information to patient and family:   See after visit summary for information provided to patient and family.      Provisions for Follow-Up Care:  See after visit summary for information related to follow-up care and any pertinent home health orders.      Mobility at time of Discharge:   Basic Mobility Inpatient Raw Score: 12  JH-HLM Goal: 4: Move to chair/commode  JH-HLM Achieved: 4: Move to chair/commode  HLM Goal achieved. Continue to encourage appropriate mobility.     Disposition:   Home    Planned Readmission: No     Discharge Medications:  See after visit summary for reconciled discharge medications provided to patient and/or family.      Administrative Statements   Discharge Statement:  I have spent a total time of greater than 45 minutes in caring for this patient on the day of the visit/encounter. >30 minutes of time was spent on: Diagnostic results, Counseling / Coordination of care, Documenting in the medical record, Reviewing / ordering tests, medicine, procedures  , and Communicating with other healthcare professionals .    **Please Note: This note may have been constructed using a voice recognition system**

## 2024-11-27 ENCOUNTER — PATIENT OUTREACH (OUTPATIENT)
Dept: CASE MANAGEMENT | Facility: HOSPITAL | Age: 89
End: 2024-11-27

## 2024-11-27 ENCOUNTER — TRANSITIONAL CARE MANAGEMENT (OUTPATIENT)
Dept: FAMILY MEDICINE CLINIC | Facility: CLINIC | Age: 89
End: 2024-11-27

## 2024-11-27 DIAGNOSIS — Z71.89 COMPLEX CARE COORDINATION: Primary | ICD-10-CM

## 2024-11-27 LAB
DME PARACHUTE DELIVERY DATE ACTUAL: NORMAL
DME PARACHUTE DELIVERY DATE ACTUAL: NORMAL
DME PARACHUTE DELIVERY DATE REQUESTED: NORMAL
DME PARACHUTE DELIVERY DATE REQUESTED: NORMAL
DME PARACHUTE ITEM DESCRIPTION: NORMAL
DME PARACHUTE ORDER STATUS: NORMAL
DME PARACHUTE ORDER STATUS: NORMAL
DME PARACHUTE SUPPLIER NAME: NORMAL
DME PARACHUTE SUPPLIER NAME: NORMAL
DME PARACHUTE SUPPLIER PHONE: NORMAL
DME PARACHUTE SUPPLIER PHONE: NORMAL

## 2024-11-27 NOTE — UTILIZATION REVIEW
NOTIFICATION OF ADMISSION DISCHARGE   This is a Notification of Discharge from [facility]. Please be advised that this patient has been discharge from our facility. Below you will find the admission and discharge date and time including the patient’s disposition.   UTILIZATION REVIEW CONTACT:  Irena Ramey  Utilization   Network Utilization Review Department  Phone: [phone] x carefully listen to the prompts. All voicemails are confidential.  Email: [email or web address]     ADMISSION INFORMATION  PRESENTATION DATE: 11/20/2024 10:54 AM  OBERVATION ADMISSION DATE: N/A  INPATIENT ADMISSION DATE: 11/20/24 12:51 PM   DISCHARGE DATE: 11/26/2024  1:30 PM   DISPOSITION:Home/Self Care    Network Utilization Review Department  ATTENTION: Please call with any questions or concerns to [phone] and carefully listen to the prompts so that you are directed to the right person. All voicemails are confidential.   For Discharge needs, contact Care Management DC Support Team at [phone] opt. 2  Send all requests for admission clinical reviews, approved or denied determinations and any other requests to dedicated fax number below belonging to the campus where the patient is receiving treatment. List of dedicated fax numbers for the Facilities:  FACILITY NAME UR FAX NUMBER   ADMISSION DENIALS (Administrative/Medical Necessity) [phone]   DISCHARGE SUPPORT TEAM ([facility]) [phone]   PARENT CHILD HEALTH (Maternity/NICU/Pediatrics) [phone]   [facility] [phone]   [facility] [phone]   [facility] [phone]   [facility] [phone]   [facility] [phone]   [facility] [phone]   [facility] [phone]   [facility]  [phone]   [facility] [phone]   [facility] [phone]   [facility] [phone]   [facility] [phone]

## 2024-11-27 NOTE — PROGRESS NOTES
HRR referral received via IB. Chart reviewed. Patient admitted to IP at [facility] with PNA 11/20/24-11/26/24. Presented with c/o cough x 3 days w/associated pleuritic cp with coughing. Patient also with suspected COPD exacerbation. Patient treated with IV Solumedrol and Prednisone taper at [facility]. Patient had O2 study before discharge and qualified for home O2. Per notes RW also ordered for patient. Patient was to get HHC at discharge but family cancelled due to OOP cost. Patient discharged home with son Diaz and family. Patient received O2 through [employer] and RW through [employer].    Call placed to patients heri Perales. No answer and no option to leave message.     RNCM to f/u next week.

## 2024-11-30 ENCOUNTER — RA CDI HCC (OUTPATIENT)
Dept: OTHER | Facility: HOSPITAL | Age: 89
End: 2024-11-30

## 2024-12-05 ENCOUNTER — PATIENT OUTREACH (OUTPATIENT)
Dept: CASE MANAGEMENT | Facility: HOSPITAL | Age: 89
End: 2024-12-05

## 2024-12-05 NOTE — PROGRESS NOTES
HRR referral follow up. Chart reviewed. New home O2 through [employer] and RW from [employer] at discharge on 11/2624.  PCP [facility] apt scheduled 12/12/24  Call placed to patients son Diaz.   Diaz states patient is doing okay. She has her good and her bad days. She is in a WC at this time but is able to go to the bathroom for toileting. She is not up to using a walker yet.   Patient has the O2 that is ordered and he says they have had no issues. He denies any questions or concerns regarding.   Diaz says he manages patient medications and she takes them as directed. We reviewed AVS and medications. He denies any questions or concerns at this time. He declined the need for CCM services but agreed to take down my information and will call if he has any questions or concerns in the future.

## 2024-12-06 DIAGNOSIS — M05.79 RHEUMATOID ARTHRITIS INVOLVING MULTIPLE SITES WITH POSITIVE RHEUMATOID FACTOR (HCC): ICD-10-CM

## 2024-12-09 RX ORDER — FOLIC ACID 1 MG/1
TABLET ORAL DAILY
Qty: 90 TABLET | Refills: 1 | Status: SHIPPED | OUTPATIENT
Start: 2024-12-09

## 2024-12-12 ENCOUNTER — TELEPHONE (OUTPATIENT)
Dept: FAMILY MEDICINE CLINIC | Facility: CLINIC | Age: 89
End: 2024-12-12

## 2024-12-12 ENCOUNTER — OFFICE VISIT (OUTPATIENT)
Dept: FAMILY MEDICINE CLINIC | Facility: CLINIC | Age: 89
End: 2024-12-12
Payer: COMMERCIAL

## 2024-12-12 VITALS — HEART RATE: 53 BPM | OXYGEN SATURATION: 97 % | SYSTOLIC BLOOD PRESSURE: 152 MMHG | DIASTOLIC BLOOD PRESSURE: 73 MMHG

## 2024-12-12 DIAGNOSIS — B33.8 RSV INFECTION: ICD-10-CM

## 2024-12-12 DIAGNOSIS — R53.1 GENERALIZED WEAKNESS: ICD-10-CM

## 2024-12-12 DIAGNOSIS — M06.09 RHEUMATOID ARTHRITIS OF MULTIPLE SITES WITH NEGATIVE RHEUMATOID FACTOR (HCC): ICD-10-CM

## 2024-12-12 DIAGNOSIS — R09.02 HYPOXIA: Primary | ICD-10-CM

## 2024-12-12 DIAGNOSIS — R73.09 ABNORMAL GLUCOSE: ICD-10-CM

## 2024-12-12 PROCEDURE — 99496 TRANSJ CARE MGMT HIGH F2F 7D: CPT | Performed by: FAMILY MEDICINE

## 2024-12-12 NOTE — Clinical Note
Please see how the patient is feeling if they were able to wean her off of oxygen.  I recommended a slow wean

## 2024-12-12 NOTE — PROGRESS NOTES
Transition of Care Visit  Name: Dennise Arnett      : 1929      MRN: [id number]  Encounter Provider: Verna Ybarra MD  Encounter Date: 2024   Encounter department: [facility]    Assessment & Plan  Hypoxia    Orders:  •  EXTERNAL Referral to Home Health; Future  •  CBC and differential; Future    RSV infection    Orders:  •  EXTERNAL Referral to Home Health; Future  •  CBC and differential; Future    Generalized weakness    Orders:  •  EXTERNAL Referral to Home Health; Future  •  CBC and differential; Future    Abnormal glucose    Orders:  •  Basic metabolic panel; Future    Rheumatoid arthritis of multiple sites with negative rheumatoid factor (HCC)    Orders:  •  CBC and differential; Future         History of Present Illness   {?Quick Links Encounters * My Last Note * Last Note in Specialty * Snapshot * Since Last Visit * History :64700}  Transitional Care Management Review:   Dennise Arnett is a 89 y.o. female here for TCM follow up.     During the TCM phone call patient stated:  TCM Call     Date and time call was made  2024 12:00 PM    Hospital care reviewed  Records reviewed    Patient was hospitialized at  [facility]    Date of Admission  24    Date of discharge  24    Diagnosis  Pneumonia    Disposition  Home    Current Symptoms  Cough    Cough Severity  Moderate      TCM Call     Post hospital issues  None    Should patient be enrolled in anticoag monitoring?  No    Scheduled for follow up?  Yes    Did you obtain your prescribed medications  Yes    Do you need help managing your prescriptions or medications  Yes    Why type of assitance do you need  Son helps    Is transportation to your appointment needed  No    I have advised the patient to call PCP with any new or worsening symptoms  Ryleigh Nemec MA    Living Arrangements  Children    Are you recieving any outpatient services  No    Are you recieving home care services  No     Are you using any community resources  No    Current waiver services  No    Have you fallen in the last 12 months  No    Interperter language line needed  No    Counseling  Family        HPI    Review of Systems   Constitutional:  Negative for activity change, appetite change, chills, fatigue and fever.   HENT:  Negative for congestion.    Respiratory:  Negative for cough, chest tightness and shortness of breath.    Cardiovascular:  Negative for chest pain and leg swelling.   Gastrointestinal:  Negative for abdominal distention, abdominal pain, constipation, diarrhea, nausea and vomiting.   All other systems reviewed and are negative.    Objective {?Quick Links Trend Vitals * Enter New Vitals * Results Review * Timeline (Adult) * Labs * Imaging * Cardiology * Procedures * Lung Cancer Screening * Surgical eConsent :37000}  /73   Pulse (!) 53   SpO2 97%     Physical Exam  { Medications have NOT been reviewed by provider in current encounter. Once medications have been reviewed, please refresh your progress note so that you can sign the visit }    {Administrative / Billing Section (Optional):19706}

## 2024-12-12 NOTE — TELEPHONE ENCOUNTER
Dr. Cordon:    Lisa from [facility] called stating that they do not participate with patient's insurance company.

## 2024-12-13 NOTE — TELEPHONE ENCOUNTER
Patient's son advised. Scheduled appointment 12/30. He says when oxygen level was changed from 3 to 2, she had little stomachache, but when she laid down she felt better.He wants to know if changing oxygen level could have caused this. Please place order for outpatient PT.

## 2024-12-13 NOTE — TELEPHONE ENCOUNTER
Are you able to make the patient's son aware that I need her to go to outpatient physical therapy given that it is not covered.  And I also want to see her in 2 to 3 weeks to do an oxygen check because I want to wean her off as fast as possible so she does not stay on oxygen.

## 2024-12-13 NOTE — TELEPHONE ENCOUNTER
Spoke to the patient in great detail advised that I would like them to try to wean her off the oxygen from home.  Instructions and details given to the family members.  Recommend scheduling outpatient PT

## 2024-12-23 ENCOUNTER — APPOINTMENT (OUTPATIENT)
Dept: LAB | Facility: CLINIC | Age: 89
End: 2024-12-23
Payer: COMMERCIAL

## 2024-12-23 DIAGNOSIS — R09.02 HYPOXIA: ICD-10-CM

## 2024-12-23 DIAGNOSIS — R53.1 GENERALIZED WEAKNESS: ICD-10-CM

## 2024-12-23 DIAGNOSIS — B33.8 RSV INFECTION: ICD-10-CM

## 2024-12-23 DIAGNOSIS — M06.09 RHEUMATOID ARTHRITIS OF MULTIPLE SITES WITH NEGATIVE RHEUMATOID FACTOR (HCC): ICD-10-CM

## 2024-12-23 DIAGNOSIS — R73.09 ABNORMAL GLUCOSE: ICD-10-CM

## 2024-12-23 LAB
ANION GAP SERPL CALCULATED.3IONS-SCNC: 3 MMOL/L (ref 4–13)
BUN SERPL-MCNC: 17 MG/DL (ref 5–25)
CALCIUM SERPL-MCNC: 10.1 MG/DL (ref 8.4–10.2)
CHLORIDE SERPL-SCNC: 101 MMOL/L (ref 96–108)
CO2 SERPL-SCNC: 37 MMOL/L (ref 21–32)
CREAT SERPL-MCNC: 0.75 MG/DL (ref 0.6–1.3)
GFR SERPL CREATININE-BSD FRML MDRD: 67 ML/MIN/1.73SQ M
GLUCOSE P FAST SERPL-MCNC: 117 MG/DL (ref 65–99)
POTASSIUM SERPL-SCNC: 4.3 MMOL/L (ref 3.5–5.3)
SODIUM SERPL-SCNC: 141 MMOL/L (ref 135–147)

## 2024-12-23 PROCEDURE — 80048 BASIC METABOLIC PNL TOTAL CA: CPT

## 2024-12-23 PROCEDURE — 36415 COLL VENOUS BLD VENIPUNCTURE: CPT

## 2024-12-23 PROCEDURE — 85007 BL SMEAR W/DIFF WBC COUNT: CPT

## 2024-12-23 PROCEDURE — 85027 COMPLETE CBC AUTOMATED: CPT

## 2024-12-23 NOTE — PROGRESS NOTES
Virtual TCM Visit:    Verification of patient location:    Patient is located at Home in the following state in which I hold an active license [place]    Assessment & Plan  Hypoxia  Goal is to wean off of oxygen slowly  Orders:  •  EXTERNAL Referral to Home Health; Future  •  CBC and differential; Future    RSV infection    Orders:  •  EXTERNAL Referral to Home Health; Future  •  CBC and differential; Future    Generalized weakness    Orders:  •  EXTERNAL Referral to Home Health; Future  •  CBC and differential; Future    Abnormal glucose    Orders:  •  Basic metabolic panel; Future    Rheumatoid arthritis of multiple sites with negative rheumatoid factor (HCC)    Orders:  •  CBC and differential; Future           Encounter provider Verna Ybarra MD     Provider located at [facility]  [address] [facility] [address]    After connecting through [employer], the patient was identified by name and date of birth. Dennise Arnett was informed that this is a telemedicine visit and that the visit is being conducted through the [employer] platform. She agrees to proceed..  My office door was closed. No one else was in the room.  She acknowledged consent and understanding of privacy and security of the video platform. The patient has agreed to participate and understands they can discontinue the visit at any time.    Patient is aware this is a billable service.    History of Present Illness     Transitional Care Management Review:   Dennise Arnett is a 89 y.o. female here for TCM follow up.    During the TCM phone call patient stated:  TCM Call     Date and time call was made  11/27/2024 12:00 PM    Hospital care reviewed  Records reviewed    Patient was hospitialized at  [facility]    Date of Admission  11/20/24    Date of discharge  11/26/24    Diagnosis  Pneumonia    Disposition  Home    Current Symptoms  Cough    Cough Severity  Moderate      TCM Call      Post hospital issues  None    Should patient be enrolled in antico monitoring?  No    Scheduled for follow up?  Yes    Did you obtain your prescribed medications  Yes    Do you need help managing your prescriptions or medications  Yes    Why type of assitance do you need  Son helps    Is transportation to your appointment needed  No    I have advised the patient to call PCP with any new or worsening symptoms  Ryleigh Nemec MA    Living Arrangements  Children    Are you recieving any outpatient services  No    Are you recieving home care services  No    Are you using any community resources  No    Current waiver services  No    Have you fallen in the last 12 months  No    Interperter language line needed  No    Counseling  Family        HPI  89-year-old female presenting to the office after being hospitalized for RSV.  Patient is currently on oxygen.  Her baseline is no oxygen.  Patient is unable to go to physical therapy at this time.  Is willing to have home therapy if possible.  She does have her sons currently taking care of her  Review of Systems   Constitutional:  Positive for activity change, appetite change and fatigue.   Respiratory:  Positive for cough.      Objective   /73   Pulse (!) 53   SpO2 97%     Physical Exam  Constitutional:       Appearance: She is well-developed.   HENT:      Head: Normocephalic and atraumatic.   Pulmonary:      Effort: Pulmonary effort is normal.   Musculoskeletal:         General: Normal range of motion.   Neurological:      Mental Status: She is alert and oriented to person, place, and time.   Psychiatric:         Behavior: Behavior normal.         Thought Content: Thought content normal.         Judgment: Judgment normal.       Medications have been reviewed by provider in current encounter      Verna Ybarra MD      VIRTUAL VISIT DISCLAIMER    Dennise DAMIAN Arnett verbally agrees to participate in [employer] [facility]. Pt is aware that Virtual Care Services  could be limited without vital signs or the ability to perform a full hands-on physical exam. Dennise Arnett understands she or the provider may request at any time to terminate the video visit and request the patient to seek care or treatment in person.

## 2024-12-24 LAB
BASOPHILS # BLD MANUAL: 0 THOUSAND/UL (ref 0–0.1)
BASOPHILS NFR MAR MANUAL: 0 % (ref 0–1)
EOSINOPHIL # BLD MANUAL: 0.08 THOUSAND/UL (ref 0–0.4)
EOSINOPHIL NFR BLD MANUAL: 1 % (ref 0–6)
ERYTHROCYTE [DISTWIDTH] IN BLOOD BY AUTOMATED COUNT: 15.3 % (ref 11.6–15.1)
HCT VFR BLD AUTO: 38.8 % (ref 34.8–46.1)
HGB BLD-MCNC: 12.1 G/DL (ref 11.5–15.4)
LYMPHOCYTES # BLD AUTO: 2.56 THOUSAND/UL (ref 0.6–4.47)
LYMPHOCYTES # BLD AUTO: 25 % (ref 14–44)
MCH RBC QN AUTO: 31.1 PG (ref 26.8–34.3)
MCHC RBC AUTO-ENTMCNC: 31.2 G/DL (ref 31.4–37.4)
MCV RBC AUTO: 100 FL (ref 82–98)
MONOCYTES # BLD AUTO: 2.48 THOUSAND/UL (ref 0–1.22)
MONOCYTES NFR BLD: 30 % (ref 4–12)
NEUTROPHILS # BLD MANUAL: 3.14 THOUSAND/UL (ref 1.85–7.62)
NEUTS BAND NFR BLD MANUAL: 5 % (ref 0–8)
NEUTS SEG NFR BLD AUTO: 33 % (ref 43–75)
OVALOCYTES BLD QL SMEAR: PRESENT
PATHOLOGY REVIEW: YES
PLATELET # BLD AUTO: 152 THOUSANDS/UL (ref 149–390)
PLATELET BLD QL SMEAR: ADEQUATE
PMV BLD AUTO: 12.2 FL (ref 8.9–12.7)
RBC # BLD AUTO: 3.89 MILLION/UL (ref 3.81–5.12)
RBC MORPH BLD: PRESENT
VARIANT LYMPHS # BLD AUTO: 6 %
WBC # BLD AUTO: 8.25 THOUSAND/UL (ref 4.31–10.16)

## 2024-12-24 PROCEDURE — 85060 BLOOD SMEAR INTERPRETATION: CPT | Performed by: STUDENT IN AN ORGANIZED HEALTH CARE EDUCATION/TRAINING PROGRAM

## 2024-12-30 ENCOUNTER — OFFICE VISIT (OUTPATIENT)
Dept: FAMILY MEDICINE CLINIC | Facility: CLINIC | Age: 89
End: 2024-12-30
Payer: COMMERCIAL

## 2024-12-30 VITALS
DIASTOLIC BLOOD PRESSURE: 82 MMHG | TEMPERATURE: 97.3 F | SYSTOLIC BLOOD PRESSURE: 120 MMHG | WEIGHT: 123.6 LBS | HEIGHT: 63 IN | RESPIRATION RATE: 16 BRPM | HEART RATE: 77 BPM | BODY MASS INDEX: 21.9 KG/M2 | OXYGEN SATURATION: 92 %

## 2024-12-30 DIAGNOSIS — R53.1 GENERALIZED WEAKNESS: Primary | ICD-10-CM

## 2024-12-30 DIAGNOSIS — R09.02 HYPOXIA: ICD-10-CM

## 2024-12-30 DIAGNOSIS — M06.09 RHEUMATOID ARTHRITIS OF MULTIPLE SITES WITH NEGATIVE RHEUMATOID FACTOR (HCC): ICD-10-CM

## 2024-12-30 DIAGNOSIS — R53.81 PHYSICAL DECONDITIONING: ICD-10-CM

## 2024-12-30 PROCEDURE — G2211 COMPLEX E/M VISIT ADD ON: HCPCS | Performed by: FAMILY MEDICINE

## 2024-12-30 PROCEDURE — 99214 OFFICE O/P EST MOD 30 MIN: CPT | Performed by: FAMILY MEDICINE

## 2024-12-30 RX ORDER — PREDNISONE 2.5 MG/1
2.5 TABLET ORAL DAILY
COMMUNITY

## 2024-12-30 NOTE — PROGRESS NOTES
Dennise Arnett 11/30/1929 female MRN: [id number]    [facility] OFFICE VISIT  [facility] - [facility]      ASSESSMENT/PLAN  Dennise Arnett is a 89 y.o. female presents to the office for    Diagnoses and all orders for this visit:    Generalized weakness    Hypoxia    Physical deconditioning    Rheumatoid arthritis of multiple sites with negative rheumatoid factor (HCC)    Other orders  -     predniSONE 2.5 mg tablet; Take 2.5 mg by mouth daily      Attempted to wean off of Oxygen  Unable; continue on 1L.  I do believe she is deconditioned.  Recommend ICP to be used at home  Recommend with assistant using walker rather them wheel chair.  Continue pain meds for RA            Future Appointments   Date Time Provider Department Center   1/21/2025  2:00 PM Mario Gonzalez MD [facility]   1/30/2025  1:30 PM Verna Ybarra MD [facility]          SUBJECTIVE  CC: Follow-up (Following up on pneumonia patient was in hospital last month , discuss o2)      HPI:  Dennise Arnett is a 89 y.o. female who presents for an follow-up appointment.  Patient was just recently seen in the hospital for pneumonia.  States that she has been using the oxygen at 1 L every time is decreased the patient gets a headache.  Patient states that she has been starting to take Percocet by the pain management for her rheumatoid arthritis.  Son admits that the patient is not very active and does not do her home exercises.  States that she is also deconditioned.        Review of Systems   Constitutional:  Negative for activity change, appetite change, chills, fatigue and fever.   HENT:  Negative for congestion.    Respiratory:  Positive for chest tightness. Negative for cough and shortness of breath.    Cardiovascular:  Negative for chest pain and leg swelling.   Gastrointestinal:  Negative for abdominal distention, abdominal pain, constipation, diarrhea, nausea and vomiting.    Neurological:  Positive for headaches.   All other systems reviewed and are negative.      Historical Information   The patient history was reviewed as follows:  Past Medical History:   Diagnosis Date   • Arthritis    • Carpal tunnel syndrome    • Degeneration of lumbar intervertebral disc    • Postmenopausal osteoporosis    • Primary generalized (osteo)arthritis    • Rheumatoid arthritis (HCC)    • Right shoulder pain          Medications:     Current Outpatient Medications:   •  albuterol (ProAir HFA) 90 mcg/act inhaler, Inhale 2 puffs every 6 (six) hours as needed for wheezing, Disp: 8.5 g, Rfl: 0  •  budesonide-formoterol (Symbicort) 160-4.5 mcg/act inhaler, INHALE TWO PUFFS TWO (TWO) TIMES A DAY RINSE MOUTH AFTER USE., Disp: 10.2 g, Rfl: 5  •  carvedilol (COREG) 3.125 mg tablet, TAKE ONE TABLET BY MOUTH TWICE A DAY WITH MEALS, Disp: 180 tablet, Rfl: 0  •  folic acid (FOLVITE) 1 mg tablet, TAKE ONE CAPSULE BY MOUTH DAILY, Disp: 90 tablet, Rfl: 1  •  gabapentin (NEURONTIN) 400 mg capsule, Take 1 capsule (400 mg total) by mouth daily at bedtime, Disp: 90 capsule, Rfl: 0  •  oxyCODONE-acetaminophen (PERCOCET) 5-325 mg per tablet, Take 0.5 tablets by mouth every 6 (six) hours as needed for moderate pain Max Daily Amount: 2 tablets Do not start before December 14, 2024., Disp: 60 tablet, Rfl: 0  •  pantoprazole (PROTONIX) 40 mg tablet, Take 1 tablet (40 mg total) by mouth daily, Disp: 90 tablet, Rfl: 1  •  predniSONE 2.5 mg tablet, Take 2.5 mg by mouth daily, Disp: , Rfl:   •  Anoro Ellipta 62.5-25 MCG/ACT inhaler, INHALE 1 PUFF DAILY (Patient not taking: Reported on 11/27/2024), Disp: 60 each, Rfl: 10  •  guaiFENesin (MUCINEX) 600 mg 12 hr tablet, Take 1 tablet (600 mg total) by mouth 2 (two) times a day (Patient not taking: Reported on 12/30/2024), Disp: 60 tablet, Rfl: 0  •  lidocaine (LIDODERM) 5 %, Apply 1 patch topically daily for 7 days Remove & Discard patch within 12 hours or as directed by MD, Disp: 7  "patch, Rfl: 0    No Known Allergies    OBJECTIVE  Vitals:   Vitals:    12/30/24 1345   BP: 120/82   BP Location: Left arm   Patient Position: Sitting   Cuff Size: Standard   Pulse: 77   Resp: 16   Temp: (!) 97.3 °F (36.3 °C)   TempSrc: Temporal   SpO2: 92%   Weight: 56.1 kg (123 lb 9.6 oz)   Height: 5' 3\" (1.6 m)         Physical Exam  Vitals reviewed.   Constitutional:       Appearance: She is well-developed.   HENT:      Head: Normocephalic and atraumatic.   Eyes:      Conjunctiva/sclera: Conjunctivae normal.      Pupils: Pupils are equal, round, and reactive to light.   Cardiovascular:      Rate and Rhythm: Normal rate and regular rhythm.      Heart sounds: Normal heart sounds, S1 normal and S2 normal. No murmur heard.  Pulmonary:      Effort: Pulmonary effort is normal. No respiratory distress.      Breath sounds: Normal breath sounds. No wheezing.   Musculoskeletal:         General: Normal range of motion.      Cervical back: Normal range of motion and neck supple.   Skin:     General: Skin is warm.   Neurological:      Mental Status: She is alert and oriented to person, place, and time.   Psychiatric:         Speech: Speech normal.         Behavior: Behavior normal.         Thought Content: Thought content normal.         Judgment: Judgment normal.                    Verna Ybarra MD,   [facility]  12/30/2024      "

## 2025-01-09 LAB
DME PARACHUTE DELIVERY DATE ACTUAL: NORMAL
DME PARACHUTE DELIVERY DATE REQUESTED: NORMAL
DME PARACHUTE ITEM DESCRIPTION: NORMAL
DME PARACHUTE ORDER STATUS: NORMAL
DME PARACHUTE SUPPLIER NAME: NORMAL
DME PARACHUTE SUPPLIER PHONE: NORMAL

## 2025-01-16 DIAGNOSIS — M06.09 RHEUMATOID ARTHRITIS OF MULTIPLE SITES WITH NEGATIVE RHEUMATOID FACTOR (HCC): ICD-10-CM

## 2025-01-16 RX ORDER — GABAPENTIN 400 MG/1
400 CAPSULE ORAL
Qty: 90 CAPSULE | Refills: 0 | Status: SHIPPED | OUTPATIENT
Start: 2025-01-16 | End: 2025-04-16

## 2025-01-28 ENCOUNTER — OFFICE VISIT (OUTPATIENT)
Age: OVER 89
End: 2025-01-28
Payer: COMMERCIAL

## 2025-01-28 DIAGNOSIS — N18.31 STAGE 3A CHRONIC KIDNEY DISEASE (HCC): ICD-10-CM

## 2025-01-28 DIAGNOSIS — G89.4 CHRONIC PAIN SYNDROME: ICD-10-CM

## 2025-01-28 DIAGNOSIS — Z79.891 LONG-TERM CURRENT USE OF OPIATE ANALGESIC: Primary | ICD-10-CM

## 2025-01-28 DIAGNOSIS — M06.09 RHEUMATOID ARTHRITIS OF MULTIPLE SITES WITH NEGATIVE RHEUMATOID FACTOR (HCC): ICD-10-CM

## 2025-01-28 DIAGNOSIS — M54.12 CERVICAL RADICULOPATHY: ICD-10-CM

## 2025-01-28 DIAGNOSIS — F11.20 UNCOMPLICATED OPIOID DEPENDENCE (HCC): ICD-10-CM

## 2025-01-28 DIAGNOSIS — M15.9 GENERALIZED OSTEOARTHRITIS: ICD-10-CM

## 2025-01-28 PROCEDURE — G2211 COMPLEX E/M VISIT ADD ON: HCPCS | Performed by: STUDENT IN AN ORGANIZED HEALTH CARE EDUCATION/TRAINING PROGRAM

## 2025-01-28 PROCEDURE — 99214 OFFICE O/P EST MOD 30 MIN: CPT | Performed by: STUDENT IN AN ORGANIZED HEALTH CARE EDUCATION/TRAINING PROGRAM

## 2025-01-28 RX ORDER — OXYCODONE AND ACETAMINOPHEN 5; 325 MG/1; MG/1
0.5 TABLET ORAL EVERY 6 HOURS PRN
Qty: 60 TABLET | Refills: 0 | Status: SHIPPED | OUTPATIENT
Start: 2025-03-29 | End: 2025-04-28

## 2025-01-28 RX ORDER — OXYCODONE AND ACETAMINOPHEN 5; 325 MG/1; MG/1
0.5 TABLET ORAL EVERY 6 HOURS PRN
Qty: 60 TABLET | Refills: 0 | Status: SHIPPED | OUTPATIENT
Start: 2025-01-28 | End: 2025-02-27

## 2025-01-28 RX ORDER — OXYCODONE AND ACETAMINOPHEN 5; 325 MG/1; MG/1
0.5 TABLET ORAL EVERY 6 HOURS PRN
Qty: 60 TABLET | Refills: 0 | Status: SHIPPED | OUTPATIENT
Start: 2025-02-27 | End: 2025-03-29

## 2025-01-28 NOTE — PROGRESS NOTES
Pain Medicine Follow-Up Note    Assessment:  1. Long-term current use of opiate analgesic    2. Uncomplicated opioid dependence (HCC)    3. Chronic pain syndrome    4. Rheumatoid arthritis of multiple sites with negative rheumatoid factor (HCC)    5. Generalized osteoarthritis    6. Cervical radiculopathy    7. Stage 3a chronic kidney disease (HCC)    Patient is a pleasant 89-year-old woman who presents as a follow-up visit/being seen in October for chronic pain syndrome, rheumatoid arthritis of multiple sites and cervical radiculopathy.  At that time patient was continued on Percocet 5-325 every 6 hours as needed.  Patient is currently taking half a tablet every 6 hours as needed.  Patient also continues on gabapentin for 400 mg nightly.  Patient rates up to the same rating her pain 6 out of 10 on numeric rating scale.  The pain is constant and the quality pain is a dull aching, throbbing, shooting pain and the pain does interfere with her daily activities.  She has not been involving conservative treatment recently.  Patient complaining mainly of pain in her left hand along with her right lower back rating down her right leg.     Given patient is stable on her current regimen we will continue with Percocet 5-325 every 6 hours as needed.  Patient also counseled to increase her mobility as according to her son she lies in bed or in the recliner most of the day.  Patient amenable to this plan will follow-up in 3 months for further medication management.    Plan:  Continue Percocet 5-325 mg q6hr prn; 0.5 tablets per dose. 60 tablets a month   Oral Drug swab in office today   Patient counseled to increase mobility at home  Follow up in three months or sooner if needed   Orders Placed This Encounter   Procedures   • MM OF_Alprazolam Definitive   • MM OF_Amphetamine Definitive Test   • MM OF_Buprenorphine Definitive Test   • MM OF_Carisoprodol Definitive Test   • MM OF_Clonazepam Definitive   • MM OF_Cocaine Definitive  Test   • MM OF_Codeine Definitive   • MM OF_Dextromethorphan Definitive Test   • MM OF_Diazepam Definitive   • MM OF_Fentanyl Definitive Test   • MM OF_Gabapentin Definitive Test   • MM OF_Heroin Definitive Test   • MM OF_Hydrocodone Definitive   • MM OF_Hydromorphone Definitive   • MM OF_Levorphanol Definitive Test   • MM OF_Lorazepam Definitive   • MM OF_MDMA Definitive Test   • MM OF_Meperidine Definitive Test   • MM OF_Methadone Definitive Test   • MM OF_Methylphenidate Definitive Test   • MM OF_Morphine Definitive   • MM OF_Naltrexone Definitive Test   • MM OF_Oxazepam Definitive   • MM OF_Oxycodone Definitive Test - Oral Fluid   • MM OF_Oxymorphone Definitive Test   • MM OF_Phencyclidine Definitive Test   • MM OF_Pregabalin Definitive Test   • MM OF_Tapentadol Definitive Test   • MM OF_Temazepam Definitive   • MM OF_THC Definitive Test   • MM OF_Tramadol Definitive Test       New Medications Ordered This Visit   Medications   • oxyCODONE-acetaminophen (Percocet) 5-325 mg per tablet     Sig: Take 0.5 tablets by mouth every 6 (six) hours as needed for moderate pain or severe pain Max Daily Amount: 2 tablets     Dispense:  60 tablet     Refill:  0   • oxyCODONE-acetaminophen (Percocet) 5-325 mg per tablet     Sig: Take 0.5 tablets by mouth every 6 (six) hours as needed for moderate pain or severe pain Max Daily Amount: 2 tablets Do not start before February 27, 2025.     Dispense:  60 tablet     Refill:  0   • oxyCODONE-acetaminophen (Percocet) 5-325 mg per tablet     Sig: Take 0.5 tablets by mouth every 6 (six) hours as needed for moderate pain or severe pain Max Daily Amount: 2 tablets Do not start before March 29, 2025.     Dispense:  60 tablet     Refill:  0       My impressions and treatment recommendations were discussed in detail with the patient who verbalized understanding and had no further questions.        [place] Prescription Drug Monitoring Program report was reviewed and was appropriate  and  [place] Prescription Drug Monitoring Program report was reviewed and was appropriate     A urine drug screen was collected at today's office visit as part of our medication management protocol. The point of care testing results were appropriate for what was being prescribed. The specimen will be sent for confirmatory testing. The drug screen is medically necessary because the patient is either dependent on opioid medication or is being considered for opioid medication therapy and the results could impact ongoing or future treatment. The drug screen is to evaluate for the presences or absence of prescribed, non-prescribed, and/or illicit drugs/substances.      Complete risks and benefits including bleeding, infection, tissue reaction, nerve injury and allergic reaction were discussed. The approach was demonstrated using models and literature was provided. Verbal and written consent was obtained.    An oral drug screen swab was collected at today's office visit. The swab will be sent for confirmatory testing. The drug screen is medically necessary because the patient is either dependent on opioid medication or is being considered for opioid medication therapy and the results could impact ongoing or future treatment. The drug screen is to evaluate for the presences or absence of prescribed, non-prescribed, and/or illicit drugs/substances.     Follow-up is planned in three months time or sooner as warranted.  Discharge instructions were provided. I personally saw and examined the patient and I agree with the above discussed plan of care.    History of Present Illness:    Dennise Arnett is a 89 y.o. female who presents to [facility] for interval re-evaluation of the above stated pain complaints. The patient has a past medical and chronic pain history as outlined in the assessment section. She was last seen on October 2024.    Patient is a pleasant 89-year-old woman who presents as a  follow-up visit/being seen in October for chronic pain syndrome, rheumatoid arthritis of multiple sites and cervical radiculopathy.  At that time patient was continued on Percocet 5-325 every 6 hours as needed.  Patient is currently taking half a tablet every 6 hours as needed.  Patient also continues on gabapentin for 400 mg nightly.  Patient rates up to the same rating her pain 6 out of 10 on numeric rating scale.  The pain is constant and the quality pain is a dull aching, throbbing, shooting pain and the pain does interfere with her daily activities.  She has not been involving conservative treatment recently.  Patient complaining mainly of pain in her left hand along with her right lower back rating down her right leg.     Pain Contract Signed:  1/28/2025  Last Urine Drug Screen:  1/28/2025    Other than as stated above, the patient denies any interval changes in medications, medical condition, mental condition, symptoms, or allergies since the last office visit.         Review of Systems:    Review of Systems   Constitutional:  Negative for unexpected weight change.   HENT:  Negative for ear pain.    Eyes:  Negative for visual disturbance.   Respiratory:  Negative for shortness of breath and wheezing.    Gastrointestinal:  Negative for abdominal pain.   Musculoskeletal:  Positive for back pain and gait problem.        Decreased ROM, joint and muscle pain   Neurological:  Positive for dizziness, weakness and numbness.   Psychiatric/Behavioral:  Negative for decreased concentration.          Past Medical History:   Diagnosis Date   • Arthritis    • Carpal tunnel syndrome    • Degeneration of lumbar intervertebral disc    • Postmenopausal osteoporosis    • Primary generalized (osteo)arthritis    • Rheumatoid arthritis (HCC)    • Right shoulder pain        Past Surgical History:   Procedure Laterality Date   • CARPAL TUNNEL RELEASE     • CATARACT EXTRACTION     • CHOLECYSTECTOMY     • KNEE SURGERY     • MASS  EXCISION Right 2019    Procedure: EXCISION BIOPSY TISSUE LESION/MASS HAND;  Surgeon: Dylan Baugh DO;  Location: [facility];  Service: Orthopedics   • TONSILLECTOMY         Family History   Problem Relation Age of Onset   • Cancer Sister    • Breast cancer Mother    • Breast cancer Maternal Grandmother    • No Known Problems Father    • No Known Problems Brother    • No Known Problems Maternal Aunt    • No Known Problems Maternal Uncle    • No Known Problems Paternal Aunt    • No Known Problems Paternal Uncle    • No Known Problems Maternal Grandfather    • No Known Problems Paternal Grandmother    • No Known Problems Paternal Grandfather    • ADD / ADHD Neg Hx    • Anesthesia problems Neg Hx    • Clotting disorder Neg Hx    • Collagen disease Neg Hx    • Diabetes Neg Hx    • Dislocations Neg Hx    • Learning disabilities Neg Hx    • Neurological problems Neg Hx    • Osteoporosis Neg Hx    • Rheumatologic disease Neg Hx    • Scoliosis Neg Hx    • Vascular Disease Neg Hx        Social History     Occupational History   • Not on file   Tobacco Use   • Smoking status: Former     Current packs/day: 0.00     Average packs/day: 0.5 packs/day for 44.0 years (22.0 ttl pk-yrs)     Types: Cigarettes     Start date: 1954     Quit date: 1998     Years since quittin.0   • Smokeless tobacco: Never   Vaping Use   • Vaping status: Never Used   Substance and Sexual Activity   • Alcohol use: Not Currently     Comment: rarely   • Drug use: Never   • Sexual activity: Never         Current Outpatient Medications:   •  albuterol (ProAir HFA) 90 mcg/act inhaler, Inhale 2 puffs every 6 (six) hours as needed for wheezing, Disp: 8.5 g, Rfl: 0  •  budesonide-formoterol (Symbicort) 160-4.5 mcg/act inhaler, INHALE TWO PUFFS TWO (TWO) TIMES A DAY RINSE MOUTH AFTER USE., Disp: 10.2 g, Rfl: 5  •  carvedilol (COREG) 3.125 mg tablet, TAKE ONE TABLET BY MOUTH TWICE A DAY WITH MEALS, Disp: 180 tablet, Rfl: 0  •  folic acid  (FOLVITE) 1 mg tablet, TAKE ONE CAPSULE BY MOUTH DAILY, Disp: 90 tablet, Rfl: 1  •  gabapentin (NEURONTIN) 400 mg capsule, TAKE 1 CAPSULE (400 MG TOTAL) BY MOUTH DAILY AT BEDTIME, Disp: 90 capsule, Rfl: 0  •  oxyCODONE-acetaminophen (Percocet) 5-325 mg per tablet, Take 0.5 tablets by mouth every 6 (six) hours as needed for moderate pain or severe pain Max Daily Amount: 2 tablets, Disp: 60 tablet, Rfl: 0  •  [START ON 2/27/2025] oxyCODONE-acetaminophen (Percocet) 5-325 mg per tablet, Take 0.5 tablets by mouth every 6 (six) hours as needed for moderate pain or severe pain Max Daily Amount: 2 tablets Do not start before February 27, 2025., Disp: 60 tablet, Rfl: 0  •  [START ON 3/29/2025] oxyCODONE-acetaminophen (Percocet) 5-325 mg per tablet, Take 0.5 tablets by mouth every 6 (six) hours as needed for moderate pain or severe pain Max Daily Amount: 2 tablets Do not start before March 29, 2025., Disp: 60 tablet, Rfl: 0  •  pantoprazole (PROTONIX) 40 mg tablet, Take 1 tablet (40 mg total) by mouth daily, Disp: 90 tablet, Rfl: 1  •  predniSONE 2.5 mg tablet, Take 2.5 mg by mouth daily, Disp: , Rfl:   •  Anoro Ellipta 62.5-25 MCG/ACT inhaler, INHALE 1 PUFF DAILY (Patient not taking: Reported on 1/28/2025), Disp: 60 each, Rfl: 10  •  guaiFENesin (MUCINEX) 600 mg 12 hr tablet, Take 1 tablet (600 mg total) by mouth 2 (two) times a day (Patient not taking: Reported on 1/28/2025), Disp: 60 tablet, Rfl: 0  •  lidocaine (LIDODERM) 5 %, Apply 1 patch topically daily for 7 days Remove & Discard patch within 12 hours or as directed by MD, Disp: 7 patch, Rfl: 0    No Known Allergies    Physical Exam:    There were no vitals taken for this visit.    Constitutional:normal, well developed, well nourished, alert, in no distress and non-toxic and no overt pain behavior.  Eyes:anicteric  HEENT:grossly intact  Neck:supple, symmetric, trachea midline and no masses   Pulmonary:even and unlabored  Cardiovascular:No edema or pitting edema  present  Skin:Normal without rashes or lesions and well hydrated  Psychiatric:Mood and affect appropriate  Neurologic:Cranial Nerves II-XII grossly intact  Musculoskeletal:In wheelchair.       Imaging  No orders to display         Orders Placed This Encounter   Procedures   • MM OF_Alprazolam Definitive   • MM OF_Amphetamine Definitive Test   • MM OF_Buprenorphine Definitive Test   • MM OF_Carisoprodol Definitive Test   • MM OF_Clonazepam Definitive   • MM OF_Cocaine Definitive Test   • MM OF_Codeine Definitive   • MM OF_Dextromethorphan Definitive Test   • MM OF_Diazepam Definitive   • MM OF_Fentanyl Definitive Test   • MM OF_Gabapentin Definitive Test   • MM OF_Heroin Definitive Test   • MM OF_Hydrocodone Definitive   • MM OF_Hydromorphone Definitive   • MM OF_Levorphanol Definitive Test   • MM OF_Lorazepam Definitive   • MM OF_MDMA Definitive Test   • MM OF_Meperidine Definitive Test   • MM OF_Methadone Definitive Test   • MM OF_Methylphenidate Definitive Test   • MM OF_Morphine Definitive   • MM OF_Naltrexone Definitive Test   • MM OF_Oxazepam Definitive   • MM OF_Oxycodone Definitive Test - Oral Fluid   • MM OF_Oxymorphone Definitive Test   • MM OF_Phencyclidine Definitive Test   • MM OF_Pregabalin Definitive Test   • MM OF_Tapentadol Definitive Test   • MM OF_Temazepam Definitive   • MM OF_THC Definitive Test   • MM OF_Tramadol Definitive Test

## 2025-01-30 DIAGNOSIS — J45.30 MILD PERSISTENT ASTHMA WITHOUT COMPLICATION: ICD-10-CM

## 2025-01-30 RX ORDER — BUDESONIDE AND FORMOTEROL FUMARATE DIHYDRATE 160; 4.5 UG/1; UG/1
AEROSOL RESPIRATORY (INHALATION)
Qty: 10.2 G | Refills: 1 | Status: SHIPPED | OUTPATIENT
Start: 2025-01-30

## 2025-01-31 LAB
7AMINOCLONAZEPAM SAL QL CFM: NEGATIVE NG/ML
AMPHET SAL QL CFM: NEGATIVE NG/ML
BUPRENORPHINE SAL QL SCN: NEGATIVE NG/ML
CARBOXYTHC SAL QL CFM: NEGATIVE NG/ML
CCP IGG SERPL-ACNC: NEGATIVE NG/ML
COCAINE SAL QL CFM: NEGATIVE NG/ML
CODEINE SAL QL CFM: NEGATIVE NG/ML
D-METHORPHAN SAL QL CFM: ABNORMAL NG/ML
DXO+LEVORPHANOL SAL QL CFM: NEGATIVE NG/ML
DXO+LEVORPHANOL SAL QL CFM: NEGATIVE NG/ML
EDDP SAL QL CFM: NEGATIVE NG/ML
GABAPENTIN SAL QL CFM: ABNORMAL NG/ML
HYDROCODONE SAL QL CFM: NEGATIVE NG/ML
HYDROCODONE SAL QL CFM: NEGATIVE NG/ML
HYDROMORPHONE SAL QL CFM: NEGATIVE NG/ML
LEUKEMIA MARKERS BLD-IMP: NEGATIVE NG/ML
M PROTEIN 3 UR ELPH-MCNC: ABNORMAL NG/ML
M TB TUBERC IGNF/MITOGEN IGNF CONTROL: NEGATIVE NG/ML
METHADONE SAL QL CFM: NEGATIVE NG/ML
MORPHINE SAL QL CFM: NEGATIVE NG/ML
MORPHINE SAL QL CFM: NEGATIVE NG/ML
NALTREXOL SAL QL CFM: NEGATIVE NG/ML
NALTREXONE SAL QL CFM: NEGATIVE NG/ML
OXYMORPHONE SAL QL CFM: NEGATIVE NG/ML
OXYMORPHONE SAL QL CFM: NEGATIVE NG/ML
PCP SAL QL CFM: NEGATIVE NG/ML
PREGABALIN SAL QL CFM: NEGATIVE NG/ML
SL AMB 6-MAM (HEROIN METABOLITE) QUANTIFICATION: NEGATIVE NG/ML
SL AMB ALPRAZOLAM QUANTIFICATION: NEGATIVE NG/ML
SL AMB CARISOPRODOL QUANTIFICATION: NEGATIVE NG/ML
SL AMB CLONAZEPAM QUANTIFICATION: NEGATIVE NG/ML
SL AMB DIAZEPAM QUANTIFICATION: NEGATIVE NG/ML
SL AMB FENTANYL QUANTIFICATION: NEGATIVE NG/ML
SL AMB MDMA QUANTIFICATION: NEGATIVE NG/ML
SL AMB MEPROBAMATE QUANTIFICATION: NEGATIVE NG/ML
SL AMB N-DESMETHYL-TRAMADOL QUANTIFICATION SALIVA: NEGATIVE NG/ML
SL AMB NORBUPRENORPHINE QUANTIFICATION: NEGATIVE NG/ML
SL AMB NORDIAZEPAM QUANTIFICATION: NEGATIVE NG/ML
SL AMB NORFENTANYL QUANTIFICATION: NEGATIVE NG/ML
SL AMB NORHYDROCODONE QUANTIFICATION: NEGATIVE NG/ML
SL AMB NORHYDROCODONE QUANTIFICATION: NEGATIVE NG/ML
SL AMB NORMEPERIDINE QUANTIFICATION: NEGATIVE NG/ML
SL AMB NOROXYCODONE QUANTIFICATION: NEGATIVE NG/ML
SL AMB OXAZEPAM QUANTIFICATION: NEGATIVE NG/ML
SL AMB RITALINIC ACID QUANTIFICATION: NEGATIVE NG/ML
SL AMB TEMAZEPAM QUANTIFICATION: NEGATIVE NG/ML
SL AMB TEMAZEPAM QUANTIFICATION: NEGATIVE NG/ML
SL AMB TRAMADOL QUANTIFICATION: NEGATIVE NG/ML
SQUAMOUS #/AREA URNS HPF: NEGATIVE NG/ML
TAPENTADOL SAL QL CFM: NEGATIVE NG/ML

## 2025-02-13 ENCOUNTER — OFFICE VISIT (OUTPATIENT)
Dept: FAMILY MEDICINE CLINIC | Facility: CLINIC | Age: OVER 89
End: 2025-02-13
Payer: COMMERCIAL

## 2025-02-13 VITALS
OXYGEN SATURATION: 92 % | SYSTOLIC BLOOD PRESSURE: 125 MMHG | DIASTOLIC BLOOD PRESSURE: 59 MMHG | TEMPERATURE: 97 F | HEART RATE: 71 BPM

## 2025-02-13 DIAGNOSIS — M06.09 RHEUMATOID ARTHRITIS OF MULTIPLE SITES WITH NEGATIVE RHEUMATOID FACTOR (HCC): ICD-10-CM

## 2025-02-13 DIAGNOSIS — D49.2 ABNORMAL SKIN GROWTH: Primary | ICD-10-CM

## 2025-02-13 DIAGNOSIS — R53.81 PHYSICAL DECONDITIONING: ICD-10-CM

## 2025-02-13 DIAGNOSIS — R53.1 GENERALIZED WEAKNESS: ICD-10-CM

## 2025-02-13 PROBLEM — J96.00 ACUTE RESPIRATORY FAILURE (HCC): Status: RESOLVED | Noted: 2024-11-20 | Resolved: 2025-02-13

## 2025-02-13 PROCEDURE — 99214 OFFICE O/P EST MOD 30 MIN: CPT | Performed by: FAMILY MEDICINE

## 2025-02-13 PROCEDURE — G2211 COMPLEX E/M VISIT ADD ON: HCPCS | Performed by: FAMILY MEDICINE

## 2025-02-13 NOTE — PROGRESS NOTES
Dennise Arnett 11/30/1929 female MRN: [id number]    [facility] OFFICE VISIT  [facility]      ASSESSMENT/PLAN  Dennise Arnett is a 89 y.o. female presents to the office for    Diagnoses and all orders for this visit:    Abnormal skin growth  -     Ambulatory Referral to Dermatology; Future    Generalized weakness    Physical deconditioning    Rheumatoid arthritis of multiple sites with negative rheumatoid factor (HCC)       Abnormal skin growth recommend dermatology evaluation.  I did place it under media for imaging.  Generalized weakness I do recommend that the patient continue to start ambulating throughout the house given that she is physically to conditioning.  Did speak to the patient and her son about this.  History of rheumatoid arthritis which is not making her recovery better.  But from an oxygen standpoint is getting back to her baseline.  Lower spine is the start of an ulcer recommend repositioning every 2 hours  Second imaging is not a bedsore and will require biopsy                 Future Appointments   Date Time Provider Department Center   4/22/2025  2:00 PM Mario Gonzalez MD [facility]   5/15/2025  3:00 PM Verna Ybarra MD [facility]          SUBJECTIVE  CC: Follow-up      HPI:  Dennise Arnett is a 89 y.o. female who presents for follow-up appointment.  Patient was recently placed on oxygen after hospital stay.  States that she is weaned off the oxygen but still has it at nighttime.  Patient states that she does not walk or ambulate much.  Has a skin tag on the back and thinks it might be a bedsore and wanted to be evaluated Review of Systems   Constitutional:  Negative for activity change, appetite change, chills, fatigue and fever.   HENT:  Negative for congestion.    Respiratory:  Negative for cough, chest tightness and shortness of breath.    Cardiovascular:  Negative for chest pain and leg swelling.    Gastrointestinal:  Negative for abdominal distention, abdominal pain, constipation, diarrhea, nausea and vomiting.   All other systems reviewed and are negative.      Historical Information   The patient history was reviewed as follows:  Past Medical History:   Diagnosis Date   • Arthritis    • Carpal tunnel syndrome    • Degeneration of lumbar intervertebral disc    • Postmenopausal osteoporosis    • Primary generalized (osteo)arthritis    • Rheumatoid arthritis (HCC)    • Right shoulder pain          Medications:     Current Outpatient Medications:   •  albuterol (ProAir HFA) 90 mcg/act inhaler, Inhale 2 puffs every 6 (six) hours as needed for wheezing, Disp: 8.5 g, Rfl: 0  •  budesonide-formoterol (Symbicort) 160-4.5 mcg/act inhaler, INHALE TWO PUFFS TWO (TWO) TIMES A DAY RINSE MOUTH AFTER USE., Disp: 10.2 g, Rfl: 1  •  folic acid (FOLVITE) 1 mg tablet, TAKE ONE CAPSULE BY MOUTH DAILY, Disp: 90 tablet, Rfl: 1  •  gabapentin (NEURONTIN) 400 mg capsule, TAKE 1 CAPSULE (400 MG TOTAL) BY MOUTH DAILY AT BEDTIME, Disp: 90 capsule, Rfl: 0  •  oxyCODONE-acetaminophen (Percocet) 5-325 mg per tablet, Take 0.5 tablets by mouth every 6 (six) hours as needed for moderate pain or severe pain Max Daily Amount: 2 tablets, Disp: 60 tablet, Rfl: 0  •  [START ON 2/27/2025] oxyCODONE-acetaminophen (Percocet) 5-325 mg per tablet, Take 0.5 tablets by mouth every 6 (six) hours as needed for moderate pain or severe pain Max Daily Amount: 2 tablets Do not start before February 27, 2025., Disp: 60 tablet, Rfl: 0  •  [START ON 3/29/2025] oxyCODONE-acetaminophen (Percocet) 5-325 mg per tablet, Take 0.5 tablets by mouth every 6 (six) hours as needed for moderate pain or severe pain Max Daily Amount: 2 tablets Do not start before March 29, 2025., Disp: 60 tablet, Rfl: 0  •  pantoprazole (PROTONIX) 40 mg tablet, Take 1 tablet (40 mg total) by mouth daily, Disp: 90 tablet, Rfl: 1  •  Anoro Ellipta 62.5-25 MCG/ACT inhaler, INHALE 1 PUFF DAILY  (Patient not taking: Reported on 1/28/2025), Disp: 60 each, Rfl: 10  •  carvedilol (COREG) 3.125 mg tablet, TAKE ONE TABLET BY MOUTH TWICE A DAY WITH MEALS, Disp: 180 tablet, Rfl: 0  •  guaiFENesin (MUCINEX) 600 mg 12 hr tablet, Take 1 tablet (600 mg total) by mouth 2 (two) times a day (Patient not taking: Reported on 2/17/2025), Disp: 60 tablet, Rfl: 0  •  lidocaine (LIDODERM) 5 %, Apply 1 patch topically daily for 7 days Remove & Discard patch within 12 hours or as directed by MD, Disp: 7 patch, Rfl: 0  •  predniSONE 2.5 mg tablet, Take 1 tablet (2.5 mg total) by mouth daily, Disp: 30 tablet, Rfl: 0    No Known Allergies    OBJECTIVE  Vitals:   Vitals:    02/13/25 1527   BP: 125/59   BP Location: Left arm   Patient Position: Sitting   Cuff Size: Large   Pulse: 71   Temp: (!) 97 °F (36.1 °C)   TempSrc: Temporal   SpO2: 92%         Physical Exam  Vitals reviewed.   Constitutional:       Appearance: She is well-developed.   HENT:      Head: Normocephalic and atraumatic.   Eyes:      Conjunctiva/sclera: Conjunctivae normal.      Pupils: Pupils are equal, round, and reactive to light.   Cardiovascular:      Rate and Rhythm: Normal rate and regular rhythm.      Heart sounds: Normal heart sounds, S1 normal and S2 normal. No murmur heard.  Pulmonary:      Effort: Pulmonary effort is normal. No respiratory distress.      Breath sounds: Normal breath sounds. No wheezing.   Musculoskeletal:         General: Normal range of motion.      Cervical back: Normal range of motion and neck supple.   Skin:     General: Skin is warm.      Comments: Please see pictures   Neurological:      Mental Status: She is alert and oriented to person, place, and time.   Psychiatric:         Speech: Speech normal.         Behavior: Behavior normal.         Thought Content: Thought content normal.         Judgment: Judgment normal.                    Verna Ybarra MD,   [facility]  2/20/2025

## 2025-02-17 ENCOUNTER — OFFICE VISIT (OUTPATIENT)
Age: OVER 89
End: 2025-02-17
Payer: COMMERCIAL

## 2025-02-17 VITALS — WEIGHT: 123 LBS | BODY MASS INDEX: 21.79 KG/M2

## 2025-02-17 DIAGNOSIS — R06.02 SOB (SHORTNESS OF BREATH): ICD-10-CM

## 2025-02-17 DIAGNOSIS — D48.5 NEOPLASM OF UNCERTAIN BEHAVIOR OF SKIN: Primary | ICD-10-CM

## 2025-02-17 DIAGNOSIS — D49.2 ABNORMAL SKIN GROWTH: ICD-10-CM

## 2025-02-17 PROCEDURE — 11102 TANGNTL BX SKIN SINGLE LES: CPT | Performed by: DERMATOLOGY

## 2025-02-17 PROCEDURE — 99204 OFFICE O/P NEW MOD 45 MIN: CPT | Performed by: DERMATOLOGY

## 2025-02-17 PROCEDURE — 88342 IMHCHEM/IMCYTCHM 1ST ANTB: CPT | Performed by: STUDENT IN AN ORGANIZED HEALTH CARE EDUCATION/TRAINING PROGRAM

## 2025-02-17 PROCEDURE — 88305 TISSUE EXAM BY PATHOLOGIST: CPT | Performed by: STUDENT IN AN ORGANIZED HEALTH CARE EDUCATION/TRAINING PROGRAM

## 2025-02-17 PROCEDURE — 88341 IMHCHEM/IMCYTCHM EA ADD ANTB: CPT | Performed by: STUDENT IN AN ORGANIZED HEALTH CARE EDUCATION/TRAINING PROGRAM

## 2025-02-17 NOTE — PROGRESS NOTES
"[facility] Note     Patient Name: Dennise Arnett  Encounter Date: 02/17/2025     Have you been cared for by a [facility] Dermatologist in the last 3 years and, if so, which description applies to you?    NO.   I am considered a \"new\" patient and must complete all patient intake questions. I am FEMALE/not of child-bearing potential.    REVIEW OF SYSTEMS:  Have you recently had or currently have any of the following? Recent fever or chills? No  Any non-healing wound? YES, back  Are you pregnant or planning to become pregnant? No  Are you currently or planning to be nursing or breast feeding? No   PAST MEDICAL HISTORY:  Have you personally ever had or currently have any of the following?  If \"YES,\" then please provide more detail. Skin cancer (such as Melanoma, Basal Cell Carcinoma, Squamous Cell Carcinoma?  No  Tuberculosis, HIV/AIDS, Hepatitis B or C: No  Radiation Treatment No   HISTORY OF IMMUNOSUPPRESSION:   Do you have a history of any of the following:  Systemic Immunosuppression such as Diabetes, Biologic or Immunotherapy, Chemotherapy, Organ Transplantation, Bone Marrow Transplantation or Prednsione?  No    Answering \"YES\" requires the addition of the dotphrase \"IMMUNOSUPPRESSED\" as the first diagnosis of the patient's visit.   FAMILY HISTORY:  Any \"first degree relatives\" (parent, brother, sister, or child) with the following?    Skin Cancer, Pancreatic or Other Cancer? No   PATIENT EXPERIENCE:    Do you want the Dermatologist to perform a COMPLETE skin exam today including a clinical examination under the \"bra and underwear\" areas?  NO  If necessary, do we have your permission to call and leave a detailed message on your Preferred Phone number that includes your specific medical information?  Yes      No Known Allergies   Current Outpatient Medications:     albuterol (ProAir HFA) 90 mcg/act inhaler, Inhale 2 puffs every 6 (six) hours as needed for wheezing, Disp: 8.5 g, Rfl: 0    " budesonide-formoterol (Symbicort) 160-4.5 mcg/act inhaler, INHALE TWO PUFFS TWO (TWO) TIMES A DAY RINSE MOUTH AFTER USE., Disp: 10.2 g, Rfl: 1    carvedilol (COREG) 3.125 mg tablet, TAKE ONE TABLET BY MOUTH TWICE A DAY WITH MEALS, Disp: 180 tablet, Rfl: 0    folic acid (FOLVITE) 1 mg tablet, TAKE ONE CAPSULE BY MOUTH DAILY, Disp: 90 tablet, Rfl: 1    gabapentin (NEURONTIN) 400 mg capsule, TAKE 1 CAPSULE (400 MG TOTAL) BY MOUTH DAILY AT BEDTIME, Disp: 90 capsule, Rfl: 0    oxyCODONE-acetaminophen (Percocet) 5-325 mg per tablet, Take 0.5 tablets by mouth every 6 (six) hours as needed for moderate pain or severe pain Max Daily Amount: 2 tablets, Disp: 60 tablet, Rfl: 0    [START ON 2/27/2025] oxyCODONE-acetaminophen (Percocet) 5-325 mg per tablet, Take 0.5 tablets by mouth every 6 (six) hours as needed for moderate pain or severe pain Max Daily Amount: 2 tablets Do not start before February 27, 2025., Disp: 60 tablet, Rfl: 0    [START ON 3/29/2025] oxyCODONE-acetaminophen (Percocet) 5-325 mg per tablet, Take 0.5 tablets by mouth every 6 (six) hours as needed for moderate pain or severe pain Max Daily Amount: 2 tablets Do not start before March 29, 2025., Disp: 60 tablet, Rfl: 0    pantoprazole (PROTONIX) 40 mg tablet, Take 1 tablet (40 mg total) by mouth daily, Disp: 90 tablet, Rfl: 1    predniSONE 2.5 mg tablet, Take 2.5 mg by mouth daily, Disp: , Rfl:     Anoro Ellipta 62.5-25 MCG/ACT inhaler, INHALE 1 PUFF DAILY (Patient not taking: Reported on 1/28/2025), Disp: 60 each, Rfl: 10    guaiFENesin (MUCINEX) 600 mg 12 hr tablet, Take 1 tablet (600 mg total) by mouth 2 (two) times a day (Patient not taking: Reported on 2/17/2025), Disp: 60 tablet, Rfl: 0    lidocaine (LIDODERM) 5 %, Apply 1 patch topically daily for 7 days Remove & Discard patch within 12 hours or as directed by MD, Disp: 7 patch, Rfl: 0          Whom besides the patient is providing clinical information about today's encounter?   NO ADDITIONAL HISTORIAN  "(patient alone provided history)    Physical Exam and Assessment/Plan by Diagnosis:    SEBORRHEIC KERATOSIS; NON-INFLAMED    Physical Exam:  Anatomic Location Affected:  back  Morphological Description:  Flat and raised, waxy, smooth to warty textured, yellow to brownish-grey to dark brown to blackish, discrete, \"stuck-on\" appearing papules.  Pertinent Positives:  Pertinent Negatives:    Additional History of Present Condition:  Patient reports new bumps on the skin.  Denies itch, burn, pain, bleeding or ulceration.  Present constantly; nothing seems to make it worse or better.  No prior treatment.      Assessment and Plan:  Based on a thorough discussion of this condition and the management approach to it (including a comprehensive discussion of the known risks, side effects and potential benefits of treatment), the patient (family) agrees to implement the following specific plan:  Benign, reassured.     Seborrheic Keratosis  A seborrheic keratosis is a harmless warty spot that appears during adult life as a common sign of skin aging.  Seborrheic keratoses can arise on any area of skin, covered or uncovered, with the usual exception of the palms and soles. They do not arise from mucous membranes. Seborrheic keratoses can have highly variable appearance.      Seborrheic keratoses are extremely common. It has been estimated that over 90% of adults over the age of 60 years have one or more of them. They occur in males and females of all races, typically beginning to erupt in the 30s or 40s. They are uncommon under the age of 20 years.  The precise cause of seborrhoeic keratoses is not known.  Seborrhoeic keratoses are considered degenerative in nature. As time goes by, seborrheic keratoses tend to become more numerous. Some people inherit a tendency to develop a very large number of them; some people may have hundreds of them.    The name \"seborrheic keratosis\" is misleading, because these lesions are not limited to a " "seborrhoeic distribution (scalp, mid-face, chest, upper back), nor are they formed from sebaceous glands, nor are they associated with sebum -- which is greasy.  Seborrheic keratosis may also be called \"SK,\" \"Seb K,\" \"basal cell papilloma,\" \"senile wart,\" or \"barnacle.\"      Researchers have noted:  Eruptive seborrhoeic keratoses can follow sunburn or dermatitis  Skin friction may be the reason they appear in body folds  Viral cause (e.g., human papillomavirus) seems unlikely  Stable and clonal mutations or activation of FRFR3, PIK3CA, MINISTERIO, AKT1 and EGFR genes are found in seborrhoeic keratoses  Seborrhoeic keratosis can arise from solar lentigo  FRFR3 mutations also arise in solar lentigines. These mutations are associated with increased age and location on the head and neck, suggesting a role of ultraviolet radiation in these lesions  Seborrheic keratoses do not harbour tumour suppressor gene mutations  Epidermal growth factor receptor inhibitors, which are used to treat some cancers, often result in an increase in verrucal (warty) keratoses.    There is no easy way to remove multiple lesions on a single occasion.  Unless a specific lesion is \"inflamed\" and is causing pain or stinging/burning or is bleeding, most insurance companies do not authorize treatment.     NEOPLASM OF UNCERTAIN BEHAVIOR OF SKIN    Physical Exam:  (Anatomic Location); (Size and Morphological Description); (Differential Diagnosis):  Specimen A: Right mid back; 89 year old female with 1.2 cm crusted plaque;Skin; shave; Diff dx; Basal cell carcinoma vs Inflamed Seborrheic Keratosis   Pertinent Positives:  Pertinent Negatives:    Additional History of Present Condition:  Patient states she had had a non healing lesion on her upper back for months. No history of skin cancer.     Assessment and Plan:  I have discussed with the patient that a sample of skin via a \"skin biopsy” would be potentially helpful to further make a specific diagnosis under " the microscope.  Based on a thorough discussion of this condition and the management approach to it (including a comprehensive discussion of the known risks, side effects and potential benefits of treatment), the patient (family) agrees to implement the following specific plan:    Procedure:  Skin Biopsy.  After a thorough discussion of treatment options and risk/benefits/alternatives (including but not limited to local pain, scarring, dyspigmentation, blistering, possible superinfection, and inability to confirm a diagnosis via histopathology), verbal and written consent were obtained and portion of the rash was biopsied for tissue sample.  See below for consent that was obtained from patient and subsequent Procedure Note.   PROCEDURE TANGENTIAL (SHAVE) BIOPSY NOTE:    Performing Physician:  Dr. Barreto  Anatomic Location; Clinical Description with size (cm); Pre-Op Diagnosis:   Specimen A: Right mid back; 89 year old female with 1.2 cm crusted plaque;Skin; shave; Diff dx; Basal cell carcinoma vs Inflamed Seborrheic Keratosis   Post-op diagnosis: Same     Local anesthesia: 3:1 1% xylocaine with epi and 1-100,000 buffered     Topical anesthesia: None    Hemostasis: Aluminum chloride       After obtaining informed consent  at which time there was a discussion about the purpose of biopsy  and low risks of infection and bleeding.  The area was prepped and draped in the usual fashion. Anesthesia was obtained with 1% lidocaine with epinephrine. A shave biopsy to an appropriate sampling depth was obtained by Shave (Dermablade or 15 blade) The resulting wound was covered with surgical ointment and bandaged appropriately.     The patient tolerated the procedure well without complications and was without signs of functional compromise.      Specimen has been sent for review by Dermatopathology.    Standard post-procedure care has been explained and has been included in written form within the patient's copy of Informed  "Consent.    INFORMED CONSENT DISCUSSION AND POST-OPERATIVE INSTRUCTIONS FOR PATIENT    I.  RATIONALE FOR PROCEDURE  I understand that a skin biopsy allows the Dermatologist to test a lesion or rash under the microscope to obtain a diagnosis.  It usually involves numbing the area with numbing medication and removing a small piece of skin; sometimes the area will be closed with sutures. In this specific procedure, sutures are not usually needed.  If any sutures are placed, then they are usually need to be removed in 2 weeks or less.    I understand that my Dermatologist recommends that a skin \"shave\" biopsy be performed today.  A local anesthetic, similar to the kind that a dentist uses when filling a cavity, will be injected with a very small needle into the skin area to be sampled.  The injected skin and tissue underneath \"will go to sleep” and become numb so no pain should be felt afterwards.  An instrument shaped like a tiny \"razor blade\" (shave biopsy instrument) will be used to cut a small piece of tissue and skin from the area so that a sample of tissue can be taken and examined more closely under the microscope.  A slight amount of bleeding will occur, but it will be stopped with direct pressure and a pressure bandage and any other appropriate methods.  I understands that a scar will form where the wound was created.  Surgical ointment will be applied to help protect the wound.  Sutures are not usually needed.    II.  RISKS AND POTENTIAL COMPLICATIONS   I understand the risks and potential complications of a skin biopsy include but are not limited to the following:  Bleeding  Infection  Pain  Scar/keloid  Skin discoloration  Incomplete Removal  Recurrence  Nerve Damage/Numbness/Loss of Function  Allergic Reaction to Anesthesia  Biopsies are diagnostic procedures and based on findings additional treatment or evaluation may be required  Loss or destruction of specimen resulting in no additional findings    My " "Dermatologist has explained to me the nature of the condition, the nature of the procedure, and the benefits to be reasonably expected compared with alternative approaches.  My Dermatologist has discussed the likelihood of major risks or complications of this procedure including the specific risks listed above, such as bleeding, infection, and scarring/keloid.  I understand that a scar is expected after this procedure.  I understand that my physician cannot predict if the scar will form a \"keloid,\" which extends beyond the borders of the wound that is created.  A keloid is a thick, painful, and bumpy scar.  A keloid can be difficult to treat, as it does not always respond well to therapy, which includes injecting cortisone directly into the keloid every few weeks.  While this usually reduces the pain and size of the scar, it does not eliminate it.      I understand that photographs may be taken before and after the procedure.  These will be maintained as part of the medical providers confidential records and may not be made available to me.  I further authorize the medical provider to use the photographs for teaching purposes or to illustrate scientific papers, books, or lectures if in his/her judgment, medical research, education, or science may benefit from its use.    I have had an opportunity to fully inquire about the risks and benefits of this procedure and its alternatives.   I have been given ample time and opportunity to ask questions and to seek a second opinion if I wished to do so.  I acknowledge that there have specifically been no guarantees as to the cosmetic results from the procedure.  I am aware that with any procedure there is always the possibility of an unexpected complication.    III. POST-PROCEDURAL CARE (WHAT YOU WILL NEED TO DO \"AFTER THE BIOPSY\" TO OPTIMIZE HEALING)    Keep the area clean and dry.  Try NOT to remove the bandage or get it wet for the first 24 hours.    Gently clean the area " "and apply surgical ointment (such as Vaseline petrolatum ointment, which is available \"over the counter\" and not a prescription) to the biopsy site for up to 2 weeks straight.  This acts to protect the wound from the outside world.      Sutures are not usually placed in this procedure.  If any sutures were placed, return for suture removal as instructed (generally 1 week for the face, 2 weeks for the body).      Take Acetaminophen (Tylenol) for discomfort, if no contraindications.  Ibuprofen or aspirin could make bleeding worse.    Call our office immediately for signs of infection: fever, chills, increased redness, warmth, tenderness, discomfort/pain, or pus or foul smell coming from the wound.    WHAT TO DO IF THERE IS ANY BLEEDING?  If a small amount of bleeding is noticed, place a clean cloth over the area and apply firm pressure for ten minutes.  Check the wound after 10 minutes of direct pressure.  If bleeding persists, try one more time for an additional 10 minutes of direct pressure on the area.  If the bleeding becomes heavier or does not stop after the second attempt, or if you have any other questions about this procedure, then please call your [facility]'s Dermatologist by calling [phone] (SKIN).     I hereby acknowledge that I have reviewed and verified the site with my Dermatologist and have requested and authorized my Dermatologist to proceed with the procedure.      Scribe Attestation      I,:  Claritza Kumar MA am acting as a scribe while in the presence of the attending physician.:       I,:  Avani Barrteo MD personally performed the services described in this documentation    as scribed in my presence.:             "

## 2025-02-19 RX ORDER — CARVEDILOL 3.12 MG/1
3.12 TABLET ORAL 2 TIMES DAILY WITH MEALS
Qty: 180 TABLET | Refills: 0 | Status: SHIPPED | OUTPATIENT
Start: 2025-02-19

## 2025-02-19 RX ORDER — PREDNISONE 2.5 MG/1
TABLET ORAL
Qty: 90 TABLET | OUTPATIENT
Start: 2025-02-19

## 2025-02-20 DIAGNOSIS — M19.91 PRIMARY OSTEOARTHRITIS, UNSPECIFIED SITE: Primary | ICD-10-CM

## 2025-02-20 PROCEDURE — 88341 IMHCHEM/IMCYTCHM EA ADD ANTB: CPT | Performed by: STUDENT IN AN ORGANIZED HEALTH CARE EDUCATION/TRAINING PROGRAM

## 2025-02-20 PROCEDURE — 88305 TISSUE EXAM BY PATHOLOGIST: CPT | Performed by: STUDENT IN AN ORGANIZED HEALTH CARE EDUCATION/TRAINING PROGRAM

## 2025-02-20 PROCEDURE — 88342 IMHCHEM/IMCYTCHM 1ST ANTB: CPT | Performed by: STUDENT IN AN ORGANIZED HEALTH CARE EDUCATION/TRAINING PROGRAM

## 2025-02-20 RX ORDER — PREDNISONE 2.5 MG/1
2.5 TABLET ORAL DAILY
Qty: 30 TABLET | Refills: 0 | Status: SHIPPED | OUTPATIENT
Start: 2025-02-20

## 2025-02-20 NOTE — TELEPHONE ENCOUNTER
"Patient  son called the RX Refill Line. Message is being forwarded to the office.     Patient son called again and is upset called and stated his mom is 95Y old and will be out of medication tomorrow, says he have been trying to get this refilled for 3 days now, patient used a horrible statement but stated he apologize and he just don't understand what's taking so long.    He would like to have the medication expedited and said \"I just want someone to do their job, how can I get in touch with the doctor?\"    I advised I would document the chart and resend for approval to his mom physician.    Patient son state \" please fill my mom prednisone its not a narcotic its prednisone\"    I advised I can have someone contact him, he said I don't want to talk to anyone, just send the medication to the pharmacy.    I advised patient son again I will resend for approval at .    "

## 2025-02-20 NOTE — TELEPHONE ENCOUNTER
Please see my note from January 2024, the first and only time I saw the patient. I did not feel that she had any rheumatologic condition. She was supposed to have a return appointment to see me 6 weeks later after stopping methotrexate to see how her symptoms were, and this appointment was never made.    I cannot indefinitely refill prednisone for a patient whom I am not actively treating for a rheumatologic condition.    Patient has not been seen in over a year. I will send in a 30 day supply of prednisone but cannot refill any further unless an appointment is made, as we will have to have a discussion about the risks:benefits of prednisone in the absence of any active inflammatory disease that it is treating. I don't feel it's unreasonable to continue low-dose prednisone in a 89 year old if it allows her to be functional and comfortable, but patient would still need to be seen for regular follow up for continued refills.    Please inform patient's son that this appointment has to be in person; my [place] license is currently in process so I unfortunately cannot conduct televisits outside of [place]. If this is not feasible for them then patient can return back to their original rheumatologist Dr. Ledesma.

## 2025-02-20 NOTE — TELEPHONE ENCOUNTER
Dr. Leblanc    Prednisone 2.5mg PO Daily    Son calling for refill of his mother's Prednisone.    Please advise.

## 2025-02-20 NOTE — TELEPHONE ENCOUNTER
Patients son called the refill line again for this medication. Informed him that the medication was sent to the pharmacy.

## 2025-02-20 NOTE — TELEPHONE ENCOUNTER
Patient son calling about the refill for prednisone. Upset and stating he is getting the run around trying to get a refill for this medication.     He would like a call back from the office.

## 2025-02-21 RX ORDER — PREDNISONE 2.5 MG/1
TABLET ORAL
Qty: 90 TABLET | OUTPATIENT
Start: 2025-02-21

## 2025-02-21 RX ORDER — PREDNISONE 2.5 MG/1
TABLET ORAL
Qty: 90 TABLET | Refills: 1 | OUTPATIENT
Start: 2025-02-21

## 2025-02-24 ENCOUNTER — RESULTS FOLLOW-UP (OUTPATIENT)
Age: OVER 89
End: 2025-02-24

## 2025-02-24 NOTE — RESULT ENCOUNTER NOTE
DERMATOPATHOLOGY RESULT NOTE    Results reviewed by ordering physician.  Called patient to personally discuss results. Discussed result with patient's son Diaz      Instructions for Clinical Derm Team:   (remember to route Result Note to appropriate staff):    Call patient and schedule for surgical appt (ED&C)    Result & Plan by Specimen:    Specimen A: malignant  Plan: electrodessication and curretage

## 2025-03-11 ENCOUNTER — PROCEDURE VISIT (OUTPATIENT)
Age: OVER 89
End: 2025-03-11
Payer: COMMERCIAL

## 2025-03-11 ENCOUNTER — TELEPHONE (OUTPATIENT)
Age: OVER 89
End: 2025-03-11

## 2025-03-11 VITALS
HEART RATE: 75 BPM | SYSTOLIC BLOOD PRESSURE: 132 MMHG | DIASTOLIC BLOOD PRESSURE: 73 MMHG | BODY MASS INDEX: 21.79 KG/M2 | HEIGHT: 63 IN | TEMPERATURE: 99.7 F

## 2025-03-11 DIAGNOSIS — C44.519 BCC (BASAL CELL CARCINOMA), BACK: Primary | ICD-10-CM

## 2025-03-11 PROCEDURE — 17262 DSTRJ MAL LES T/A/L 1.1-2.0: CPT | Performed by: DERMATOLOGY

## 2025-03-11 NOTE — TELEPHONE ENCOUNTER
Patient's son is calling to notify us that he will not be able to bring the patient in for her appointment tomorrow. He is hoping a virtual appointment can be possible or they would have to reschedule. I advised him that we would be here until 4:30 maybe 5 and we will try to call him back before we leave or we can call him sometime tomorrow.        Please advise.

## 2025-03-11 NOTE — PROGRESS NOTES
CURETTAGE AND DESICCATION Malignant and Premalignant Lesion    Diagnosis: Basal Cell Carcinoma(Superficial and Nodular type)  Prior biopsy: Yes  Access Number: Case: [id number]                                   Location Right Mid Back  Size preop 1.5 cm  Size postop 1.5 cm    Additional History of Present Condition:  shave biopsy confirmed BCC superficial and nodular type    Assessment and Plan:  Based on a thorough discussion of this condition and the management approach to it (including a comprehensive discussion of the known risks, side effects and potential benefits of treatment), the patient (family) agrees to treat the above described lesion with desiccation and curettage.    Procedure:  The area was cleanly  prepped in usual manner  Anesthesia:1% xylocaine with epi    The above described lesion was aggressively curetted to mid dermis followed by desiccation  Number of cycles of desiccation and curettage 3 3 mm and 4 mm curettes used  A clean dry dressing was placed on site. Oral and written postop care instructions were discussed and reviewed      DISCUSSION OF TREATMENT AND POSTOP CARE FOR PATIENT    What is curettage and desiccation?  Curettage and desiccation is a type of electrosurgery in which a skin lesion is scraped off and heat is applied to the skin surface.    What is involved in curettage and cautery?  Your doctor will explain to you why your skin lesion needs treatment and the procedure involved. You may have to sign a consent form to indicate that you consent to the surgical procedure. Tell your doctor if you are taking any medication, or if you have any allergies or medical conditions.  The doctor will inject some local anaesthetic into the area surrounding the lesion to be treated. This will make the skin go numb so no pain should be felt during the procedure. You may feel a pushing sensation but this should not be painful. The skin lesion is scraped off with a curette, which is like a small  spoon with very sharp edges.  The lesion should be sent to a pathology laboratory for analysis. The wound surface is then cauterised with a hot wire beaded tip or electrosurgical unit (diathermy). This stops bleeding and helps destroys any remaining skin tumour cells.  This procedure is usually repeated twice for malignant skin lesions (serial curettage and cautery).  A dressing may be applied and instructions should be given on how to care for your wound.    What types of skin lesions can be treated by curettage?  Curettage is suitable to treat lesions where the material being scraped off is softer than the surrounding skin or when there is a natural cleavage plane between the lesion and the surrounding normal tissue. The following are sometimes treated by curettage:  Squamous cell carcinoma in situ   Actinic keratoses   Basal cell carcinomas that are large, deep or recurrent are usually not suitable for curettage. Lesions with poorly defined edges are also generally unsuitable.Curettage and desiccation is most often used in more superficial type basal cell carcinoma.    Will I have a scar?  Curettage often results in some sort of scar especially if accompanied by cautery.   The scars from curettage are usually flat and round. They are a similar size to that of the original skin lesion. Some people have an abnormal response to skin healing and these people may get larger scars than usual (keloids and hypertrophic scarring).    How do I look after the wound following skin curettage?  The wound may be tender when the local anaesthetic wears off.  Leave the dressing in place till the nest day. Avoid strenuous exertion and stretching of the area.   If there is any bleeding, press on the wound firmly with a folded towel without looking at it for 30 minutes. If it is still bleeding after this time, seek medical attention.  Follow instructions a written in our consent ,  The wound from curettage will take approximately  2-3 weeks to heal over. The scar will initially be red and raised but usually reduces in colour and size over several months.  Scribe Attestation      I,:  Lucy Aviles MA am acting as a scribe while in the presence of the attending physician.:       I,:  Avani Barreto MD personally performed the services described in this documentation    as scribed in my presence.:          .

## 2025-03-12 DIAGNOSIS — M19.91 PRIMARY OSTEOARTHRITIS, UNSPECIFIED SITE: ICD-10-CM

## 2025-03-12 RX ORDER — PREDNISONE 2.5 MG/1
2.5 TABLET ORAL DAILY
Qty: 30 TABLET | Refills: 0 | Status: SHIPPED | OUTPATIENT
Start: 2025-03-12

## 2025-03-12 NOTE — TELEPHONE ENCOUNTER
From Aislinn:     he did not realize she is from [place]     he cannot see her     he is not licensed     Per Aislinn:     Dr. NESS said she really doesnt need to be seen by any provider, she just has osteoarthritis which can be managed by PCP

## 2025-03-12 NOTE — TELEPHONE ENCOUNTER
Son called in following up on his mother vv today with  , I advise Brooks Coffey stated patient doesn't need to be seen by him or any other provider patient as osteoarthritis which the patient PCP can manage . Brooks understand and would contact his mother PCP

## 2025-03-12 NOTE — TELEPHONE ENCOUNTER
Reach out to patients son to let him know per Dr. NESS said she really doesnt need to be seen by any provider, she just has osteoarthritis which can be managed by PCP

## 2025-03-12 NOTE — TELEPHONE ENCOUNTER
Son called in called in following up on his call from yesterday , He will not be able to bring his mother to her appointment today since he had vertigo and he cannot drive . I schedule the patient for a VV since the patient needs to be seen in order to have her medications refilled . Please advise if  will be able to see the patient as VV today . Please sent a link to Diaz mobile number so he can have his mom log in for the visit . Thank you

## 2025-03-12 NOTE — TELEPHONE ENCOUNTER
Pts son called stating that her Rheumatologist said that pt can now get her prednisone refilled by PCP for her osteoarthritis     Pts son said pt is almost out and is needing a refill on her Prednisone if this can be sent to

## 2025-04-18 DIAGNOSIS — M06.09 RHEUMATOID ARTHRITIS OF MULTIPLE SITES WITH NEGATIVE RHEUMATOID FACTOR (HCC): ICD-10-CM

## 2025-04-18 DIAGNOSIS — M19.91 PRIMARY OSTEOARTHRITIS, UNSPECIFIED SITE: ICD-10-CM

## 2025-04-18 RX ORDER — PREDNISONE 2.5 MG/1
2.5 TABLET ORAL DAILY
Qty: 30 TABLET | Refills: 0 | Status: SHIPPED | OUTPATIENT
Start: 2025-04-18

## 2025-04-18 RX ORDER — GABAPENTIN 400 MG/1
400 CAPSULE ORAL
Qty: 90 CAPSULE | Refills: 0 | Status: SHIPPED | OUTPATIENT
Start: 2025-04-18 | End: 2025-07-17

## 2025-04-18 NOTE — TELEPHONE ENCOUNTER
Patients son called rx refill line stating patient is completely out of medication. High priority placed upon refill request.

## 2025-04-22 ENCOUNTER — OFFICE VISIT (OUTPATIENT)
Age: OVER 89
End: 2025-04-22
Payer: COMMERCIAL

## 2025-04-22 DIAGNOSIS — G89.4 CHRONIC PAIN SYNDROME: ICD-10-CM

## 2025-04-22 DIAGNOSIS — M06.09 RHEUMATOID ARTHRITIS OF MULTIPLE SITES WITH NEGATIVE RHEUMATOID FACTOR (HCC): ICD-10-CM

## 2025-04-22 DIAGNOSIS — F11.20 UNCOMPLICATED OPIOID DEPENDENCE (HCC): ICD-10-CM

## 2025-04-22 DIAGNOSIS — M54.12 CERVICAL RADICULOPATHY: ICD-10-CM

## 2025-04-22 DIAGNOSIS — M15.9 GENERALIZED OSTEOARTHRITIS: ICD-10-CM

## 2025-04-22 DIAGNOSIS — Z79.891 LONG-TERM CURRENT USE OF OPIATE ANALGESIC: Primary | ICD-10-CM

## 2025-04-22 PROCEDURE — 99214 OFFICE O/P EST MOD 30 MIN: CPT | Performed by: STUDENT IN AN ORGANIZED HEALTH CARE EDUCATION/TRAINING PROGRAM

## 2025-04-22 RX ORDER — OXYCODONE AND ACETAMINOPHEN 5; 325 MG/1; MG/1
0.5 TABLET ORAL EVERY 6 HOURS PRN
Qty: 60 TABLET | Refills: 0 | Status: SHIPPED | OUTPATIENT
Start: 2025-04-22 | End: 2025-05-22

## 2025-04-22 RX ORDER — OXYCODONE AND ACETAMINOPHEN 5; 325 MG/1; MG/1
0.5 TABLET ORAL EVERY 6 HOURS PRN
Qty: 60 TABLET | Refills: 0 | Status: SHIPPED | OUTPATIENT
Start: 2025-06-21 | End: 2025-07-21

## 2025-04-22 RX ORDER — OXYCODONE AND ACETAMINOPHEN 5; 325 MG/1; MG/1
0.5 TABLET ORAL EVERY 6 HOURS PRN
Qty: 60 TABLET | Refills: 0 | Status: SHIPPED | OUTPATIENT
Start: 2025-05-22 | End: 2025-06-21

## 2025-04-22 NOTE — PROGRESS NOTES
Pain Medicine Follow-Up Note    Assessment:  1. Long-term current use of opiate analgesic    2. Uncomplicated opioid dependence (HCC)    3. Chronic pain syndrome    4. Rheumatoid arthritis of multiple sites with negative rheumatoid factor (HCC)    5. Generalized osteoarthritis    6. Cervical radiculopathy    Patient is a pleasant 89-year-old woman who presents as a follow-up visit after last being seen on 1/28/2025 for multijoint pain along with axial mid and low back pain.  At that time patient was continued on Percocet 5-3 25 every 6 hours as needed; half tablet per dose.  Patient dates symptoms are the same rated pain 7 out of 10 at her great scale.  Pain is constant the quality pain is a throbbing, numbing, pins needles like sensation.  The pain does interfere with her daily activities.    Patient stable on her current regimen.  Will continue current dose and also get a urine drug screen in the office today.  Patient to follow-up in 3 months for further medication management.    Plan:  Continue Percocet 5-325 mg; 0.5 tablets 60 tablets monthly   Oral drug swab today   Follow up in three months for further medication management   Orders Placed This Encounter   Procedures   • MM OF_Alprazolam Definitive   • MM OF_Amphetamine Definitive Test   • MM OF_Buprenorphine Definitive Test   • MM OF_Carisoprodol Definitive Test   • MM OF_Clonazepam Definitive   • MM OF_Cocaine Definitive Test   • MM OF_Codeine Definitive   • MM OF_Dextromethorphan Definitive Test   • MM OF_Diazepam Definitive   • MM OF_Fentanyl Definitive Test   • MM OF_Gabapentin Definitive Test   • MM OF_Heroin Definitive Test   • MM OF_Hydrocodone Definitive   • MM OF_Hydromorphone Definitive   • MM OF_Levorphanol Definitive Test   • MM OF_Lorazepam Definitive   • MM OF_MDMA Definitive Test   • MM OF_Meperidine Definitive Test   • MM OF_Methadone Definitive Test   • MM OF_Methylphenidate Definitive Test   • MM OF_Morphine Definitive   • MM OF_Naltrexone  Definitive Test   • MM OF_Oxazepam Definitive   • MM OF_Oxycodone Definitive Test - Oral Fluid   • MM OF_Oxymorphone Definitive Test   • MM OF_Phencyclidine Definitive Test   • MM OF_Pregabalin Definitive Test   • MM OF_Tapentadol Definitive Test   • MM OF_Temazepam Definitive   • MM OF_THC Definitive Test   • MM OF_Tramadol Definitive Test       New Medications Ordered This Visit   Medications   • oxyCODONE-acetaminophen (Percocet) 5-325 mg per tablet     Sig: Take 0.5 tablets by mouth every 6 (six) hours as needed for moderate pain or severe pain Max Daily Amount: 2 tablets Do not start before June 21, 2025.     Dispense:  60 tablet     Refill:  0   • oxyCODONE-acetaminophen (Percocet) 5-325 mg per tablet     Sig: Take 0.5 tablets by mouth every 6 (six) hours as needed for moderate pain or severe pain Max Daily Amount: 2 tablets Do not start before May 22, 2025.     Dispense:  60 tablet     Refill:  0   • oxyCODONE-acetaminophen (Percocet) 5-325 mg per tablet     Sig: Take 0.5 tablets by mouth every 6 (six) hours as needed for moderate pain or severe pain Max Daily Amount: 2 tablets     Dispense:  60 tablet     Refill:  0       My impressions and treatment recommendations were discussed in detail with the patient who verbalized understanding and had no further questions.        [place] Prescription Drug Monitoring Program report was reviewed and was appropriate  and [place] Prescription Drug Monitoring Program report was reviewed and was appropriate     A urine drug screen was collected at today's office visit as part of our medication management protocol. The point of care testing results were appropriate for what was being prescribed. The specimen will be sent for confirmatory testing. The drug screen is medically necessary because the patient is either dependent on opioid medication or is being considered for opioid medication therapy and the results could impact ongoing or future treatment. The drug  screen is to evaluate for the presences or absence of prescribed, non-prescribed, and/or illicit drugs/substances.    There are risks associated with opioid medications, including dependence, addiction and tolerance. The patient understands and agrees to use these medications only as prescribed. Potential side effects of the medications include, but are not limited to, constipation, drowsiness, addiction, impaired judgment and risk of fatal overdose if not taken as prescribed. The patient was warned against driving while taking sedation medications.  Sharing medications is a felony. At this point in time, the patient is showing no signs of addiction, abuse, diversion or suicidal ideation.    An oral drug screen swab was collected at today's office visit. The swab will be sent for confirmatory testing. The drug screen is medically necessary because the patient is either dependent on opioid medication or is being considered for opioid medication therapy and the results could impact ongoing or future treatment. The drug screen is to evaluate for the presences or absence of prescribed, non-prescribed, and/or illicit drugs/substances.     Follow-up is planned in three months time or sooner as warranted.  Discharge instructions were provided. I personally saw and examined the patient and I agree with the above discussed plan of care.    History of Present Illness:    Dennise Arnett is a 89 y.o. female who presents to [facility] for interval re-evaluation of the above stated pain complaints. The patient has a past medical and chronic pain history as outlined in the assessment section. She was last seen on 1/28/2025.    Patient is a pleasant 89-year-old woman who presents as a follow-up visit after last being seen on 1/28/2025 for multijoint pain along with axial mid and low back pain.  At that time patient was continued on Percocet 5-3 25 every 6 hours as needed; half tablet per dose.  Patient  dates symptoms are the same rated pain 7 out of 10 at her great scale.  Pain is constant the quality pain is a throbbing, numbing, pins needles like sensation.  The pain does interfere with her daily activities.    Pain Contract Signed:  Active  Last Urine Drug Screen:  4/22/2025    Other than as stated above, the patient denies any interval changes in medications, medical condition, mental condition, symptoms, or allergies since the last office visit.         Review of Systems:    Review of Systems   Constitutional:  Negative for unexpected weight change.   HENT:  Negative for ear pain.    Eyes:  Negative for visual disturbance.   Respiratory:  Negative for shortness of breath and wheezing.    Gastrointestinal:  Negative for abdominal pain.   Musculoskeletal:  Positive for back pain, gait problem and joint swelling.        Decreased ROM, joint and muscle pain   Neurological:  Positive for weakness and headaches. Negative for numbness.   Psychiatric/Behavioral:  Positive for sleep disturbance. Negative for decreased concentration.          Past Medical History:   Diagnosis Date   • Arthritis    • Carpal tunnel syndrome    • Degeneration of lumbar intervertebral disc    • Postmenopausal osteoporosis    • Primary generalized (osteo)arthritis    • Rheumatoid arthritis (HCC)    • Right shoulder pain        Past Surgical History:   Procedure Laterality Date   • CARPAL TUNNEL RELEASE     • CATARACT EXTRACTION     • CHOLECYSTECTOMY     • KNEE SURGERY     • MASS EXCISION Right 1/2/2019    Procedure: EXCISION BIOPSY TISSUE LESION/MASS HAND;  Surgeon: Dylan Baugh DO;  Location: [facility];  Service: Orthopedics   • TONSILLECTOMY         Family History   Problem Relation Age of Onset   • Cancer Sister    • Breast cancer Mother    • Breast cancer Maternal Grandmother    • No Known Problems Father    • No Known Problems Brother    • No Known Problems Maternal Aunt    • No Known Problems Maternal Uncle    • No Known Problems  Paternal Aunt    • No Known Problems Paternal Uncle    • No Known Problems Maternal Grandfather    • No Known Problems Paternal Grandmother    • No Known Problems Paternal Grandfather    • ADD / ADHD Neg Hx    • Anesthesia problems Neg Hx    • Clotting disorder Neg Hx    • Collagen disease Neg Hx    • Diabetes Neg Hx    • Dislocations Neg Hx    • Learning disabilities Neg Hx    • Neurological problems Neg Hx    • Osteoporosis Neg Hx    • Rheumatologic disease Neg Hx    • Scoliosis Neg Hx    • Vascular Disease Neg Hx        Social History     Occupational History   • Not on file   Tobacco Use   • Smoking status: Former     Current packs/day: 0.00     Average packs/day: 0.5 packs/day for 44.0 years (22.0 ttl pk-yrs)     Types: Cigarettes     Start date: 1954     Quit date: 1998     Years since quittin.3   • Smokeless tobacco: Never   Vaping Use   • Vaping status: Never Used   Substance and Sexual Activity   • Alcohol use: Not Currently     Comment: rarely   • Drug use: Never   • Sexual activity: Never         Current Outpatient Medications:   •  albuterol (ProAir HFA) 90 mcg/act inhaler, Inhale 2 puffs every 6 (six) hours as needed for wheezing, Disp: 8.5 g, Rfl: 0  •  budesonide-formoterol (Symbicort) 160-4.5 mcg/act inhaler, INHALE TWO PUFFS TWO (TWO) TIMES A DAY RINSE MOUTH AFTER USE., Disp: 10.2 g, Rfl: 1  •  carvedilol (COREG) 3.125 mg tablet, TAKE ONE TABLET BY MOUTH TWICE A DAY WITH MEALS, Disp: 180 tablet, Rfl: 0  •  folic acid (FOLVITE) 1 mg tablet, TAKE ONE CAPSULE BY MOUTH DAILY, Disp: 90 tablet, Rfl: 1  •  gabapentin (NEURONTIN) 400 mg capsule, TAKE 1 CAPSULE (400 MG TOTAL) BY MOUTH DAILY AT BEDTIME, Disp: 90 capsule, Rfl: 0  •  [START ON 2025] oxyCODONE-acetaminophen (Percocet) 5-325 mg per tablet, Take 0.5 tablets by mouth every 6 (six) hours as needed for moderate pain or severe pain Max Daily Amount: 2 tablets Do not start before 2025., Disp: 60 tablet, Rfl: 0  •  [START  ON 5/22/2025] oxyCODONE-acetaminophen (Percocet) 5-325 mg per tablet, Take 0.5 tablets by mouth every 6 (six) hours as needed for moderate pain or severe pain Max Daily Amount: 2 tablets Do not start before May 22, 2025., Disp: 60 tablet, Rfl: 0  •  oxyCODONE-acetaminophen (Percocet) 5-325 mg per tablet, Take 0.5 tablets by mouth every 6 (six) hours as needed for moderate pain or severe pain Max Daily Amount: 2 tablets, Disp: 60 tablet, Rfl: 0  •  pantoprazole (PROTONIX) 40 mg tablet, Take 1 tablet (40 mg total) by mouth daily, Disp: 90 tablet, Rfl: 1  •  predniSONE 2.5 mg tablet, TAKE 1 TABLET (2.5 MG TOTAL) BY MOUTH DAILY, Disp: 30 tablet, Rfl: 0  •  Anoro Ellipta 62.5-25 MCG/ACT inhaler, INHALE 1 PUFF DAILY (Patient not taking: Reported on 1/28/2025), Disp: 60 each, Rfl: 10  •  guaiFENesin (MUCINEX) 600 mg 12 hr tablet, Take 1 tablet (600 mg total) by mouth 2 (two) times a day (Patient not taking: Reported on 4/22/2025), Disp: 60 tablet, Rfl: 0  •  lidocaine (LIDODERM) 5 %, Apply 1 patch topically daily for 7 days Remove & Discard patch within 12 hours or as directed by MD, Disp: 7 patch, Rfl: 0    No Known Allergies    Physical Exam:    There were no vitals taken for this visit.    Constitutional:normal, well developed, well nourished, alert, in no distress and non-toxic and no overt pain behavior.  Eyes:anicteric  HEENT:grossly intact  Neck:supple, symmetric, trachea midline and no masses   Pulmonary:even and unlabored  Cardiovascular:No edema or pitting edema present  Skin:Normal without rashes or lesions and well hydrated  Psychiatric:Mood and affect appropriate  Neurologic:Cranial Nerves II-XII grossly intact  Musculoskeletal:in wheelchair      Imaging  No orders to display         Orders Placed This Encounter   Procedures   • MM OF_Alprazolam Definitive   • MM OF_Amphetamine Definitive Test   • MM OF_Buprenorphine Definitive Test   • MM OF_Carisoprodol Definitive Test   • MM OF_Clonazepam Definitive   • MM  OF_Cocaine Definitive Test   • MM OF_Codeine Definitive   • MM OF_Dextromethorphan Definitive Test   • MM OF_Diazepam Definitive   • MM OF_Fentanyl Definitive Test   • MM OF_Gabapentin Definitive Test   • MM OF_Heroin Definitive Test   • MM OF_Hydrocodone Definitive   • MM OF_Hydromorphone Definitive   • MM OF_Levorphanol Definitive Test   • MM OF_Lorazepam Definitive   • MM OF_MDMA Definitive Test   • MM OF_Meperidine Definitive Test   • MM OF_Methadone Definitive Test   • MM OF_Methylphenidate Definitive Test   • MM OF_Morphine Definitive   • MM OF_Naltrexone Definitive Test   • MM OF_Oxazepam Definitive   • MM OF_Oxycodone Definitive Test - Oral Fluid   • MM OF_Oxymorphone Definitive Test   • MM OF_Phencyclidine Definitive Test   • MM OF_Pregabalin Definitive Test   • MM OF_Tapentadol Definitive Test   • MM OF_Temazepam Definitive   • MM OF_THC Definitive Test   • MM OF_Tramadol Definitive Test

## 2025-04-27 LAB
7AMINOCLONAZEPAM SAL QL CFM: NEGATIVE NG/ML
AMPHET SAL QL CFM: NEGATIVE NG/ML
BUPRENORPHINE SAL QL SCN: NEGATIVE NG/ML
CARBOXYTHC SAL QL CFM: NEGATIVE NG/ML
CCP IGG SERPL-ACNC: NEGATIVE NG/ML
COCAINE SAL QL CFM: NEGATIVE NG/ML
CODEINE SAL QL CFM: NEGATIVE NG/ML
D-METHORPHAN SAL QL CFM: NEGATIVE NG/ML
DXO+LEVORPHANOL SAL QL CFM: NEGATIVE NG/ML
DXO+LEVORPHANOL SAL QL CFM: NEGATIVE NG/ML
EDDP SAL QL CFM: NEGATIVE NG/ML
GABAPENTIN SAL QL CFM: ABNORMAL NG/ML
HYDROCODONE SAL QL CFM: NEGATIVE NG/ML
HYDROCODONE SAL QL CFM: NEGATIVE NG/ML
HYDROMORPHONE SAL QL CFM: NEGATIVE NG/ML
LEUKEMIA MARKERS BLD-IMP: NEGATIVE NG/ML
M PROTEIN 3 UR ELPH-MCNC: ABNORMAL NG/ML
M TB TUBERC IGNF/MITOGEN IGNF CONTROL: NEGATIVE NG/ML
METHADONE SAL QL CFM: NEGATIVE NG/ML
MORPHINE SAL QL CFM: NEGATIVE NG/ML
MORPHINE SAL QL CFM: NEGATIVE NG/ML
NALTREXOL SAL QL CFM: NEGATIVE NG/ML
NALTREXONE SAL QL CFM: NEGATIVE NG/ML
OXYMORPHONE SAL QL CFM: NEGATIVE NG/ML
OXYMORPHONE SAL QL CFM: NEGATIVE NG/ML
PCP SAL QL CFM: NEGATIVE NG/ML
PREGABALIN SAL QL CFM: NEGATIVE NG/ML
SL AMB 6-MAM (HEROIN METABOLITE) QUANTIFICATION: NEGATIVE NG/ML
SL AMB ALPRAZOLAM QUANTIFICATION: NEGATIVE NG/ML
SL AMB CARISOPRODOL QUANTIFICATION: NEGATIVE NG/ML
SL AMB CLONAZEPAM QUANTIFICATION: NEGATIVE NG/ML
SL AMB DIAZEPAM QUANTIFICATION: NEGATIVE NG/ML
SL AMB FENTANYL QUANTIFICATION: NEGATIVE NG/ML
SL AMB MDMA QUANTIFICATION: NEGATIVE NG/ML
SL AMB MEPROBAMATE QUANTIFICATION: NEGATIVE NG/ML
SL AMB N-DESMETHYL-TRAMADOL QUANTIFICATION SALIVA: NEGATIVE NG/ML
SL AMB NORBUPRENORPHINE QUANTIFICATION: NEGATIVE NG/ML
SL AMB NORDIAZEPAM QUANTIFICATION: NEGATIVE NG/ML
SL AMB NORFENTANYL QUANTIFICATION: NEGATIVE NG/ML
SL AMB NORHYDROCODONE QUANTIFICATION: NEGATIVE NG/ML
SL AMB NORHYDROCODONE QUANTIFICATION: NEGATIVE NG/ML
SL AMB NORMEPERIDINE QUANTIFICATION: NEGATIVE NG/ML
SL AMB NOROXYCODONE QUANTIFICATION: ABNORMAL NG/ML
SL AMB OXAZEPAM QUANTIFICATION: NEGATIVE NG/ML
SL AMB RITALINIC ACID QUANTIFICATION: NEGATIVE NG/ML
SL AMB TEMAZEPAM QUANTIFICATION: NEGATIVE NG/ML
SL AMB TEMAZEPAM QUANTIFICATION: NEGATIVE NG/ML
SL AMB TRAMADOL QUANTIFICATION: NEGATIVE NG/ML
SQUAMOUS #/AREA URNS HPF: NEGATIVE NG/ML
TAPENTADOL SAL QL CFM: NEGATIVE NG/ML

## 2025-05-21 DIAGNOSIS — M19.91 PRIMARY OSTEOARTHRITIS, UNSPECIFIED SITE: ICD-10-CM

## 2025-05-21 DIAGNOSIS — R06.02 SOB (SHORTNESS OF BREATH): ICD-10-CM

## 2025-05-21 DIAGNOSIS — K21.9 GASTROESOPHAGEAL REFLUX DISEASE WITHOUT ESOPHAGITIS: ICD-10-CM

## 2025-05-21 DIAGNOSIS — M05.79 RHEUMATOID ARTHRITIS INVOLVING MULTIPLE SITES WITH POSITIVE RHEUMATOID FACTOR (HCC): ICD-10-CM

## 2025-05-21 RX ORDER — FOLIC ACID 1 MG/1
TABLET ORAL DAILY
Qty: 90 TABLET | Refills: 1 | Status: SHIPPED | OUTPATIENT
Start: 2025-05-21

## 2025-05-21 RX ORDER — CARVEDILOL 3.12 MG/1
3.12 TABLET ORAL 2 TIMES DAILY WITH MEALS
Qty: 180 TABLET | Refills: 1 | Status: SHIPPED | OUTPATIENT
Start: 2025-05-21

## 2025-05-22 RX ORDER — PANTOPRAZOLE SODIUM 40 MG/1
40 TABLET, DELAYED RELEASE ORAL DAILY
Qty: 90 TABLET | Refills: 1 | Status: SHIPPED | OUTPATIENT
Start: 2025-05-22

## 2025-05-22 RX ORDER — PREDNISONE 2.5 MG/1
2.5 TABLET ORAL DAILY
Qty: 30 TABLET | Refills: 0 | Status: SHIPPED | OUTPATIENT
Start: 2025-05-22

## 2025-05-22 NOTE — TELEPHONE ENCOUNTER
Patient's son called to check on the status of patient's refill for her prednisone and pantoprazole. Patient completely out of these meds and needs asap. Preferred pharmacy is [facility]

## 2025-06-10 DIAGNOSIS — J45.30 MILD PERSISTENT ASTHMA WITHOUT COMPLICATION: ICD-10-CM

## 2025-06-10 RX ORDER — BUDESONIDE AND FORMOTEROL FUMARATE DIHYDRATE 160; 4.5 UG/1; UG/1
AEROSOL RESPIRATORY (INHALATION)
Qty: 10.2 G | Refills: 5 | Status: SHIPPED | OUTPATIENT
Start: 2025-06-10

## 2025-06-19 DIAGNOSIS — M19.91 PRIMARY OSTEOARTHRITIS, UNSPECIFIED SITE: ICD-10-CM

## 2025-06-19 NOTE — TELEPHONE ENCOUNTER
Pt 's son Diaz requested a refill for the predniSONE 2.5 mg tablet   ( 35 day supply of pills) until patient's next appt.       Please send to:   [facility] - [place], [place] - [address]         Thank you.

## 2025-06-20 RX ORDER — PREDNISONE 2.5 MG/1
2.5 TABLET ORAL DAILY
Qty: 30 TABLET | Refills: 0 | Status: SHIPPED | OUTPATIENT
Start: 2025-06-20

## 2025-07-08 DIAGNOSIS — M06.09 RHEUMATOID ARTHRITIS OF MULTIPLE SITES WITH NEGATIVE RHEUMATOID FACTOR (HCC): ICD-10-CM

## 2025-07-09 DIAGNOSIS — M06.09 RHEUMATOID ARTHRITIS OF MULTIPLE SITES WITH NEGATIVE RHEUMATOID FACTOR (HCC): ICD-10-CM

## 2025-07-10 RX ORDER — GABAPENTIN 400 MG/1
400 CAPSULE ORAL
Qty: 90 CAPSULE | Refills: 0 | OUTPATIENT
Start: 2025-07-10 | End: 2025-10-08

## 2025-07-10 RX ORDER — GABAPENTIN 400 MG/1
400 CAPSULE ORAL
Qty: 90 CAPSULE | Refills: 0 | Status: SHIPPED | OUTPATIENT
Start: 2025-07-10 | End: 2025-10-08

## 2025-07-17 ENCOUNTER — OFFICE VISIT (OUTPATIENT)
Dept: FAMILY MEDICINE CLINIC | Facility: CLINIC | Age: OVER 89
End: 2025-07-17
Payer: COMMERCIAL

## 2025-07-17 VITALS
DIASTOLIC BLOOD PRESSURE: 64 MMHG | RESPIRATION RATE: 20 BRPM | OXYGEN SATURATION: 96 % | HEART RATE: 67 BPM | SYSTOLIC BLOOD PRESSURE: 122 MMHG | TEMPERATURE: 98.1 F

## 2025-07-17 DIAGNOSIS — E53.8 B12 DEFICIENCY: ICD-10-CM

## 2025-07-17 DIAGNOSIS — R73.9 HYPERGLYCEMIA: ICD-10-CM

## 2025-07-17 DIAGNOSIS — Z00.00 MEDICARE ANNUAL WELLNESS VISIT, SUBSEQUENT: Primary | ICD-10-CM

## 2025-07-17 DIAGNOSIS — H61.23 BILATERAL IMPACTED CERUMEN: ICD-10-CM

## 2025-07-17 DIAGNOSIS — Z13.29 SCREENING FOR THYROID DISORDER: ICD-10-CM

## 2025-07-17 DIAGNOSIS — M06.09 RHEUMATOID ARTHRITIS OF MULTIPLE SITES WITH NEGATIVE RHEUMATOID FACTOR (HCC): ICD-10-CM

## 2025-07-17 DIAGNOSIS — Z13.220 SCREENING CHOLESTEROL LEVEL: ICD-10-CM

## 2025-07-17 DIAGNOSIS — N18.30 STAGE 3 CHRONIC KIDNEY DISEASE, UNSPECIFIED WHETHER STAGE 3A OR 3B CKD (HCC): ICD-10-CM

## 2025-07-17 DIAGNOSIS — E87.6 HYPOKALEMIA: ICD-10-CM

## 2025-07-17 PROCEDURE — G0439 PPPS, SUBSEQ VISIT: HCPCS | Performed by: FAMILY MEDICINE

## 2025-07-17 NOTE — PROGRESS NOTES
Name: Dennise Arnett      : 1929      MRN: [id number]  Encounter Provider: Verna Ybarra MD  Encounter Date: 2025   Encounter department: [facility]  :  Assessment & Plan  Medicare annual wellness visit, subsequent         Screening for thyroid disorder    Orders:  •  TSH, 3rd generation with Free T4 reflex; Future    B12 deficiency    Orders:  •  Vitamin B12; Future    Screening cholesterol level    Orders:  •  Lipid panel; Future    Hyperglycemia         Hypokalemia         Stage 3 chronic kidney disease, unspecified whether stage 3a or 3b CKD (HCC)  Lab Results   Component Value Date    EGFR 67 2024    EGFR 71 2024    EGFR 69 2024    CREATININE 0.75 2024    CREATININE 0.72 2024    CREATININE 0.74 2024            Rheumatoid arthritis of multiple sites with negative rheumatoid factor (HCC)    Orders:  •  CBC and differential; Future  •  Comprehensive metabolic panel; Future    Bilateral impacted cerumen  Cerumen removed bilateral          Preventive health issues were discussed with patient, and age appropriate screening tests were ordered as noted in patient's After Visit Summary. Personalized health advice and appropriate referrals for health education or preventive services given if needed, as noted in patient's After Visit Summary.    History of Present Illness     HPI   89-year-old female presenting the office.  States that she has been taking the Marlene.  States that she gets sharp pains out of nowhere.  Patient complaining also of bilateral ear pain  Patient Care Team:  Verna Ybarra MD as PCP - General (Family Medicine)  Verna Ybarra MD as PCP - PCP-[facility] ([facility])    Review of Systems   Constitutional:  Positive for fatigue. Negative for activity change, appetite change, chills and fever.   HENT:  Positive for ear pain. Negative for congestion.    Respiratory:  Negative for cough, chest  tightness and shortness of breath.    Cardiovascular:  Negative for chest pain and leg swelling.   Gastrointestinal:  Negative for abdominal distention, abdominal pain, constipation, diarrhea, nausea and vomiting.   Musculoskeletal:  Positive for arthralgias and back pain.   All other systems reviewed and are negative.    Medical History Reviewed by provider this encounter:       Annual Wellness Visit Questionnaire   Dennise is here for her Subsequent Wellness visit.     Health Risk Assessment:   Patient rates overall health as good. Patient feels that their physical health rating is same. Patient is satisfied with their life. Eyesight was rated as same. Hearing was rated as same. Patient feels that their emotional and mental health rating is same. Patients states they are never, rarely angry. Patient states they are sometimes unusually tired/fatigued. Pain experienced in the last 7 days has been a lot.     Fall Risk Screening:   In the past year, patient has experienced: no history of falling in past year      Urinary Incontinence Screening:   Patient has not leaked urine accidently in the last six months.     Home Safety:  Patient does not have trouble with stairs inside or outside of their home. Patient has working smoke alarms and has working carbon monoxide detector. Home safety hazards include: none.     Nutrition:   Current diet is Regular.     Medications:   Patient is not currently taking any over-the-counter supplements. Patient is able to manage medications.     Activities of Daily Living (ADLs)/Instrumental Activities of Daily Living (IADLs):   Walk and transfer into and out of bed and chair?: Yes  Dress and groom yourself?: Yes    Bathe or shower yourself?: Yes    Feed yourself? Yes  Do your laundry/housekeeping?: Yes  Manage your money, pay your bills and track your expenses?: Yes  Make your own meals?: Yes    Do your own shopping?: Yes    Previous Hospitalizations:   Any hospitalizations or ED visits  within the last 12 months?: No      Advance Care Planning:   Living will: Yes    Advanced directive: Yes      Cognitive Screening:   Provider or family/friend/caregiver concerned regarding cognition?: No    Preventive Screenings      Cardiovascular Screening:    General: Screening Current      Diabetes Screening:     General: Screening Current      Colorectal Cancer Screening:     General: Screening Not Indicated      Cervical Cancer Screening:    General: Screening Not Indicated      Lung Cancer Screening:     General: Screening Not Indicated    Screening, Brief Intervention, and Referral to Treatment (SBIRT)     Screening  Typical number of drinks in a day: 0  Typical number of drinks in a week: 0  Interpretation: Low risk drinking behavior.    Single Item Drug Screening:  How often have you used an illegal drug (including marijuana) or a prescription medication for non-medical reasons in the past year? never    Single Item Drug Screen Score: 0  Interpretation: Negative screen for possible drug use disorder    Review of Current Opioid Use    Opioid Risk Tool (ORT) Interpretation: Complete Opioid Risk Tool (ORT)    Social Drivers of Health     Financial Resource Strain: Low Risk  (4/24/2023)    Overall Financial Resource Strain (CARDIA)    • Difficulty of Paying Living Expenses: Not hard at all   Food Insecurity: No Food Insecurity (11/20/2024)    Nursing - Inadequate Food Risk Classification    • Ran Out of Food in the Last Year: Never true   Transportation Needs: No Transportation Needs (11/20/2024)    Nursing - Transportation Risk Classification    • Lack of Transportation: No   Housing Stability: Unknown (11/20/2024)    Nursing: Inadequate Housing Risk Classification    • Unable to Pay for Housing in the Last Year: No    • Has Housing: No   Utilities: Not At Risk (11/20/2024)    Nursing - Utilities Risk Classification    • Threatened with loss of utilities: No     No results found.    Objective   /64 (BP  Location: Right arm, Patient Position: Sitting, Cuff Size: Standard)   Pulse 67   Temp 98.1 °F (36.7 °C) (Temporal)   Resp 20   SpO2 96%     Physical Exam  Vitals reviewed.   Constitutional:       Appearance: Normal appearance. She is well-developed.   HENT:      Head: Normocephalic and atraumatic.      Right Ear: Tympanic membrane, ear canal and external ear normal. There is impacted cerumen.      Left Ear: Tympanic membrane, ear canal and external ear normal. There is impacted cerumen.      Nose: Nose normal.      Mouth/Throat:      Mouth: Mucous membranes are moist.      Pharynx: Oropharynx is clear.     Eyes:      Conjunctiva/sclera: Conjunctivae normal.      Pupils: Pupils are equal, round, and reactive to light.       Cardiovascular:      Rate and Rhythm: Normal rate and regular rhythm.      Heart sounds: Normal heart sounds.   Pulmonary:      Effort: Pulmonary effort is normal.      Breath sounds: Normal breath sounds.   Abdominal:      General: Abdomen is flat. Bowel sounds are normal.      Palpations: Abdomen is soft.     Musculoskeletal:         General: Normal range of motion.      Cervical back: Normal range of motion and neck supple.     Skin:     General: Skin is warm.      Capillary Refill: Capillary refill takes less than 2 seconds.     Neurological:      General: No focal deficit present.      Mental Status: She is alert and oriented to person, place, and time. Mental status is at baseline.     Psychiatric:         Mood and Affect: Mood normal.         Behavior: Behavior normal.         Thought Content: Thought content normal.         Judgment: Judgment normal.

## 2025-07-20 DIAGNOSIS — M19.91 PRIMARY OSTEOARTHRITIS, UNSPECIFIED SITE: ICD-10-CM

## 2025-07-21 ENCOUNTER — TELEPHONE (OUTPATIENT)
Age: OVER 89
End: 2025-07-21

## 2025-07-21 DIAGNOSIS — M19.91 PRIMARY OSTEOARTHRITIS, UNSPECIFIED SITE: ICD-10-CM

## 2025-07-21 RX ORDER — PREDNISONE 2.5 MG/1
2.5 TABLET ORAL DAILY
Qty: 30 TABLET | Refills: 0 | OUTPATIENT
Start: 2025-07-21

## 2025-07-21 RX ORDER — PREDNISONE 2.5 MG/1
2.5 TABLET ORAL DAILY
Qty: 30 TABLET | Refills: 3 | Status: SHIPPED | OUTPATIENT
Start: 2025-07-21

## 2025-07-21 NOTE — TELEPHONE ENCOUNTER
Unfortunately I just got the refill request today.  I just sent it to [facility] given that I was seeing patients all day

## 2025-07-21 NOTE — TELEPHONE ENCOUNTER
Unable to reach patients son, line just beeps and the patents number has a VM that has not been set up   Will try later

## 2025-07-22 NOTE — ASSESSMENT & PLAN NOTE
Lab Results   Component Value Date    EGFR 67 12/23/2024    EGFR 71 11/25/2024    EGFR 69 11/24/2024    CREATININE 0.75 12/23/2024    CREATININE 0.72 11/25/2024    CREATININE 0.74 11/24/2024

## 2025-07-29 ENCOUNTER — TELEPHONE (OUTPATIENT)
Age: OVER 89
End: 2025-07-29

## 2025-07-29 ENCOUNTER — OFFICE VISIT (OUTPATIENT)
Age: OVER 89
End: 2025-07-29
Payer: COMMERCIAL

## 2025-07-29 DIAGNOSIS — G89.4 CHRONIC PAIN SYNDROME: ICD-10-CM

## 2025-07-29 DIAGNOSIS — Z79.891 LONG-TERM CURRENT USE OF OPIATE ANALGESIC: Primary | ICD-10-CM

## 2025-07-29 DIAGNOSIS — F11.20 UNCOMPLICATED OPIOID DEPENDENCE (HCC): ICD-10-CM

## 2025-07-29 PROCEDURE — 99214 OFFICE O/P EST MOD 30 MIN: CPT | Performed by: STUDENT IN AN ORGANIZED HEALTH CARE EDUCATION/TRAINING PROGRAM

## 2025-07-29 RX ORDER — OXYCODONE AND ACETAMINOPHEN 5; 325 MG/1; MG/1
0.5 TABLET ORAL EVERY 6 HOURS PRN
Qty: 60 TABLET | Refills: 0 | Status: SHIPPED | OUTPATIENT
Start: 2025-08-28 | End: 2025-09-27

## 2025-07-29 RX ORDER — OXYCODONE AND ACETAMINOPHEN 5; 325 MG/1; MG/1
0.5 TABLET ORAL EVERY 6 HOURS PRN
Qty: 60 TABLET | Refills: 0 | Status: SHIPPED | OUTPATIENT
Start: 2025-07-29 | End: 2025-08-28

## 2025-07-29 RX ORDER — OXYCODONE AND ACETAMINOPHEN 5; 325 MG/1; MG/1
0.5 TABLET ORAL EVERY 6 HOURS PRN
Qty: 60 TABLET | Refills: 0 | Status: SHIPPED | OUTPATIENT
Start: 2025-09-27 | End: 2025-10-27

## 2025-08-01 LAB
7AMINOCLONAZEPAM SAL QL CFM: NEGATIVE NG/ML
AMPHET SAL QL CFM: NEGATIVE NG/ML
BUPRENORPHINE SAL QL SCN: NEGATIVE NG/ML
CARBOXYTHC SAL QL CFM: NEGATIVE NG/ML
CCP IGG SERPL-ACNC: NEGATIVE NG/ML
COCAINE SAL QL CFM: NEGATIVE NG/ML
CODEINE SAL QL CFM: NEGATIVE NG/ML
D-METHORPHAN SAL QL CFM: NEGATIVE NG/ML
DXO+LEVORPHANOL SAL QL CFM: NEGATIVE NG/ML
DXO+LEVORPHANOL SAL QL CFM: NEGATIVE NG/ML
EDDP SAL QL CFM: NEGATIVE NG/ML
GABAPENTIN SAL QL CFM: ABNORMAL NG/ML
HYDROCODONE SAL QL CFM: NEGATIVE NG/ML
HYDROCODONE SAL QL CFM: NEGATIVE NG/ML
HYDROMORPHONE SAL QL CFM: NEGATIVE NG/ML
LEUKEMIA MARKERS BLD-IMP: NEGATIVE NG/ML
M PROTEIN 3 UR ELPH-MCNC: ABNORMAL NG/ML
M TB TUBERC IGNF/MITOGEN IGNF CONTROL: NEGATIVE NG/ML
METHADONE SAL QL CFM: NEGATIVE NG/ML
MORPHINE SAL QL CFM: NEGATIVE NG/ML
MORPHINE SAL QL CFM: NEGATIVE NG/ML
NALTREXOL SAL QL CFM: NEGATIVE NG/ML
NALTREXONE SAL QL CFM: NEGATIVE NG/ML
OXYMORPHONE SAL QL CFM: NEGATIVE NG/ML
OXYMORPHONE SAL QL CFM: NEGATIVE NG/ML
PCP SAL QL CFM: NEGATIVE NG/ML
PREGABALIN SAL QL CFM: NEGATIVE NG/ML
SL AMB 6-MAM (HEROIN METABOLITE) QUANTIFICATION: NEGATIVE NG/ML
SL AMB ALPRAZOLAM QUANTIFICATION: NEGATIVE NG/ML
SL AMB CARISOPRODOL QUANTIFICATION: NEGATIVE NG/ML
SL AMB CLONAZEPAM QUANTIFICATION: NEGATIVE NG/ML
SL AMB DIAZEPAM QUANTIFICATION: NEGATIVE NG/ML
SL AMB FENTANYL QUANTIFICATION: NEGATIVE NG/ML
SL AMB MDMA QUANTIFICATION: NEGATIVE NG/ML
SL AMB MEPROBAMATE QUANTIFICATION: NEGATIVE NG/ML
SL AMB N-DESMETHYL-TRAMADOL QUANTIFICATION SALIVA: NEGATIVE NG/ML
SL AMB NORBUPRENORPHINE QUANTIFICATION: NEGATIVE NG/ML
SL AMB NORDIAZEPAM QUANTIFICATION: NEGATIVE NG/ML
SL AMB NORFENTANYL QUANTIFICATION: NEGATIVE NG/ML
SL AMB NORHYDROCODONE QUANTIFICATION: NEGATIVE NG/ML
SL AMB NORHYDROCODONE QUANTIFICATION: NEGATIVE NG/ML
SL AMB NORMEPERIDINE QUANTIFICATION: NEGATIVE NG/ML
SL AMB NOROXYCODONE QUANTIFICATION: ABNORMAL NG/ML
SL AMB OXAZEPAM QUANTIFICATION: NEGATIVE NG/ML
SL AMB RITALINIC ACID QUANTIFICATION: NEGATIVE NG/ML
SL AMB TEMAZEPAM QUANTIFICATION: NEGATIVE NG/ML
SL AMB TEMAZEPAM QUANTIFICATION: NEGATIVE NG/ML
SL AMB TRAMADOL QUANTIFICATION: NEGATIVE NG/ML
SQUAMOUS #/AREA URNS HPF: NEGATIVE NG/ML
TAPENTADOL SAL QL CFM: NEGATIVE NG/ML

## (undated) DEVICE — GLOVE SRG BIOGEL 7.5

## (undated) DEVICE — CURITY NON-ADHERENT STRIPS: Brand: CURITY

## (undated) DEVICE — 3M™ STERI-DRAPE™ U-DRAPE 1015: Brand: STERI-DRAPE™

## (undated) DEVICE — GAUZE SPONGES,16 PLY: Brand: CURITY

## (undated) DEVICE — SPONGE SCRUB 4 PCT CHLORHEXIDINE

## (undated) DEVICE — GLOVE INDICATOR PI UNDERGLOVE SZ 7.5 BLUE

## (undated) DEVICE — BRUSH EZ SCRUB PCMX W/NAIL CLEANER

## (undated) DEVICE — STOCKINETTE,IMPERVIOUS,12X48,STERILE: Brand: MEDLINE

## (undated) DEVICE — PAD CAST 4 IN COTTON NON STERILE

## (undated) DEVICE — ASTOUND STANDARD SURGICAL GOWN, XL: Brand: CONVERTORS

## (undated) DEVICE — INTENDED FOR TISSUE SEPARATION, AND OTHER PROCEDURES THAT REQUIRE A SHARP SURGICAL BLADE TO PUNCTURE OR CUT.: Brand: BARD-PARKER SAFETY BLADES SIZE 15, STERILE

## (undated) DEVICE — ACE WRAP 2 IN UNSTERILE

## (undated) DEVICE — PACK GENERAL LF

## (undated) DEVICE — PADDING CAST 3IN COTTON STRL

## (undated) DEVICE — ARM SLING: Brand: DEROYAL

## (undated) DEVICE — EXTREMITY DRAPE W/ARMBOARD COVERS: Brand: CONVERTORS

## (undated) DEVICE — COBAN 4 IN STERILE